# Patient Record
Sex: FEMALE | Race: WHITE | NOT HISPANIC OR LATINO | Employment: OTHER | URBAN - METROPOLITAN AREA
[De-identification: names, ages, dates, MRNs, and addresses within clinical notes are randomized per-mention and may not be internally consistent; named-entity substitution may affect disease eponyms.]

---

## 2017-01-31 ENCOUNTER — TELEPHONE (OUTPATIENT)
Dept: FAMILY MEDICINE CLINIC | Facility: CLINIC | Age: 72
End: 2017-01-31

## 2017-01-31 NOTE — TELEPHONE ENCOUNTER
Called raine [phone] re physical therapy/nursing for new hospital visit knee okd but pt not seen since 10/16 and would need appt.

## 2017-02-10 ENCOUNTER — OFFICE VISIT (OUTPATIENT)
Dept: FAMILY MEDICINE CLINIC | Facility: CLINIC | Age: 72
End: 2017-02-10

## 2017-02-10 VITALS
SYSTOLIC BLOOD PRESSURE: 130 MMHG | BODY MASS INDEX: 41.32 KG/M2 | RESPIRATION RATE: 16 BRPM | HEART RATE: 80 BPM | TEMPERATURE: 98.3 F | DIASTOLIC BLOOD PRESSURE: 82 MMHG | WEIGHT: 248 LBS | OXYGEN SATURATION: 96 % | HEIGHT: 65 IN

## 2017-02-10 DIAGNOSIS — M15.9 PRIMARY OSTEOARTHRITIS INVOLVING MULTIPLE JOINTS: Primary | ICD-10-CM

## 2017-02-10 DIAGNOSIS — E78.5 HYPERLIPIDEMIA, UNSPECIFIED HYPERLIPIDEMIA TYPE: ICD-10-CM

## 2017-02-10 DIAGNOSIS — E03.9 HYPOTHYROIDISM, UNSPECIFIED TYPE: ICD-10-CM

## 2017-02-10 PROCEDURE — 99214 OFFICE O/P EST MOD 30 MIN: CPT | Performed by: INTERNAL MEDICINE

## 2017-02-10 RX ORDER — PANTOPRAZOLE SODIUM 40 MG/1
40 TABLET, DELAYED RELEASE ORAL DAILY
COMMUNITY
End: 2017-10-16

## 2017-02-10 RX ORDER — LEVOTHYROXINE SODIUM 0.12 MG/1
125 TABLET ORAL DAILY
Qty: 90 TABLET | Refills: 3 | Status: SHIPPED | OUTPATIENT
Start: 2017-02-10 | End: 2017-04-06 | Stop reason: DRUGHIGH

## 2017-02-10 RX ORDER — OXYCODONE HYDROCHLORIDE AND ACETAMINOPHEN 5; 325 MG/1; MG/1
TABLET ORAL
COMMUNITY
Start: 2017-02-06 | End: 2017-09-19

## 2017-02-10 RX ORDER — RIZATRIPTAN BENZOATE 10 MG/1
10 TABLET ORAL ONCE AS NEEDED
COMMUNITY
End: 2017-02-10 | Stop reason: SDUPTHER

## 2017-02-10 RX ORDER — RIZATRIPTAN BENZOATE 10 MG/1
10 TABLET ORAL ONCE AS NEEDED
Qty: 5 TABLET | Refills: 3 | Status: SHIPPED | OUTPATIENT
Start: 2017-02-10 | End: 2018-05-03

## 2017-02-10 RX ORDER — ASPIRIN 325 MG
TABLET, DELAYED RELEASE (ENTERIC COATED) ORAL
COMMUNITY
Start: 2017-01-27 | End: 2017-11-01 | Stop reason: DRUGHIGH

## 2017-02-10 NOTE — PROGRESS NOTES
Mesha Lane is a 71 y.o. female.     History of Present Illness   Patient was really simply discharged hard in [facility] for a knee replacement.  Patient is doing extremely well she refill her medications and return to our clinic in 3 months.    Her hypothyroidism been well-controlled on her medication.  She is also controlling her lipid status with diet.    Much of this encounter note is an electronic transcription/translation of spoken language to printed text.  The electronic translation of spoken language may permit erroneous, or at times, nonsensical words or phrases to be inadvertently transcribed.  Although I have reviewed the note for such errors, some may still exist.  The following portions of the patient's history were reviewed and updated as appropriate: allergies, current medications, past family history, past medical history, past social history, past surgical history and problem list.    Review of Systems   Constitutional: Negative for fatigue and fever.   HENT: Positive for congestion. Negative for trouble swallowing.    Eyes: Negative for discharge and visual disturbance.   Respiratory: Negative for choking and shortness of breath.    Cardiovascular: Negative for chest pain and palpitations.   Gastrointestinal: Negative for abdominal pain and blood in stool.   Endocrine: Negative.    Genitourinary: Negative for genital sores and hematuria.   Musculoskeletal: Negative for gait problem and joint swelling.        Knee pain   Skin: Negative for color change, pallor, rash and wound.   Allergic/Immunologic: Positive for environmental allergies. Negative for immunocompromised state.   Neurological: Negative for facial asymmetry and speech difficulty.   Psychiatric/Behavioral: Negative for hallucinations and suicidal ideas.       Objective   Physical Exam   Constitutional: She is oriented to person, place, and time. She appears well-developed and well-nourished.   HENT:   Head:  Normocephalic.   Eyes: Conjunctivae are normal. Pupils are equal, round, and reactive to light.   Neck: Normal range of motion. Neck supple.   Cardiovascular: Normal rate, regular rhythm and normal heart sounds.    Pulmonary/Chest: Effort normal and breath sounds normal.   Abdominal: Soft. Bowel sounds are normal.   Musculoskeletal: She exhibits edema, tenderness and deformity.   Pain to flexion extension left knee   Neurological: She is alert and oriented to person, place, and time.   Skin: Skin is warm and dry.   Psychiatric: She has a normal mood and affect. Her behavior is normal. Judgment and thought content normal.   Nursing note and vitals reviewed.      Assessment/Plan   Problems Addressed this Visit        Cardiovascular and Mediastinum    Hyperlipemia       Endocrine    Hypothyroidism    Relevant Medications    levothyroxine (SYNTHROID) 125 MCG tablet       Musculoskeletal and Integument    Primary osteoarthritis involving multiple joints - Primary

## 2017-03-03 ENCOUNTER — LAB (OUTPATIENT)
Dept: FAMILY MEDICINE CLINIC | Facility: CLINIC | Age: 72
End: 2017-03-03

## 2017-03-03 DIAGNOSIS — M15.9 PRIMARY OSTEOARTHRITIS INVOLVING MULTIPLE JOINTS: ICD-10-CM

## 2017-03-03 DIAGNOSIS — E78.5 HYPERLIPIDEMIA, UNSPECIFIED HYPERLIPIDEMIA TYPE: ICD-10-CM

## 2017-03-03 DIAGNOSIS — R79.89 LOW VITAMIN D LEVEL: ICD-10-CM

## 2017-03-03 DIAGNOSIS — E03.9 HYPOTHYROIDISM, UNSPECIFIED TYPE: ICD-10-CM

## 2017-03-03 LAB
25(OH)D3 SERPL-MCNC: 29.6 NG/ML (ref 30–100)
ALBUMIN SERPL-MCNC: 4.2 G/DL (ref 3.5–5.2)
ALBUMIN/GLOB SERPL: 1.3 G/DL
ALP SERPL-CCNC: 69 U/L (ref 39–117)
ALT SERPL W P-5'-P-CCNC: 17 U/L (ref 1–33)
ANION GAP SERPL CALCULATED.3IONS-SCNC: 12.1 MMOL/L
AST SERPL-CCNC: 19 U/L (ref 1–32)
BILIRUB SERPL-MCNC: 0.7 MG/DL (ref 0.1–1.2)
BUN BLD-MCNC: 16 MG/DL (ref 8–23)
BUN/CREAT SERPL: 16.8 (ref 7–25)
CALCIUM SPEC-SCNC: 9.9 MG/DL (ref 8.6–10.5)
CHLORIDE SERPL-SCNC: 101 MMOL/L (ref 98–107)
CHOLEST SERPL-MCNC: 185 MG/DL (ref 0–200)
CO2 SERPL-SCNC: 26.9 MMOL/L (ref 22–29)
CREAT BLD-MCNC: 0.95 MG/DL (ref 0.57–1)
ERYTHROCYTE [DISTWIDTH] IN BLOOD BY AUTOMATED COUNT: 12.8 % (ref 4.5–15)
GFR SERPL CREATININE-BSD FRML MDRD: 58 ML/MIN/1.73
GLOBULIN UR ELPH-MCNC: 3.3 GM/DL
GLUCOSE BLD-MCNC: 97 MG/DL (ref 65–99)
HCT VFR BLD AUTO: 43.2 % (ref 31–42)
HDLC SERPL-MCNC: 39 MG/DL (ref 40–60)
HGB BLD-MCNC: 14.3 G/DL (ref 12–18)
LDLC SERPL CALC-MCNC: 114 MG/DL (ref 0–100)
LDLC/HDLC SERPL: 2.93 {RATIO}
LYMPHOCYTES # BLD AUTO: 2.2 10*3/MM3 (ref 1.2–3.4)
LYMPHOCYTES NFR BLD AUTO: 26.2 % (ref 21–51)
MCH RBC QN AUTO: 30.3 PG (ref 26.1–33.1)
MCHC RBC AUTO-ENTMCNC: 33.1 G/DL (ref 33–37)
MCV RBC AUTO: 91.3 FL (ref 80–99)
MONOCYTES # BLD AUTO: 0.5 10*3/MM3 (ref 0.1–0.6)
MONOCYTES NFR BLD AUTO: 6 % (ref 2–9)
NEUTROPHILS # BLD AUTO: 5.7 10*3/MM3 (ref 1.4–6.5)
NEUTROPHILS NFR BLD AUTO: 67.8 % (ref 42–75)
PLATELET # BLD AUTO: 298 10*3/MM3 (ref 150–450)
PMV BLD AUTO: 8.6 FL (ref 7.1–10.5)
POTASSIUM BLD-SCNC: 4.8 MMOL/L (ref 3.5–5.2)
PROT SERPL-MCNC: 7.5 G/DL (ref 6–8.5)
RBC # BLD AUTO: 4.73 10*6/MM3 (ref 4–6)
SODIUM BLD-SCNC: 140 MMOL/L (ref 136–145)
T-UPTAKE NFR SERPL: 0.97 TBI (ref 0.8–1.3)
T4 SERPL-MCNC: 9.64 MCG/DL (ref 4.5–11.7)
TRIGL SERPL-MCNC: 158 MG/DL (ref 0–150)
TSH SERPL DL<=0.05 MIU/L-ACNC: 4.39 MIU/ML (ref 0.27–4.2)
VLDLC SERPL-MCNC: 31.6 MG/DL (ref 5–40)
WBC NRBC COR # BLD: 8.4 10*3/MM3 (ref 4.5–10)

## 2017-03-03 PROCEDURE — 82306 VITAMIN D 25 HYDROXY: CPT | Performed by: INTERNAL MEDICINE

## 2017-03-03 PROCEDURE — 84436 ASSAY OF TOTAL THYROXINE: CPT | Performed by: INTERNAL MEDICINE

## 2017-03-03 PROCEDURE — 80061 LIPID PANEL: CPT | Performed by: INTERNAL MEDICINE

## 2017-03-03 PROCEDURE — 85025 COMPLETE CBC W/AUTO DIFF WBC: CPT | Performed by: INTERNAL MEDICINE

## 2017-03-03 PROCEDURE — 80053 COMPREHEN METABOLIC PANEL: CPT | Performed by: INTERNAL MEDICINE

## 2017-03-03 PROCEDURE — 84479 ASSAY OF THYROID (T3 OR T4): CPT | Performed by: INTERNAL MEDICINE

## 2017-03-03 PROCEDURE — 84443 ASSAY THYROID STIM HORMONE: CPT | Performed by: INTERNAL MEDICINE

## 2017-03-04 ENCOUNTER — TELEPHONE (OUTPATIENT)
Dept: FAMILY MEDICINE CLINIC | Facility: CLINIC | Age: 72
End: 2017-03-04

## 2017-03-04 DIAGNOSIS — E06.3 HYPOTHYROIDISM DUE TO HASHIMOTO'S THYROIDITIS: Primary | ICD-10-CM

## 2017-03-04 DIAGNOSIS — E03.8 HYPOTHYROIDISM DUE TO HASHIMOTO'S THYROIDITIS: Primary | ICD-10-CM

## 2017-03-04 NOTE — TELEPHONE ENCOUNTER
----- Message from Edis Bhakta MD sent at 3/4/2017  9:43 AM EST -----  Slightly elevated TSH would recheck in 1 month      L/m to recheck 1 month no change now

## 2017-04-01 ENCOUNTER — LAB (OUTPATIENT)
Dept: FAMILY MEDICINE CLINIC | Facility: CLINIC | Age: 72
End: 2017-04-01

## 2017-04-01 DIAGNOSIS — E03.8 HYPOTHYROIDISM DUE TO HASHIMOTO'S THYROIDITIS: ICD-10-CM

## 2017-04-01 DIAGNOSIS — E06.3 HYPOTHYROIDISM DUE TO HASHIMOTO'S THYROIDITIS: ICD-10-CM

## 2017-04-01 LAB — TSH SERPL DL<=0.05 MIU/L-ACNC: 4.31 MIU/ML (ref 0.27–4.2)

## 2017-04-01 PROCEDURE — 84443 ASSAY THYROID STIM HORMONE: CPT | Performed by: INTERNAL MEDICINE

## 2017-04-06 ENCOUNTER — TELEPHONE (OUTPATIENT)
Dept: FAMILY MEDICINE CLINIC | Facility: CLINIC | Age: 72
End: 2017-04-06

## 2017-04-06 RX ORDER — LEVOTHYROXINE SODIUM 137 UG/1
137 TABLET ORAL DAILY
Qty: 30 TABLET | Refills: 1 | Status: SHIPPED | OUTPATIENT
Start: 2017-04-06 | End: 2017-05-18 | Stop reason: SDUPTHER

## 2017-04-06 NOTE — TELEPHONE ENCOUNTER
----- Message from Coleman Lopez Jr., MD sent at 4/6/2017  7:39 AM EDT -----  Contact: PATIENT [phone]  Increase Synthroid to 137 mg daily with recheck TSH in 6 weeks  ----- Message -----     From: Sameera Stephen MA     Sent: 4/5/2017   2:37 PM       To: Coleman Lopez Jr., MD    Pt states has not missed a dose,takes 125 faithfully  ----- Message -----     From: Sandra Bradley     Sent: 4/5/2017   2:11 PM       To: Sameera Stephen MA    LABS RESULTS DONE HERE      Pt informed re: dose change and recheck in 6 weeks,couldnt get to go over so faxed

## 2017-05-16 ENCOUNTER — LAB (OUTPATIENT)
Dept: FAMILY MEDICINE CLINIC | Facility: CLINIC | Age: 72
End: 2017-05-16

## 2017-05-16 DIAGNOSIS — E03.9 HYPOTHYROIDISM, UNSPECIFIED TYPE: Primary | ICD-10-CM

## 2017-05-16 LAB — TSH SERPL DL<=0.05 MIU/L-ACNC: 3.06 MIU/ML (ref 0.27–4.2)

## 2017-05-16 PROCEDURE — 84443 ASSAY THYROID STIM HORMONE: CPT | Performed by: INTERNAL MEDICINE

## 2017-05-18 ENCOUNTER — TELEPHONE (OUTPATIENT)
Dept: FAMILY MEDICINE CLINIC | Facility: CLINIC | Age: 72
End: 2017-05-18

## 2017-05-18 RX ORDER — LEVOTHYROXINE SODIUM 137 UG/1
137 TABLET ORAL DAILY
Qty: 90 TABLET | Refills: 1 | Status: SHIPPED | OUTPATIENT
Start: 2017-05-18 | End: 2017-11-15 | Stop reason: SDUPTHER

## 2017-08-23 ENCOUNTER — OFFICE VISIT (OUTPATIENT)
Dept: FAMILY MEDICINE CLINIC | Facility: CLINIC | Age: 72
End: 2017-08-23

## 2017-08-23 VITALS
WEIGHT: 251 LBS | OXYGEN SATURATION: 93 % | HEIGHT: 65 IN | DIASTOLIC BLOOD PRESSURE: 72 MMHG | BODY MASS INDEX: 41.82 KG/M2 | HEART RATE: 106 BPM | TEMPERATURE: 98.3 F | SYSTOLIC BLOOD PRESSURE: 122 MMHG

## 2017-08-23 DIAGNOSIS — H61.21 RIGHT EAR IMPACTED CERUMEN: ICD-10-CM

## 2017-08-23 DIAGNOSIS — Z00.00 MEDICARE ANNUAL WELLNESS VISIT, INITIAL: Primary | ICD-10-CM

## 2017-08-23 DIAGNOSIS — J01.00 ACUTE MAXILLARY SINUSITIS, RECURRENCE NOT SPECIFIED: ICD-10-CM

## 2017-08-23 PROCEDURE — 69209 REMOVE IMPACTED EAR WAX UNI: CPT | Performed by: NURSE PRACTITIONER

## 2017-08-23 PROCEDURE — G0438 PPPS, INITIAL VISIT: HCPCS | Performed by: NURSE PRACTITIONER

## 2017-08-23 PROCEDURE — 99213 OFFICE O/P EST LOW 20 MIN: CPT | Performed by: NURSE PRACTITIONER

## 2017-08-23 RX ORDER — AMOXICILLIN 875 MG/1
875 TABLET, COATED ORAL 2 TIMES DAILY
Qty: 14 TABLET | Refills: 0 | Status: SHIPPED | OUTPATIENT
Start: 2017-08-23 | End: 2017-08-30

## 2017-08-23 NOTE — PROGRESS NOTES
QUICK REFERENCE INFORMATION:  The ABCs of the Annual Wellness Visit    Initial Medicare Wellness Visit    HEALTH RISK ASSESSMENT    1945    Recent Hospitalizations:  Recently treated at the following:  [facility].        Current Medical Providers:  Patient Care Team:  Edis Bhakta MD as PCP - General  Denys Valladares MD as PCP - Claims Attributed        Smoking Status:  History   Smoking Status   • Former Smoker   Smokeless Tobacco   • Not on file       Alcohol Consumption:  History   Alcohol Use No       Depression Screen:   PHQ-9 Depression Screening 8/23/2017   Little interest or pleasure in doing things 0   Feeling down, depressed, or hopeless 0   Trouble falling or staying asleep, or sleeping too much 0   Feeling tired or having little energy 0   Poor appetite or overeating 0   Feeling bad about yourself - or that you are a failure or have let yourself or your family down 0   Trouble concentrating on things, such as reading the newspaper or watching television 0   Moving or speaking so slowly that other people could have noticed. Or the opposite - being so fidgety or restless that you have been moving around a lot more than usual 0   Thoughts that you would be better off dead, or of hurting yourself in some way 0   PHQ-9 Total Score 0   If you checked off any problems, how difficult have these problems made it for you to do your work, take care of things at home, or get along with other people? Not difficult at all       Health Habits and Functional and Cognitive Screening:  Functional & Cognitive Status 8/23/2017   Do you have difficulty preparing food and eating? No   Do you have difficulty bathing yourself? No   Do you have difficulty getting dressed? No   Do you have difficulty using the toilet? No   Do you have difficulty moving around from place to place? No   In the past year have you fallen or experienced a near fall? No   Do you need help using the phone?  No   Are you deaf or  do you have serious difficulty hearing?  No   Do you need help with transportation? No   Do you need help shopping? No   Do you need help preparing meals?  No   Do you need help with housework?  No   Do you need help with laundry? No   Do you need help taking your medications? No   Do you need help managing money? No   Do you have difficulty concentrating, remembering or making decisions? No       Health Habits  Current Diet: Well Balanced Diet  Dental Exam: Up to date  Eye Exam: Up to date  Exercise (times per week): 4 times per week  Current Exercise Activities Include: Swimming          Does the patient have evidence of cognitive impairment? No    Asiprin use counseling: Start ASA 81 mg daily       Recent Lab Results:    Visual Acuity:  No exam data present    Age-appropriate Screening Schedule:  Refer to the list below for future screening recommendations based on patient's age, sex and/or medical conditions. Orders for these recommended tests are listed in the plan section. The patient has been provided with a written plan.    Health Maintenance   Topic Date Due   • TDAP/TD VACCINES (1 - Tdap) 07/22/1964   • ZOSTER VACCINE  04/11/2016   • DIABETIC FOOT EXAM  08/23/2017   • HEMOGLOBIN A1C  08/23/2017   • URINE MICROALBUMIN  08/23/2017   • INFLUENZA VACCINE  09/01/2017   • LIPID PANEL  03/03/2018   • DIABETIC EYE EXAM  06/01/2018   • PNEUMOCOCCAL VACCINES (65+ LOW/MEDIUM RISK) (2 of 2 - PPSV23) 08/23/2018   • MAMMOGRAM  12/30/2018   • COLONOSCOPY  04/21/2025        Subjective   History of Present Illness    Shwetha Lane is a 72 y.o. female who presents for an Annual Wellness Visit.    The following portions of the patient's history were reviewed and updated as appropriate: allergies, current medications, past family history, past medical history, past social history, past surgical history and problem list.    Outpatient Medications Prior to Visit   Medication Sig Dispense Refill   • levothyroxine  "(SYNTHROID) 137 MCG tablet Take 1 tablet by mouth Daily. 90 tablet 1   • aspirin  MG tablet      • oxyCODONE-acetaminophen (PERCOCET) 5-325 MG per tablet      • pantoprazole (PROTONIX) 40 MG EC tablet Take 40 mg by mouth Daily.     • rizatriptan (MAXALT) 10 MG tablet Take 1 tablet by mouth 1 (One) Time As Needed for migraine. May repeat in 2 hours if needed 5 tablet 3   • vitamin D (ERGOCALCIFEROL) 03930 UNITS capsule capsule Take 1 capsule by mouth Every 7 (Seven) Days. 32 capsule 1     No facility-administered medications prior to visit.        Patient Active Problem List   Diagnosis   • Pneumonia   • Hypothyroidism   • Primary osteoarthritis involving multiple joints   • Hyperlipemia   • Migraine       Advance Care Planning:  has an advance directive - a copy HAS NOT been provided. Have asked the patient to send this to us to add to record.    Identification of Risk Factors:  Risk factors include: weight  and inactivity.    Review of Systems    Compared to one year ago, the patient feels her physical health is worse.  Compared to one year ago, the patient feels her mental health is the same.    Objective     Physical Exam    Vitals:    08/23/17 1417   BP: 122/72   BP Location: Left arm   Patient Position: Sitting   Cuff Size: Small Adult   Pulse: 106   Temp: 98.3 °F (36.8 °C)   TempSrc: Oral   SpO2: 93%   Weight: 251 lb (114 kg)   Height: 65\" (165.1 cm)   PainSc:   6   PainLoc: Head       Body mass index is 41.77 kg/(m^2).  Discussed the patient's BMI with her. The BMI is above average; BMI management plan is completed.    Assessment/Plan   Patient Self-Management and Personalized Health Advice  The patient has been provided with information about: diet, exercise and weight management and preventive services including:   · Exercise counseling provided, Nutrition counseling provided.    Visit Diagnoses:    ICD-10-CM ICD-9-CM   1. Right ear impacted cerumen H61.21 380.4   2. Acute maxillary sinusitis, " recurrence not specified J01.00 461.0       No orders of the defined types were placed in this encounter.      Outpatient Encounter Prescriptions as of 8/23/2017   Medication Sig Dispense Refill   • levothyroxine (SYNTHROID) 137 MCG tablet Take 1 tablet by mouth Daily. 90 tablet 1   • Multiple Vitamins-Minerals (MULTIVITAMIN & MINERAL PO) Take  by mouth.     • amoxicillin (AMOXIL) 875 MG tablet Take 1 tablet by mouth 2 (Two) Times a Day for 7 days. 14 tablet 0   • aspirin  MG tablet      • oxyCODONE-acetaminophen (PERCOCET) 5-325 MG per tablet      • pantoprazole (PROTONIX) 40 MG EC tablet Take 40 mg by mouth Daily.     • rizatriptan (MAXALT) 10 MG tablet Take 1 tablet by mouth 1 (One) Time As Needed for migraine. May repeat in 2 hours if needed 5 tablet 3   • vitamin D (ERGOCALCIFEROL) 86040 UNITS capsule capsule Take 1 capsule by mouth Every 7 (Seven) Days. 32 capsule 1     No facility-administered encounter medications on file as of 8/23/2017.        Reviewed use of high risk medication in the elderly: yes  Reviewed for potential of harmful drug interactions in the elderly: yes    Follow Up:  No Follow-up on file.     An After Visit Summary and PPPS with all of these plans were given to the patient.

## 2017-08-23 NOTE — PATIENT INSTRUCTIONS
Right ear lavage today.  Start amoxicillin 875mg 2x day for 7 days, take with food.  Medicare wellness visit today.  May try OTC antihistamine, like claritin as needed for drainage.   Increase fluid intake, get plenty of rest.   Patient agrees with plan of care and understands instructions. Call if worsening symptoms or any pr    Medicare Wellness  Personal Prevention Plan of Service     Date of Office Visit:  2017  Encounter Provider:  NELLY Streeter  Place of Service:  [facility]  Patient Name: Shwetha Lane  :  1945    As part of the Medicare Wellness portion of your visit today, we are providing you with this personalized preventive plan of services (PPPS). This plan is based upon recommendations of the [place] Preventive Services Task Force (USPSTF) and the Advisory Committee on Immunization Practices (ACIP).    This lists the preventive care services that should be considered, and provides dates of when you are due. Items listed as completed are up-to-date and do not require any further intervention.    Health Maintenance   Topic Date Due   • TDAP/TD VACCINES (1 - Tdap) 1964   • HEPATITIS C SCREENING  2016   • MEDICARE ANNUAL WELLNESS  2016   • ZOSTER VACCINE  2016   • DIABETIC FOOT EXAM  2017   • HEMOGLOBIN A1C  2017   • URINE MICROALBUMIN  2017   • INFLUENZA VACCINE  2017   • LIPID PANEL  2018   • DIABETIC EYE EXAM  2018   • PNEUMOCOCCAL VACCINES (65+ LOW/MEDIUM RISK) (2 of 2 - PPSV23) 2018   • MAMMOGRAM  2018   • COLONOSCOPY  2025       No orders of the defined types were placed in this encounter.      No Follow-up on file.      oblems or concerns.

## 2017-08-23 NOTE — PROGRESS NOTES
Mesha Lane is a 72 y.o. female.     History of Present Illness   C/o er pain, fever, sore throat, stared Monday night, she has sore lymph nodes on neck, she had nose bleeds on Monday, she has HA, sinus pressure, she has body aches, she took tylenol at home which helped some. She has not been around anyone sick, she recently got home from traveling. She   The following portions of the patient's history were reviewed and updated as appropriate: allergies, current medications, past family history, past medical history, past social history, past surgical history and problem list.    Review of Systems   Constitutional: Positive for fatigue. Negative for chills, diaphoresis and fever.   HENT: Positive for ear pain, nosebleeds, postnasal drip, rhinorrhea, sinus pressure and sore throat. Negative for congestion.    Eyes: Negative for pain.   Respiratory: Negative for cough and shortness of breath.    Cardiovascular: Negative for chest pain.   Musculoskeletal: Positive for arthralgias and myalgias.   Skin: Negative for pallor.   Allergic/Immunologic: Positive for environmental allergies.   Neurological: Negative for dizziness, light-headedness and headaches.   All other systems reviewed and are negative.      Objective   Physical Exam   Constitutional: She is oriented to person, place, and time. She appears well-developed and well-nourished.   HENT:   Head: Normocephalic.   Left Ear: Tympanic membrane and ear canal normal.   Nose: Mucosal edema present. Right sinus exhibits maxillary sinus tenderness and frontal sinus tenderness. Left sinus exhibits maxillary sinus tenderness and frontal sinus tenderness.   Mouth/Throat: Uvula is midline, oropharynx is clear and moist and mucous membranes are normal.   Right cerumen impaction   Eyes: Pupils are equal, round, and reactive to light.   Neck: Neck supple.   Cardiovascular: Normal rate, regular rhythm and normal heart sounds.    Pulmonary/Chest: Effort normal  and breath sounds normal.   Musculoskeletal: Normal range of motion.   Lymphadenopathy:     She has no cervical adenopathy.   Neurological: She is alert and oriented to person, place, and time.   Skin: Skin is warm and dry.   Psychiatric: She has a normal mood and affect. Her behavior is normal. Judgment and thought content normal.   Nursing note and vitals reviewed.  right TM WNL after lavage.     Assessment/Plan   Shwetha was seen today for sinusitis.    Diagnoses and all orders for this visit:    Medicare annual wellness visit, initial    Right ear impacted cerumen    Acute maxillary sinusitis, recurrence not specified    Other orders  -     amoxicillin (AMOXIL) 875 MG tablet; Take 1 tablet by mouth 2 (Two) Times a Day for 7 days.      Right ear lavage today. Tolerated well.   Start amoxicillin 875mg 2x day for 7 days, take with food.  Medicare wellness visit today.  May try OTC antihistamine, like claritin as needed for drainage.   Increase fluid intake, get plenty of rest.   Patient agrees with plan of care and understands instructions. Call if worsening symptoms or any problems or concerns.

## 2017-09-19 ENCOUNTER — OFFICE VISIT (OUTPATIENT)
Dept: FAMILY MEDICINE CLINIC | Facility: CLINIC | Age: 72
End: 2017-09-19

## 2017-09-19 VITALS
BODY MASS INDEX: 42 KG/M2 | TEMPERATURE: 98.4 F | HEIGHT: 64 IN | DIASTOLIC BLOOD PRESSURE: 70 MMHG | HEART RATE: 93 BPM | OXYGEN SATURATION: 92 % | WEIGHT: 246 LBS | SYSTOLIC BLOOD PRESSURE: 120 MMHG

## 2017-09-19 DIAGNOSIS — J20.9 ACUTE BRONCHITIS, UNSPECIFIED ORGANISM: Primary | ICD-10-CM

## 2017-09-19 DIAGNOSIS — R06.2 WHEEZING: ICD-10-CM

## 2017-09-19 DIAGNOSIS — R06.02 SHORTNESS OF BREATH: ICD-10-CM

## 2017-09-19 DIAGNOSIS — J01.00 ACUTE MAXILLARY SINUSITIS, RECURRENCE NOT SPECIFIED: ICD-10-CM

## 2017-09-19 LAB
ERYTHROCYTE [DISTWIDTH] IN BLOOD BY AUTOMATED COUNT: 12.8 % (ref 4.5–15)
HCT VFR BLD AUTO: 45.4 % (ref 31–42)
HGB BLD-MCNC: 14.8 G/DL (ref 12–18)
LYMPHOCYTES # BLD AUTO: 3.2 10*3/MM3 (ref 1.2–3.4)
LYMPHOCYTES NFR BLD AUTO: 19.6 % (ref 21–51)
MCH RBC QN AUTO: 30.2 PG (ref 26.1–33.1)
MCHC RBC AUTO-ENTMCNC: 32.7 G/DL (ref 33–37)
MCV RBC AUTO: 92.5 FL (ref 80–99)
MONOCYTES # BLD AUTO: 0.4 10*3/MM3 (ref 0.1–0.6)
MONOCYTES NFR BLD AUTO: 2.6 % (ref 2–9)
NEUTROPHILS # BLD AUTO: 12.8 10*3/MM3 (ref 1.4–6.5)
NEUTROPHILS NFR BLD AUTO: 77.8 % (ref 42–75)
PLATELET # BLD AUTO: 333 10*3/MM3 (ref 150–450)
PMV BLD AUTO: 7.3 FL (ref 7.1–10.5)
RBC # BLD AUTO: 4.91 10*6/MM3 (ref 4–6)
WBC NRBC COR # BLD: 16.4 10*3/MM3 (ref 4.5–10)

## 2017-09-19 PROCEDURE — 36415 COLL VENOUS BLD VENIPUNCTURE: CPT | Performed by: NURSE PRACTITIONER

## 2017-09-19 PROCEDURE — 71020 XR CHEST PA AND LATERAL: CPT | Performed by: NURSE PRACTITIONER

## 2017-09-19 PROCEDURE — 99213 OFFICE O/P EST LOW 20 MIN: CPT | Performed by: NURSE PRACTITIONER

## 2017-09-19 PROCEDURE — 85025 COMPLETE CBC W/AUTO DIFF WBC: CPT | Performed by: NURSE PRACTITIONER

## 2017-09-19 RX ORDER — LEVOFLOXACIN 500 MG/1
500 TABLET, FILM COATED ORAL DAILY
Qty: 7 TABLET | Refills: 0 | Status: SHIPPED | OUTPATIENT
Start: 2017-09-19 | End: 2017-10-16

## 2017-09-19 RX ORDER — FLUCONAZOLE 150 MG/1
150 TABLET ORAL ONCE
Qty: 1 TABLET | Refills: 1 | Status: SHIPPED | OUTPATIENT
Start: 2017-09-19 | End: 2017-09-19

## 2017-09-19 RX ORDER — OXYBUTYNIN CHLORIDE 5 MG/1
TABLET ORAL
COMMUNITY
Start: 2017-08-24 | End: 2017-11-01

## 2017-09-19 RX ORDER — ALBUTEROL SULFATE 90 UG/1
2 AEROSOL, METERED RESPIRATORY (INHALATION) EVERY 4 HOURS PRN
Qty: 1 INHALER | Refills: 0 | Status: SHIPPED | OUTPATIENT
Start: 2017-09-19 | End: 2017-11-01

## 2017-09-19 RX ORDER — GUAIFENESIN AND CODEINE PHOSPHATE 100; 10 MG/5ML; MG/5ML
5 SOLUTION ORAL 4 TIMES DAILY PRN
Qty: 120 ML | Refills: 0 | Status: SHIPPED | OUTPATIENT
Start: 2017-09-19 | End: 2017-10-16

## 2017-09-19 RX ORDER — IPRATROPIUM BROMIDE AND ALBUTEROL SULFATE 2.5; .5 MG/3ML; MG/3ML
3 SOLUTION RESPIRATORY (INHALATION) ONCE
Status: DISCONTINUED | OUTPATIENT
Start: 2017-09-19 | End: 2018-05-25

## 2017-09-19 NOTE — PROGRESS NOTES
Mesha Lane is a 72 y.o. female.     History of Present Illness   C/o chest tenderness and fever beginning 13 days ago, with chest tightness and using mucinex OTC without much help, with wheezing and SOA and prod cough yellow mucus, no ear pain or sore throat, some sinus tenderness or congestion and was last seen 08/17 tx amox helped but thinks has returned, OTC tylenol helps somewhat, with hx of bronchitis and pneumonia, has used albuterol inhaler in past but none at home, doesn't like steroids as makes her anxious and jittery    The following portions of the patient's history were reviewed and updated as appropriate: allergies, current medications, past family history, past medical history, past social history, past surgical history and problem list.    Review of Systems   Constitutional: Positive for fatigue and fever.   HENT: Positive for congestion, facial swelling, postnasal drip and sinus pressure. Negative for dental problem, drooling, ear discharge, ear pain, hearing loss, mouth sores, nosebleeds, rhinorrhea, sneezing, sore throat, trouble swallowing and voice change.    Respiratory: Positive for cough, chest tightness, shortness of breath and wheezing.    Cardiovascular: Negative for chest pain, palpitations and leg swelling.   Neurological: Negative for dizziness and headaches.   Psychiatric/Behavioral: Positive for sleep disturbance. Negative for suicidal ideas. The patient is nervous/anxious.    All other systems reviewed and are negative.      Objective   Physical Exam   Constitutional: She is oriented to person, place, and time. She appears well-developed and well-nourished.   HENT:   Head: Normocephalic and atraumatic.   Right Ear: Hearing and tympanic membrane normal.   Left Ear: Hearing and tympanic membrane normal.   Nose: Mucosal edema present. Right sinus exhibits maxillary sinus tenderness. Right sinus exhibits no frontal sinus tenderness. Left sinus exhibits maxillary sinus  tenderness. Left sinus exhibits no frontal sinus tenderness.   Mouth/Throat: Posterior oropharyngeal erythema present. No oropharyngeal exudate or posterior oropharyngeal edema.   Eyes: Conjunctivae and EOM are normal. Pupils are equal, round, and reactive to light.   Neck: Normal range of motion. Neck supple. No thyromegaly present.   Cardiovascular: Normal rate, regular rhythm and normal heart sounds.    Pulmonary/Chest: Effort normal. She has wheezes (inspiratory and expiratory B upper and middle lobes).   Musculoskeletal: Normal range of motion.   Lymphadenopathy:     She has no cervical adenopathy.   Neurological: She is alert and oriented to person, place, and time.   Skin: Skin is warm and dry.   Psychiatric: Her behavior is normal. Judgment and thought content normal.   tearful   Vitals reviewed.  chest xray 2v without comparison for SOA wheezing and prod cough shows NAD awaiting radiology review    Assessment/Plan   Shwetha was seen today for cough.    Diagnoses and all orders for this visit:    Acute bronchitis, unspecified organism  -     ipratropium-albuterol (DUO-NEB) nebulizer solution 3 mL; Take 3 mL by nebulization 1 (One) Time.    Shortness of breath  -     CBC & Differential  -     XR Chest PA & Lateral  -     CBC Auto Differential  -     ipratropium-albuterol (DUO-NEB) nebulizer solution 3 mL; Take 3 mL by nebulization 1 (One) Time.    Wheezing  -     CBC & Differential  -     XR Chest PA & Lateral  -     CBC Auto Differential  -     ipratropium-albuterol (DUO-NEB) nebulizer solution 3 mL; Take 3 mL by nebulization 1 (One) Time.    Acute maxillary sinusitis, recurrence not specified    Other orders  -     levoFLOXacin (LEVAQUIN) 500 MG tablet; Take 1 tablet by mouth Daily. 1 tab qd x 10d  -     albuterol (PROVENTIL HFA;VENTOLIN HFA) 108 (90 Base) MCG/ACT inhaler; Inhale 2 puffs Every 4 (Four) Hours As Needed for Wheezing or Shortness of Air.  -     fluconazole (DIFLUCAN) 150 MG tablet; Take 1  tablet by mouth 1 (One) Time for 1 dose.  -     guaifenesin-codeine (CHERATUSSIN AC) 100-10 MG/5ML liquid; Take 5 mL by mouth 4 (Four) Times a Day As Needed for Cough.    WBC 16, CXR NAD, duoneb today, rx levaquin 500 mg 1 PO daily x 1 wk, rx albuterol inhaler 2 puffs every 4-6 hrs prn, defer steroids d/t past SE, erx diflucan 150 mg 1 PO today repeat prn, call or RTC if sx persist or worsen in 3 days, increase H20 and rest, rx cheratussin cough syrup may cause sedation

## 2017-09-19 NOTE — PATIENT INSTRUCTIONS
WBC 16, CXR NAD, duoneb today, rx levaquin 500 mg 1 PO daily x 1 wk, rx albuterol inhaler 2 puffs every 4-6 hrs prn, defer steroids d/t past SE, erx diflucan 150 mg 1 PO today repeat prn, call or RTC if sx persist or worsen in 3 days, increase H20 and rest

## 2017-09-20 ENCOUNTER — TELEPHONE (OUTPATIENT)
Dept: FAMILY MEDICINE CLINIC | Facility: CLINIC | Age: 72
End: 2017-09-20

## 2017-10-16 ENCOUNTER — OFFICE VISIT (OUTPATIENT)
Dept: FAMILY MEDICINE CLINIC | Facility: CLINIC | Age: 72
End: 2017-10-16

## 2017-10-16 VITALS
HEART RATE: 85 BPM | DIASTOLIC BLOOD PRESSURE: 72 MMHG | TEMPERATURE: 98.4 F | WEIGHT: 248.8 LBS | HEIGHT: 64 IN | SYSTOLIC BLOOD PRESSURE: 118 MMHG | OXYGEN SATURATION: 94 % | RESPIRATION RATE: 16 BRPM | BODY MASS INDEX: 42.47 KG/M2

## 2017-10-16 DIAGNOSIS — J30.2 SEASONAL ALLERGIC RHINITIS, UNSPECIFIED CHRONICITY, UNSPECIFIED TRIGGER: ICD-10-CM

## 2017-10-16 DIAGNOSIS — J01.01 ACUTE RECURRENT MAXILLARY SINUSITIS: Primary | ICD-10-CM

## 2017-10-16 DIAGNOSIS — R05.9 COUGH: ICD-10-CM

## 2017-10-16 PROCEDURE — 99213 OFFICE O/P EST LOW 20 MIN: CPT | Performed by: NURSE PRACTITIONER

## 2017-10-16 RX ORDER — FEXOFENADINE HCL AND PSEUDOEPHEDRINE HCI 60; 120 MG/1; MG/1
1 TABLET, EXTENDED RELEASE ORAL 2 TIMES DAILY
Qty: 30 TABLET | Refills: 0 | Status: SHIPPED | OUTPATIENT
Start: 2017-10-16 | End: 2017-11-01

## 2017-10-16 NOTE — PROGRESS NOTES
"Mesha Lane is a 72 y.o. female.     History of Present Illness   C/o sinus pressure and congestion, she has nasal drainage, she was seen in 8/17, given amoxicillin for sinusitis, seen again in 9/17 by trina tyler, given levaquin, cxr normal, she states she feels weak, she states chest congestion is better, she still has a productive cough, nasal drainage yellow in color,she denies fever, she denies sore throat. she states she tried sin-x which helped, she has used inhaler but did not need it this weekend. She has seen ENT for ear infection before. She saw Dr. Wilkinson. She states she had sinus infections frequently d/t a \"tooth leak.\"  The following portions of the patient's history were reviewed and updated as appropriate: allergies, current medications, past family history, past medical history, past social history, past surgical history and problem list.    Review of Systems   Constitutional: Negative for chills, diaphoresis and fever.   HENT: Positive for congestion, postnasal drip, rhinorrhea and sinus pressure. Negative for ear pain and sore throat.    Eyes: Negative for pain.   Respiratory: Positive for cough. Negative for chest tightness, shortness of breath and wheezing.    Cardiovascular: Negative for chest pain.   Musculoskeletal: Negative for arthralgias and myalgias.   Allergic/Immunologic: Positive for environmental allergies.   Neurological: Negative for dizziness, light-headedness and headaches.   All other systems reviewed and are negative.      Objective   Physical Exam   Constitutional: She is oriented to person, place, and time. She appears well-developed and well-nourished.   HENT:   Head: Normocephalic.   Right Ear: Tympanic membrane and ear canal normal.   Left Ear: Tympanic membrane and ear canal normal.   Nose: Mucosal edema and rhinorrhea present. Right sinus exhibits maxillary sinus tenderness. Left sinus exhibits maxillary sinus tenderness.   Mouth/Throat: Uvula is " midline, oropharynx is clear and moist and mucous membranes are normal.   Eyes: Pupils are equal, round, and reactive to light.   Neck: Normal range of motion. Neck supple.   Cardiovascular: Normal rate, regular rhythm and normal heart sounds.    Pulmonary/Chest: Effort normal and breath sounds normal. She has no decreased breath sounds. She has no wheezes. She has no rhonchi. She has no rales.   Musculoskeletal: Normal range of motion.   Lymphadenopathy:     She has no cervical adenopathy.   Neurological: She is alert and oriented to person, place, and time.   Skin: Skin is warm and dry.   Psychiatric: She has a normal mood and affect. Her behavior is normal. Judgment and thought content normal.   Nursing note and vitals reviewed.      Assessment/Plan   Shwetha was seen today for sinusitis.    Diagnoses and all orders for this visit:    Acute recurrent maxillary sinusitis  -     Ambulatory Referral to ENT (Otolaryngology)    Seasonal allergic rhinitis, unspecified chronicity, unspecified trigger  -     Ambulatory Referral to ENT (Otolaryngology)    Cough    Other orders  -     fexofenadine-pseudoephedrine (ALLEGRA-D)  MG per 12 hr tablet; Take 1 tablet by mouth 2 (Two) Times a Day.      Refer to ent, appt made for patient with Dr. Renner on 10/19/17 at 3:20pm.  Trial allegra-d as needed for congestion.   Deferred abx and CT sinus today as she will see ENT this week.  May try flonase OTC as needed for congestion and drainage.   Increase fluid intake, get plenty of rest.   Patient agrees with plan of care and understands instructions. Call if worsening symptoms or any problems or concerns.

## 2017-10-16 NOTE — PATIENT INSTRUCTIONS
Refer to ent, appt made for patient with Dr. Renner on 10/19/17 at 3:20pm.  Trial allegra-d as needed for congestion.   Deferred abx and CT sinus today as she will see ENT this week.  May try flonase OTC as needed for congestion and drainage.   Increase fluid intake, get plenty of rest.   Patient agrees with plan of care and understands instructions. Call if worsening symptoms or any problems or concerns.

## 2017-10-23 ENCOUNTER — TELEPHONE (OUTPATIENT)
Dept: FAMILY MEDICINE CLINIC | Facility: CLINIC | Age: 72
End: 2017-10-23

## 2017-10-23 NOTE — TELEPHONE ENCOUNTER
PT WENT TO HOSPITAL FOR PNEUMONIA AND NEEDS A F/U APPT WTH RLS.  SOONEST AVAIL ISN'T UNTIL 11/21 PLS WORK PT IN SOONER IF POSSIBLE.

## 2017-11-01 ENCOUNTER — OFFICE VISIT (OUTPATIENT)
Dept: FAMILY MEDICINE CLINIC | Facility: CLINIC | Age: 72
End: 2017-11-01

## 2017-11-01 VITALS
DIASTOLIC BLOOD PRESSURE: 88 MMHG | TEMPERATURE: 98.1 F | OXYGEN SATURATION: 93 % | HEIGHT: 64 IN | BODY MASS INDEX: 42 KG/M2 | WEIGHT: 246 LBS | RESPIRATION RATE: 16 BRPM | SYSTOLIC BLOOD PRESSURE: 120 MMHG | HEART RATE: 103 BPM

## 2017-11-01 DIAGNOSIS — B37.0 THRUSH: ICD-10-CM

## 2017-11-01 DIAGNOSIS — J18.9 PNEUMONIA OF LEFT LOWER LOBE DUE TO INFECTIOUS ORGANISM: Primary | ICD-10-CM

## 2017-11-01 DIAGNOSIS — E03.9 HYPOTHYROIDISM, UNSPECIFIED TYPE: ICD-10-CM

## 2017-11-01 DIAGNOSIS — E78.5 HYPERLIPIDEMIA, UNSPECIFIED HYPERLIPIDEMIA TYPE: ICD-10-CM

## 2017-11-01 DIAGNOSIS — E55.9 VITAMIN D DEFICIENCY: ICD-10-CM

## 2017-11-01 LAB — KOH PREP NAIL: ABNORMAL

## 2017-11-01 PROCEDURE — 87220 TISSUE EXAM FOR FUNGI: CPT | Performed by: INTERNAL MEDICINE

## 2017-11-01 PROCEDURE — 99214 OFFICE O/P EST MOD 30 MIN: CPT | Performed by: INTERNAL MEDICINE

## 2017-11-01 RX ORDER — ASPIRIN 81 MG/1
81 TABLET ORAL DAILY
COMMUNITY
End: 2020-08-18

## 2017-11-01 RX ORDER — FLUCONAZOLE 100 MG/1
100 TABLET ORAL DAILY
Qty: 14 TABLET | Refills: 3 | Status: SHIPPED | OUTPATIENT
Start: 2017-11-01 | End: 2017-12-21

## 2017-11-01 NOTE — PROGRESS NOTES
Mesha Lane is a 72 y.o. female.     History of Present Illness   He was discharged from the hospital for pneumonia.  She is following up with a very stable.  She is on thyroid medicines but there has been stable over last several months.  Lipid status is been controlled with diet and medication.  She did have oral thrush and was put on nystatin swish and swallow without success.  He also became very hoarse and was set up with ENT for endoscopy.  Patient return to our clinic in 2 weeks and recheck KOH she was placed on Diflucan on milligrams daily.    Much of this encounter note is an electronic transcription/translation of spoken language to printed text.  The electronic translation of spoken language may permit erroneous, or at times, nonsensical words or phrases to be inadvertently transcribed.  Although I have reviewed the note for such errors, some may still exist.  The following portions of the patient's history were reviewed and updated as appropriate: allergies, current medications, past family history, past medical history, past social history, past surgical history and problem list.    Review of Systems   Constitutional: Negative for fatigue and fever.   HENT: Positive for congestion. Negative for trouble swallowing.    Eyes: Negative for discharge and visual disturbance.   Respiratory: Negative for choking and shortness of breath.    Cardiovascular: Negative for chest pain and palpitations.   Gastrointestinal: Negative for abdominal pain and blood in stool.   Endocrine: Negative.    Genitourinary: Negative for genital sores and hematuria.   Musculoskeletal: Negative for gait problem and joint swelling.   Skin: Negative for color change, pallor, rash and wound.   Allergic/Immunologic: Positive for environmental allergies. Negative for immunocompromised state.   Neurological: Negative for facial asymmetry and speech difficulty.   Psychiatric/Behavioral: Negative for hallucinations and  suicidal ideas.       Objective   Physical Exam   Constitutional: She is oriented to person, place, and time. She appears well-developed and well-nourished.   HENT:   Head: Normocephalic.   Eyes: Conjunctivae are normal. Pupils are equal, round, and reactive to light.   Neck: Normal range of motion. Neck supple.   Cardiovascular: Normal rate, regular rhythm and normal heart sounds.    Pulmonary/Chest: Effort normal and breath sounds normal.   Abdominal: Soft. Bowel sounds are normal.   Musculoskeletal: Normal range of motion.   Neurological: She is alert and oriented to person, place, and time.   Skin: Skin is warm and dry.   Psychiatric: She has a normal mood and affect. Her behavior is normal. Judgment and thought content normal.   Nursing note and vitals reviewed.      Assessment/Plan   Problems Addressed this Visit        Cardiovascular and Mediastinum    Hyperlipemia    Relevant Orders    CBC & Differential    Comprehensive Metabolic Panel    Lipid Panel    Vitamin D 25 Hydroxy    Thyroid Panel With TSH       Respiratory    Pneumonia - Primary    Relevant Orders    CBC & Differential    Comprehensive Metabolic Panel    Lipid Panel    Vitamin D 25 Hydroxy    Thyroid Panel With TSH       Endocrine    Hypothyroidism    Relevant Orders    CBC & Differential    Comprehensive Metabolic Panel    Lipid Panel    Vitamin D 25 Hydroxy    Thyroid Panel With TSH      Other Visit Diagnoses     Vitamin D deficiency         Relevant Orders    Vitamin D 25 Hydroxy    Thrush        Relevant Medications    fluconazole (DIFLUCAN) 100 MG tablet    Other Relevant Orders    KOH Prep - Swab, Throat (Completed)    ANDREY Prep - Swab, Throat

## 2017-11-10 ENCOUNTER — CLINICAL SUPPORT (OUTPATIENT)
Dept: FAMILY MEDICINE CLINIC | Facility: CLINIC | Age: 72
End: 2017-11-10

## 2017-11-10 DIAGNOSIS — Z23 FLU VACCINE NEED: Primary | ICD-10-CM

## 2017-11-10 PROCEDURE — 90662 IIV NO PRSV INCREASED AG IM: CPT | Performed by: INTERNAL MEDICINE

## 2017-11-10 PROCEDURE — G0008 ADMIN INFLUENZA VIRUS VAC: HCPCS | Performed by: INTERNAL MEDICINE

## 2017-11-15 ENCOUNTER — LAB (OUTPATIENT)
Dept: FAMILY MEDICINE CLINIC | Facility: CLINIC | Age: 72
End: 2017-11-15

## 2017-11-15 DIAGNOSIS — E55.9 VITAMIN D DEFICIENCY: ICD-10-CM

## 2017-11-15 DIAGNOSIS — E78.5 HYPERLIPIDEMIA, UNSPECIFIED HYPERLIPIDEMIA TYPE: ICD-10-CM

## 2017-11-15 DIAGNOSIS — B37.0 THRUSH: ICD-10-CM

## 2017-11-15 DIAGNOSIS — E03.9 HYPOTHYROIDISM, UNSPECIFIED TYPE: ICD-10-CM

## 2017-11-15 DIAGNOSIS — J18.9 PNEUMONIA OF LEFT LOWER LOBE DUE TO INFECTIOUS ORGANISM: ICD-10-CM

## 2017-11-15 LAB
25(OH)D3 SERPL-MCNC: 36.5 NG/ML (ref 30–100)
ALBUMIN SERPL-MCNC: 4 G/DL (ref 3.5–5.2)
ALBUMIN/GLOB SERPL: 1.2 G/DL
ALP SERPL-CCNC: 129 U/L (ref 39–117)
ALT SERPL W P-5'-P-CCNC: 99 U/L (ref 1–33)
ANION GAP SERPL CALCULATED.3IONS-SCNC: 12.5 MMOL/L
AST SERPL-CCNC: 53 U/L (ref 1–32)
BILIRUB SERPL-MCNC: 0.5 MG/DL (ref 0.1–1.2)
BUN BLD-MCNC: 18 MG/DL (ref 8–23)
BUN/CREAT SERPL: 20 (ref 7–25)
CALCIUM SPEC-SCNC: 9.8 MG/DL (ref 8.6–10.5)
CHLORIDE SERPL-SCNC: 102 MMOL/L (ref 98–107)
CHOLEST SERPL-MCNC: 200 MG/DL (ref 0–200)
CO2 SERPL-SCNC: 26.5 MMOL/L (ref 22–29)
CREAT BLD-MCNC: 0.9 MG/DL (ref 0.57–1)
ERYTHROCYTE [DISTWIDTH] IN BLOOD BY AUTOMATED COUNT: 13.1 % (ref 4.5–15)
GFR SERPL CREATININE-BSD FRML MDRD: 62 ML/MIN/1.73
GLOBULIN UR ELPH-MCNC: 3.3 GM/DL
GLUCOSE BLD-MCNC: 112 MG/DL (ref 65–99)
HCT VFR BLD AUTO: 46 % (ref 31–42)
HDLC SERPL-MCNC: 41 MG/DL (ref 40–60)
HGB BLD-MCNC: 14.8 G/DL (ref 12–18)
KOH PREP NAIL: NORMAL
LDLC SERPL CALC-MCNC: 126 MG/DL (ref 0–100)
LDLC/HDLC SERPL: 3.07 {RATIO}
LYMPHOCYTES # BLD AUTO: 2.6 10*3/MM3 (ref 1.2–3.4)
LYMPHOCYTES NFR BLD AUTO: 30 % (ref 21–51)
MCH RBC QN AUTO: 29.7 PG (ref 26.1–33.1)
MCHC RBC AUTO-ENTMCNC: 32.2 G/DL (ref 33–37)
MCV RBC AUTO: 92.3 FL (ref 80–99)
MONOCYTES # BLD AUTO: 0.7 10*3/MM3 (ref 0.1–0.6)
MONOCYTES NFR BLD AUTO: 8.2 % (ref 2–9)
NEUTROPHILS # BLD AUTO: 5.4 10*3/MM3 (ref 1.4–6.5)
NEUTROPHILS NFR BLD AUTO: 61.8 % (ref 42–75)
PLATELET # BLD AUTO: 297 10*3/MM3 (ref 150–450)
PMV BLD AUTO: 7.9 FL (ref 7.1–10.5)
POTASSIUM BLD-SCNC: 4.8 MMOL/L (ref 3.5–5.2)
PROT SERPL-MCNC: 7.3 G/DL (ref 6–8.5)
RBC # BLD AUTO: 4.98 10*6/MM3 (ref 4–6)
SODIUM BLD-SCNC: 141 MMOL/L (ref 136–145)
T-UPTAKE NFR SERPL: 1.05 TBI (ref 0.8–1.3)
T4 SERPL-MCNC: 10.39 MCG/DL (ref 4.5–11.7)
TRIGL SERPL-MCNC: 165 MG/DL (ref 0–150)
TSH SERPL DL<=0.05 MIU/L-ACNC: 2.29 MIU/ML (ref 0.27–4.2)
VLDLC SERPL-MCNC: 33 MG/DL (ref 5–40)
WBC NRBC COR # BLD: 8.8 10*3/MM3 (ref 4.5–10)

## 2017-11-15 PROCEDURE — 84479 ASSAY OF THYROID (T3 OR T4): CPT | Performed by: INTERNAL MEDICINE

## 2017-11-15 PROCEDURE — 82306 VITAMIN D 25 HYDROXY: CPT | Performed by: INTERNAL MEDICINE

## 2017-11-15 PROCEDURE — 80061 LIPID PANEL: CPT | Performed by: INTERNAL MEDICINE

## 2017-11-15 PROCEDURE — 80053 COMPREHEN METABOLIC PANEL: CPT | Performed by: INTERNAL MEDICINE

## 2017-11-15 PROCEDURE — 84443 ASSAY THYROID STIM HORMONE: CPT | Performed by: INTERNAL MEDICINE

## 2017-11-15 PROCEDURE — 84436 ASSAY OF TOTAL THYROXINE: CPT | Performed by: INTERNAL MEDICINE

## 2017-11-15 PROCEDURE — 85025 COMPLETE CBC W/AUTO DIFF WBC: CPT | Performed by: INTERNAL MEDICINE

## 2017-11-15 PROCEDURE — 87220 TISSUE EXAM FOR FUNGI: CPT | Performed by: INTERNAL MEDICINE

## 2017-11-15 PROCEDURE — 36415 COLL VENOUS BLD VENIPUNCTURE: CPT | Performed by: INTERNAL MEDICINE

## 2017-11-15 RX ORDER — LEVOTHYROXINE SODIUM 137 UG/1
137 TABLET ORAL DAILY
Qty: 90 TABLET | Refills: 1 | Status: SHIPPED | OUTPATIENT
Start: 2017-11-15 | End: 2017-11-16 | Stop reason: SDUPTHER

## 2017-11-16 ENCOUNTER — TELEPHONE (OUTPATIENT)
Dept: FAMILY MEDICINE CLINIC | Facility: CLINIC | Age: 72
End: 2017-11-16

## 2017-11-16 RX ORDER — LEVOTHYROXINE SODIUM 137 UG/1
137 TABLET ORAL DAILY
Qty: 90 TABLET | Refills: 3 | Status: SHIPPED | OUTPATIENT
Start: 2017-11-16 | End: 2018-11-01 | Stop reason: SDUPTHER

## 2017-11-16 NOTE — TELEPHONE ENCOUNTER
PATIENTS levothyroxine (SYNTHROID) 137 MCG tablet WAS SENT TO [facility] YESTERDAY AND PHARMACY DID NOT RECEIVE IT. PATIENT WANTS TO  A RX  TOMORROW AND TAKE IT TO [facility] HERSELF.

## 2017-12-04 ENCOUNTER — TELEPHONE (OUTPATIENT)
Dept: FAMILY MEDICINE CLINIC | Facility: CLINIC | Age: 72
End: 2017-12-04

## 2017-12-04 DIAGNOSIS — R94.5 ABNORMAL RESULTS OF LIVER FUNCTION STUDIES: ICD-10-CM

## 2017-12-04 DIAGNOSIS — R79.89 ELEVATED LFTS: Primary | ICD-10-CM

## 2017-12-04 NOTE — TELEPHONE ENCOUNTER
Vitamin D low normal recommend 5000 units daily and elevated liver function tests recommend additional labs and ultrasound

## 2017-12-05 DIAGNOSIS — R79.89 ELEVATED LFTS: Primary | ICD-10-CM

## 2017-12-05 NOTE — TELEPHONE ENCOUNTER
Patient informed re: mychal d 5000 daily, further hepatits/lft check  Wants tomorrow  Says wants to recheck lfts before u/s due to something done at hospital. Did you want to order alk.phos?

## 2017-12-07 ENCOUNTER — LAB (OUTPATIENT)
Dept: FAMILY MEDICINE CLINIC | Facility: CLINIC | Age: 72
End: 2017-12-07

## 2017-12-07 DIAGNOSIS — R94.5 ABNORMAL RESULTS OF LIVER FUNCTION STUDIES: ICD-10-CM

## 2017-12-07 DIAGNOSIS — R79.89 ELEVATED LFTS: ICD-10-CM

## 2017-12-07 LAB
ALBUMIN SERPL-MCNC: 4.2 G/DL (ref 3.5–5.2)
ALP SERPL-CCNC: 82 U/L (ref 39–117)
ALT SERPL W P-5'-P-CCNC: 19 U/L (ref 1–33)
AST SERPL-CCNC: 13 U/L (ref 1–32)
BILIRUB CONJ SERPL-MCNC: <0.2 MG/DL (ref 0–0.3)
BILIRUB INDIRECT SERPL-MCNC: NORMAL MG/DL
BILIRUB SERPL-MCNC: 0.6 MG/DL (ref 0.1–1.2)
HAV IGM SERPL QL IA: NORMAL
HBV CORE IGM SERPL QL IA: NORMAL
HBV SURFACE AG SERPL QL IA: NORMAL
HCV AB SER DONR QL: NORMAL
PROT SERPL-MCNC: 7.9 G/DL (ref 6–8.5)

## 2017-12-07 PROCEDURE — 80074 ACUTE HEPATITIS PANEL: CPT | Performed by: INTERNAL MEDICINE

## 2017-12-07 PROCEDURE — 36415 COLL VENOUS BLD VENIPUNCTURE: CPT | Performed by: INTERNAL MEDICINE

## 2017-12-07 PROCEDURE — 80076 HEPATIC FUNCTION PANEL: CPT | Performed by: INTERNAL MEDICINE

## 2017-12-08 LAB
ALP BONE CFR SERPL: 48 % (ref 14–68)
ALP INTEST CFR SERPL: 3 % (ref 0–18)
ALP LIVER CFR SERPL: 49 % (ref 18–85)
ALP SERPL-CCNC: 84 IU/L (ref 39–117)

## 2017-12-13 ENCOUNTER — HOSPITAL ENCOUNTER (OUTPATIENT)
Dept: ULTRASOUND IMAGING | Facility: HOSPITAL | Age: 72
Discharge: HOME | End: 2017-12-13
Admitting: INTERNAL MEDICINE

## 2017-12-13 DIAGNOSIS — R79.89 ELEVATED LFTS: ICD-10-CM

## 2017-12-13 PROCEDURE — 76700 US EXAM ABDOM COMPLETE: CPT

## 2017-12-14 PROBLEM — K76.0 FATTY LIVER: Status: ACTIVE | Noted: 2017-12-14

## 2017-12-21 ENCOUNTER — OFFICE VISIT (OUTPATIENT)
Dept: FAMILY MEDICINE CLINIC | Facility: CLINIC | Age: 72
End: 2017-12-21

## 2017-12-21 VITALS
TEMPERATURE: 98.5 F | OXYGEN SATURATION: 93 % | SYSTOLIC BLOOD PRESSURE: 120 MMHG | BODY MASS INDEX: 42 KG/M2 | WEIGHT: 246 LBS | DIASTOLIC BLOOD PRESSURE: 72 MMHG | HEART RATE: 84 BPM | HEIGHT: 64 IN | RESPIRATION RATE: 18 BRPM

## 2017-12-21 DIAGNOSIS — E78.2 MIXED HYPERLIPIDEMIA: ICD-10-CM

## 2017-12-21 DIAGNOSIS — M15.9 PRIMARY OSTEOARTHRITIS INVOLVING MULTIPLE JOINTS: Primary | ICD-10-CM

## 2017-12-21 DIAGNOSIS — E03.9 HYPOTHYROIDISM, UNSPECIFIED TYPE: ICD-10-CM

## 2017-12-21 DIAGNOSIS — E55.9 VITAMIN D DEFICIENCY: ICD-10-CM

## 2017-12-21 PROCEDURE — 99214 OFFICE O/P EST MOD 30 MIN: CPT | Performed by: INTERNAL MEDICINE

## 2017-12-21 RX ORDER — RIZATRIPTAN BENZOATE 10 MG/1
10 TABLET, ORALLY DISINTEGRATING ORAL ONCE AS NEEDED
COMMUNITY
End: 2017-12-21 | Stop reason: SDUPTHER

## 2017-12-21 RX ORDER — CYCLOBENZAPRINE HCL 10 MG
10 TABLET ORAL DAILY PRN
COMMUNITY
End: 2017-12-21 | Stop reason: SDUPTHER

## 2017-12-21 RX ORDER — CYCLOBENZAPRINE HCL 10 MG
10 TABLET ORAL DAILY PRN
Qty: 30 TABLET | Refills: 3 | Status: SHIPPED | OUTPATIENT
Start: 2017-12-21 | End: 2019-12-11 | Stop reason: SDUPTHER

## 2017-12-21 RX ORDER — RIZATRIPTAN BENZOATE 10 MG/1
10 TABLET, ORALLY DISINTEGRATING ORAL ONCE AS NEEDED
Qty: 30 TABLET | Refills: 0 | Status: SHIPPED | OUTPATIENT
Start: 2017-12-21 | End: 2021-06-17

## 2017-12-21 NOTE — PROGRESS NOTES
Mesha Lane is a 72 y.o. female.     History of Present Illness   Patient was seen for a steel arthritis.  Her joint pains been controlled with exercise and over-the-counter medication.  She was also going to join a club with a heated pool to do water aerobics.  Her thyroid is also been controlled with medication.  Her lipid status also controlled with diet and exercise.    Much of this encounter note is an electronic transcription/translation of spoken language to printed text.  The electronic translation of spoken language may permit erroneous, or at times, nonsensical words or phrases to be inadvertently transcribed.  Although I have reviewed the note for such errors, some may still exist.  The following portions of the patient's history were reviewed and updated as appropriate: allergies, current medications, past family history, past medical history, past social history, past surgical history and problem list.    Review of Systems   Constitutional: Negative for fatigue and fever.   HENT: Positive for congestion. Negative for trouble swallowing.    Eyes: Negative for discharge and visual disturbance.   Respiratory: Negative for choking and shortness of breath.    Cardiovascular: Negative for chest pain and palpitations.   Gastrointestinal: Negative for abdominal pain and blood in stool.   Endocrine: Negative.    Genitourinary: Negative for genital sores and hematuria.   Musculoskeletal: Negative for gait problem and joint swelling.   Skin: Negative for color change, pallor, rash and wound.   Allergic/Immunologic: Positive for environmental allergies. Negative for immunocompromised state.   Neurological: Negative for facial asymmetry and speech difficulty.   Psychiatric/Behavioral: Negative for hallucinations and suicidal ideas.       Objective   Physical Exam   Constitutional: She is oriented to person, place, and time. She appears well-developed and well-nourished.   HENT:   Head:  Normocephalic.   Eyes: Conjunctivae are normal. Pupils are equal, round, and reactive to light.   Neck: Normal range of motion. Neck supple.   Cardiovascular: Normal rate, regular rhythm and normal heart sounds.    Pulmonary/Chest: Effort normal and breath sounds normal.   Abdominal: Soft. Bowel sounds are normal.   Musculoskeletal: Normal range of motion.   Neurological: She is alert and oriented to person, place, and time.   Skin: Skin is warm and dry.   Psychiatric: She has a normal mood and affect. Her behavior is normal. Judgment and thought content normal.   Nursing note and vitals reviewed.      Assessment/Plan   Problems Addressed this Visit        Cardiovascular and Mediastinum    Hyperlipemia    Relevant Orders    CBC & Differential    Comprehensive Metabolic Panel    Lipid Panel    Thyroid Panel With TSH    Vitamin D 25 Hydroxy       Endocrine    Hypothyroidism    Relevant Orders    CBC & Differential    Comprehensive Metabolic Panel    Lipid Panel    Thyroid Panel With TSH    Vitamin D 25 Hydroxy       Musculoskeletal and Integument    Primary osteoarthritis involving multiple joints - Primary    Relevant Orders    CBC & Differential    Comprehensive Metabolic Panel    Lipid Panel    Thyroid Panel With TSH    Vitamin D 25 Hydroxy      Other Visit Diagnoses     Vitamin D deficiency         Relevant Orders    Vitamin D 25 Hydroxy

## 2018-02-07 ENCOUNTER — OFFICE VISIT CONVERTED (OUTPATIENT)
Dept: ORTHOPEDIC SURGERY | Facility: CLINIC | Age: 73
End: 2018-02-07
Attending: ORTHOPAEDIC SURGERY

## 2018-04-27 ENCOUNTER — TELEPHONE (OUTPATIENT)
Dept: FAMILY MEDICINE CLINIC | Facility: CLINIC | Age: 73
End: 2018-04-27

## 2018-04-27 NOTE — TELEPHONE ENCOUNTER
PT WENT TO THE EMERGENCY ROOM AND CALLED TO MAKE A FOLLOW UP APPOINTMENT TO SEE Dr. COOPER. PT STATES THAT SHE HAS SEVERE VERTIGO AND A SEVERE HEADACHE, AND STATES THAT SHE NEEDS TO BE PUT BACK ON A MEDICATION SHE TOOK HERSELF OFF Of.  PT ALSO STATED THAT Dr. COOPER TOLD HER IF SHE IS HAVING AN ISSUE, SHE NEEDS TO TALK TO [facility] AND HAVE HER WORK HER INTO Dr. COOPER'S SCHEDULE, SO PT DOES NOT WANT TO MAKE AN APPOINTMENT WITH ANYONE ELSE, AFTER I TOLD HER THAT Dr. COOPER WAS SICK AND HIS SCHEDULE IS FULL THROUGH THE MIDDLE OF MAY

## 2018-05-03 ENCOUNTER — OFFICE VISIT (OUTPATIENT)
Dept: FAMILY MEDICINE CLINIC | Facility: CLINIC | Age: 73
End: 2018-05-03

## 2018-05-03 VITALS
HEART RATE: 78 BPM | BODY MASS INDEX: 40.98 KG/M2 | TEMPERATURE: 98.4 F | HEIGHT: 65 IN | DIASTOLIC BLOOD PRESSURE: 72 MMHG | WEIGHT: 246 LBS | OXYGEN SATURATION: 94 % | SYSTOLIC BLOOD PRESSURE: 124 MMHG

## 2018-05-03 DIAGNOSIS — E78.2 MIXED HYPERLIPIDEMIA: ICD-10-CM

## 2018-05-03 DIAGNOSIS — K76.0 FATTY LIVER: ICD-10-CM

## 2018-05-03 DIAGNOSIS — E55.9 VITAMIN D DEFICIENCY: ICD-10-CM

## 2018-05-03 DIAGNOSIS — E03.9 HYPOTHYROIDISM, UNSPECIFIED TYPE: ICD-10-CM

## 2018-05-03 DIAGNOSIS — R42 VERTIGO: Primary | ICD-10-CM

## 2018-05-03 PROCEDURE — 99214 OFFICE O/P EST MOD 30 MIN: CPT | Performed by: INTERNAL MEDICINE

## 2018-05-03 RX ORDER — MECLIZINE HYDROCHLORIDE 25 MG/1
25 TABLET ORAL
COMMUNITY
Start: 2018-04-25 | End: 2018-05-03 | Stop reason: ALTCHOICE

## 2018-05-03 RX ORDER — FLUTICASONE PROPIONATE 50 MCG
SPRAY, SUSPENSION (ML) NASAL
COMMUNITY
Start: 2018-05-01 | End: 2019-08-08 | Stop reason: SDUPTHER

## 2018-05-03 RX ORDER — CLONAZEPAM 1 MG/1
1 TABLET ORAL 2 TIMES DAILY PRN
Qty: 30 TABLET | Refills: 1 | Status: SHIPPED | OUTPATIENT
Start: 2018-05-03 | End: 2021-06-17

## 2018-05-03 RX ORDER — TOPIRAMATE 25 MG/1
25 TABLET ORAL DAILY
Qty: 30 TABLET | Refills: 3 | Status: SHIPPED | OUTPATIENT
Start: 2018-05-03 | End: 2018-08-23 | Stop reason: SDUPTHER

## 2018-05-03 NOTE — PROGRESS NOTES
Mesha Lane is a 72 y.o. female.     History of Present Illness   A she was seen today for vertigo.  She gets extreme dizziness with nausea when laying on her left side.  Patient did go the emergency room and the MRI was normal.  She has no palpitations no irregular heartbeats.  She was trying meclizine with partial relief that was DC'd and switched to clonidine.  Patient was also sent to ENT for the Epley maneuver.  Her thyroid is main stable on levothyroxin her fatty liver is also being monitored on a routine basis and her lipids also controlled with diet and medication.  She has having more trouble with her migraines and was placed back on her Topamax daily.    Dictated utilizing Dragon dictation. If there are questions or for further clarification, please contact me.   The following portions of the patient's history were reviewed and updated as appropriate: allergies, current medications, past family history, past medical history, past social history, past surgical history and problem list.    Review of Systems   Constitutional: Negative for fatigue and fever.   HENT: Positive for congestion. Negative for trouble swallowing.    Eyes: Negative for discharge and visual disturbance.   Respiratory: Negative for choking and shortness of breath.    Cardiovascular: Negative for chest pain and palpitations.   Gastrointestinal: Negative for abdominal pain and blood in stool.   Endocrine: Negative.    Genitourinary: Negative for genital sores and hematuria.   Musculoskeletal: Negative for gait problem and joint swelling.   Skin: Negative for color change, pallor, rash and wound.   Allergic/Immunologic: Positive for environmental allergies. Negative for immunocompromised state.   Neurological: Positive for dizziness and headaches. Negative for facial asymmetry and speech difficulty.   Psychiatric/Behavioral: Negative for hallucinations and suicidal ideas.       Objective   Physical Exam    Constitutional: She is oriented to person, place, and time. She appears well-developed and well-nourished.   HENT:   Head: Normocephalic and atraumatic.   Right Ear: External ear normal.   Left Ear: External ear normal.   Nose: Nose normal.   Mouth/Throat: Oropharynx is clear and moist. No oropharyngeal exudate.   Eyes: Conjunctivae are normal. Pupils are equal, round, and reactive to light.   Neck: Normal range of motion. Neck supple.   Cardiovascular: Normal rate, regular rhythm and normal heart sounds.  Exam reveals no gallop and no friction rub.    No murmur heard.  Pulmonary/Chest: Effort normal and breath sounds normal. No respiratory distress. She has no wheezes. She has no rales. She exhibits no tenderness.   Abdominal: Soft. Bowel sounds are normal.   Musculoskeletal: Normal range of motion.   Neurological: She is alert and oriented to person, place, and time.   Skin: Skin is warm and dry.   Psychiatric: She has a normal mood and affect. Her behavior is normal. Judgment and thought content normal.   Nursing note and vitals reviewed.      Assessment/Plan   Problems Addressed this Visit        Cardiovascular and Mediastinum    Hyperlipemia    Relevant Orders    Comprehensive Metabolic Panel    CBC & Differential    Lipid Panel    Vitamin D 25 Hydroxy    Thyroid Panel With TSH       Digestive    Fatty liver    Relevant Orders    Comprehensive Metabolic Panel    CBC & Differential    Lipid Panel    Vitamin D 25 Hydroxy    Thyroid Panel With TSH       Endocrine    Hypothyroidism    Relevant Orders    Comprehensive Metabolic Panel    CBC & Differential    Lipid Panel    Vitamin D 25 Hydroxy    Thyroid Panel With TSH       Other    Vertigo - Primary    Relevant Orders    Ambulatory Referral to ENT (Otolaryngology) (Completed)      Other Visit Diagnoses     Vitamin D deficiency         Relevant Orders    Vitamin D 25 Hydroxy

## 2018-05-22 ENCOUNTER — LAB (OUTPATIENT)
Dept: FAMILY MEDICINE CLINIC | Facility: CLINIC | Age: 73
End: 2018-05-22

## 2018-05-22 DIAGNOSIS — M15.9 PRIMARY OSTEOARTHRITIS INVOLVING MULTIPLE JOINTS: ICD-10-CM

## 2018-05-22 DIAGNOSIS — K76.0 FATTY LIVER: ICD-10-CM

## 2018-05-22 DIAGNOSIS — E03.9 HYPOTHYROIDISM, UNSPECIFIED TYPE: ICD-10-CM

## 2018-05-22 DIAGNOSIS — E78.2 MIXED HYPERLIPIDEMIA: ICD-10-CM

## 2018-05-22 DIAGNOSIS — E55.9 VITAMIN D DEFICIENCY: ICD-10-CM

## 2018-05-22 LAB
25(OH)D3 SERPL-MCNC: 48.7 NG/ML (ref 30–100)
ALBUMIN SERPL-MCNC: 4.2 G/DL (ref 3.5–5.2)
ALBUMIN/GLOB SERPL: 1.4 G/DL
ALP SERPL-CCNC: 67 U/L (ref 39–117)
ALT SERPL W P-5'-P-CCNC: 19 U/L (ref 1–33)
ANION GAP SERPL CALCULATED.3IONS-SCNC: 11.6 MMOL/L
AST SERPL-CCNC: 14 U/L (ref 1–32)
BILIRUB SERPL-MCNC: 0.7 MG/DL (ref 0.1–1.2)
BUN BLD-MCNC: 15 MG/DL (ref 8–23)
BUN/CREAT SERPL: 16.7 (ref 7–25)
CALCIUM SPEC-SCNC: 9.5 MG/DL (ref 8.6–10.5)
CHLORIDE SERPL-SCNC: 102 MMOL/L (ref 98–107)
CHOLEST SERPL-MCNC: 196 MG/DL (ref 0–200)
CO2 SERPL-SCNC: 26.4 MMOL/L (ref 22–29)
CREAT BLD-MCNC: 0.9 MG/DL (ref 0.57–1)
ERYTHROCYTE [DISTWIDTH] IN BLOOD BY AUTOMATED COUNT: 13 % (ref 4.5–15)
GFR SERPL CREATININE-BSD FRML MDRD: 62 ML/MIN/1.73
GLOBULIN UR ELPH-MCNC: 2.9 GM/DL
GLUCOSE BLD-MCNC: 108 MG/DL (ref 65–99)
HCT VFR BLD AUTO: 46.8 % (ref 31–42)
HDLC SERPL-MCNC: 41 MG/DL (ref 40–60)
HGB BLD-MCNC: 15.8 G/DL (ref 12–18)
LDLC SERPL CALC-MCNC: 128 MG/DL (ref 0–100)
LDLC/HDLC SERPL: 3.13 {RATIO}
LYMPHOCYTES # BLD AUTO: 2.2 10*3/MM3 (ref 1.2–3.4)
LYMPHOCYTES NFR BLD AUTO: 27.4 % (ref 21–51)
MCH RBC QN AUTO: 31.3 PG (ref 26.1–33.1)
MCHC RBC AUTO-ENTMCNC: 33.8 G/DL (ref 33–37)
MCV RBC AUTO: 92.5 FL (ref 80–99)
MONOCYTES # BLD AUTO: 0.5 10*3/MM3 (ref 0.1–0.6)
MONOCYTES NFR BLD AUTO: 5.6 % (ref 2–9)
NEUTROPHILS # BLD AUTO: 5.5 10*3/MM3 (ref 1.4–6.5)
NEUTROPHILS NFR BLD AUTO: 67 % (ref 42–75)
PLATELET # BLD AUTO: 271 10*3/MM3 (ref 150–450)
PMV BLD AUTO: 8.2 FL (ref 7.1–10.5)
POTASSIUM BLD-SCNC: 4.6 MMOL/L (ref 3.5–5.2)
PROT SERPL-MCNC: 7.1 G/DL (ref 6–8.5)
RBC # BLD AUTO: 5.06 10*6/MM3 (ref 4–6)
SODIUM BLD-SCNC: 140 MMOL/L (ref 136–145)
T-UPTAKE NFR SERPL: 0.88 TBI (ref 0.8–1.3)
T4 SERPL-MCNC: 10.83 MCG/DL (ref 4.5–11.7)
TRIGL SERPL-MCNC: 133 MG/DL (ref 0–150)
TSH SERPL DL<=0.05 MIU/L-ACNC: 2.53 MIU/ML (ref 0.27–4.2)
VLDLC SERPL-MCNC: 26.6 MG/DL (ref 5–40)
WBC NRBC COR # BLD: 8.2 10*3/MM3 (ref 4.5–10)

## 2018-05-22 PROCEDURE — 36415 COLL VENOUS BLD VENIPUNCTURE: CPT | Performed by: INTERNAL MEDICINE

## 2018-05-22 PROCEDURE — 84479 ASSAY OF THYROID (T3 OR T4): CPT | Performed by: INTERNAL MEDICINE

## 2018-05-22 PROCEDURE — 84436 ASSAY OF TOTAL THYROXINE: CPT | Performed by: INTERNAL MEDICINE

## 2018-05-22 PROCEDURE — 85025 COMPLETE CBC W/AUTO DIFF WBC: CPT | Performed by: INTERNAL MEDICINE

## 2018-05-22 PROCEDURE — 84443 ASSAY THYROID STIM HORMONE: CPT | Performed by: INTERNAL MEDICINE

## 2018-05-22 PROCEDURE — 80061 LIPID PANEL: CPT | Performed by: INTERNAL MEDICINE

## 2018-05-22 PROCEDURE — 80053 COMPREHEN METABOLIC PANEL: CPT | Performed by: INTERNAL MEDICINE

## 2018-05-22 PROCEDURE — 82306 VITAMIN D 25 HYDROXY: CPT | Performed by: INTERNAL MEDICINE

## 2018-06-21 ENCOUNTER — OFFICE VISIT (OUTPATIENT)
Dept: FAMILY MEDICINE CLINIC | Facility: CLINIC | Age: 73
End: 2018-06-21

## 2018-06-21 VITALS
WEIGHT: 242 LBS | OXYGEN SATURATION: 94 % | BODY MASS INDEX: 40.32 KG/M2 | TEMPERATURE: 98.3 F | HEIGHT: 65 IN | SYSTOLIC BLOOD PRESSURE: 110 MMHG | RESPIRATION RATE: 18 BRPM | HEART RATE: 85 BPM | DIASTOLIC BLOOD PRESSURE: 70 MMHG

## 2018-06-21 DIAGNOSIS — M25.511 ACUTE PAIN OF RIGHT SHOULDER: Primary | ICD-10-CM

## 2018-06-21 DIAGNOSIS — E78.2 MIXED HYPERLIPIDEMIA: ICD-10-CM

## 2018-06-21 DIAGNOSIS — E03.9 HYPOTHYROIDISM, UNSPECIFIED TYPE: ICD-10-CM

## 2018-06-21 DIAGNOSIS — R42 VERTIGO: ICD-10-CM

## 2018-06-21 PROCEDURE — 73030 X-RAY EXAM OF SHOULDER: CPT | Performed by: INTERNAL MEDICINE

## 2018-06-21 PROCEDURE — 99213 OFFICE O/P EST LOW 20 MIN: CPT | Performed by: INTERNAL MEDICINE

## 2018-06-21 NOTE — PROGRESS NOTES
Subjective   Shwetha Lane is a 72 y.o. female.     History of Present Illness   Patient was seen for trauma to the right shoulder.  A she was in [place] and fell and hit her right shoulder.  Her x-ray did not show any acute fractures.  She was given physical therapy orders and will follow-up in 2 weeks.  He also prefers to use over-the-counter NSAIDs.    X-Ray  Interpretation report in house X-rays that I personally viewed    Relevant Clinical Issues/Diagnoses/Indications:  Shoulder pain shoulder x-ray        Clinical Findings:  No acute disease          Comparative Data:  No previous x-ray          Date of Previous X-ray:  Change on current X-ray      Dictated utilizing Dragon dictation. If there are questions or for further clarification, please contact me.   The following portions of the patient's history were reviewed and updated as appropriate: allergies, current medications, past family history, past medical history, past social history, past surgical history and problem list.    Review of Systems   Constitutional: Negative for fatigue and fever.   HENT: Positive for congestion. Negative for trouble swallowing.    Eyes: Negative for discharge and visual disturbance.   Respiratory: Negative for choking and shortness of breath.    Cardiovascular: Negative for chest pain and palpitations.   Gastrointestinal: Negative for abdominal pain and blood in stool.   Endocrine: Negative.    Genitourinary: Negative for genital sores and hematuria.   Musculoskeletal: Negative for gait problem and joint swelling.        Right shoulder pain   Skin: Negative for color change, pallor, rash and wound.   Allergic/Immunologic: Positive for environmental allergies. Negative for immunocompromised state.   Neurological: Negative for facial asymmetry and speech difficulty.   Psychiatric/Behavioral: Negative for hallucinations and suicidal ideas.       Objective   Physical Exam   Constitutional: She is oriented to person, place,  and time. She appears well-developed and well-nourished.   HENT:   Head: Normocephalic.   Eyes: Conjunctivae are normal. Pupils are equal, round, and reactive to light.   Neck: Normal range of motion. Neck supple.   Cardiovascular: Normal rate, regular rhythm and normal heart sounds.    Pulmonary/Chest: Effort normal and breath sounds normal.   Abdominal: Soft. Bowel sounds are normal.   Musculoskeletal: Normal range of motion. She exhibits edema and tenderness.   Neurological: She is alert and oriented to person, place, and time.   Skin: Skin is warm and dry.   Psychiatric: She has a normal mood and affect. Her behavior is normal. Judgment and thought content normal.   Nursing note and vitals reviewed.      Assessment/Plan   Problems Addressed this Visit        Cardiovascular and Mediastinum    Hyperlipemia       Endocrine    Hypothyroidism       Other    Vertigo      Other Visit Diagnoses     Acute pain of right shoulder    -  Primary    Relevant Orders    XR Shoulder 2+ View Right (Completed)    Ambulatory Referral to Physical Therapy Evaluate and treat, Ortho (Completed)

## 2018-07-23 ENCOUNTER — OFFICE VISIT CONVERTED (OUTPATIENT)
Dept: ORTHOPEDIC SURGERY | Facility: CLINIC | Age: 73
End: 2018-07-23
Attending: ORTHOPAEDIC SURGERY

## 2018-08-23 ENCOUNTER — TELEPHONE (OUTPATIENT)
Dept: FAMILY MEDICINE CLINIC | Facility: CLINIC | Age: 73
End: 2018-08-23

## 2018-08-23 RX ORDER — TOPIRAMATE 25 MG/1
25 TABLET ORAL DAILY
Qty: 90 TABLET | Refills: 3 | Status: SHIPPED | OUTPATIENT
Start: 2018-08-23 | End: 2018-08-28 | Stop reason: SDUPTHER

## 2018-08-28 ENCOUNTER — TELEPHONE (OUTPATIENT)
Dept: FAMILY MEDICINE CLINIC | Facility: CLINIC | Age: 73
End: 2018-08-28

## 2018-08-28 RX ORDER — TOPIRAMATE 25 MG/1
25 TABLET ORAL DAILY
Qty: 90 TABLET | Refills: 3 | Status: SHIPPED | OUTPATIENT
Start: 2018-08-28 | End: 2019-08-08

## 2018-08-28 NOTE — TELEPHONE ENCOUNTER
PT CALLED FROM PHARMACY STATING THAT THEY NEVER RECEIVED THE RX FOR TOPIRAMATE.  PT STATES THAT SHE IS COMING BY TOMORROW TO  A PAPER PRESCRIPTION INSTEAD

## 2018-09-10 ENCOUNTER — OFFICE VISIT CONVERTED (OUTPATIENT)
Dept: NEUROSURGERY | Facility: CLINIC | Age: 73
End: 2018-09-10
Attending: PHYSICIAN ASSISTANT

## 2018-10-10 ENCOUNTER — CONVERSION ENCOUNTER (OUTPATIENT)
Dept: NEUROLOGY | Facility: CLINIC | Age: 73
End: 2018-10-10

## 2018-10-10 ENCOUNTER — OFFICE VISIT CONVERTED (OUTPATIENT)
Dept: NEUROSURGERY | Facility: CLINIC | Age: 73
End: 2018-10-10
Attending: PHYSICIAN ASSISTANT

## 2018-11-01 ENCOUNTER — OFFICE VISIT (OUTPATIENT)
Dept: FAMILY MEDICINE CLINIC | Facility: CLINIC | Age: 73
End: 2018-11-01

## 2018-11-01 VITALS
RESPIRATION RATE: 16 BRPM | SYSTOLIC BLOOD PRESSURE: 110 MMHG | WEIGHT: 247 LBS | TEMPERATURE: 98.2 F | OXYGEN SATURATION: 96 % | HEIGHT: 65 IN | BODY MASS INDEX: 41.15 KG/M2 | DIASTOLIC BLOOD PRESSURE: 64 MMHG | HEART RATE: 66 BPM

## 2018-11-01 DIAGNOSIS — E03.9 HYPOTHYROIDISM, UNSPECIFIED TYPE: ICD-10-CM

## 2018-11-01 DIAGNOSIS — E78.2 MIXED HYPERLIPIDEMIA: ICD-10-CM

## 2018-11-01 DIAGNOSIS — Z00.00 MEDICARE ANNUAL WELLNESS VISIT, SUBSEQUENT: Primary | ICD-10-CM

## 2018-11-01 DIAGNOSIS — M15.9 PRIMARY OSTEOARTHRITIS INVOLVING MULTIPLE JOINTS: ICD-10-CM

## 2018-11-01 DIAGNOSIS — E55.9 VITAMIN D DEFICIENCY: ICD-10-CM

## 2018-11-01 LAB
25(OH)D3 SERPL-MCNC: 56.3 NG/ML (ref 30–100)
ALBUMIN SERPL-MCNC: 4.4 G/DL (ref 3.5–5.2)
ALBUMIN/GLOB SERPL: 1.3 G/DL
ALP SERPL-CCNC: 73 U/L (ref 39–117)
ALT SERPL W P-5'-P-CCNC: 20 U/L (ref 1–33)
ANION GAP SERPL CALCULATED.3IONS-SCNC: 10.8 MMOL/L
AST SERPL-CCNC: 19 U/L (ref 1–32)
BILIRUB SERPL-MCNC: 0.6 MG/DL (ref 0.1–1.2)
BUN BLD-MCNC: 15 MG/DL (ref 8–23)
BUN/CREAT SERPL: 18.1 (ref 7–25)
CALCIUM SPEC-SCNC: 9.7 MG/DL (ref 8.6–10.5)
CHLORIDE SERPL-SCNC: 102 MMOL/L (ref 98–107)
CHOLEST SERPL-MCNC: 182 MG/DL (ref 0–200)
CO2 SERPL-SCNC: 24.2 MMOL/L (ref 22–29)
CREAT BLD-MCNC: 0.83 MG/DL (ref 0.57–1)
ERYTHROCYTE [DISTWIDTH] IN BLOOD BY AUTOMATED COUNT: 12.6 % (ref 4.5–15)
GFR SERPL CREATININE-BSD FRML MDRD: 67 ML/MIN/1.73
GLOBULIN UR ELPH-MCNC: 3.4 GM/DL
GLUCOSE BLD-MCNC: 100 MG/DL (ref 65–99)
HCT VFR BLD AUTO: 47.6 % (ref 31–42)
HDLC SERPL-MCNC: 38 MG/DL (ref 40–60)
HGB BLD-MCNC: 15.4 G/DL (ref 12–18)
LDLC SERPL CALC-MCNC: 106 MG/DL (ref 0–100)
LDLC/HDLC SERPL: 2.78 {RATIO}
LYMPHOCYTES # BLD AUTO: 3.1 10*3/MM3 (ref 1.2–3.4)
LYMPHOCYTES NFR BLD AUTO: 32 % (ref 21–51)
MCH RBC QN AUTO: 30.8 PG (ref 26.1–33.1)
MCHC RBC AUTO-ENTMCNC: 32.4 G/DL (ref 33–37)
MCV RBC AUTO: 95.1 FL (ref 80–99)
MONOCYTES # BLD AUTO: 0.3 10*3/MM3 (ref 0.1–0.6)
MONOCYTES NFR BLD AUTO: 3.4 % (ref 2–9)
NEUTROPHILS # BLD AUTO: 6.2 10*3/MM3 (ref 1.4–6.5)
NEUTROPHILS NFR BLD AUTO: 64.6 % (ref 42–75)
PLATELET # BLD AUTO: 294 10*3/MM3 (ref 150–450)
PMV BLD AUTO: 7.8 FL (ref 7.1–10.5)
POTASSIUM BLD-SCNC: 4.2 MMOL/L (ref 3.5–5.2)
PROT SERPL-MCNC: 7.8 G/DL (ref 6–8.5)
RBC # BLD AUTO: 5.01 10*6/MM3 (ref 4–6)
SODIUM BLD-SCNC: 137 MMOL/L (ref 136–145)
T-UPTAKE NFR SERPL: 0.97 TBI (ref 0.8–1.3)
T4 SERPL-MCNC: 10.66 MCG/DL (ref 4.5–11.7)
TRIGL SERPL-MCNC: 192 MG/DL (ref 0–150)
TSH SERPL DL<=0.05 MIU/L-ACNC: 1.3 MIU/ML (ref 0.27–4.2)
VLDLC SERPL-MCNC: 38.4 MG/DL (ref 5–40)
WBC NRBC COR # BLD: 9.6 10*3/MM3 (ref 4.5–10)

## 2018-11-01 PROCEDURE — 99214 OFFICE O/P EST MOD 30 MIN: CPT | Performed by: INTERNAL MEDICINE

## 2018-11-01 PROCEDURE — G0008 ADMIN INFLUENZA VIRUS VAC: HCPCS | Performed by: INTERNAL MEDICINE

## 2018-11-01 PROCEDURE — 82306 VITAMIN D 25 HYDROXY: CPT | Performed by: INTERNAL MEDICINE

## 2018-11-01 PROCEDURE — 36415 COLL VENOUS BLD VENIPUNCTURE: CPT | Performed by: INTERNAL MEDICINE

## 2018-11-01 PROCEDURE — 84443 ASSAY THYROID STIM HORMONE: CPT | Performed by: INTERNAL MEDICINE

## 2018-11-01 PROCEDURE — 84436 ASSAY OF TOTAL THYROXINE: CPT | Performed by: INTERNAL MEDICINE

## 2018-11-01 PROCEDURE — 90662 IIV NO PRSV INCREASED AG IM: CPT | Performed by: INTERNAL MEDICINE

## 2018-11-01 PROCEDURE — 80061 LIPID PANEL: CPT | Performed by: INTERNAL MEDICINE

## 2018-11-01 PROCEDURE — 80053 COMPREHEN METABOLIC PANEL: CPT | Performed by: INTERNAL MEDICINE

## 2018-11-01 PROCEDURE — 84479 ASSAY OF THYROID (T3 OR T4): CPT | Performed by: INTERNAL MEDICINE

## 2018-11-01 PROCEDURE — G0439 PPPS, SUBSEQ VISIT: HCPCS | Performed by: INTERNAL MEDICINE

## 2018-11-01 PROCEDURE — 85025 COMPLETE CBC W/AUTO DIFF WBC: CPT | Performed by: INTERNAL MEDICINE

## 2018-11-01 RX ORDER — LEVOTHYROXINE SODIUM 137 UG/1
137 TABLET ORAL DAILY
Qty: 90 TABLET | Refills: 3 | Status: SHIPPED | OUTPATIENT
Start: 2018-11-01 | End: 2019-11-06 | Stop reason: SDUPTHER

## 2018-11-01 NOTE — PROGRESS NOTES
Mesha Lane is a 73 y.o. female.     History of Present Illness   She was seen for Medicare wellness exam.  Patient's thyroid is been well-controlled with her levothyroxin her osteoarthritis controlled with over-the-counter pain medication.  Lipid status will be assessed today with lab work and she will follow-up in 4 days.  She will return to our clinic in 6 months.    Dictated utilizing Dragon dictation. If there are questions or for further clarification, please contact me.   The following portions of the patient's history were reviewed and updated as appropriate: allergies, current medications, past family history, past medical history, past social history, past surgical history and problem list.    Review of Systems   Constitutional: Negative for fatigue and fever.   HENT: Positive for congestion. Negative for trouble swallowing.    Eyes: Negative for discharge and visual disturbance.   Respiratory: Negative for choking and shortness of breath.    Cardiovascular: Negative for chest pain and palpitations.   Gastrointestinal: Negative for abdominal pain and blood in stool.   Endocrine: Negative.    Genitourinary: Negative for genital sores and hematuria.   Musculoskeletal: Negative for gait problem and joint swelling.   Skin: Negative for color change, pallor, rash and wound.   Allergic/Immunologic: Positive for environmental allergies. Negative for immunocompromised state.   Neurological: Negative for facial asymmetry and speech difficulty.   Psychiatric/Behavioral: Negative for hallucinations and suicidal ideas.       Objective   Physical Exam   Constitutional: She is oriented to person, place, and time. She appears well-developed and well-nourished.   HENT:   Head: Normocephalic.   Eyes: Pupils are equal, round, and reactive to light. Conjunctivae are normal.   Neck: Normal range of motion. Neck supple.   Cardiovascular: Normal rate, regular rhythm and normal heart sounds.     Pulmonary/Chest: Effort normal and breath sounds normal.   Abdominal: Soft. Bowel sounds are normal.   Musculoskeletal: Normal range of motion.   Neurological: She is alert and oriented to person, place, and time.   Skin: Skin is warm and dry.   Psychiatric: She has a normal mood and affect. Her behavior is normal. Judgment and thought content normal.   Nursing note and vitals reviewed.      Assessment/Plan   Problems Addressed this Visit        Cardiovascular and Mediastinum    Hyperlipemia    Relevant Orders    Comprehensive Metabolic Panel    CBC & Differential (Completed)    Lipid Panel    Vitamin D 25 Hydroxy    Thyroid Panel With TSH    CBC Auto Differential (Completed)       Endocrine    Hypothyroidism    Relevant Medications    metoprolol tartrate (LOPRESSOR) 25 MG tablet    levothyroxine (SYNTHROID) 137 MCG tablet    Other Relevant Orders    Comprehensive Metabolic Panel    CBC & Differential (Completed)    Lipid Panel    Vitamin D 25 Hydroxy    Thyroid Panel With TSH    CBC Auto Differential (Completed)       Musculoskeletal and Integument    Primary osteoarthritis involving multiple joints    Relevant Orders    Comprehensive Metabolic Panel    CBC & Differential (Completed)    Lipid Panel    Vitamin D 25 Hydroxy    Thyroid Panel With TSH    CBC Auto Differential (Completed)      Other Visit Diagnoses     Medicare annual wellness visit, subsequent    -  Primary    Vitamin D deficiency         Relevant Orders    Vitamin D 25 Hydroxy

## 2018-11-01 NOTE — PATIENT INSTRUCTIONS
Medicare Wellness  Personal Prevention Plan of Service     Date of Office Visit:  2018  Encounter Provider:  Edis Bhakta MD  Place of Service:  [facility]  Patient Name: Shwetha Lane  :  1945    As part of the Medicare Wellness portion of your visit today, we are providing you with this personalized preventive plan of services (PPPS). This plan is based upon recommendations of the [place] Preventive Services Task Force (USPSTF) and the Advisory Committee on Immunization Practices (ACIP).    This lists the preventive care services that should be considered, and provides dates of when you are due. Items listed as completed are up-to-date and do not require any further intervention.    Health Maintenance   Topic Date Due   • URINE MICROALBUMIN  1945   • TDAP/TD VACCINES (1 - Tdap) 1964   • ZOSTER VACCINE (1 of 2) 1995   • DIABETIC FOOT EXAM  2016   • HEMOGLOBIN A1C  2016   • INFLUENZA VACCINE  2018   • MEDICARE ANNUAL WELLNESS  2018   • MAMMOGRAM  2018   • LIPID PANEL  2019   • DIABETIC EYE EXAM  2019   • COLONOSCOPY  2028   • HEPATITIS C SCREENING  Completed   • PNEUMOCOCCAL VACCINES (65+ LOW/MEDIUM RISK)  Excluded       Orders Placed This Encounter   Procedures   • Flu Vaccine High Dose PF 65YR+ ([id number])   • Comprehensive Metabolic Panel   • Lipid Panel   • Vitamin D 25 Hydroxy   • Thyroid Panel With TSH   • CBC & Differential     Order Specific Question:   Manual Differential     Answer:   No       No Follow-up on file.

## 2018-11-01 NOTE — PROGRESS NOTES
QUICK REFERENCE INFORMATION:  The ABCs of the Annual Wellness Visit    Subsequent Medicare Wellness Visit    HEALTH RISK ASSESSMENT    1945    Recent Hospitalizations:  No hospitalization(s) within the last year..        Current Medical Providers:  Patient Care Team:  Edis Bhakta MD as PCP - General  BlaineDenys MD as PCP - Claims Attributed        Smoking Status:  History   Smoking Status   • Former Smoker   Smokeless Tobacco   • Never Used       Alcohol Consumption:  History   Alcohol Use No       Depression Screen:   PHQ-2/PHQ-9 Depression Screening 11/1/2018   Little interest or pleasure in doing things 0   Feeling down, depressed, or hopeless 0   Trouble falling or staying asleep, or sleeping too much 0   Feeling tired or having little energy 0   Poor appetite or overeating 0   Feeling bad about yourself - or that you are a failure or have let yourself or your family down 0   Trouble concentrating on things, such as reading the newspaper or watching television 0   Moving or speaking so slowly that other people could have noticed. Or the opposite - being so fidgety or restless that you have been moving around a lot more than usual 0   Thoughts that you would be better off dead, or of hurting yourself in some way 0   Total Score 0   If you checked off any problems, how difficult have these problems made it for you to do your work, take care of things at home, or get along with other people? Not difficult at all       Health Habits and Functional and Cognitive Screening:  Functional & Cognitive Status 8/23/2017   Do you have difficulty preparing food and eating? No   Do you have difficulty bathing yourself, getting dressed or grooming yourself? No   Do you have difficulty using the toilet? No   Do you have difficulty moving around from place to place? No   In the past year have you fallen or experienced a near fall? No   Current Diet Well Balanced Diet   Dental Exam Up to date   Eye Exam Up to  date   Exercise (times per week) 4 times per week   Current Exercise Activities Include Swimming   Do you need help using the phone?  No   Are you deaf or do you have serious difficulty hearing?  No   Do you need help with transportation? No   Do you need help shopping? No   Do you need help preparing meals?  No   Do you need help with housework?  No   Do you need help with laundry? No   Do you need help taking your medications? No   Do you need help managing money? No   Do you have difficulty concentrating, remembering or making decisions? No           Does the patient have evidence of cognitive impairment? No    Aspirin use counseling: Does not need ASA (and currently is not on it)      Recent Lab Results:  CMP:  Lab Results   Component Value Date    BUN 15 05/22/2018    CREATININE 0.90 05/22/2018    EGFRIFNONA 62 05/22/2018    BCR 16.7 05/22/2018     05/22/2018    K 4.6 05/22/2018    CO2 26.4 05/22/2018    CALCIUM 9.5 05/22/2018    ALBUMIN 4.20 05/22/2018    BILITOT 0.7 05/22/2018    ALKPHOS 67 05/22/2018    AST 14 05/22/2018    ALT 19 05/22/2018     Lipid Panel:  Lab Results   Component Value Date    CHOL 196 05/22/2018    TRIG 133 05/22/2018    HDL 41 05/22/2018    VLDL 26.6 05/22/2018    LDLHDL 3.13 05/22/2018     HbA1c:       Visual Acuity:  No exam data present    Age-appropriate Screening Schedule:  Refer to the list below for future screening recommendations based on patient's age, sex and/or medical conditions. Orders for these recommended tests are listed in the plan section. The patient has been provided with a written plan.    Health Maintenance   Topic Date Due   • URINE MICROALBUMIN  1945   • TDAP/TD VACCINES (1 - Tdap) 07/22/1964   • ZOSTER VACCINE (1 of 2) 07/22/1995   • DIABETIC FOOT EXAM  04/11/2016   • HEMOGLOBIN A1C  04/11/2016   • INFLUENZA VACCINE  08/01/2018   • MAMMOGRAM  12/30/2018   • LIPID PANEL  05/22/2019   • DIABETIC EYE EXAM  11/01/2019   • COLONOSCOPY  06/21/2028   •  PNEUMOCOCCAL VACCINES (65+ LOW/MEDIUM RISK)  Excluded        Subjective   History of Present Illness    Shwetha Lane is a 73 y.o. female who presents for an Subsequent Wellness Visit.    The following portions of the patient's history were reviewed and updated as appropriate: allergies, current medications, past family history, past medical history, past social history, past surgical history and problem list.    Outpatient Medications Prior to Visit   Medication Sig Dispense Refill   • Cholecalciferol (VITAMIN D-3) 5000 units tablet Take  by mouth Daily.     • fluticasone (FLONASE) 50 MCG/ACT nasal spray      • Multiple Vitamins-Minerals (MULTIVITAMIN & MINERAL PO) Take  by mouth.     • rizatriptan MLT (MAXALT-MLT) 10 MG disintegrating tablet Take 1 tablet by mouth 1 (One) Time As Needed for Migraine. May repeat in 2 hours if needed 30 tablet 0   • topiramate (TOPAMAX) 25 MG tablet Take 1 tablet by mouth Daily. 90 tablet 3   • levothyroxine (SYNTHROID) 137 MCG tablet Take 1 tablet by mouth Daily. 90 tablet 3   • aspirin 81 MG EC tablet Take 81 mg by mouth Daily.     • clonazePAM (KLONOPIN) 1 MG tablet Take 1 tablet by mouth 2 (Two) Times a Day As Needed (vertigo do not drive). 30 tablet 1   • cyclobenzaprine (FLEXERIL) 10 MG tablet Take 1 tablet by mouth Daily As Needed for Muscle Spasms. 30 tablet 3     No facility-administered medications prior to visit.        Patient Active Problem List   Diagnosis   • Pneumonia   • Hypothyroidism   • Primary osteoarthritis involving multiple joints   • Hyperlipemia   • Migraine   • Obesity, morbid (more than 100 lbs over ideal weight or BMI > 40) (CMS/HCC)   • Status following gastric banding surgery for weight loss   • Fatty liver   • Vertigo       Advance Care Planning:  has an advance directive - a copy HAS NOT been provided. Have asked the patient to send this to us to add to record.    Identification of Risk Factors:  Risk factors include: NA.    Review of  "Systems    Compared to one year ago, the patient feels her physical health is worse.  Compared to one year ago, the patient feels her mental health is the same.    Objective     Physical Exam    Vitals:    11/01/18 1307   BP: 110/64   BP Location: Left arm   Patient Position: Sitting   Cuff Size: Large Adult   Pulse: 66   Resp: 16   Temp: 98.2 °F (36.8 °C)   TempSrc: Oral   SpO2: 96%   Weight: 112 kg (247 lb)   Height: 165.1 cm (65\")   PainSc: 0-No pain       Patient's Body mass index is 41.1 kg/m². BMI is above normal parameters. Recommendations include: educational material.      Assessment/Plan   Patient Self-Management and Personalized Health Advice  The patient has been provided with information about: diet and exercise and preventive services including:   · NA.    Visit Diagnoses:    ICD-10-CM ICD-9-CM   1. Mixed hyperlipidemia E78.2 272.2   2. Hypothyroidism, unspecified type E03.9 244.9   3. Primary osteoarthritis involving multiple joints M15.0 715.09   4. Vitamin D deficiency  E55.9 268.9       Orders Placed This Encounter   Procedures   • Flu Vaccine High Dose PF 65YR+ ([id number])   • Comprehensive Metabolic Panel   • Lipid Panel   • Vitamin D 25 Hydroxy   • Thyroid Panel With TSH   • CBC & Differential     Order Specific Question:   Manual Differential     Answer:   No       Outpatient Encounter Prescriptions as of 11/1/2018   Medication Sig Dispense Refill   • Cholecalciferol (VITAMIN D-3) 5000 units tablet Take  by mouth Daily.     • fluticasone (FLONASE) 50 MCG/ACT nasal spray      • metoprolol tartrate (LOPRESSOR) 25 MG tablet Take 25 mg by mouth Daily.     • Multiple Vitamins-Minerals (MULTIVITAMIN & MINERAL PO) Take  by mouth.     • rizatriptan MLT (MAXALT-MLT) 10 MG disintegrating tablet Take 1 tablet by mouth 1 (One) Time As Needed for Migraine. May repeat in 2 hours if needed 30 tablet 0   • topiramate (TOPAMAX) 25 MG tablet Take 1 tablet by mouth Daily. 90 tablet 3   • [DISCONTINUED] " levothyroxine (SYNTHROID) 137 MCG tablet Take 1 tablet by mouth Daily. 90 tablet 3   • aspirin 81 MG EC tablet Take 81 mg by mouth Daily.     • clonazePAM (KLONOPIN) 1 MG tablet Take 1 tablet by mouth 2 (Two) Times a Day As Needed (vert[place] do not drive). 30 tablet 1   • cyclobenzaprine (FLEXERIL) 10 MG tablet Take 1 tablet by mouth Daily As Needed for Muscle Spasms. 30 tablet 3   • levothyroxine (SYNTHROID) 137 MCG tablet Take 1 tablet by mouth Daily. 90 tablet 3     No facility-administered encounter medications on file as of 11/1/2018.        Reviewed use of high risk medication in the elderly: not applicable  Reviewed for potential of harmful drug interactions in the elderly: not applicable    Follow Up:  No Follow-up on file.     An After Visit Summary and PPPS with all of these plans were given to the patient.

## 2018-12-10 ENCOUNTER — OFFICE VISIT CONVERTED (OUTPATIENT)
Dept: ORTHOPEDIC SURGERY | Facility: CLINIC | Age: 73
End: 2018-12-10
Attending: ORTHOPAEDIC SURGERY

## 2019-01-16 ENCOUNTER — OFFICE VISIT CONVERTED (OUTPATIENT)
Dept: NEUROLOGY | Facility: CLINIC | Age: 74
End: 2019-01-16
Attending: PSYCHIATRY & NEUROLOGY

## 2019-01-18 ENCOUNTER — OFFICE VISIT CONVERTED (OUTPATIENT)
Dept: NEUROLOGY | Facility: CLINIC | Age: 74
End: 2019-01-18
Attending: PSYCHIATRY & NEUROLOGY

## 2019-03-01 ENCOUNTER — OFFICE VISIT CONVERTED (OUTPATIENT)
Dept: NEUROLOGY | Facility: CLINIC | Age: 74
End: 2019-03-01
Attending: PHYSICIAN ASSISTANT

## 2019-03-04 ENCOUNTER — CONVERSION ENCOUNTER (OUTPATIENT)
Dept: NEUROLOGY | Facility: CLINIC | Age: 74
End: 2019-03-04

## 2019-03-04 ENCOUNTER — OFFICE VISIT CONVERTED (OUTPATIENT)
Dept: NEUROSURGERY | Facility: CLINIC | Age: 74
End: 2019-03-04
Attending: PHYSICIAN ASSISTANT

## 2019-04-15 ENCOUNTER — OFFICE VISIT CONVERTED (OUTPATIENT)
Dept: NEUROSURGERY | Facility: CLINIC | Age: 74
End: 2019-04-15
Attending: PHYSICIAN ASSISTANT

## 2019-04-17 ENCOUNTER — OFFICE VISIT (OUTPATIENT)
Dept: FAMILY MEDICINE CLINIC | Facility: CLINIC | Age: 74
End: 2019-04-17

## 2019-04-17 VITALS
DIASTOLIC BLOOD PRESSURE: 80 MMHG | OXYGEN SATURATION: 96 % | SYSTOLIC BLOOD PRESSURE: 136 MMHG | HEART RATE: 91 BPM | WEIGHT: 247.8 LBS | TEMPERATURE: 97.4 F | HEIGHT: 65 IN | BODY MASS INDEX: 41.29 KG/M2

## 2019-04-17 DIAGNOSIS — J01.00 ACUTE MAXILLARY SINUSITIS, RECURRENCE NOT SPECIFIED: Primary | ICD-10-CM

## 2019-04-17 PROCEDURE — 99213 OFFICE O/P EST LOW 20 MIN: CPT | Performed by: NURSE PRACTITIONER

## 2019-04-17 RX ORDER — BENZONATATE 100 MG/1
100 CAPSULE ORAL 3 TIMES DAILY PRN
Qty: 30 CAPSULE | Refills: 0 | Status: SHIPPED | OUTPATIENT
Start: 2019-04-17 | End: 2020-08-18

## 2019-04-17 RX ORDER — AMOXICILLIN 875 MG/1
875 TABLET, COATED ORAL 2 TIMES DAILY
Qty: 14 TABLET | Refills: 0 | Status: SHIPPED | OUTPATIENT
Start: 2019-04-17 | End: 2019-04-24

## 2019-04-17 NOTE — PROGRESS NOTES
Mesha Lane is a 73 y.o. female.     History of Present Illness   C/o cough, sinus pressure, started about 2 weeks ago, productive cough, green in color, hx of seasonal allergies, has not used flonase, denies fever, did have sore throat, she has drainage. Does have ear pain, comes and goes. Tried robitussin at home.     The following portions of the patient's history were reviewed and updated as appropriate: allergies, current medications, past family history, past medical history, past social history, past surgical history and problem list.    Review of Systems   Constitutional: Negative for chills, diaphoresis and fever.   HENT: Positive for ear pain, postnasal drip, rhinorrhea, sinus pressure and sore throat.    Respiratory: Positive for cough. Negative for shortness of breath and wheezing.    Cardiovascular: Negative for chest pain.   Musculoskeletal: Negative for arthralgias and myalgias.   Allergic/Immunologic: Negative for environmental allergies.   Neurological: Negative for dizziness, light-headedness and headache.   All other systems reviewed and are negative.      Objective   Physical Exam   Constitutional: She is oriented to person, place, and time. She appears well-developed and well-nourished.   HENT:   Head: Normocephalic.   Right Ear: Tympanic membrane and ear canal normal.   Left Ear: Tympanic membrane and ear canal normal.   Nose: Mucosal edema present. Right sinus exhibits maxillary sinus tenderness and frontal sinus tenderness. Left sinus exhibits maxillary sinus tenderness and frontal sinus tenderness.   Mouth/Throat: Uvula is midline and mucous membranes are normal. Posterior oropharyngeal erythema present.   Eyes: Pupils are equal, round, and reactive to light.   Neck: Normal range of motion.   Cardiovascular: Normal rate, regular rhythm and normal heart sounds.   Pulmonary/Chest: Effort normal and breath sounds normal. She has no decreased breath sounds. She has no  wheezes. She has no rhonchi. She has no rales.   Musculoskeletal: Normal range of motion.   Lymphadenopathy:     She has no cervical adenopathy.   Neurological: She is alert and oriented to person, place, and time.   Skin: Skin is warm and dry.   Psychiatric: She has a normal mood and affect. Her behavior is normal.   Nursing note and vitals reviewed.        Assessment/Plan   Shwetha was seen today for cough and sinusitis.    Diagnoses and all orders for this visit:    Acute maxillary sinusitis, recurrence not specified    Other orders  -     amoxicillin (AMOXIL) 875 MG tablet; Take 1 tablet by mouth 2 (Two) Times a Day for 7 days.  -     benzonatate (TESSALON PERLES) 100 MG capsule; Take 1 capsule by mouth 3 (Three) Times a Day As Needed for Cough.      Start amoxicillin 875mg 2x day for 7 days.   Tessalon perles as needed for cough,   May try OTC zyrtec as needed for drainage, cont Flonase as needed.   If symptoms persist 5-7 days call office   Increase fluid intake, get plenty of rest.   Patient agrees with plan of care and understands instructions. Call if worsening symptoms or any problems or concerns.

## 2019-04-17 NOTE — PATIENT INSTRUCTIONS
Start amoxicillin 875mg 2x day for 7 days.   Tessalon perles as needed for cough,   May try OTC zyrtec as needed for drainage, cont Flonase as needed.   If symptoms persist 5-7 days call office   Increase fluid intake, get plenty of rest.   Patient agrees with plan of care and understands instructions. Call if worsening symptoms or any problems or concerns.

## 2019-05-01 ENCOUNTER — LAB (OUTPATIENT)
Dept: FAMILY MEDICINE CLINIC | Facility: CLINIC | Age: 74
End: 2019-05-01

## 2019-05-01 DIAGNOSIS — E03.9 HYPOTHYROIDISM, UNSPECIFIED TYPE: ICD-10-CM

## 2019-05-01 DIAGNOSIS — E55.9 VITAMIN D DEFICIENCY: ICD-10-CM

## 2019-05-01 DIAGNOSIS — E78.2 MIXED HYPERLIPIDEMIA: Primary | ICD-10-CM

## 2019-05-01 LAB
25(OH)D3 SERPL-MCNC: 46.4 NG/ML (ref 30–100)
ALBUMIN SERPL-MCNC: 4.1 G/DL (ref 3.5–5.2)
ALBUMIN/GLOB SERPL: 1.6 G/DL
ALP SERPL-CCNC: 69 U/L (ref 39–117)
ALT SERPL W P-5'-P-CCNC: 17 U/L (ref 1–33)
ANION GAP SERPL CALCULATED.3IONS-SCNC: 9.1 MMOL/L
AST SERPL-CCNC: 17 U/L (ref 1–32)
BILIRUB SERPL-MCNC: 0.5 MG/DL (ref 0.2–1.2)
BUN BLD-MCNC: 14 MG/DL (ref 8–23)
BUN/CREAT SERPL: 14.4 (ref 7–25)
CALCIUM SPEC-SCNC: 9.6 MG/DL (ref 8.6–10.5)
CHLORIDE SERPL-SCNC: 103 MMOL/L (ref 98–107)
CHOLEST SERPL-MCNC: 178 MG/DL (ref 0–200)
CO2 SERPL-SCNC: 26.9 MMOL/L (ref 22–29)
CREAT BLD-MCNC: 0.97 MG/DL (ref 0.57–1)
ERYTHROCYTE [DISTWIDTH] IN BLOOD BY AUTOMATED COUNT: 13.2 % (ref 12.3–15.4)
GFR SERPL CREATININE-BSD FRML MDRD: 56 ML/MIN/1.73
GLOBULIN UR ELPH-MCNC: 2.6 GM/DL
GLUCOSE BLD-MCNC: 110 MG/DL (ref 65–99)
HCT VFR BLD AUTO: 43.5 % (ref 34–46.6)
HDLC SERPL-MCNC: 41 MG/DL (ref 40–60)
HGB BLD-MCNC: 14.8 G/DL (ref 12–15.9)
LDLC SERPL CALC-MCNC: 112 MG/DL (ref 0–100)
LDLC/HDLC SERPL: 2.73 {RATIO}
LYMPHOCYTES # BLD AUTO: 2.2 10*3/MM3 (ref 0.7–3.1)
LYMPHOCYTES NFR BLD AUTO: 26.2 % (ref 19.6–45.3)
MCH RBC QN AUTO: 31.2 PG (ref 26.6–33)
MCHC RBC AUTO-ENTMCNC: 34.1 G/DL (ref 31.5–35.7)
MCV RBC AUTO: 91.6 FL (ref 79–97)
MONOCYTES # BLD AUTO: 0.5 10*3/MM3 (ref 0.1–0.9)
MONOCYTES NFR BLD AUTO: 6 % (ref 5–12)
NEUTROPHILS # BLD AUTO: 5.7 10*3/MM3 (ref 1.7–7)
NEUTROPHILS NFR BLD AUTO: 67.8 % (ref 42.7–76)
PLATELET # BLD AUTO: 292 10*3/MM3 (ref 140–450)
PMV BLD AUTO: 8 FL (ref 6–12)
POTASSIUM BLD-SCNC: 4.4 MMOL/L (ref 3.5–5.2)
PROT SERPL-MCNC: 6.7 G/DL (ref 6–8.5)
RBC # BLD AUTO: 4.75 10*6/MM3 (ref 3.77–5.28)
SODIUM BLD-SCNC: 139 MMOL/L (ref 136–145)
T-UPTAKE NFR SERPL: 0.91 TBI (ref 0.8–1.3)
T4 SERPL-MCNC: 8.93 MCG/DL (ref 4.5–11.7)
TRIGL SERPL-MCNC: 125 MG/DL (ref 0–150)
TSH SERPL DL<=0.05 MIU/L-ACNC: 1.98 MIU/ML (ref 0.27–4.2)
VLDLC SERPL-MCNC: 25 MG/DL (ref 5–40)
WBC NRBC COR # BLD: 8.4 10*3/MM3 (ref 3.4–10.8)

## 2019-05-01 PROCEDURE — 80053 COMPREHEN METABOLIC PANEL: CPT | Performed by: INTERNAL MEDICINE

## 2019-05-01 PROCEDURE — 84436 ASSAY OF TOTAL THYROXINE: CPT | Performed by: INTERNAL MEDICINE

## 2019-05-01 PROCEDURE — 36415 COLL VENOUS BLD VENIPUNCTURE: CPT | Performed by: INTERNAL MEDICINE

## 2019-05-01 PROCEDURE — 84443 ASSAY THYROID STIM HORMONE: CPT | Performed by: INTERNAL MEDICINE

## 2019-05-01 PROCEDURE — 82306 VITAMIN D 25 HYDROXY: CPT | Performed by: INTERNAL MEDICINE

## 2019-05-01 PROCEDURE — 80061 LIPID PANEL: CPT | Performed by: INTERNAL MEDICINE

## 2019-05-01 PROCEDURE — 84479 ASSAY OF THYROID (T3 OR T4): CPT | Performed by: INTERNAL MEDICINE

## 2019-05-01 PROCEDURE — 85025 COMPLETE CBC W/AUTO DIFF WBC: CPT | Performed by: INTERNAL MEDICINE

## 2019-05-08 ENCOUNTER — OFFICE VISIT (OUTPATIENT)
Dept: FAMILY MEDICINE CLINIC | Facility: CLINIC | Age: 74
End: 2019-05-08

## 2019-05-08 VITALS
TEMPERATURE: 99.1 F | HEART RATE: 82 BPM | BODY MASS INDEX: 41.09 KG/M2 | DIASTOLIC BLOOD PRESSURE: 78 MMHG | WEIGHT: 246.6 LBS | HEIGHT: 65 IN | SYSTOLIC BLOOD PRESSURE: 134 MMHG | OXYGEN SATURATION: 95 %

## 2019-05-08 DIAGNOSIS — E03.9 HYPOTHYROIDISM, UNSPECIFIED TYPE: Primary | ICD-10-CM

## 2019-05-08 DIAGNOSIS — E78.2 MIXED HYPERLIPIDEMIA: ICD-10-CM

## 2019-05-08 DIAGNOSIS — M15.9 PRIMARY OSTEOARTHRITIS INVOLVING MULTIPLE JOINTS: ICD-10-CM

## 2019-05-08 PROCEDURE — 99214 OFFICE O/P EST MOD 30 MIN: CPT | Performed by: INTERNAL MEDICINE

## 2019-05-08 RX ORDER — OXYBUTYNIN CHLORIDE 5 MG/1
2.5 TABLET ORAL DAILY
COMMUNITY

## 2019-05-08 NOTE — PROGRESS NOTES
Mesha Lane is a 73 y.o. female.     History of Present Illness   Patient was seen for hypothyroidism.  Pain controlled with her levothyroxine.  Her lipids controlled with diet and exercise.  Her primary osteoarthritis also controlled with over-the-counter medication.    Dictated utilizing Dragon dictation. If there are questions or for further clarification, please contact me.  The following portions of the patient's history were reviewed and updated as appropriate: allergies, current medications, past family history, past medical history, past social history, past surgical history and problem list.    Review of Systems   Constitutional: Negative for fatigue and fever.   HENT: Positive for congestion. Negative for trouble swallowing.    Eyes: Negative for discharge and visual disturbance.   Respiratory: Negative for choking and shortness of breath.    Cardiovascular: Negative for chest pain and palpitations.   Gastrointestinal: Negative for abdominal pain and blood in stool.   Endocrine: Negative.    Genitourinary: Negative for genital sores and hematuria.   Musculoskeletal: Negative for gait problem and joint swelling.   Skin: Negative for color change, pallor, rash and wound.   Allergic/Immunologic: Positive for environmental allergies. Negative for immunocompromised state.   Neurological: Negative for facial asymmetry and speech difficulty.   Psychiatric/Behavioral: Negative for hallucinations and suicidal ideas.       Objective   Physical Exam   Constitutional: She is oriented to person, place, and time. She appears well-developed and well-nourished.   HENT:   Head: Normocephalic.   Eyes: Conjunctivae are normal. Pupils are equal, round, and reactive to light.   Neck: Normal range of motion. Neck supple.   Cardiovascular: Normal rate, regular rhythm and normal heart sounds.   Pulmonary/Chest: Effort normal and breath sounds normal.   Abdominal: Soft. Bowel sounds are normal.    Musculoskeletal: Normal range of motion.   Neurological: She is alert and oriented to person, place, and time.   Skin: Skin is warm and dry.   Psychiatric: She has a normal mood and affect. Her behavior is normal. Judgment and thought content normal.   Nursing note and vitals reviewed.      Assessment/Plan   Problems Addressed this Visit        Cardiovascular and Mediastinum    Hyperlipemia       Endocrine    Hypothyroidism - Primary       Musculoskeletal and Integument    Primary osteoarthritis involving multiple joints

## 2019-06-17 ENCOUNTER — OFFICE VISIT CONVERTED (OUTPATIENT)
Dept: NEUROSURGERY | Facility: CLINIC | Age: 74
End: 2019-06-17
Attending: PHYSICIAN ASSISTANT

## 2019-06-17 ENCOUNTER — CONVERSION ENCOUNTER (OUTPATIENT)
Dept: NEUROLOGY | Facility: CLINIC | Age: 74
End: 2019-06-17

## 2019-08-08 ENCOUNTER — OFFICE VISIT (OUTPATIENT)
Dept: FAMILY MEDICINE CLINIC | Facility: CLINIC | Age: 74
End: 2019-08-08

## 2019-08-08 VITALS
HEIGHT: 65 IN | TEMPERATURE: 98.3 F | WEIGHT: 247 LBS | DIASTOLIC BLOOD PRESSURE: 72 MMHG | BODY MASS INDEX: 41.15 KG/M2 | OXYGEN SATURATION: 94 % | RESPIRATION RATE: 16 BRPM | HEART RATE: 64 BPM | SYSTOLIC BLOOD PRESSURE: 118 MMHG

## 2019-08-08 DIAGNOSIS — E03.9 HYPOTHYROIDISM, UNSPECIFIED TYPE: ICD-10-CM

## 2019-08-08 DIAGNOSIS — J01.01 ACUTE RECURRENT MAXILLARY SINUSITIS: Primary | ICD-10-CM

## 2019-08-08 DIAGNOSIS — E55.9 VITAMIN D DEFICIENCY: ICD-10-CM

## 2019-08-08 DIAGNOSIS — M15.9 PRIMARY OSTEOARTHRITIS INVOLVING MULTIPLE JOINTS: ICD-10-CM

## 2019-08-08 DIAGNOSIS — E78.2 MIXED HYPERLIPIDEMIA: ICD-10-CM

## 2019-08-08 PROCEDURE — 99213 OFFICE O/P EST LOW 20 MIN: CPT | Performed by: INTERNAL MEDICINE

## 2019-08-08 RX ORDER — CHOLECALCIFEROL (VITAMIN D3) 50 MCG
2000 TABLET ORAL DAILY
Qty: 90 TABLET | Refills: 3 | Status: SHIPPED | OUTPATIENT
Start: 2019-08-08 | End: 2020-08-07

## 2019-08-08 RX ORDER — FLUTICASONE PROPIONATE 50 MCG
1 SPRAY, SUSPENSION (ML) NASAL DAILY
Qty: 3 BOTTLE | Refills: 3 | Status: SHIPPED | OUTPATIENT
Start: 2019-08-08 | End: 2021-06-17

## 2019-08-08 RX ORDER — TOPIRAMATE 25 MG/1
25 TABLET ORAL DAILY
Qty: 90 TABLET | Refills: 3 | Status: SHIPPED | OUTPATIENT
Start: 2019-08-08 | End: 2021-06-17

## 2019-08-08 NOTE — PROGRESS NOTES
Mesha Lane is a 74 y.o. female.     History of Present Illness   Patient was seen for chronic maxillary sinusitis.  Patient has frequent sinusitis and was diagnosed with a maxillary cyst.  Patient was put on Flonase and will follow-up in 1 month.    Dictation by Katlyn  The following portions of the patient's history were reviewed and updated as appropriate: allergies, current medications, past family history, past medical history, past social history, past surgical history and problem list.    Review of Systems   Constitutional: Negative for fatigue and fever.   HENT: Positive for congestion and sinus pain. Negative for trouble swallowing.    Eyes: Negative for discharge and visual disturbance.   Respiratory: Negative for choking and shortness of breath.    Cardiovascular: Negative for chest pain and palpitations.   Gastrointestinal: Negative for abdominal pain and blood in stool.   Endocrine: Negative.    Genitourinary: Negative for genital sores and hematuria.   Musculoskeletal: Negative for gait problem and joint swelling.   Skin: Negative for color change, pallor, rash and wound.   Allergic/Immunologic: Positive for environmental allergies. Negative for immunocompromised state.   Neurological: Negative for facial asymmetry and speech difficulty.   Psychiatric/Behavioral: Negative for hallucinations and suicidal ideas.       Objective   Physical Exam   Constitutional: She is oriented to person, place, and time. She appears well-developed and well-nourished.   HENT:   Head: Normocephalic.   Clear tenderness   Eyes: Conjunctivae are normal. Pupils are equal, round, and reactive to light.   Neck: Normal range of motion. Neck supple.   Cardiovascular: Normal rate, regular rhythm and normal heart sounds.   Pulmonary/Chest: Effort normal and breath sounds normal.   Abdominal: Soft. Bowel sounds are normal.   Musculoskeletal: Normal range of motion.   Neurological: She is alert and oriented to  person, place, and time.   Skin: Skin is warm and dry.   Psychiatric: She has a normal mood and affect. Her behavior is normal. Judgment and thought content normal.   Nursing note and vitals reviewed.      Assessment/Plan   Problems Addressed this Visit        Cardiovascular and Mediastinum    Hyperlipemia    Relevant Orders    CBC (No Diff)    Comprehensive Metabolic Panel    Lipid Panel    Vitamin D 25 Hydroxy    Thyroid Panel With TSH       Endocrine    Hypothyroidism    Relevant Orders    CBC (No Diff)    Comprehensive Metabolic Panel    Lipid Panel    Vitamin D 25 Hydroxy    Thyroid Panel With TSH       Musculoskeletal and Integument    Primary osteoarthritis involving multiple joints    Relevant Orders    CBC (No Diff)    Comprehensive Metabolic Panel    Lipid Panel    Vitamin D 25 Hydroxy    Thyroid Panel With TSH      Other Visit Diagnoses     Acute recurrent maxillary sinusitis    -  Primary    Vitamin D deficiency         Relevant Orders    Vitamin D 25 Hydroxy

## 2019-09-11 ENCOUNTER — OFFICE VISIT CONVERTED (OUTPATIENT)
Dept: NEUROSURGERY | Facility: CLINIC | Age: 74
End: 2019-09-11
Attending: PHYSICIAN ASSISTANT

## 2019-10-03 ENCOUNTER — TELEPHONE (OUTPATIENT)
Dept: FAMILY MEDICINE CLINIC | Facility: CLINIC | Age: 74
End: 2019-10-03

## 2019-10-03 ENCOUNTER — OFFICE VISIT (OUTPATIENT)
Dept: FAMILY MEDICINE CLINIC | Facility: CLINIC | Age: 74
End: 2019-10-03

## 2019-10-03 VITALS
TEMPERATURE: 98.3 F | DIASTOLIC BLOOD PRESSURE: 74 MMHG | BODY MASS INDEX: 40.42 KG/M2 | HEIGHT: 65 IN | OXYGEN SATURATION: 94 % | SYSTOLIC BLOOD PRESSURE: 122 MMHG | HEART RATE: 80 BPM | WEIGHT: 242.6 LBS

## 2019-10-03 DIAGNOSIS — R05.9 COUGH: ICD-10-CM

## 2019-10-03 DIAGNOSIS — J01.00 ACUTE MAXILLARY SINUSITIS, RECURRENCE NOT SPECIFIED: Primary | ICD-10-CM

## 2019-10-03 DIAGNOSIS — J30.9 ALLERGIC RHINITIS, UNSPECIFIED SEASONALITY, UNSPECIFIED TRIGGER: ICD-10-CM

## 2019-10-03 PROCEDURE — 99213 OFFICE O/P EST LOW 20 MIN: CPT | Performed by: NURSE PRACTITIONER

## 2019-10-03 RX ORDER — IPRATROPIUM BROMIDE AND ALBUTEROL SULFATE 2.5; .5 MG/3ML; MG/3ML
3 SOLUTION RESPIRATORY (INHALATION) EVERY 4 HOURS PRN
Qty: 360 ML | Refills: 0 | Status: SHIPPED | OUTPATIENT
Start: 2019-10-03 | End: 2021-06-17

## 2019-10-03 RX ORDER — FLUCONAZOLE 150 MG/1
TABLET ORAL
Qty: 2 TABLET | Refills: 0 | Status: SHIPPED | OUTPATIENT
Start: 2019-10-03 | End: 2020-08-18

## 2019-10-03 RX ORDER — IPRATROPIUM BROMIDE AND ALBUTEROL SULFATE 2.5; .5 MG/3ML; MG/3ML
3 SOLUTION RESPIRATORY (INHALATION) EVERY 4 HOURS PRN
COMMUNITY
End: 2019-10-03 | Stop reason: SDUPTHER

## 2019-10-03 RX ORDER — AMOXICILLIN 875 MG/1
875 TABLET, COATED ORAL 2 TIMES DAILY
Qty: 14 TABLET | Refills: 0 | Status: SHIPPED | OUTPATIENT
Start: 2019-10-03 | End: 2019-10-10

## 2019-10-03 NOTE — PATIENT INSTRUCTIONS
Start amoxicillin 875mg bid for 7 days.   Refilled duoneb cont as needed for wheezing,   Cont flonase, tessalon she has these at home,   If symptoms persist 5-7 days call office   Increase fluid intake, get plenty of rest.   Patient agrees with plan of care and understands instructions. Call if worsening symptoms or any problems or concerns.

## 2019-10-03 NOTE — TELEPHONE ENCOUNTER
PT STS THAT SHE WILL NEED SOMETHING FOR A YEAST INFECTION ..BEFORE SHE CAN START THE MEDS YOU GAVE HER TODAY.....PLEASE CALL HER [phone]

## 2019-10-03 NOTE — PROGRESS NOTES
Mesha Lane is a 74 y.o. female.     History of Present Illness   C/o sinus pressure, cough, started about 5 days ago, she denies fever, did have chills, has productive cough, green in color, she denies hx of asthma, hx of bronchitis, former smoker quit about 12 years ago. She denies sore throat, does have right ear pain, she tried aleve OTC, needs refills of medications for nebulizer. She did notice wheezing. She does use flonase.     The following portions of the patient's history were reviewed and updated as appropriate: allergies, current medications, past family history, past medical history, past social history, past surgical history and problem list.    Review of Systems   Constitutional: Negative for chills, diaphoresis and fever.   HENT: Positive for congestion, ear pain, postnasal drip, rhinorrhea, sinus pressure and sore throat.    Respiratory: Positive for cough and wheezing. Negative for shortness of breath.    Cardiovascular: Negative for chest pain.   Musculoskeletal: Negative for arthralgias and myalgias.   Allergic/Immunologic: Positive for environmental allergies.   Neurological: Negative for dizziness, light-headedness and headache.   All other systems reviewed and are negative.      Objective   Physical Exam   Constitutional: She is oriented to person, place, and time. She appears well-developed and well-nourished.   HENT:   Head: Normocephalic.   Right Ear: Tympanic membrane and ear canal normal.   Left Ear: Tympanic membrane and ear canal normal.   Nose: Mucosal edema present. Right sinus exhibits maxillary sinus tenderness. Left sinus exhibits maxillary sinus tenderness.   Mouth/Throat: Uvula is midline and mucous membranes are normal. Posterior oropharyngeal erythema present.   Eyes: Pupils are equal, round, and reactive to light.   Neck: Normal range of motion.   Cardiovascular: Normal rate, regular rhythm and normal heart sounds.   Pulmonary/Chest: Effort normal. She has  no decreased breath sounds. She has wheezes in the right upper field and the left upper field. She has no rhonchi. She has no rales.   Musculoskeletal: Normal range of motion.   Lymphadenopathy:     She has no cervical adenopathy.   Neurological: She is alert and oriented to person, place, and time.   Skin: Skin is warm and dry.   Psychiatric: She has a normal mood and affect. Her behavior is normal.   Nursing note and vitals reviewed.        Assessment/Plan   Shwetha was seen today for sinusitis, cough and med refill.    Diagnoses and all orders for this visit:    Acute maxillary sinusitis, recurrence not specified    Cough    Allergic rhinitis, unspecified seasonality, unspecified trigger    Other orders  -     ipratropium-albuterol (DUO-NEB) 0.5-2.5 mg/3 ml nebulizer; Take 3 mL by nebulization Every 4 (Four) Hours As Needed for Wheezing.  -     amoxicillin (AMOXIL) 875 MG tablet; Take 1 tablet by mouth 2 (Two) Times a Day for 7 days.          Start amoxicillin 875mg bid for 7 days.   Refilled duoneb cont as needed for wheezing,   Cont flonase, tessalon she has these at home,   If symptoms persist 5-7 days call office   Increase fluid intake, get plenty of rest.   Patient agrees with plan of care and understands instructions. Call if worsening symptoms or any problems or concerns.

## 2019-10-07 ENCOUNTER — OFFICE VISIT CONVERTED (OUTPATIENT)
Dept: ORTHOPEDIC SURGERY | Facility: CLINIC | Age: 74
End: 2019-10-07
Attending: ORTHOPAEDIC SURGERY

## 2019-10-14 ENCOUNTER — OFFICE VISIT CONVERTED (OUTPATIENT)
Dept: ORTHOPEDIC SURGERY | Facility: CLINIC | Age: 74
End: 2019-10-14
Attending: ORTHOPAEDIC SURGERY

## 2019-10-17 ENCOUNTER — CLINICAL SUPPORT (OUTPATIENT)
Dept: FAMILY MEDICINE CLINIC | Facility: CLINIC | Age: 74
End: 2019-10-17

## 2019-10-17 PROCEDURE — 90653 IIV ADJUVANT VACCINE IM: CPT | Performed by: INTERNAL MEDICINE

## 2019-10-17 PROCEDURE — G0008 ADMIN INFLUENZA VIRUS VAC: HCPCS | Performed by: INTERNAL MEDICINE

## 2019-11-06 ENCOUNTER — TELEPHONE (OUTPATIENT)
Dept: FAMILY MEDICINE CLINIC | Facility: CLINIC | Age: 74
End: 2019-11-06

## 2019-11-06 RX ORDER — LEVOTHYROXINE SODIUM 137 UG/1
137 TABLET ORAL DAILY
Qty: 90 TABLET | Refills: 3 | Status: SHIPPED | OUTPATIENT
Start: 2019-11-06 | End: 2020-08-18 | Stop reason: SDUPTHER

## 2019-12-11 ENCOUNTER — OFFICE VISIT (OUTPATIENT)
Dept: FAMILY MEDICINE CLINIC | Facility: CLINIC | Age: 74
End: 2019-12-11

## 2019-12-11 VITALS
RESPIRATION RATE: 16 BRPM | HEIGHT: 65 IN | HEART RATE: 67 BPM | OXYGEN SATURATION: 95 % | BODY MASS INDEX: 40.32 KG/M2 | WEIGHT: 242 LBS | SYSTOLIC BLOOD PRESSURE: 120 MMHG | DIASTOLIC BLOOD PRESSURE: 64 MMHG | TEMPERATURE: 98.1 F

## 2019-12-11 DIAGNOSIS — M15.9 PRIMARY OSTEOARTHRITIS INVOLVING MULTIPLE JOINTS: ICD-10-CM

## 2019-12-11 DIAGNOSIS — R91.1 NODULE OF RIGHT LUNG: ICD-10-CM

## 2019-12-11 DIAGNOSIS — E55.9 VITAMIN D DEFICIENCY: ICD-10-CM

## 2019-12-11 DIAGNOSIS — E78.2 MIXED HYPERLIPIDEMIA: ICD-10-CM

## 2019-12-11 DIAGNOSIS — Z00.00 MEDICARE ANNUAL WELLNESS VISIT, SUBSEQUENT: Primary | ICD-10-CM

## 2019-12-11 DIAGNOSIS — E78.00 PURE HYPERCHOLESTEROLEMIA: ICD-10-CM

## 2019-12-11 DIAGNOSIS — E55.9 VITAMIN D DEFICIENCY, UNSPECIFIED: ICD-10-CM

## 2019-12-11 DIAGNOSIS — E03.9 HYPOTHYROIDISM, UNSPECIFIED TYPE: ICD-10-CM

## 2019-12-11 PROBLEM — I49.3 PVC (PREMATURE VENTRICULAR CONTRACTION): Status: ACTIVE | Noted: 2019-12-11

## 2019-12-11 PROBLEM — I49.1 PAC (PREMATURE ATRIAL CONTRACTION): Status: ACTIVE | Noted: 2019-12-11

## 2019-12-11 LAB
25(OH)D3 SERPL-MCNC: 46.8 NG/ML (ref 30–100)
ALBUMIN SERPL-MCNC: 4.2 G/DL (ref 3.5–5.2)
ALBUMIN/GLOB SERPL: 1.2 G/DL
ALP SERPL-CCNC: 71 U/L (ref 39–117)
ALT SERPL W P-5'-P-CCNC: 21 U/L (ref 1–33)
ANION GAP SERPL CALCULATED.3IONS-SCNC: 10.2 MMOL/L (ref 5–15)
AST SERPL-CCNC: 17 U/L (ref 1–32)
BILIRUB SERPL-MCNC: 0.5 MG/DL (ref 0.2–1.2)
BUN BLD-MCNC: 14 MG/DL (ref 8–23)
BUN/CREAT SERPL: 16.5 (ref 7–25)
CALCIUM SPEC-SCNC: 9.5 MG/DL (ref 8.6–10.5)
CHLORIDE SERPL-SCNC: 102 MMOL/L (ref 98–107)
CHOLEST SERPL-MCNC: 201 MG/DL (ref 0–200)
CO2 SERPL-SCNC: 25.8 MMOL/L (ref 22–29)
CREAT BLD-MCNC: 0.85 MG/DL (ref 0.57–1)
DEPRECATED RDW RBC AUTO: 40.5 FL (ref 37–54)
ERYTHROCYTE [DISTWIDTH] IN BLOOD BY AUTOMATED COUNT: 12.4 % (ref 12.3–15.4)
GFR SERPL CREATININE-BSD FRML MDRD: 65 ML/MIN/1.73
GLOBULIN UR ELPH-MCNC: 3.4 GM/DL
GLUCOSE BLD-MCNC: 104 MG/DL (ref 65–99)
HCT VFR BLD AUTO: 44.2 % (ref 34–46.6)
HDLC SERPL-MCNC: 40 MG/DL (ref 40–60)
HGB BLD-MCNC: 15.2 G/DL (ref 12–15.9)
LDLC SERPL CALC-MCNC: 129 MG/DL (ref 0–100)
LDLC/HDLC SERPL: 3.23 {RATIO}
MCH RBC QN AUTO: 31.1 PG (ref 26.6–33)
MCHC RBC AUTO-ENTMCNC: 34.4 G/DL (ref 31.5–35.7)
MCV RBC AUTO: 90.4 FL (ref 79–97)
PLATELET # BLD AUTO: 282 10*3/MM3 (ref 140–450)
PMV BLD AUTO: 10 FL (ref 6–12)
POTASSIUM BLD-SCNC: 4.3 MMOL/L (ref 3.5–5.2)
PROT SERPL-MCNC: 7.6 G/DL (ref 6–8.5)
RBC # BLD AUTO: 4.89 10*6/MM3 (ref 3.77–5.28)
SODIUM BLD-SCNC: 138 MMOL/L (ref 136–145)
T-UPTAKE NFR SERPL: 0.9 TBI (ref 0.8–1.3)
T4 SERPL-MCNC: 9.71 MCG/DL (ref 4.5–11.7)
TRIGL SERPL-MCNC: 159 MG/DL (ref 0–150)
TSH SERPL DL<=0.05 MIU/L-ACNC: 1.53 UIU/ML (ref 0.27–4.2)
VLDLC SERPL-MCNC: 31.8 MG/DL (ref 5–40)
WBC NRBC COR # BLD: 7.35 10*3/MM3 (ref 3.4–10.8)

## 2019-12-11 PROCEDURE — 82306 VITAMIN D 25 HYDROXY: CPT | Performed by: INTERNAL MEDICINE

## 2019-12-11 PROCEDURE — 99214 OFFICE O/P EST MOD 30 MIN: CPT | Performed by: INTERNAL MEDICINE

## 2019-12-11 PROCEDURE — 80053 COMPREHEN METABOLIC PANEL: CPT | Performed by: INTERNAL MEDICINE

## 2019-12-11 PROCEDURE — 36415 COLL VENOUS BLD VENIPUNCTURE: CPT | Performed by: INTERNAL MEDICINE

## 2019-12-11 PROCEDURE — 84479 ASSAY OF THYROID (T3 OR T4): CPT | Performed by: INTERNAL MEDICINE

## 2019-12-11 PROCEDURE — G0439 PPPS, SUBSEQ VISIT: HCPCS | Performed by: INTERNAL MEDICINE

## 2019-12-11 PROCEDURE — 85027 COMPLETE CBC AUTOMATED: CPT | Performed by: INTERNAL MEDICINE

## 2019-12-11 PROCEDURE — 84436 ASSAY OF TOTAL THYROXINE: CPT | Performed by: INTERNAL MEDICINE

## 2019-12-11 PROCEDURE — 84443 ASSAY THYROID STIM HORMONE: CPT | Performed by: INTERNAL MEDICINE

## 2019-12-11 PROCEDURE — 80061 LIPID PANEL: CPT | Performed by: INTERNAL MEDICINE

## 2019-12-11 RX ORDER — LIDOCAINE 50 MG/G
1 PATCH TOPICAL EVERY 12 HOURS
Qty: 30 PATCH | Refills: 1 | Status: SHIPPED | OUTPATIENT
Start: 2019-12-11 | End: 2021-10-22 | Stop reason: SDUPTHER

## 2019-12-11 RX ORDER — LIDOCAINE 50 MG/G
1 PATCH TOPICAL EVERY 24 HOURS
COMMUNITY
End: 2019-12-11 | Stop reason: SDUPTHER

## 2019-12-11 RX ORDER — CYCLOBENZAPRINE HCL 10 MG
10 TABLET ORAL DAILY PRN
Qty: 30 TABLET | Refills: 0 | Status: SHIPPED | OUTPATIENT
Start: 2019-12-11 | End: 2021-06-17

## 2019-12-11 NOTE — PROGRESS NOTES
Subsequent Medicare Wellness Visit   The ABC's of the Annual Wellness Visit    Chief Complaint   Patient presents with   • Hypothyroidism   • Follow-up     [facility] went in for irregular heart rate states everything came back normal       HPI:  Shwetha Lane, -1945, is a 74 y.o. female who presents for a Subsequent Medicare Wellness Visit.  Patient was seen for Medicare wellness exam.  Patient had a prior x-ray which indicated possible pneumonia versus pulmonary nodule.  Radiologist did recommend CT scan of the chest and it was ordered.  Patient's lipids are being treated with diet exercise.  Thyroid treated with levothyroxine and labs were drawn.  Patient does have osteoarthritis and is treated with over-the-counter pain medication.    Dictated utilizing Dragon dictation. If there are questions or for further clarification, please contact me.  Recent Hospitalizations:  Recently treated at the following:  Other: NA.    Current Medical Providers:  Patient Care Team:  Edis Bhakta MD as PCP - General  Denys Valladares MD as PCP - Claims Attributed    Health Habits and Functional and Cognitive Screening and Depression Screening:  Functional & Cognitive Status 2019   Do you have difficulty preparing food and eating? No   Do you have difficulty bathing yourself, getting dressed or grooming yourself? No   Do you have difficulty using the toilet? No   Do you have difficulty moving around from place to place? No   Do you have trouble with steps or getting out of a bed or a chair? No   Current Diet Well Balanced Diet   Dental Exam Up to date   Eye Exam Up to date   Exercise (times per week) 3 times per week   Current Exercise Activities Include Aerobics   Do you need help using the phone?  No   Are you deaf or do you have serious difficulty hearing?  No   Do you need help with transportation? No   Do you need help shopping? No   Do you need help preparing meals?  No   Do you need help with  housework?  No   Do you need help with laundry? No   Do you need help taking your medications? No   Do you need help managing money? No   Do you ever drive or ride in a car without wearing a seat belt? No   Have you felt unusual stress, anger or loneliness in the last month? No   Who do you live with? Other   If you need help, do you have trouble finding someone available to you? No   Have you been bothered in the last four weeks by sexual problems? No   Do you have difficulty concentrating, remembering or making decisions? No       Compared to one year ago, the patient feels her physical health is worse and her mental health is the same.    Depression Screen:  PHQ-2/PHQ-9 Depression Screening 12/11/2019   Little interest or pleasure in doing things 0   Feeling down, depressed, or hopeless 0   Trouble falling or staying asleep, or sleeping too much 0   Feeling tired or having little energy 0   Poor appetite or overeating 0   Feeling bad about yourself - or that you are a failure or have let yourself or your family down 0   Trouble concentrating on things, such as reading the newspaper or watching television 0   Moving or speaking so slowly that other people could have noticed. Or the opposite - being so fidgety or restless that you have been moving around a lot more than usual 0   Thoughts that you would be better off dead, or of hurting yourself in some way 0   Total Score 0   If you checked off any problems, how difficult have these problems made it for you to do your work, take care of things at home, or get along with other people? Not difficult at all         Past Medical/Family/Social History:  The following portions of the patient's history were reviewed and updated as appropriate: allergies, current medications, past family history, past medical history, past social history, past surgical history and problem list.    Allergies   Allergen Reactions   • Nickel Rash         Current Outpatient Medications:   •   Cholecalciferol (VITAMIN D) 2000 units tablet, Take 2,000 Units by mouth Daily., Disp: 90 tablet, Rfl: 3  •  cyclobenzaprine (FLEXERIL) 10 MG tablet, Take 1 tablet by mouth Daily As Needed for Muscle Spasms., Disp: 30 tablet, Rfl: 0  •  fluticasone (FLONASE) 50 MCG/ACT nasal spray, 1 spray into the nostril(s) as directed by provider Daily., Disp: 3 bottle, Rfl: 3  •  ipratropium-albuterol (DUO-NEB) 0.5-2.5 mg/3 ml nebulizer, Take 3 mL by nebulization Every 4 (Four) Hours As Needed for Wheezing., Disp: 360 mL, Rfl: 0  •  levothyroxine (SYNTHROID) 137 MCG tablet, Take 1 tablet by mouth Daily., Disp: 90 tablet, Rfl: 3  •  lidocaine (LIDODERM) 5 %, Place 1 patch on the skin as directed by provider Every 12 (Twelve) Hours. Remove & Discard patch within 12 hours or as directed by MD, Disp: 30 patch, Rfl: 1  •  metoprolol tartrate (LOPRESSOR) 25 MG tablet, Take 25 mg by mouth Daily., Disp: , Rfl:   •  Multiple Vitamins-Minerals (MULTIVITAMIN & MINERAL PO), Take  by mouth., Disp: , Rfl:   •  oxybutynin (DITROPAN) 5 MG tablet, Take 2.5 mg by mouth Daily., Disp: , Rfl:   •  rizatriptan MLT (MAXALT-MLT) 10 MG disintegrating tablet, Take 1 tablet by mouth 1 (One) Time As Needed for Migraine. May repeat in 2 hours if needed, Disp: 30 tablet, Rfl: 0  •  topiramate (TOPAMAX) 25 MG tablet, Take 1 tablet by mouth Daily., Disp: 90 tablet, Rfl: 3  •  aspirin 81 MG EC tablet, Take 81 mg by mouth Daily., Disp: , Rfl:   •  benzonatate (TESSALON PERLES) 100 MG capsule, Take 1 capsule by mouth 3 (Three) Times a Day As Needed for Cough., Disp: 30 capsule, Rfl: 0  •  clonazePAM (KLONOPIN) 1 MG tablet, Take 1 tablet by mouth 2 (Two) Times a Day As Needed (vertigo do not drive)., Disp: 30 tablet, Rfl: 1  •  fluconazole (DIFLUCAN) 150 MG tablet, Take 1 tablet today then repeat again in 3 days., Disp: 2 tablet, Rfl: 0    Aspirin use counseling: Does not need ASA (and currently is not on it)    Current medication list contains no high risk  medications.  No harmful drug interactions have been identified.     Family History   Problem Relation Age of Onset   • Diabetes Mother    • Heart disease Father        Social History     Tobacco Use   • Smoking status: Former Smoker   • Smokeless tobacco: Never Used   Substance Use Topics   • Alcohol use: No       Past Surgical History:   Procedure Laterality Date   • COLONOSCOPY     • HYSTERECTOMY     • LAPAROSCOPIC GASTRIC BANDING     • REPLACEMENT TOTAL KNEE BILATERAL Bilateral     2017 january   • TONSILLECTOMY         Patient Active Problem List   Diagnosis   • Pneumonia   • Hypothyroidism   • Primary osteoarthritis involving multiple joints   • Hyperlipemia   • Migraine   • Obesity, morbid (more than 100 lbs over ideal weight or BMI > 40) (CMS/HCC)   • Status following gastric banding surgery for weight loss   • Fatty liver   • Vertigo   • PVC (premature ventricular contraction)   • PAC (premature atrial contraction)       Review of Systems   Constitutional: Negative for fatigue and fever.   HENT: Positive for congestion. Negative for trouble swallowing.    Eyes: Negative for discharge and visual disturbance.   Respiratory: Negative for choking and shortness of breath.    Cardiovascular: Negative for chest pain and palpitations.   Gastrointestinal: Negative for abdominal pain and blood in stool.   Endocrine: Negative.    Genitourinary: Negative for genital sores and hematuria.   Musculoskeletal: Negative for gait problem and joint swelling.   Skin: Negative for color change, pallor, rash and wound.   Allergic/Immunologic: Positive for environmental allergies. Negative for immunocompromised state.   Neurological: Negative for facial asymmetry and speech difficulty.   Psychiatric/Behavioral: Negative for hallucinations and suicidal ideas.       Objective     Vitals:    12/11/19 1022   BP: 120/64   BP Location: Left arm   Patient Position: Sitting   Cuff Size: Large Adult   Pulse: 67   Resp: 16   Temp: 98.1 °F  "(36.7 °C)   TempSrc: Oral   SpO2: 95%   Weight: 110 kg (242 lb)   Height: 165.1 cm (65\")   PainSc: 0-No pain       Patient's Body mass index is 40.27 kg/m². BMI is within normal parameters. No follow-up required..      No exam data present    The patient has no evidence of cognitve impairment.     Physical Exam   Constitutional: She is oriented to person, place, and time. She appears well-developed and well-nourished.   HENT:   Head: Normocephalic and atraumatic.   Right Ear: External ear normal.   Left Ear: External ear normal.   Nose: Nose normal.   Mouth/Throat: Oropharynx is clear and moist.   Eyes: Pupils are equal, round, and reactive to light. Conjunctivae and EOM are normal.   Neck: Normal range of motion. Neck supple.   Cardiovascular: Exam reveals no gallop and no friction rub.   No murmur heard.  Pulmonary/Chest: No stridor. No respiratory distress. She has no wheezes. She has no rales. She exhibits no tenderness.   Abdominal: Soft. Bowel sounds are normal.   Musculoskeletal: Normal range of motion.   Neurological: She is alert and oriented to person, place, and time.   Skin: Skin is warm and dry.   Psychiatric: She has a normal mood and affect. Her behavior is normal. Judgment and thought content normal.       Recent Lab Results:  Lab Results   Component Value Date     (H) 12/02/2019     Lab Results   Component Value Date    CHOL 201 (H) 12/11/2019    TRIG 159 (H) 12/11/2019    HDL 40 12/11/2019    VLDL 31.8 12/11/2019    LDLHDL 3.23 12/11/2019       Assessment/Plan #1 CT scan of the chest #2 labs #3 continue present diet and activity level  Age-appropriate Screening Schedule:  Refer to the list below for future screening recommendations based on patient's age, sex and/or medical conditions.      Health Maintenance   Topic Date Due   • TDAP/TD VACCINES (1 - Tdap) 07/22/1956   • ZOSTER VACCINE (1 of 2) 07/22/1995   • LIPID PANEL  05/01/2020   • MAMMOGRAM  12/03/2020   • COLONOSCOPY  06/21/2028   • " INFLUENZA VACCINE  Completed   • PNEUMOCOCCAL VACCINES (65+ LOW/MEDIUM RISK)  Discontinued       Medicare Risks and Personalized Health Plan:  NA      CMS-Preventive Services Quick Reference  Medicare Preventive Services Addressed:  DONA    Advance Care Planning:  Patient has an advance directive - a copy has not been provided. Have asked the patient to send this to us to add to record    Diagnoses and all orders for this visit:    1. Medicare annual wellness visit, subsequent (Primary)    2. Nodule of right lung  -     CT Chest Without Contrast; Future  -     Cancel: CBC (No Diff)  -     Cancel: Comprehensive Metabolic Panel  -     Cancel: Lipid Panel  -     Cancel: Vitamin D 25 Hydroxy  -     Cancel: Thyroid Panel With TSH    3. Pure hypercholesterolemia  -     Cancel: CBC (No Diff)  -     Cancel: Comprehensive Metabolic Panel  -     Cancel: Lipid Panel  -     Cancel: Vitamin D 25 Hydroxy  -     Cancel: Thyroid Panel With TSH    4. Hypothyroidism, unspecified type  -     Cancel: CBC (No Diff)  -     Cancel: Comprehensive Metabolic Panel  -     Cancel: Lipid Panel  -     Cancel: Vitamin D 25 Hydroxy  -     Cancel: Thyroid Panel With TSH  -     CBC (No Diff)  -     Comprehensive Metabolic Panel  -     Lipid Panel  -     Vitamin D 25 Hydroxy  -     Thyroid Panel With TSH    5. Vitamin D deficiency, unspecified   -     Cancel: Vitamin D 25 Hydroxy    6. Mixed hyperlipidemia  -     CBC (No Diff)  -     Comprehensive Metabolic Panel  -     Lipid Panel  -     Vitamin D 25 Hydroxy  -     Thyroid Panel With TSH    7. Primary osteoarthritis involving multiple joints  -     CBC (No Diff)  -     Comprehensive Metabolic Panel  -     Lipid Panel  -     Vitamin D 25 Hydroxy  -     Thyroid Panel With TSH    8. Vitamin D deficiency   -     Vitamin D 25 Hydroxy    Other orders  -     cyclobenzaprine (FLEXERIL) 10 MG tablet; Take 1 tablet by mouth Daily As Needed for Muscle Spasms.  Dispense: 30 tablet; Refill: 0  -     lidocaine  (LIDODERM) 5 %; Place 1 patch on the skin as directed by provider Every 12 (Twelve) Hours. Remove & Discard patch within 12 hours or as directed by MD  Dispense: 30 patch; Refill: 1        An After Visit Summary and PPPS with all of these plans were given to the patient.      Follow Up:  Return in about 4 weeks (around 1/8/2020), or if symptoms worsen or fail to improve, for Recheck.

## 2019-12-11 NOTE — PATIENT INSTRUCTIONS
Medicare Wellness  Personal Prevention Plan of Service     Date of Office Visit:  2019  Encounter Provider:  Edis Bhakta MD  Place of Service:  [facility]  Patient Name: Shwetha Lane  :  1945    As part of the Medicare Wellness portion of your visit today, we are providing you with this personalized preventive plan of services (PPPS). This plan is based upon recommendations of the [place] Preventive Services Task Force (USPSTF) and the Advisory Committee on Immunization Practices (ACIP).    This lists the preventive care services that should be considered, and provides dates of when you are due. Items listed as completed are up-to-date and do not require any further intervention.    Health Maintenance   Topic Date Due   • TDAP/TD VACCINES (1 - Tdap) 1956   • ZOSTER VACCINE (1 of 2) 1995   • LIPID PANEL  2020   • MAMMOGRAM  2020   • MEDICARE ANNUAL WELLNESS  2020   • COLONOSCOPY  2028   • HEPATITIS C SCREENING  Completed   • INFLUENZA VACCINE  Completed   • PNEUMOCOCCAL VACCINES (65+ LOW/MEDIUM RISK)  Discontinued       Orders Placed This Encounter   Procedures   • CT Chest Without Contrast     CxR at [facility] indicates a possible pneumonia verses nodule . Radiologist recommending CT scan     Standing Status:   Future     Standing Expiration Date:   12/10/2020   • CBC (No Diff)   • Comprehensive Metabolic Panel   • Lipid Panel   • Vitamin D 25 Hydroxy   • Thyroid Panel With TSH       No follow-ups on file.

## 2019-12-20 ENCOUNTER — TELEPHONE (OUTPATIENT)
Dept: FAMILY MEDICINE CLINIC | Facility: CLINIC | Age: 74
End: 2019-12-20

## 2019-12-20 NOTE — TELEPHONE ENCOUNTER
PATIENT IS GETTING A CT SCAN DONE ON Monday AND DR. COOPER TOLD PATIENT TO COME IN 4 DAYS LATER. CAN PATIENT BE WORKED IN

## 2019-12-23 ENCOUNTER — HOSPITAL ENCOUNTER (OUTPATIENT)
Dept: CT IMAGING | Facility: HOSPITAL | Age: 74
Discharge: HOME | End: 2019-12-23
Admitting: INTERNAL MEDICINE

## 2019-12-23 DIAGNOSIS — R91.1 NODULE OF RIGHT LUNG: ICD-10-CM

## 2019-12-23 PROCEDURE — 71250 CT THORAX DX C-: CPT

## 2020-01-13 ENCOUNTER — OFFICE VISIT CONVERTED (OUTPATIENT)
Dept: NEUROSURGERY | Facility: CLINIC | Age: 75
End: 2020-01-13
Attending: PHYSICIAN ASSISTANT

## 2020-06-02 ENCOUNTER — OFFICE VISIT CONVERTED (OUTPATIENT)
Dept: NEUROSURGERY | Facility: CLINIC | Age: 75
End: 2020-06-02
Attending: PHYSICIAN ASSISTANT

## 2020-07-31 ENCOUNTER — TELEPHONE (OUTPATIENT)
Dept: FAMILY MEDICINE CLINIC | Facility: CLINIC | Age: 75
End: 2020-07-31

## 2020-07-31 DIAGNOSIS — E78.00 PURE HYPERCHOLESTEROLEMIA: Primary | ICD-10-CM

## 2020-07-31 DIAGNOSIS — E55.9 VITAMIN D DEFICIENCY, UNSPECIFIED: ICD-10-CM

## 2020-07-31 DIAGNOSIS — E03.9 HYPOTHYROIDISM, UNSPECIFIED TYPE: ICD-10-CM

## 2020-08-05 ENCOUNTER — LAB (OUTPATIENT)
Dept: FAMILY MEDICINE CLINIC | Facility: CLINIC | Age: 75
End: 2020-08-05

## 2020-08-05 DIAGNOSIS — E03.9 HYPOTHYROIDISM, UNSPECIFIED TYPE: ICD-10-CM

## 2020-08-05 DIAGNOSIS — E78.00 PURE HYPERCHOLESTEROLEMIA: ICD-10-CM

## 2020-08-05 DIAGNOSIS — E55.9 VITAMIN D DEFICIENCY, UNSPECIFIED: ICD-10-CM

## 2020-08-05 LAB
25(OH)D3 SERPL-MCNC: 40.6 NG/ML (ref 30–100)
ALBUMIN SERPL-MCNC: 4 G/DL (ref 3.5–5.2)
ALBUMIN/GLOB SERPL: 1.4 G/DL
ALP SERPL-CCNC: 64 U/L (ref 39–117)
ALT SERPL W P-5'-P-CCNC: 23 U/L (ref 1–33)
ANION GAP SERPL CALCULATED.3IONS-SCNC: 9.7 MMOL/L (ref 5–15)
AST SERPL-CCNC: 16 U/L (ref 1–32)
BILIRUB SERPL-MCNC: 0.7 MG/DL (ref 0–1.2)
BUN SERPL-MCNC: 14 MG/DL (ref 8–23)
BUN/CREAT SERPL: 15.4 (ref 7–25)
CALCIUM SPEC-SCNC: 9.4 MG/DL (ref 8.6–10.5)
CHLORIDE SERPL-SCNC: 104 MMOL/L (ref 98–107)
CHOLEST SERPL-MCNC: 189 MG/DL (ref 0–200)
CO2 SERPL-SCNC: 24.3 MMOL/L (ref 22–29)
CREAT SERPL-MCNC: 0.91 MG/DL (ref 0.57–1)
DEPRECATED RDW RBC AUTO: 40.9 FL (ref 37–54)
ERYTHROCYTE [DISTWIDTH] IN BLOOD BY AUTOMATED COUNT: 12.1 % (ref 12.3–15.4)
GFR SERPL CREATININE-BSD FRML MDRD: 60 ML/MIN/1.73
GLOBULIN UR ELPH-MCNC: 2.8 GM/DL
GLUCOSE SERPL-MCNC: 113 MG/DL (ref 65–99)
HCT VFR BLD AUTO: 45.1 % (ref 34–46.6)
HDLC SERPL-MCNC: 34 MG/DL (ref 40–60)
HGB BLD-MCNC: 15.1 G/DL (ref 12–15.9)
LDLC SERPL CALC-MCNC: 116 MG/DL (ref 0–100)
LDLC/HDLC SERPL: 3.42 {RATIO}
MCH RBC QN AUTO: 30.9 PG (ref 26.6–33)
MCHC RBC AUTO-ENTMCNC: 33.5 G/DL (ref 31.5–35.7)
MCV RBC AUTO: 92.2 FL (ref 79–97)
PLATELET # BLD AUTO: 268 10*3/MM3 (ref 140–450)
PMV BLD AUTO: 10.4 FL (ref 6–12)
POTASSIUM SERPL-SCNC: 4.4 MMOL/L (ref 3.5–5.2)
PROT SERPL-MCNC: 6.8 G/DL (ref 6–8.5)
RBC # BLD AUTO: 4.89 10*6/MM3 (ref 3.77–5.28)
SODIUM SERPL-SCNC: 138 MMOL/L (ref 136–145)
T-UPTAKE NFR SERPL: 0.97 TBI (ref 0.8–1.3)
T4 SERPL-MCNC: 11 MCG/DL (ref 4.5–11.7)
TRIGL SERPL-MCNC: 193 MG/DL (ref 0–150)
TSH SERPL DL<=0.05 MIU/L-ACNC: 1.92 UIU/ML (ref 0.27–4.2)
VLDLC SERPL-MCNC: 38.6 MG/DL (ref 5–40)
WBC # BLD AUTO: 6.93 10*3/MM3 (ref 3.4–10.8)

## 2020-08-05 PROCEDURE — 85027 COMPLETE CBC AUTOMATED: CPT | Performed by: INTERNAL MEDICINE

## 2020-08-05 PROCEDURE — 84436 ASSAY OF TOTAL THYROXINE: CPT | Performed by: INTERNAL MEDICINE

## 2020-08-05 PROCEDURE — 84479 ASSAY OF THYROID (T3 OR T4): CPT | Performed by: INTERNAL MEDICINE

## 2020-08-05 PROCEDURE — 80061 LIPID PANEL: CPT | Performed by: INTERNAL MEDICINE

## 2020-08-05 PROCEDURE — 84443 ASSAY THYROID STIM HORMONE: CPT | Performed by: INTERNAL MEDICINE

## 2020-08-05 PROCEDURE — 80053 COMPREHEN METABOLIC PANEL: CPT | Performed by: INTERNAL MEDICINE

## 2020-08-05 PROCEDURE — 82306 VITAMIN D 25 HYDROXY: CPT | Performed by: INTERNAL MEDICINE

## 2020-08-05 PROCEDURE — 36415 COLL VENOUS BLD VENIPUNCTURE: CPT | Performed by: INTERNAL MEDICINE

## 2020-08-07 ENCOUNTER — TELEPHONE (OUTPATIENT)
Dept: FAMILY MEDICINE CLINIC | Facility: CLINIC | Age: 75
End: 2020-08-07

## 2020-08-07 NOTE — TELEPHONE ENCOUNTER
Patient is calling for the  Results of labs that were drawn on 08/05/20.    Please advise.    Patient call back [phone]

## 2020-08-07 NOTE — TELEPHONE ENCOUNTER
FYI,  Had mammogram stage 1 left breast lumpectomy just finished radiation cancer free now on meds SEEING ONCLOGIST

## 2020-08-18 ENCOUNTER — OFFICE VISIT (OUTPATIENT)
Dept: FAMILY MEDICINE CLINIC | Facility: CLINIC | Age: 75
End: 2020-08-18

## 2020-08-18 VITALS
SYSTOLIC BLOOD PRESSURE: 118 MMHG | HEIGHT: 65 IN | HEART RATE: 82 BPM | WEIGHT: 253 LBS | BODY MASS INDEX: 42.15 KG/M2 | OXYGEN SATURATION: 96 % | DIASTOLIC BLOOD PRESSURE: 66 MMHG | TEMPERATURE: 97.8 F

## 2020-08-18 DIAGNOSIS — C50.012 MALIGNANT NEOPLASM OF NIPPLE OF LEFT BREAST IN FEMALE, UNSPECIFIED ESTROGEN RECEPTOR STATUS (HCC): ICD-10-CM

## 2020-08-18 DIAGNOSIS — T66.XXXA ADVERSE EFFECT OF RADIATION, INITIAL ENCOUNTER: Primary | ICD-10-CM

## 2020-08-18 PROBLEM — C50.919 MALIGNANT NEOPLASM OF FEMALE BREAST (HCC): Status: ACTIVE | Noted: 2020-08-18

## 2020-08-18 PROCEDURE — 99213 OFFICE O/P EST LOW 20 MIN: CPT | Performed by: INTERNAL MEDICINE

## 2020-08-18 RX ORDER — FLUCONAZOLE 150 MG/1
TABLET ORAL
Qty: 3 TABLET | Refills: 0 | Status: SHIPPED | OUTPATIENT
Start: 2020-08-18 | End: 2021-06-17

## 2020-08-18 RX ORDER — SULFAMETHOXAZOLE AND TRIMETHOPRIM 800; 160 MG/1; MG/1
1 TABLET ORAL 2 TIMES DAILY
Qty: 28 TABLET | Refills: 0 | Status: SHIPPED | OUTPATIENT
Start: 2020-08-18 | End: 2021-02-04

## 2020-08-18 RX ORDER — MELATONIN
Qty: 180 TABLET | Refills: 3 | Status: SHIPPED | OUTPATIENT
Start: 2020-08-18 | End: 2021-11-17 | Stop reason: SDUPTHER

## 2020-08-18 RX ORDER — LEVOTHYROXINE SODIUM 137 UG/1
137 TABLET ORAL DAILY
Qty: 90 TABLET | Refills: 3 | Status: SHIPPED | OUTPATIENT
Start: 2020-08-18 | End: 2021-08-17 | Stop reason: SDUPTHER

## 2020-08-18 RX ORDER — MELATONIN
1000 DAILY
COMMUNITY
End: 2020-08-18 | Stop reason: SDUPTHER

## 2020-08-18 NOTE — PROGRESS NOTES
Subjective   Shwetha Lane is a 75 y.o. female.     History of Present Illness   Patient was seen for radiation burn over the left breast.  Patient has been diagnosed with breast cancer and received radiation therapy.  Her left breast showed a open wound in the chest area just posterior to her left breast.  It is approximately 2 cm in diameter and is erythematous.  The erythema includes her breasts.  It is very tender to palpation and she was put on Silvadene cream twice daily.  Patient was also placed on Bactrim DS 1 p.o. twice daily.  Patient will follow-up in 1 week.    Dictated utilizing Dragon dictation. If there are questions or for further clarification, please contact me.  The following portions of the patient's history were reviewed and updated as appropriate: allergies, current medications, past family history, past medical history, past social history, past surgical history and problem list.    Review of Systems   Constitutional: Negative for fatigue and fever.   HENT: Positive for congestion. Negative for trouble swallowing.    Eyes: Negative for discharge and visual disturbance.   Respiratory: Negative for choking and shortness of breath.    Cardiovascular: Negative for chest pain and palpitations.   Gastrointestinal: Negative for abdominal pain and blood in stool.   Endocrine: Negative.    Genitourinary: Negative for genital sores and hematuria.   Musculoskeletal: Negative for gait problem and joint swelling.   Skin: Negative for color change, pallor, rash and wound.        Radiation burns to anterior chest and left breast.  Abrasion anterior chest   Allergic/Immunologic: Positive for environmental allergies. Negative for immunocompromised state.   Neurological: Negative for facial asymmetry and speech difficulty.   Psychiatric/Behavioral: Negative for hallucinations and suicidal ideas.       Objective   Physical Exam   Constitutional: She is oriented to person, place, and time. She appears  well-developed and well-nourished.   HENT:   Head: Normocephalic and atraumatic.   Eyes: Pupils are equal, round, and reactive to light. Conjunctivae and EOM are normal.   Neck: Normal range of motion. Neck supple.   Cardiovascular: Normal rate, regular rhythm and normal heart sounds. Exam reveals no gallop and no friction rub.   No murmur heard.  Pulmonary/Chest: Effort normal and breath sounds normal. No stridor. No respiratory distress. She has no wheezes. She has no rales. She exhibits no tenderness.   Abdominal: Soft. Bowel sounds are normal.   Musculoskeletal: Normal range of motion.   Neurological: She is alert and oriented to person, place, and time.   Skin: Skin is warm and dry.   Abrasion with severe erythema anterior chest and left breast   Psychiatric: She has a normal mood and affect. Her behavior is normal. Judgment and thought content normal.   Nursing note and vitals reviewed.      Assessment/Plan #1 Silvadene dressing changes twice daily #2 Bactrim DS 1 p.o. twice daily  Shwetha was seen today for refill synthyroid, 6 month f/u and look at left breast skin issue.    Diagnoses and all orders for this visit:    Adverse effect of radiation, initial encounter    Malignant neoplasm of nipple of left breast in female, unspecified estrogen receptor status (CMS/HCC)    Other orders  -     levothyroxine (Synthroid) 137 MCG tablet; Take 1 tablet by mouth Daily.  -     cholecalciferol (VITAMIN D3) 25 MCG (1000 UT) tablet; 2 pills daily  -     silver sulfadiazine (Silvadene) 1 % cream; Apply  topically to the appropriate area as directed 2 (Two) Times a Day.  -     sulfamethoxazole-trimethoprim (BACTRIM DS,SEPTRA DS) 800-160 MG per tablet; Take 1 tablet by mouth 2 (Two) Times a Day.  -     fluconazole (Diflucan) 150 MG tablet; 1 po qday 1,4,7

## 2020-08-20 ENCOUNTER — TELEPHONE (OUTPATIENT)
Dept: FAMILY MEDICINE CLINIC | Facility: CLINIC | Age: 75
End: 2020-08-20

## 2020-08-20 RX ORDER — DOXYCYCLINE HYCLATE 100 MG
100 TABLET ORAL 2 TIMES DAILY
Qty: 20 TABLET | Refills: 0 | Status: SHIPPED | OUTPATIENT
Start: 2020-08-20 | End: 2021-02-04

## 2020-08-20 NOTE — TELEPHONE ENCOUNTER
PATIENT CALLED AND STATED SHE NOW HAS PUS COMING FROM UNDER BREAST AND THAT DR COOPER WANTED HER TO COME IN AND SEE HIM IF ANYTHING CHANGED FROM VISIT ON 8/18    DR COOPER HAS NO OPENINGS UNTIL THURSDAY THE 27TH AND PATIENT IS WANTING TO BE SEEN FRI 8/21      Phone:  [phone] (Mobile)

## 2020-10-13 ENCOUNTER — CLINICAL SUPPORT (OUTPATIENT)
Dept: FAMILY MEDICINE CLINIC | Facility: CLINIC | Age: 75
End: 2020-10-13

## 2020-10-13 PROCEDURE — 90694 VACC AIIV4 NO PRSRV 0.5ML IM: CPT | Performed by: INTERNAL MEDICINE

## 2020-10-13 PROCEDURE — G0008 ADMIN INFLUENZA VIRUS VAC: HCPCS | Performed by: INTERNAL MEDICINE

## 2020-11-06 ENCOUNTER — OFFICE VISIT CONVERTED (OUTPATIENT)
Dept: ORTHOPEDIC SURGERY | Facility: CLINIC | Age: 75
End: 2020-11-06
Attending: ORTHOPAEDIC SURGERY

## 2021-01-21 ENCOUNTER — OFFICE VISIT (OUTPATIENT)
Dept: FAMILY MEDICINE CLINIC | Facility: CLINIC | Age: 76
End: 2021-01-21

## 2021-01-21 VITALS
TEMPERATURE: 98 F | OXYGEN SATURATION: 96 % | WEIGHT: 250 LBS | DIASTOLIC BLOOD PRESSURE: 62 MMHG | HEIGHT: 65 IN | HEART RATE: 74 BPM | SYSTOLIC BLOOD PRESSURE: 120 MMHG | BODY MASS INDEX: 41.65 KG/M2 | RESPIRATION RATE: 16 BRPM

## 2021-01-21 DIAGNOSIS — E78.00 PURE HYPERCHOLESTEROLEMIA: ICD-10-CM

## 2021-01-21 DIAGNOSIS — I83.11 VARICOSE VEINS OF RIGHT LOWER EXTREMITY WITH INFLAMMATION: ICD-10-CM

## 2021-01-21 DIAGNOSIS — E03.9 HYPOTHYROIDISM, UNSPECIFIED TYPE: ICD-10-CM

## 2021-01-21 DIAGNOSIS — E55.9 VITAMIN D DEFICIENCY, UNSPECIFIED: ICD-10-CM

## 2021-01-21 DIAGNOSIS — I89.0 LYMPHEDEMA: Primary | ICD-10-CM

## 2021-01-21 LAB
25(OH)D3 SERPL-MCNC: 44.3 NG/ML (ref 30–100)
ALBUMIN SERPL-MCNC: 4.2 G/DL (ref 3.5–5.2)
ALBUMIN/GLOB SERPL: 1.8 G/DL
ALP SERPL-CCNC: 73 U/L (ref 39–117)
ALT SERPL W P-5'-P-CCNC: 15 U/L (ref 1–33)
ANION GAP SERPL CALCULATED.3IONS-SCNC: 10.9 MMOL/L (ref 5–15)
AST SERPL-CCNC: 17 U/L (ref 1–32)
BILIRUB SERPL-MCNC: 0.4 MG/DL (ref 0–1.2)
BUN SERPL-MCNC: 15 MG/DL (ref 8–23)
BUN/CREAT SERPL: 18.3 (ref 7–25)
CALCIUM SPEC-SCNC: 9.3 MG/DL (ref 8.6–10.5)
CHLORIDE SERPL-SCNC: 105 MMOL/L (ref 98–107)
CHOLEST SERPL-MCNC: 218 MG/DL (ref 0–200)
CO2 SERPL-SCNC: 25.1 MMOL/L (ref 22–29)
CREAT SERPL-MCNC: 0.82 MG/DL (ref 0.57–1)
DEPRECATED RDW RBC AUTO: 40.9 FL (ref 37–54)
ERYTHROCYTE [DISTWIDTH] IN BLOOD BY AUTOMATED COUNT: 12.2 % (ref 12.3–15.4)
GFR SERPL CREATININE-BSD FRML MDRD: 68 ML/MIN/1.73
GLOBULIN UR ELPH-MCNC: 2.4 GM/DL
GLUCOSE SERPL-MCNC: 89 MG/DL (ref 65–99)
HCT VFR BLD AUTO: 46 % (ref 34–46.6)
HDLC SERPL-MCNC: 37 MG/DL (ref 40–60)
HGB BLD-MCNC: 15.8 G/DL (ref 12–15.9)
LDLC SERPL CALC-MCNC: 135 MG/DL (ref 0–100)
LDLC/HDLC SERPL: 3.51 {RATIO}
MCH RBC QN AUTO: 31.6 PG (ref 26.6–33)
MCHC RBC AUTO-ENTMCNC: 34.3 G/DL (ref 31.5–35.7)
MCV RBC AUTO: 92 FL (ref 79–97)
PLATELET # BLD AUTO: 260 10*3/MM3 (ref 140–450)
PMV BLD AUTO: 10.6 FL (ref 6–12)
POTASSIUM SERPL-SCNC: 4.5 MMOL/L (ref 3.5–5.2)
PROT SERPL-MCNC: 6.6 G/DL (ref 6–8.5)
RBC # BLD AUTO: 5 10*6/MM3 (ref 3.77–5.28)
SODIUM SERPL-SCNC: 141 MMOL/L (ref 136–145)
T-UPTAKE NFR SERPL: 0.98 TBI (ref 0.8–1.3)
T4 SERPL-MCNC: 9.57 MCG/DL (ref 4.5–11.7)
TRIGL SERPL-MCNC: 256 MG/DL (ref 0–150)
TSH SERPL DL<=0.05 MIU/L-ACNC: 2.73 UIU/ML (ref 0.27–4.2)
VLDLC SERPL-MCNC: 46 MG/DL (ref 5–40)
WBC # BLD AUTO: 8.13 10*3/MM3 (ref 3.4–10.8)

## 2021-01-21 PROCEDURE — 80053 COMPREHEN METABOLIC PANEL: CPT | Performed by: INTERNAL MEDICINE

## 2021-01-21 PROCEDURE — 82306 VITAMIN D 25 HYDROXY: CPT | Performed by: INTERNAL MEDICINE

## 2021-01-21 PROCEDURE — 84436 ASSAY OF TOTAL THYROXINE: CPT | Performed by: INTERNAL MEDICINE

## 2021-01-21 PROCEDURE — 84443 ASSAY THYROID STIM HORMONE: CPT | Performed by: INTERNAL MEDICINE

## 2021-01-21 PROCEDURE — 80061 LIPID PANEL: CPT | Performed by: INTERNAL MEDICINE

## 2021-01-21 PROCEDURE — 85027 COMPLETE CBC AUTOMATED: CPT | Performed by: INTERNAL MEDICINE

## 2021-01-21 PROCEDURE — 84479 ASSAY OF THYROID (T3 OR T4): CPT | Performed by: INTERNAL MEDICINE

## 2021-01-21 PROCEDURE — 36415 COLL VENOUS BLD VENIPUNCTURE: CPT | Performed by: INTERNAL MEDICINE

## 2021-01-21 PROCEDURE — 99214 OFFICE O/P EST MOD 30 MIN: CPT | Performed by: INTERNAL MEDICINE

## 2021-01-21 RX ORDER — TAMOXIFEN CITRATE 20 MG/1
20 TABLET ORAL DAILY
COMMUNITY
End: 2021-06-17

## 2021-01-22 NOTE — PROGRESS NOTES
Mesha Lane is a 75 y.o. female.     Vitals:    01/21/21 1303   BP: 120/62   Pulse: 74   Resp: 16   Temp: 98 °F (36.7 °C)   SpO2: 96%      Body mass index is 41.6 kg/m².     History of Present Illness   Patient was seen for lymphedema and varicose veins of her right leg.  Patient is being set up with lymphedema clinic.  Patient was also given thigh-high JOCELYN stockings.  Patient did have knee-high stockings shoulder was changed to thigh-high.  Patient has had these feelings pain for several years.  Patient's thyroids been treated with levothyroxine 137 mcg daily.  TSH is 1.9.  Patient's lipids are being treated with diet and physical activity.  Glycerides 193, HDL 34, .  Patient wants to continue present treatment.  Patient's vitamin D3 is being treated with 2000 units daily.  Patient did have labs today and follow-up in 4 days.  Patient will follow up in the office in 1 month.    Dictated utilizing Dragon dictation. If there are questions or for further clarification, please contact me.  The following portions of the patient's history were reviewed and updated as appropriate: allergies, current medications, past family history, past medical history, past social history, past surgical history and problem list.    Review of Systems   Constitutional: Negative for fatigue and fever.   HENT: Positive for congestion. Negative for trouble swallowing.    Eyes: Negative for discharge and visual disturbance.   Respiratory: Negative for choking and shortness of breath.    Cardiovascular: Negative for chest pain and palpitations.   Gastrointestinal: Negative for abdominal pain and blood in stool.   Endocrine: Negative.    Genitourinary: Negative for genital sores and hematuria.   Musculoskeletal: Negative for gait problem and joint swelling.   Skin: Negative for color change, pallor, rash and wound.   Allergic/Immunologic: Positive for environmental allergies. Negative for immunocompromised state.    Neurological: Negative for facial asymmetry and speech difficulty.   Psychiatric/Behavioral: Negative for hallucinations and suicidal ideas.       Objective   Physical Exam  Vitals signs and nursing note reviewed.   Constitutional:       Appearance: Normal appearance. She is well-developed.   HENT:      Head: Normocephalic and atraumatic.      Nose: Nose normal.      Mouth/Throat:      Mouth: Mucous membranes are moist.      Pharynx: Oropharynx is clear.   Eyes:      Extraocular Movements: Extraocular movements intact.      Conjunctiva/sclera: Conjunctivae normal.      Pupils: Pupils are equal, round, and reactive to light.   Neck:      Musculoskeletal: Normal range of motion and neck supple.   Cardiovascular:      Rate and Rhythm: Normal rate and regular rhythm.      Heart sounds: Normal heart sounds. No murmur. No friction rub. No gallop.    Pulmonary:      Effort: Pulmonary effort is normal. No respiratory distress.      Breath sounds: Normal breath sounds. No stridor. No wheezing, rhonchi or rales.   Chest:      Chest wall: No tenderness.   Abdominal:      General: Bowel sounds are normal.      Palpations: Abdomen is soft.   Musculoskeletal: Normal range of motion.         General: Swelling present.      Right lower leg: Edema present.   Skin:     General: Skin is warm and dry.   Neurological:      General: No focal deficit present.      Mental Status: She is alert and oriented to person, place, and time. Mental status is at baseline.   Psychiatric:         Mood and Affect: Mood normal.         Behavior: Behavior normal.         Thought Content: Thought content normal.         Judgment: Judgment normal.         Assessment/Plan #1 labs #2 lipidemia clinic #3 thigh-high stocking   Problems Addressed this Visit     Cardiac and Vasculature              Hyperlipemia    Relevant Orders    CBC (No Diff)    Comprehensive Metabolic Panel    Lipid Panel    Vitamin D 25 Hydroxy    Thyroid Panel With TSH           Endocrine and Metabolic              Hypothyroidism    Relevant Orders    CBC (No Diff)    Comprehensive Metabolic Panel    Lipid Panel    Vitamin D 25 Hydroxy    Thyroid Panel With TSH            Other Visit Diagnoses     Lymphedema    -  Primary    Relevant Orders    Ambulatory Referral to Lymphedema Clinic    Compression Stockings    Vitamin D deficiency, unspecified         Relevant Orders    CBC (No Diff)    Comprehensive Metabolic Panel    Lipid Panel    Vitamin D 25 Hydroxy    Thyroid Panel With TSH    Varicose veins of right lower extremity with inflammation          Diagnoses     Diagnosis Codes Comments    Lymphedema    -  Primary ICD-10-CM: I89.0  ICD-9-CM: 457.1     Pure hypercholesterolemia     ICD-10-CM: E78.00  ICD-9-CM: 272.0     Hypothyroidism, unspecified type     ICD-10-CM: E03.9  ICD-9-CM: 244.9     Vitamin D deficiency, unspecified      ICD-10-CM: E55.9  ICD-9-CM: 268.9     Varicose veins of right lower extremity with inflammation     ICD-10-CM: I83.11  ICD-9-CM: 454.1

## 2021-02-04 ENCOUNTER — OFFICE VISIT (OUTPATIENT)
Dept: FAMILY MEDICINE CLINIC | Facility: CLINIC | Age: 76
End: 2021-02-04

## 2021-02-04 VITALS
HEIGHT: 65 IN | OXYGEN SATURATION: 95 % | TEMPERATURE: 97.5 F | SYSTOLIC BLOOD PRESSURE: 128 MMHG | RESPIRATION RATE: 16 BRPM | WEIGHT: 252 LBS | DIASTOLIC BLOOD PRESSURE: 80 MMHG | BODY MASS INDEX: 41.99 KG/M2 | HEART RATE: 72 BPM

## 2021-02-04 DIAGNOSIS — G43.001 MIGRAINE WITHOUT AURA AND WITH STATUS MIGRAINOSUS, NOT INTRACTABLE: ICD-10-CM

## 2021-02-04 DIAGNOSIS — E78.00 PURE HYPERCHOLESTEROLEMIA: ICD-10-CM

## 2021-02-04 DIAGNOSIS — E03.9 HYPOTHYROIDISM, UNSPECIFIED TYPE: Primary | ICD-10-CM

## 2021-02-04 PROBLEM — E11.43 TYPE 2 DIABETES MELLITUS WITH AUTONOMIC NEUROPATHY (HCC): Status: ACTIVE | Noted: 2021-02-04

## 2021-02-04 PROBLEM — E11.9 TYPE 2 DIABETES MELLITUS, WITHOUT LONG-TERM CURRENT USE OF INSULIN (HCC): Status: ACTIVE | Noted: 2021-02-04

## 2021-02-04 PROBLEM — E11.9 TYPE 2 DIABETES MELLITUS, WITHOUT LONG-TERM CURRENT USE OF INSULIN: Status: RESOLVED | Noted: 2021-02-04 | Resolved: 2021-02-04

## 2021-02-04 PROBLEM — E11.43 TYPE 2 DIABETES MELLITUS WITH AUTONOMIC NEUROPATHY: Status: RESOLVED | Noted: 2021-02-04 | Resolved: 2021-02-04

## 2021-02-04 PROCEDURE — 99214 OFFICE O/P EST MOD 30 MIN: CPT | Performed by: INTERNAL MEDICINE

## 2021-02-05 NOTE — PROGRESS NOTES
Mesha Lane is a 75 y.o. female.     Vitals:    02/04/21 1608   BP: 128/80   Pulse: 72   Resp: 16   Temp: 97.5 °F (36.4 °C)   SpO2: 95%      Body mass index is 41.93 kg/m².     History of Present Illness   Patient was seen for hypothyroidism.  Patient is using levothyroxine 137 mcg daily.  TSH was 2.7.  Patient will continue present dosage.  Patient's lipids being treated with diet and exercise.  Patient's triglycerides were 256, HDL 37, .  Patient 6 months ago had a triglycerides of 193, HDL 47, .  Patient states she has been off her diet and not exercising.  Patient prefers to restart and not begin medications at this time.  Patient will be rechecked and in a few months.  Patient's migraine is being treated with Maxalt.  This is well controlled her headaches.  Patient will continue treatment.    Dictated utilizing Dragon dictation. If there are questions or for further clarification, please contact me.  The following portions of the patient's history were reviewed and updated as appropriate: allergies, current medications, past family history, past medical history, past social history, past surgical history and problem list.    Review of Systems   Constitutional: Negative for fatigue and fever.   HENT: Positive for congestion. Negative for trouble swallowing.    Eyes: Negative for discharge and visual disturbance.   Respiratory: Negative for choking and shortness of breath.    Cardiovascular: Negative for chest pain and palpitations.   Gastrointestinal: Negative for abdominal pain and blood in stool.   Endocrine: Negative.    Genitourinary: Negative for genital sores and hematuria.   Musculoskeletal: Negative for gait problem and joint swelling.   Skin: Negative for color change, pallor, rash and wound.   Allergic/Immunologic: Positive for environmental allergies. Negative for immunocompromised state.   Neurological: Positive for headaches. Negative for facial asymmetry and speech  difficulty.   Psychiatric/Behavioral: Negative for hallucinations and suicidal ideas.       Objective   Physical Exam  Vitals signs and nursing note reviewed.   Constitutional:       Appearance: Normal appearance. She is well-developed.   HENT:      Head: Normocephalic and atraumatic.      Nose: Nose normal.      Mouth/Throat:      Mouth: Mucous membranes are moist.      Pharynx: Oropharynx is clear.   Eyes:      Extraocular Movements: Extraocular movements intact.      Conjunctiva/sclera: Conjunctivae normal.      Pupils: Pupils are equal, round, and reactive to light.   Neck:      Musculoskeletal: Normal range of motion and neck supple.   Cardiovascular:      Rate and Rhythm: Normal rate and regular rhythm.      Heart sounds: Normal heart sounds. No murmur. No friction rub. No gallop.    Pulmonary:      Effort: Pulmonary effort is normal. No respiratory distress.      Breath sounds: Normal breath sounds. No stridor. No wheezing, rhonchi or rales.   Chest:      Chest wall: No tenderness.   Abdominal:      General: Bowel sounds are normal.      Palpations: Abdomen is soft.   Musculoskeletal: Normal range of motion.   Skin:     General: Skin is warm and dry.   Neurological:      General: No focal deficit present.      Mental Status: She is alert and oriented to person, place, and time. Mental status is at baseline.   Psychiatric:         Mood and Affect: Mood normal.         Behavior: Behavior normal.         Thought Content: Thought content normal.         Judgment: Judgment normal.         Assessment/Plan #1 restart cholesterol diet and low impact activity #2 continue levothyroxine at present dose #3 continue triptan No. 4 recheck labs in 4 months  Problems Addressed this Visit     Cardiac and Vasculature              Hyperlipemia          Endocrine and Metabolic              Hypothyroidism - Primary          Neuro              Migraine            Diagnoses     Diagnosis Codes Comments    Hypothyroidism,  unspecified type    -  Primary ICD-10-CM: E03.9  ICD-9-CM: 244.9     Pure hypercholesterolemia     ICD-10-CM: E78.00  ICD-9-CM: 272.0     Migraine without aura and with status migrainosus, not intractable     ICD-10-CM: G43.001  ICD-9-CM: 346.12

## 2021-03-02 DIAGNOSIS — Z23 IMMUNIZATION DUE: ICD-10-CM

## 2021-04-02 ENCOUNTER — TELEPHONE (OUTPATIENT)
Dept: FAMILY MEDICINE CLINIC | Facility: CLINIC | Age: 76
End: 2021-04-02

## 2021-04-02 DIAGNOSIS — G89.29 CHRONIC BILATERAL LOW BACK PAIN WITH SCIATICA, SCIATICA LATERALITY UNSPECIFIED: Primary | ICD-10-CM

## 2021-04-02 DIAGNOSIS — M54.40 CHRONIC BILATERAL LOW BACK PAIN WITH SCIATICA, SCIATICA LATERALITY UNSPECIFIED: Primary | ICD-10-CM

## 2021-04-02 NOTE — TELEPHONE ENCOUNTER
PATIENT IS REQUESTING A REFERRAL TO DR VAUGHN ONEIL IN [place] ([phone])    SHE STATES THAT SHE IS EXPERIENCING PAIN IN HER SPINE AND NEEDS A REFERRAL TO BE SEEN.    PLEASE ADVISE    PATIENT CAN BE REACHED AT  [phone]

## 2021-04-07 DIAGNOSIS — E78.2 MIXED HYPERLIPIDEMIA: Primary | ICD-10-CM

## 2021-04-13 ENCOUNTER — TELEPHONE (OUTPATIENT)
Dept: FAMILY MEDICINE CLINIC | Facility: CLINIC | Age: 76
End: 2021-04-13

## 2021-04-13 NOTE — TELEPHONE ENCOUNTER
PATIENT IS REQUESTING A REFERRAL TO DR VAUGHN ONEIL IN [place] ([phone])     SHE STATES THAT SHE IS EXPERIENCING PAIN IN HER SPINE AND NEEDS A REFERRAL TO BE SEEN.     PLEASE ADVISE     PATIENT CAN BE REACHED AT  [phone]

## 2021-04-14 DIAGNOSIS — G89.29 CHRONIC BILATERAL LOW BACK PAIN WITH SCIATICA, SCIATICA LATERALITY UNSPECIFIED: Primary | ICD-10-CM

## 2021-04-14 DIAGNOSIS — M54.40 CHRONIC BILATERAL LOW BACK PAIN WITH SCIATICA, SCIATICA LATERALITY UNSPECIFIED: Primary | ICD-10-CM

## 2021-05-13 NOTE — PROGRESS NOTES
"   Progress Note      Patient Name: Shwetha Lane   Patient ID: [id number]   Sex: Female   YOB: 1945    Primary Care Provider: Denys Mohr MD   Referring Provider: Denys Mohr MD    Visit Date: November 6, 2020    Provider: Denys Mohr MD   Location: [facility]   Location Address: [address] [place] [address]  [id number]   Location Phone: [phone]          Chief Complaint  · bilateral hip pain      History Of Present Illness  Shwetha Lane is a 75 year old /White female who presents today to [facility].      L. She states pain is mostly lateral. She denies injury. She states pain in low back and right groin at times. She denies radicular symptoms. She also states severe right knee pain as well.     She is s/p left TKA 2017, and is very pleased with her outcome.\">She is here for bilateral hip pain, R>L. She states pain is mostly lateral. She denies injury. She states pain in low back and right groin at times. She denies radicular symptoms. She also states severe right knee pain as well.     She is s/p left TKA 2017, and is very pleased with her outcome.       Past Medical History  Arthritis; Bladder Disorder; Bladder problem; Degenerative Disc Disease ; History of knee replacement, total, left; Hypothyroidism, Acquired; Left Knee Osteoarthritis ; Migraines; Muscle cramps; Scoliosis; Thyroid disease; Thyroid disorder         Past Surgical History  Appendectomy; Artificial Joints/Limbs; Back; Back surgery; Colonoscopy; Hysterectomy; Joint Surgery; Lap Band Surgery; Tonsillectomy         Medication List  metoprolol tartrate 25 mg oral tablet; oxybutynin transdermal; Synthroid 137 mcg oral tablet; topiramate 25 mg oral capsule, sprinkle         Allergy List  NO KNOWN DRUG ALLERGIES       Allergies Reconciled  Family Medical History  Stroke; Heart Disease; Cancer, Unspecified; Heart Attack (MI); Family history of cancer; Family history of stroke; Family " "history of heart disease; Family history of diabetes mellitus         Social History  Alcohol (Never); lives with other; Recreational Drug Use (Never); Retired; Retired.; Tobacco (Former);          Review of Systems  · Constitutional  o Denies  o : fever, chills, weight loss  · Cardiovascular  o Denies  o : chest pain, shortness of breath  · Gastrointestinal  o Denies  o : liver disease, heartburn, nausea, blood in stools  · Genitourinary  o Denies  o : painful urination, blood in urine  · Integument  o Denies  o : rash, itching  · Neurologic  o Denies  o : headache, weakness, loss of consciousness  · Musculoskeletal  o Admits  o : painful, swollen joints  · Psychiatric  o Denies  o : drug/alcohol addiction, anxiety, depression      Vitals  Date Time BP Position Site L\R Cuff Size HR RR TEMP (F) WT  HT  BMI kg/m2 BSA m2 O2 Sat FR L/min FiO2 HC       11/06/2020 01:20 PM      78 - R   255lbs 8oz 5'  5\" 42.52 2.31 94 %            Physical Examination  · Constitutional  o Appearance  o : well developed, well-nourished, no obvious deformities present  · Head and Face  o Head  o :   § Inspection  § : normocephalic  o Face  o :   § Inspection  § : no facial lesions  · Eyes  o Conjunctivae  o : conjunctivae normal  o Sclerae  o : sclerae white  · Ears, Nose, Mouth and Throat  o Ears  o :   § External Ears  § : appearance within normal limits  § Hearing  § : intact  o Nose  o :   § External Nose  § : appearance normal  · Neck  o Inspection/Palpation  o : normal appearance  o Range of Motion  o : full range of motion  · Respiratory  o Respiratory Effort  o : breathing unlabored  o Inspection of Chest  o : normal appearance  o Auscultation of Lungs  o : no audible wheezing or rales  · Cardiovascular  o Heart  o : regular rate  · Gastrointestinal  o Abdominal Examination  o : soft and non-tender  · Skin and Subcutaneous Tissue  o General Inspection  o : intact, no rashes  · Psychiatric  o General  o : Alert and oriented " x3  o Judgement and Insight  o : judgment and insight intact  o Mood and Affect  o : mood normal, affect appropriate  · Right Knee  o Inspection  o : Arthritic appearing. Tender join lines. Full ROM. Stable to varus/valgus stress. Full weight bearing. Good tone of quads, hamstrings, dorsiflexors, plantarflexors. Neurovascularly intact.   · Left Knee  o Inspection  o : Well healed incision. Full ROM. Stable to varus/valgus stress. Full weight bearing. Good tone of quads, hamstrings, dorsiflexors, plantarflexors. Neurovascularly intact.   · Extremities  o Extremities  o : BILATERAL HIP: Appearance of the hips is normal compared to each other. No swelling, atrophy, or skin discoloration. Tenderness over bilateral trochanteric bursae. Mild left groin pain with internal/external rotation. Good strength of quadriceps, hamstrings, dorsiflexors, and plantar flexors. Sensation is grossly intact. Neurovascularly intact.   · Injection Note/Aspiration Note  o Site  o : right knee, bilateral hips   o Procedure  o : Procedure: After educating the patient, patient gave consent for procedure. After using Chloraprep, the joint space was injected. The patient tolerated the procedure well.   o Medication  o : 80 mg of DepoMedrol with 9cc of 1% Lidocaine for bilateral hips. Synvisc One 48 mg for right knee.  · In Office Procedures  o View  o : AP/LATERAL  o Site  o : right hip and left hip  o Indication  o : bilateral hip pain  o Study  o : X-rays ordered, taken in the office, and reviewed today.  o Xray  o : Mild degenerative hip changes L>R.  o Comparative Data  o : No comparative data found          Assessment  · Primary osteoarthritis of right knee     715.16/M17.11  · Trochanteric Bursitis     726.5/M70.60  · Bilateral Pain: Hip     719.45/M25.559  · History of knee joint replacement     V43.65/Z96.659      Plan  · Orders  o Hyaluronan or derivative, Synvisc or Synvisc-One, for intra-nikia () - 715.16/M17.11 - 11/06/2020   Lot  [id number] Exp 10 2022 [employer] Administered by OLIIVER Mohr MD  o Knee Intra-articular Injection without US [facility] (20610) - 715.16/M17.11 - 11/06/2020  o 2 - Depo-Medrol injection 80mg () - 726.5/M70.60 - 11/06/2020   Lot [id number] Exp 09 2021 [employer] Administered by OLIVIER abraham 2 - Hip Intra-articular Injection without US [facility] (20610) - 726.5/M70.60 - 11/06/2020   Lot [id number] Exp 08 01 2021 [employer] Administered by OLIVIER Mohr MD  o Hip (Left) 2 or more views (includes AP Pelvis) [facility] (68243) - 719.45/M25.559 - 11/06/2020  o Hip (Right) 2 or more views (includes AP Pelvis) [facility]. (90689) - 719.45/M25.559 - 11/06/2020  · Medications  o Medications have been Reconciled  o Transition of Care or Provider Policy  · Instructions  o Dr. Mohr saw and examined the patient and agrees with plan.   o X-rays reviewed by Dr. Mohr.  o Reviewed the patient's Past Medical, Social, and Family history as well as the ROS at today's visit, no changes.  o Call or return if worsening symptoms.  o Follow Up PRN.  o This note was transcribed by Romelia Quijano. frederick/nasra  o Right knee synvisc injection. Bilateral trochanteric bursa injections. Follow up PRN.  o Electronically Identified Patient Education Materials Provided Electronically            Electronically Signed by: Romelia Quijano-, Other -Author on December 3, 2020 04:09:39 PM  Electronically Co-signed by: Denys Mohr MD -Reviewer on December 3, 2020 09:34:17 PM

## 2021-05-13 NOTE — PROGRESS NOTES
Progress Note      Patient Name: Shwetha Lane   Patient ID: [id number]   Sex: Female   YOB: 1945    Primary Care Provider: Denys Mohr MD   Referring Provider: Denys Mohr MD    Visit Date: June 2, 2020    Provider: Rosibel Correia PA-C   Location: [facility]   Location Address: [address] [place] [address]  [id number]   Location Phone: [phone]          Chief Complaint     Patient is here for a 3 month follow up after RFA       History Of Present Illness     Pt notes that she did not do the RFA of neck. She notes that she had an occipital nerve block and it helped with her neck pain and migraines. She plans to have additional occipital nerve blocks prior to trying any other injections.       Past Medical History  Arthritis; Bladder Disorder; Bladder problem; Degenerative Disc Disease ; History of knee replacement, total, left; Hypothyroidism, Acquired; Left Knee Osteoarthritis ; Migraines; Muscle cramps; Scoliosis; Thyroid disease; Thyroid disorder         Past Surgical History  Appendectomy; Artificial Joints/Limbs; Back; Back surgery; Colonoscopy; Hysterectomy; Joint Surgery; Lap Band Surgery; Tonsillectomy         Medication List  metoprolol tartrate 25 mg oral tablet; oxybutynin transdermal; Synthroid 137 mcg oral tablet; topiramate 25 mg oral capsule, sprinkle; Voltaren 1 % topical gel         Allergy List  NO KNOWN DRUG ALLERGIES         Family Medical History  Stroke; Heart Disease; Cancer, Unspecified; Heart Attack (MI); Family history of cancer; Family history of stroke; Family history of heart disease; Family history of diabetes mellitus         Social History  Alcohol (Never); lives with other; Recreational Drug Use (Never); Retired; Retired.; Tobacco (Current every day);          Review of Systems  · Constitutional  o Denies  o : chills, excessive sweating, fatigue, fever, sycope/passing out, weight gain, weight loss  · Eyes  o Denies  o : changes  "in vision, blurry vision, double vision  · HENT  o Admits  o : ringing in the ears, sinus pain  o Denies  o : loss of hearing, ear aches, sore throat, nasal congestion, nose bleeds, seasonal allergies  · Cardiovascular  o Denies  o : blood clots, swollen legs, anemia, easy burising or bleeding, transfusions  · Respiratory  o Denies  o : shortness of breath, dry cough, productive cough, pneumonia, COPD  · Gastrointestinal  o Denies  o : difficulty swallowing, reflux  · Genitourinary  o Denies  o : incontinence  · Neurologic  o Admits  o : headache  o Denies  o : seizure, stroke, tremor, loss of balance, falls, dizziness/vertigo, difficulty with sleep, numbness/tingling/paresthesia , difficulty with coordination, difficulty with dexterity, weakness  · Musculoskeletal  o Admits  o : neck stiffness/pain, muscle aches, joint pain, low back pain  o Denies  o : swollen lymph nodes, weakness, spasms, sciatica, pain radiating in arm, pain radiating in leg  · Endocrine  o Admits  o : thyroid disorder  o Denies  o : diabetes  · Psychiatric  o Denies  o : anxiety, depression      Vitals  Date Time BP Position Site L\R Cuff Size HR RR TEMP (F) WT  HT  BMI kg/m2 BSA m2 O2 Sat        06/02/2020 02:21 PM        97.7 240lbs 0oz 5'  5\" 39.94 2.23           Physical Examination  · Constitutional  o Appearance  o : well-nourished, well developed, alert, in no acute distress  · Neck  o Inspection/Palpation  o : normal appearance, no masses, trachea midline. Tenderness of Cspine parsapinals on right.   o Range of Motion  o : Decreased ROM when turning to right.   · Respiratory  o Respiratory Effort  o : breathing unlabored  · Cardiovascular  o Peripheral Vascular System  o :   § Extremities  § : no edema or cyanosis  · Musculoskeletal  o Spine  o :   § Stability  § : no subluxations present  § Muscle Strength/Tone  § : paraspinal muscle strength within normal limits, paraspinal muscle tone within normal limits  o Right Upper " Extremity  o :   § Inspection/Palpation  § : no tenderness to palpation  § Joint Stability  § : shoulder, elbow and wrist joint stability normal  § Range of Motion  § : range of motion normal, no joint crepitus or pain with motion present,shoulder negative  o Left Upper Extremity  o :   § Inspection/Palpation  § : no tenderness to palpation  § Joint Stability  § : shoulder, elbow and wrist joint stability normal  § Range of Motion  § : range of motion normal, no joint crepitus present, no pain with joint motion, shoulder negative  · Skin and Subcutaneous Tissue  o Neck  o : no lesions or areas of discoloration  · Neurologic  o Mental Status Examination  o :   § Orientation  § : alert and oriented to person, place, time and events  o Motor Examination  o :   § RUE Strength  § : strength normal  § RUE Motor Function  § : tone normal, muscle bulk normal  § LUE Strength  § : strength normal  § LUE Motor Function  § : tone normal, muscle bulk normal  o Reflexes  o :   § RUE  § : 2/4 in biceps/triceps/brachioradialis, Franco sign negative  § LUE  § : 2/4 in biceps/triceps/brachioradialis, Franco sign negative  o Sensation  o :   § Light Touch  § : sensation intact to light touch in extremities  o Gait and Station  o :   § Gait Screening  § : normal gait, able to stand without difficulty  · Psychiatric  o Mood and Affect  o : mood normal, affect appropriate     Tenderness of upper back and neck, especially on left in neck in facet.               Assessment  · Cervicalgia     723.1/M54.2  · Cervical radiculopathy     723.4/M54.12  · Thoracic back pain     724.1/M54.6      Plan  · Medications  o Medications have been Reconciled  o Transition of Care or Provider Policy  · Instructions  o Pt plans to get occipital nerve block. She notes they have been helping with neck pain. She will call us with worsening symptoms. May consider RFA of neck.   o Electronically Identified Patient Education Materials Provided  Electronically  · Disposition  o Call or Return if symptoms worsen or persist.            Electronically Signed by: Rosibel Correia PA-C -Author on June 2, 2020 03:25:01 PM

## 2021-05-14 VITALS — BODY MASS INDEX: 42.57 KG/M2 | HEIGHT: 65 IN | OXYGEN SATURATION: 94 % | HEART RATE: 78 BPM | WEIGHT: 255.5 LBS

## 2021-05-15 VITALS — WEIGHT: 248 LBS | HEIGHT: 65 IN | OXYGEN SATURATION: 98 % | HEART RATE: 82 BPM | BODY MASS INDEX: 41.32 KG/M2

## 2021-05-15 VITALS — HEIGHT: 65 IN | HEART RATE: 77 BPM | WEIGHT: 242 LBS | BODY MASS INDEX: 40.32 KG/M2

## 2021-05-15 VITALS — HEIGHT: 65 IN | BODY MASS INDEX: 41.65 KG/M2 | HEART RATE: 77 BPM | WEIGHT: 250 LBS

## 2021-05-15 VITALS
DIASTOLIC BLOOD PRESSURE: 65 MMHG | BODY MASS INDEX: 40.55 KG/M2 | HEIGHT: 65 IN | WEIGHT: 243.37 LBS | SYSTOLIC BLOOD PRESSURE: 130 MMHG

## 2021-05-15 VITALS — BODY MASS INDEX: 41.32 KG/M2 | HEIGHT: 65 IN | WEIGHT: 248 LBS | HEART RATE: 78 BPM

## 2021-05-15 VITALS — WEIGHT: 240 LBS | HEIGHT: 65 IN | TEMPERATURE: 97.7 F | BODY MASS INDEX: 39.99 KG/M2

## 2021-05-16 VITALS
BODY MASS INDEX: 41.15 KG/M2 | SYSTOLIC BLOOD PRESSURE: 123 MMHG | DIASTOLIC BLOOD PRESSURE: 53 MMHG | HEIGHT: 65 IN | WEIGHT: 247 LBS | HEART RATE: 58 BPM

## 2021-05-16 VITALS — HEART RATE: 72 BPM | OXYGEN SATURATION: 96 % | WEIGHT: 245 LBS | BODY MASS INDEX: 40.82 KG/M2 | HEIGHT: 65 IN

## 2021-05-16 VITALS — HEART RATE: 65 BPM | WEIGHT: 246 LBS | OXYGEN SATURATION: 96 % | BODY MASS INDEX: 40.98 KG/M2 | HEIGHT: 65 IN

## 2021-05-16 VITALS — HEART RATE: 93 BPM | BODY MASS INDEX: 42.15 KG/M2 | WEIGHT: 253 LBS | HEIGHT: 65 IN

## 2021-05-16 VITALS
WEIGHT: 249 LBS | BODY MASS INDEX: 41.48 KG/M2 | DIASTOLIC BLOOD PRESSURE: 47 MMHG | SYSTOLIC BLOOD PRESSURE: 135 MMHG | HEIGHT: 65 IN | HEART RATE: 70 BPM

## 2021-05-16 VITALS — HEIGHT: 65 IN | BODY MASS INDEX: 41.65 KG/M2 | HEART RATE: 76 BPM | WEIGHT: 250 LBS

## 2021-05-16 VITALS — WEIGHT: 250 LBS | HEART RATE: 78 BPM | HEIGHT: 65 IN | BODY MASS INDEX: 41.65 KG/M2

## 2021-05-16 VITALS — OXYGEN SATURATION: 95 % | HEART RATE: 76 BPM | HEIGHT: 65 IN | BODY MASS INDEX: 41.32 KG/M2 | WEIGHT: 248 LBS

## 2021-05-26 ENCOUNTER — OFFICE VISIT CONVERTED (OUTPATIENT)
Dept: NEUROLOGY | Facility: CLINIC | Age: 76
End: 2021-05-26
Attending: NURSE PRACTITIONER

## 2021-05-26 ENCOUNTER — CONVERSION ENCOUNTER (OUTPATIENT)
Dept: NEUROLOGY | Facility: CLINIC | Age: 76
End: 2021-05-26

## 2021-06-04 ENCOUNTER — HOSPITAL ENCOUNTER (OUTPATIENT)
Dept: GENERAL RADIOLOGY | Facility: HOSPITAL | Age: 76
Discharge: HOME | End: 2021-06-04
Attending: NURSE PRACTITIONER

## 2021-06-05 NOTE — H&P
History and Physical      Patient Name: Shwetha Lane   Patient ID: [id number]   Sex: Female   YOB: 1945    Primary Care Provider: Denys Mohr MD   Referring Provider: Rosibel Correia PA-C    Visit Date: May 26, 2021    Provider: ENLLY Rico   Location: [facility]   Location Address: [address] [place] [address]  [id number]   Location Phone: [phone]          Chief Complaint  · Back pain     Established patient presenting with new mid back pain. No recent imaging       History Of Present Illness  The patient is a 75 year old /White female, who presents on referral from Rosibel Correia PA-C, for a neurosurgical evaluation mid back pain radiating to the right.   She denies weakness, changes in sensation in the legs, and bladder/bowel dysfunction. The patient has no prior history of neck or back surgery.   RECENT INTERVENTIONS:  She has not had any recent treatment for this problem, except as above.   INFORMATION REVIEWED:  The following information was reviewed: no studies available for review.      Pt with history of chronic neck and low back pain. She is followed by pain management for RFA and LESI.     Having new pain in the thoracic territory, radiating to the right in a T7/8 dermatome.       Past Medical History  Arthritis; Bladder disorder; Bladder problem; Cancer; Degenerative Disc Disease ; History of knee replacement, total, left; Hypothyroidism, Acquired; Left Knee Osteoarthritis ; Low back pain; Migraines; Muscle cramps; Scoliosis; Thyroid disease; Thyroid disorder         Past Surgical History  Appendectomy; Artificial Joints/Limbs; Back; Back surgery; Colonoscopy; Hysterectomy; Joint Surgery; Lap Band Surgery; Tonsillectomy         Medication List  letrozole 2.5 mg oral tablet; levothyroxine 137 mcg oral capsule; metoprolol tartrate 25 mg oral tablet; oxybutynin transdermal; Synthroid 137 mcg oral tablet; topiramate 25  "mg oral capsule, sprinkle; Vitamin D3 50 mcg (2,000 unit) oral capsule         Allergy List  NO KNOWN DRUG ALLERGIES       Allergies Reconciled  Family Medical History  Stroke; Heart Disease; Cancer, Unspecified; Heart Attack (MI); Family history of cancer; Family history of stroke; Family history of heart disease; Family history of diabetes mellitus         Social History  Alcohol (Never); Alcohol Use (Never); lives with other; Recreational Drug Use (Never); Retired; Retired.; Tobacco (Former);          Review of Systems  · Constitutional  o Admits  o : weight gain  · HENT  o Admits  o : ringing in ears  · Musculoskeletal  o Admits  o : joint pain, neck stiffness/pain, muscle aches, low back pain  · Endocrine  o Admits  o : thyroid disorder  · All Others Negative      Vitals  Date Time BP Position Site L\R Cuff Size HR RR TEMP (F) WT  HT  BMI kg/m2 BSA m2 O2 Sat FR L/min FiO2 HC       05/26/2021 02:20 /66 Sitting    63 - R 83 96.7 263lbs 5oz 5'  5\" 43.82 2.34 95 %            Physical Examination  · Constitutional  o Appearance  o : well-nourished, well developed, alert, in no acute distress  · Respiratory  o Respiratory Effort  o : breathing unlabored  · Cardiovascular  o Peripheral Vascular System  o :   § Extremities  § : no edema or cyanosis  · Musculoskeletal  o Spine  o :   § Inspection/Palpation  § : TTP in the T7/T8 territory on the right  o Right Lower Extremity  o :   § Inspection/Palpation  § : no joint or limb tenderness to palpation, no edema present, no ecchymosis  § Joint Stability  § : joint stability within normal limits  § Range of Motion  § : range of motion normal, no joint crepitations present, no pain on motion, Eduardo's test negative  o Left Lower Extremity  o :   § Inspection/Palpation  § : no joint or limb tenderness to palpation, no edema present, no ecchymosis  § Joint Stability  § : joint stability within normal limits  § Range of Motion  § : range of motion normal, no joint " crepitations present, no pain on motion, Eduardo's test negative  · Skin and Subcutaneous Tissue  o Extremities  o :   § Right Lower Extremity  § : no lesions or areas of discoloration  § Left Lower Extremity  § : no lesions or areas of discoloration  o Back  o : no lesions or areas of discoloration  · Neurologic  o Mental Status Examination  o :   § Orientation  § : alert and oriented to time, person, place and events  o Motor Examination  o :   § RLE Strength  § : strength normal  § RLE Motor Function  § : tone normal, no atrophy, no abnormal movements noted  § LLE Strength  § : strength normal  § LLE Motor Function  § : tone normal, no atrophy, no abnormal movements noted  o Reflexes  o :   § RLE  § : knee and ankle reflexes 2/4, SLR negative, no ankle jerk clonus  § LLE  § : knee and ankle reflexes 2/4, SLR negative, no ankle jerk clonus  o Sensation  o :   § Light Touch  § : sensation intact to light touch in extremities  o Gait and Station  o :   § Gait Screening  § : normal gait, able to stand without difficulty  · Psychiatric  o Mood and Affect  o : mood normal, affect appropriate          Assessment  · Thoracic radiculopathy due to degenerative joint disease of spine     722.51/M47.24       Pt with new thoracic spine pain with radiating symptoms. No rash noted in this area. Will order MRI Thoracic Spine to evaluate for radiculopathy. Follow up in 4 weeks to review.       Plan  · Orders  o MRI thoracic spine w/o contrst (90294) - 722.51/M47.24 - 05/26/2021  · Medications  o Medications have been Reconciled  o Transition of Care or Provider Policy  · Instructions  o Encouraged to follow-up with Primary Care Provider for preventative care.  o The ROS and the PFSH were reviewed at today's visit.  o I have discussed the risks and benefits of surgery versus physical therapy, epidural steroids, and other conservative forms of treatment.  o Handouts provided: Non-Surgical Treatments for Back Pain/Degenerative Disc  Disease.  o We have discussed the benefits of core strengthening. Patient will work on improving the core muscle strength with low impact activities such as walking, swimming, Pilates, etc.   o Call or return to office if symptoms worsen or persist.  o MRI Thoracic Spine.  o Lidocaine patch to affected area.  o Follow up in 4 weeks.            Electronically Signed by: NELLY Rico -Author on May 27, 2021 05:24:01 PM

## 2021-06-09 DIAGNOSIS — E55.9 VITAMIN D DEFICIENCY, UNSPECIFIED: ICD-10-CM

## 2021-06-09 DIAGNOSIS — E78.2 MIXED HYPERLIPIDEMIA: Primary | ICD-10-CM

## 2021-06-09 DIAGNOSIS — E03.9 HYPOTHYROIDISM, UNSPECIFIED TYPE: ICD-10-CM

## 2021-06-10 ENCOUNTER — LAB (OUTPATIENT)
Dept: FAMILY MEDICINE CLINIC | Facility: CLINIC | Age: 76
End: 2021-06-10

## 2021-06-10 DIAGNOSIS — E03.9 HYPOTHYROIDISM, UNSPECIFIED TYPE: ICD-10-CM

## 2021-06-10 DIAGNOSIS — E55.9 VITAMIN D DEFICIENCY, UNSPECIFIED: ICD-10-CM

## 2021-06-10 DIAGNOSIS — E78.2 MIXED HYPERLIPIDEMIA: ICD-10-CM

## 2021-06-10 LAB
25(OH)D3 SERPL-MCNC: 37.2 NG/ML (ref 30–100)
ALBUMIN SERPL-MCNC: 4 G/DL (ref 3.5–5.2)
ALBUMIN/GLOB SERPL: 1.4 G/DL
ALP SERPL-CCNC: 76 U/L (ref 39–117)
ALT SERPL W P-5'-P-CCNC: 24 U/L (ref 1–33)
ANION GAP SERPL CALCULATED.3IONS-SCNC: 9.5 MMOL/L (ref 5–15)
AST SERPL-CCNC: 19 U/L (ref 1–32)
BILIRUB SERPL-MCNC: 0.6 MG/DL (ref 0–1.2)
BUN SERPL-MCNC: 17 MG/DL (ref 8–23)
BUN/CREAT SERPL: 22.4 (ref 7–25)
CALCIUM SPEC-SCNC: 9.6 MG/DL (ref 8.6–10.5)
CHLORIDE SERPL-SCNC: 104 MMOL/L (ref 98–107)
CHOLEST SERPL-MCNC: 209 MG/DL (ref 0–200)
CO2 SERPL-SCNC: 26.5 MMOL/L (ref 22–29)
CREAT SERPL-MCNC: 0.76 MG/DL (ref 0.57–1)
DEPRECATED RDW RBC AUTO: 44.4 FL (ref 37–54)
ERYTHROCYTE [DISTWIDTH] IN BLOOD BY AUTOMATED COUNT: 12.5 % (ref 12.3–15.4)
GFR SERPL CREATININE-BSD FRML MDRD: 74 ML/MIN/1.73
GLOBULIN UR ELPH-MCNC: 2.8 GM/DL
GLUCOSE SERPL-MCNC: 109 MG/DL (ref 65–99)
HCT VFR BLD AUTO: 46.2 % (ref 34–46.6)
HDLC SERPL-MCNC: 43 MG/DL (ref 40–60)
HGB BLD-MCNC: 15.6 G/DL (ref 12–15.9)
LDLC SERPL CALC-MCNC: 134 MG/DL (ref 0–100)
LDLC/HDLC SERPL: 3.04 {RATIO}
MCH RBC QN AUTO: 32.3 PG (ref 26.6–33)
MCHC RBC AUTO-ENTMCNC: 33.8 G/DL (ref 31.5–35.7)
MCV RBC AUTO: 95.7 FL (ref 79–97)
PLATELET # BLD AUTO: 252 10*3/MM3 (ref 140–450)
PMV BLD AUTO: 10.2 FL (ref 6–12)
POTASSIUM SERPL-SCNC: 4.4 MMOL/L (ref 3.5–5.2)
PROT SERPL-MCNC: 6.8 G/DL (ref 6–8.5)
RBC # BLD AUTO: 4.83 10*6/MM3 (ref 3.77–5.28)
SODIUM SERPL-SCNC: 140 MMOL/L (ref 136–145)
T-UPTAKE NFR SERPL: 0.97 TBI (ref 0.8–1.3)
T4 SERPL-MCNC: 8.05 MCG/DL (ref 4.5–11.7)
TRIGL SERPL-MCNC: 177 MG/DL (ref 0–150)
TSH SERPL DL<=0.05 MIU/L-ACNC: 4.42 UIU/ML (ref 0.27–4.2)
VLDLC SERPL-MCNC: 32 MG/DL (ref 5–40)
WBC # BLD AUTO: 8.27 10*3/MM3 (ref 3.4–10.8)

## 2021-06-10 PROCEDURE — 84479 ASSAY OF THYROID (T3 OR T4): CPT | Performed by: INTERNAL MEDICINE

## 2021-06-10 PROCEDURE — 84443 ASSAY THYROID STIM HORMONE: CPT | Performed by: INTERNAL MEDICINE

## 2021-06-10 PROCEDURE — 80061 LIPID PANEL: CPT | Performed by: INTERNAL MEDICINE

## 2021-06-10 PROCEDURE — 85027 COMPLETE CBC AUTOMATED: CPT | Performed by: INTERNAL MEDICINE

## 2021-06-10 PROCEDURE — 82306 VITAMIN D 25 HYDROXY: CPT | Performed by: INTERNAL MEDICINE

## 2021-06-10 PROCEDURE — 80053 COMPREHEN METABOLIC PANEL: CPT | Performed by: INTERNAL MEDICINE

## 2021-06-10 PROCEDURE — 36415 COLL VENOUS BLD VENIPUNCTURE: CPT | Performed by: INTERNAL MEDICINE

## 2021-06-10 PROCEDURE — 84436 ASSAY OF TOTAL THYROXINE: CPT | Performed by: INTERNAL MEDICINE

## 2021-06-17 ENCOUNTER — OFFICE VISIT (OUTPATIENT)
Dept: FAMILY MEDICINE CLINIC | Facility: CLINIC | Age: 76
End: 2021-06-17

## 2021-06-17 VITALS
OXYGEN SATURATION: 95 % | BODY MASS INDEX: 42.85 KG/M2 | HEIGHT: 65 IN | WEIGHT: 257.2 LBS | HEART RATE: 83 BPM | TEMPERATURE: 97.1 F

## 2021-06-17 DIAGNOSIS — E03.9 HYPOTHYROIDISM, UNSPECIFIED TYPE: ICD-10-CM

## 2021-06-17 DIAGNOSIS — M15.9 PRIMARY OSTEOARTHRITIS INVOLVING MULTIPLE JOINTS: ICD-10-CM

## 2021-06-17 DIAGNOSIS — Z00.00 MEDICARE ANNUAL WELLNESS VISIT, SUBSEQUENT: Primary | ICD-10-CM

## 2021-06-17 DIAGNOSIS — C50.011 BILATERAL MALIGNANT NEOPLASM INVOLVING BOTH NIPPLE AND AREOLA IN FEMALE, UNSPECIFIED ESTROGEN RECEPTOR STATUS (HCC): ICD-10-CM

## 2021-06-17 DIAGNOSIS — G43.001 MIGRAINE WITHOUT AURA AND WITH STATUS MIGRAINOSUS, NOT INTRACTABLE: ICD-10-CM

## 2021-06-17 DIAGNOSIS — C50.012 BILATERAL MALIGNANT NEOPLASM INVOLVING BOTH NIPPLE AND AREOLA IN FEMALE, UNSPECIFIED ESTROGEN RECEPTOR STATUS (HCC): ICD-10-CM

## 2021-06-17 PROCEDURE — G0439 PPPS, SUBSEQ VISIT: HCPCS | Performed by: INTERNAL MEDICINE

## 2021-06-17 PROCEDURE — 99214 OFFICE O/P EST MOD 30 MIN: CPT | Performed by: INTERNAL MEDICINE

## 2021-06-17 RX ORDER — ESTRADIOL 0.1 MG/G
CREAM VAGINAL
COMMUNITY
Start: 2021-04-19

## 2021-06-17 RX ORDER — NYSTATIN 100000 [USP'U]/G
POWDER TOPICAL
COMMUNITY
Start: 2021-04-13 | End: 2021-06-23

## 2021-06-17 NOTE — PROGRESS NOTES
QUICK REFERENCE INFORMATION:  The ABCs of the Annual Wellness Visit    Subsequent Medicare Wellness Visit patient was seen for Medicare wellness exam.  Patient was seen for hypothyroidism.  Patient is using levothyroxine 137 mcg daily.  TSH was 4.4.  Patient was advised to wait 1 month and recheck.  Patient does have a history of breast cancer and was using an an aromatase inhibitor.  Patient states her oncologist stopped it due to the side effects.  Patient's osteoarthritis has been treated with Tylenol.  Patient states this relieves her symptoms and is able to ambulate.  Patient does not want any further pain medication.  Patient does have a history of migraines.  Maxalt did cause palpitations and elevated blood pressures.  Patient was given Nurtec 75 mg every other day.    Dictated utilizing Dragon dictation. If there are questions or for further clarification, please contact me.    HEALTH RISK ASSESSMENT    1945    Recent Hospitalizations:  No hospitalization(s) within the last year..        Current Medical Providers:  Patient Care Team:  Edis Bhakta MD as PCP - General        Smoking Status:  Social History     Tobacco Use   Smoking Status Former Smoker   Smokeless Tobacco Never Used       Alcohol Consumption:  Social History     Substance and Sexual Activity   Alcohol Use No       Depression Screen:   PHQ-2/PHQ-9 Depression Screening 6/17/2021   Little interest or pleasure in doing things 0   Feeling down, depressed, or hopeless 0   Trouble falling or staying asleep, or sleeping too much 0   Feeling tired or having little energy 0   Poor appetite or overeating 0   Feeling bad about yourself - or that you are a failure or have let yourself or your family down 0   Trouble concentrating on things, such as reading the newspaper or watching television 0   Moving or speaking so slowly that other people could have noticed. Or the opposite - being so fidgety or restless that you have been moving around a  lot more than usual 0   Thoughts that you would be better off dead, or of hurting yourself in some way 0   Total Score 0   If you checked off any problems, how difficult have these problems made it for you to do your work, take care of things at home, or get along with other people? Not difficult at all       Health Habits and Functional and Cognitive Screening:  Functional & Cognitive Status 6/17/2021   Do you have difficulty preparing food and eating? No   Do you have difficulty bathing yourself, getting dressed or grooming yourself? No   Do you have difficulty using the toilet? No   Do you have difficulty moving around from place to place? No   Do you have trouble with steps or getting out of a bed or a chair? No   Current Diet Well Balanced Diet   Dental Exam Up to date   Eye Exam Up to date   Exercise (times per week) 7 times per week   Current Exercises Include Aerobics   Current Exercise Activities Include -   Do you need help using the phone?  No   Are you deaf or do you have serious difficulty hearing?  No   Do you need help with transportation? No   Do you need help shopping? No   Do you need help preparing meals?  No   Do you need help with housework?  No   Do you need help with laundry? No   Do you need help taking your medications? No   Do you need help managing money? No   Do you ever drive or ride in a car without wearing a seat belt? No   Have you felt unusual stress, anger or loneliness in the last month? No   Who do you live with? Spouse   If you need help, do you have trouble finding someone available to you? No   Have you been bothered in the last four weeks by sexual problems? No   Do you have difficulty concentrating, remembering or making decisions? No           Does the patient have evidence of cognitive impairment? No    Aspirin use counseling: Does not need ASA (and currently is not on it)      Recent Lab Results:  CMP:  Lab Results   Component Value Date     (H) 05/14/2020    BUN 17  06/10/2021    CREATININE 0.76 06/10/2021    EGFRIFNONA 74 06/10/2021    BCR 22.4 06/10/2021     06/10/2021    K 4.4 06/10/2021    CO2 26.5 06/10/2021    CALCIUM 9.6 06/10/2021    ALBUMIN 4.00 06/10/2021    LABIL2 1.2 12/02/2019    BILITOT 0.6 06/10/2021    ALKPHOS 76 06/10/2021    AST 19 06/10/2021    ALT 24 06/10/2021     Lipid Panel:  Lab Results   Component Value Date    CHOL 209 (H) 06/10/2021    TRIG 177 (H) 06/10/2021    HDL 43 06/10/2021    VLDL 32 06/10/2021    LDLHDL 3.04 06/10/2021     HbA1c:       Visual Acuity:  No exam data present    Age-appropriate Screening Schedule:  Refer to the list below for future screening recommendations based on patient's age, sex and/or medical conditions. Orders for these recommended tests are listed in the plan section. The patient has been provided with a written plan.    Health Maintenance   Topic Date Due   • URINE MICROALBUMIN  Never done   • TDAP/TD VACCINES (1 - Tdap) Never done   • ZOSTER VACCINE (1 of 2) Never done   • DIABETIC FOOT EXAM  Never done   • HEMOGLOBIN A1C  Never done   • DIABETIC EYE EXAM  11/01/2019   • MAMMOGRAM  12/03/2019   • DXA SCAN  11/14/2020   • INFLUENZA VACCINE  08/01/2021   • LIPID PANEL  06/10/2022        Subjective   History of Present Illness    Shwetha Lane is a 75 y.o. female who presents for an Subsequent Wellness Visit.    The following portions of the patient's history were reviewed and updated as appropriate: allergies, current medications, past family history, past medical history, past social history, past surgical history and problem list.    Outpatient Medications Prior to Visit   Medication Sig Dispense Refill   • cholecalciferol (VITAMIN D3) 25 MCG (1000 UT) tablet 2 pills daily 180 tablet 3   • levothyroxine (Synthroid) 137 MCG tablet Take 1 tablet by mouth Daily. 90 tablet 3   • lidocaine (LIDODERM) 5 % Place 1 patch on the skin as directed by provider Every 12 (Twelve) Hours. Remove & Discard patch within 12  hours or as directed by MD 30 patch 1   • metoprolol tartrate (LOPRESSOR) 25 MG tablet Take 25 mg by mouth Daily.     • Multiple Vitamins-Minerals (MULTIVITAMIN & MINERAL PO) Take  by mouth.     • oxybutynin (DITROPAN) 5 MG tablet Take 2.5 mg by mouth Daily.     • topiramate (TOPAMAX) 25 MG tablet Take 1 tablet by mouth Daily. 90 tablet 3   • estradiol (ESTRACE) 0.1 MG/GM vaginal cream      • nystatin 141992 UNIT/GM topical powder      • clonazePAM (KLONOPIN) 1 MG tablet Take 1 tablet by mouth 2 (Two) Times a Day As Needed (vertigo do not drive). 30 tablet 1   • cyclobenzaprine (FLEXERIL) 10 MG tablet Take 1 tablet by mouth Daily As Needed for Muscle Spasms. 30 tablet 0   • fluconazole (Diflucan) 150 MG tablet 1 po qday 1,4,7 3 tablet 0   • fluticasone (FLONASE) 50 MCG/ACT nasal spray 1 spray into the nostril(s) as directed by provider Daily. 3 bottle 3   • ipratropium-albuterol (DUO-NEB) 0.5-2.5 mg/3 ml nebulizer Take 3 mL by nebulization Every 4 (Four) Hours As Needed for Wheezing. 360 mL 0   • rizatriptan MLT (MAXALT-MLT) 10 MG disintegrating tablet Take 1 tablet by mouth 1 (One) Time As Needed for Migraine. May repeat in 2 hours if needed 30 tablet 0   • silver sulfadiazine (Silvadene) 1 % cream Apply  topically to the appropriate area as directed 2 (Two) Times a Day. 50 g 3   • tamoxifen (NOLVADEX) 20 MG chemo tablet Take 20 mg by mouth Daily.       No facility-administered medications prior to visit.       Patient Active Problem List   Diagnosis   • Pneumonia   • Hypothyroidism   • Primary osteoarthritis involving multiple joints   • Hyperlipemia   • Migraine   • Obesity, morbid (more than 100 lbs over ideal weight or BMI > 40) (CMS/HCC)   • Status following gastric banding surgery for weight loss   • Fatty liver   • Vertigo   • PVC (premature ventricular contraction)   • PAC (premature atrial contraction)   • Malignant neoplasm of female breast (CMS/HCC)       Advance Care Planning:  ACP discussion was held  with the patient during this visit. Patient has an advance directive in EMR which is still valid.     Identification of Risk Factors:  Risk factors include: NA.    Review of Systems   Constitutional: Negative for fatigue and fever.   HENT: Positive for congestion. Negative for trouble swallowing.    Eyes: Negative for discharge and visual disturbance.   Respiratory: Negative for choking and shortness of breath.    Cardiovascular: Negative for chest pain and palpitations.   Gastrointestinal: Negative for abdominal pain and blood in stool.   Endocrine: Negative.    Genitourinary: Negative for genital sores and hematuria.   Musculoskeletal: Positive for gait problem and joint swelling.   Skin: Negative for color change, pallor, rash and wound.   Allergic/Immunologic: Positive for environmental allergies. Negative for immunocompromised state.   Neurological: Positive for headaches. Negative for facial asymmetry and speech difficulty.   Psychiatric/Behavioral: Negative for hallucinations and suicidal ideas.       Compared to one year ago, the patient feels her physical health is worse.  Compared to one year ago, the patient feels her mental health is the same.    Objective     Physical Exam  Vitals and nursing note reviewed.   Constitutional:       Appearance: Normal appearance. She is well-developed.   HENT:      Head: Normocephalic and atraumatic.      Nose: Nose normal.      Mouth/Throat:      Mouth: Mucous membranes are moist.      Pharynx: Oropharynx is clear.   Eyes:      Extraocular Movements: Extraocular movements intact.      Conjunctiva/sclera: Conjunctivae normal.      Pupils: Pupils are equal, round, and reactive to light.   Cardiovascular:      Rate and Rhythm: Normal rate and regular rhythm.      Heart sounds: Normal heart sounds. No murmur heard.   No friction rub. No gallop.    Pulmonary:      Effort: Pulmonary effort is normal. No respiratory distress.      Breath sounds: Normal breath sounds. No  "stridor. No wheezing, rhonchi or rales.   Chest:      Chest wall: No tenderness.   Abdominal:      General: Bowel sounds are normal.      Palpations: Abdomen is soft.   Musculoskeletal:         General: Swelling and tenderness present. Normal range of motion.      Cervical back: Normal range of motion and neck supple.   Skin:     General: Skin is warm and dry.   Neurological:      General: No focal deficit present.      Mental Status: She is alert and oriented to person, place, and time. Mental status is at baseline.   Psychiatric:         Mood and Affect: Mood normal.         Behavior: Behavior normal.         Thought Content: Thought content normal.         Judgment: Judgment normal.         Vitals:    06/17/21 1421   Pulse: 83   Temp: 97.1 °F (36.2 °C)   SpO2: 95%   Weight: 117 kg (257 lb 3.2 oz)   Height: 165.1 cm (65\")       Patient's Body mass index is 42.8 kg/m². indicating that she is overweight (BMI 25-29.9). Obesity-related health conditions include the following: hypertension and osteoarthritis. Obesity is improving with treatment. BMI is is above average; BMI management plan is completed. We discussed portion control and increasing exercise..      Assessment/Plan #1 nurtec 75 mg twice daily #2 continue Tylenol for arthritis treatment #3 continue levothyroxine dose  Patient Self-Management and Personalized Health Advice  The patient has been provided with information about: diet and exercise and preventive services including:   · NA.    Visit Diagnoses:    ICD-10-CM ICD-9-CM   1. Medicare annual wellness visit, subsequent  Z00.00 V70.0   2. Hypothyroidism, unspecified type  E03.9 244.9   3. Primary osteoarthritis involving multiple joints  M89.49 715.98   4. Bilateral malignant neoplasm involving both nipple and areola in female, unspecified estrogen receptor status (CMS/[facility])  C50.011 174.0    C50.012    5. Migraine without aura and with status migrainosus, not intractable  G43.001 346.12       No orders " of the defined types were placed in this encounter.      Outpatient Encounter Medications as of 6/17/2021   Medication Sig Dispense Refill   • cholecalciferol (VITAMIN D3) 25 MCG (1000 UT) tablet 2 pills daily 180 tablet 3   • levothyroxine (Synthroid) 137 MCG tablet Take 1 tablet by mouth Daily. 90 tablet 3   • lidocaine (LIDODERM) 5 % Place 1 patch on the skin as directed by provider Every 12 (Twelve) Hours. Remove & Discard patch within 12 hours or as directed by MD 30 patch 1   • metoprolol tartrate (LOPRESSOR) 25 MG tablet Take 25 mg by mouth Daily.     • Multiple Vitamins-Minerals (MULTIVITAMIN & MINERAL PO) Take  by mouth.     • oxybutynin (DITROPAN) 5 MG tablet Take 2.5 mg by mouth Daily.     • [DISCONTINUED] topiramate (TOPAMAX) 25 MG tablet Take 1 tablet by mouth Daily. 90 tablet 3   • estradiol (ESTRACE) 0.1 MG/GM vaginal cream      • nystatin 755272 UNIT/GM topical powder      • Rimegepant Sulfate (NURTEC) 75 MG tablet dispersible tablet Take 1 tablet by mouth Every Other Day. 15 tablet 5   • [DISCONTINUED] clonazePAM (KLONOPIN) 1 MG tablet Take 1 tablet by mouth 2 (Two) Times a Day As Needed (vertigo do not drive). 30 tablet 1   • [DISCONTINUED] cyclobenzaprine (FLEXERIL) 10 MG tablet Take 1 tablet by mouth Daily As Needed for Muscle Spasms. 30 tablet 0   • [DISCONTINUED] fluconazole (Diflucan) 150 MG tablet 1 po qday 1,4,7 3 tablet 0   • [DISCONTINUED] fluticasone (FLONASE) 50 MCG/ACT nasal spray 1 spray into the nostril(s) as directed by provider Daily. 3 bottle 3   • [DISCONTINUED] ipratropium-albuterol (DUO-NEB) 0.5-2.5 mg/3 ml nebulizer Take 3 mL by nebulization Every 4 (Four) Hours As Needed for Wheezing. 360 mL 0   • [DISCONTINUED] rizatriptan MLT (MAXALT-MLT) 10 MG disintegrating tablet Take 1 tablet by mouth 1 (One) Time As Needed for Migraine. May repeat in 2 hours if needed 30 tablet 0   • [DISCONTINUED] silver sulfadiazine (Silvadene) 1 % cream Apply  topically to the appropriate area as  directed 2 (Two) Times a Day. 50 g 3   • [DISCONTINUED] tamoxifen (NOLVADEX) 20 MG chemo tablet Take 20 mg by mouth Daily.       No facility-administered encounter medications on file as of 6/17/2021.       Reviewed use of high risk medication in the elderly: not applicable  Reviewed for potential of harmful drug interactions in the elderly: not applicable    Follow Up:  Return in about 2 weeks (around 7/1/2021), or if symptoms worsen or fail to improve, for Recheck.     An After Visit Summary and PPPS with all of these plans were given to the patient.

## 2021-06-17 NOTE — PATIENT INSTRUCTIONS
"https://www.nhlbi.nih.gov/files/docs/public/heart/dash_brief.pdf\">   DASH Eating Plan  DASH stands for Dietary Approaches to Stop Hypertension. The DASH eating plan is a healthy eating plan that has been shown to:  · Reduce high blood pressure (hypertension).  · Reduce your risk for type 2 diabetes, heart disease, and stroke.  · Help with weight loss.  What are tips for following this plan?  Reading food labels  · Check food labels for the amount of salt (sodium) per serving. Choose foods with less than 5 percent of the Daily Value of sodium. Generally, foods with less than 300 milligrams (mg) of sodium per serving fit into this eating plan.  · To find whole grains, look for the word \"whole\" as the first word in the ingredient list.  Shopping  · Buy products labeled as \"low-sodium\" or \"no salt added.\"  · Buy fresh foods. Avoid canned foods and pre-made or frozen meals.  Cooking  · Avoid adding salt when cooking. Use salt-free seasonings or herbs instead of table salt or sea salt. Check with your health care provider or pharmacist before using salt substitutes.  · Do not vides foods. Cook foods using healthy methods such as baking, boiling, grilling, roasting, and broiling instead.  · Cook with heart-healthy oils, such as olive, canola, avocado, soybean, or sunflower oil.  Meal planning    · Eat a balanced diet that includes:  ? 4 or more servings of fruits and 4 or more servings of vegetables each day. Try to fill one-half of your plate with fruits and vegetables.  ? 6-8 servings of whole grains each day.  ? Less than 6 oz (170 g) of lean meat, poultry, or fish each day. A 3-oz (85-g) serving of meat is about the same size as a deck of cards. One egg equals 1 oz (28 g).  ? 2-3 servings of low-fat dairy each day. One serving is 1 cup (237 mL).  ? 1 serving of nuts, seeds, or beans 5 times each week.  ? 2-3 servings of heart-healthy fats. Healthy fats called omega-3 fatty acids are found in foods such as walnuts, " flaxseeds, fortified milks, and eggs. These fats are also found in cold-water fish, such as sardines, salmon, and mackerel.  · Limit how much you eat of:  ? Canned or prepackaged foods.  ? Food that is high in trans fat, such as some fried foods.  ? Food that is high in saturated fat, such as fatty meat.  ? Desserts and other sweets, sugary drinks, and other foods with added sugar.  ? Full-fat dairy products.  · Do not salt foods before eating.  · Do not eat more than 4 egg yolks a week.  · Try to eat at least 2 vegetarian meals a week.  · Eat more home-cooked food and less restaurant, buffet, and fast food.  Lifestyle  · When eating at a restaurant, ask that your food be prepared with less salt or no salt, if possible.  · If you drink alcohol:  ? Limit how much you use to:  § 0-1 drink a day for women who are not pregnant.  § 0-2 drinks a day for men.  ? Be aware of how much alcohol is in your drink. In the [place] one drink equals one 12 oz bottle of beer (355 mL), one 5 oz glass of wine (148 mL), or one 1½ oz glass of hard liquor (44 mL).  General information  · Avoid eating more than 2,300 mg of salt a day. If you have hypertension, you may need to reduce your sodium intake to 1,500 mg a day.  · Work with your health care provider to maintain a healthy body weight or to lose weight. Ask what an ideal weight is for you.  · Get at least 30 minutes of exercise that causes your heart to beat faster (aerobic exercise) most days of the week. Activities may include walking, swimming, or biking.  · Work with your health care provider or dietitian to adjust your eating plan to your individual calorie needs.  What foods should I eat?  Fruits  All fresh, dried, or frozen fruit. Canned fruit in natural juice (without added sugar).  Vegetables  Fresh or frozen vegetables (raw, steamed, roasted, or grilled). Low-sodium or reduced-sodium tomato and vegetable juice. Low-sodium or reduced-sodium tomato sauce and tomato paste.  Low-sodium or reduced-sodium canned vegetables.  Grains  Whole-grain or whole-wheat bread. Whole-grain or whole-wheat pasta. Brown rice. Oatmeal. Quinoa. Bulgur. Whole-grain and low-sodium cereals. Laurie bread. Low-fat, low-sodium crackers. Whole-wheat flour tortillas.  Meats and other proteins  Skinless chicken or turkey. Ground chicken or turkey. Pork with fat trimmed off. Fish and seafood. Egg whites. Dried beans, peas, or lentils. Unsalted nuts, nut butters, and seeds. Unsalted canned beans. Lean cuts of beef with fat trimmed off. Low-sodium, lean precooked or cured meat, such as sausages or meat loaves.  Dairy  Low-fat (1%) or fat-free (skim) milk. Reduced-fat, low-fat, or fat-free cheeses. Nonfat, low-sodium ricotta or cottage cheese. Low-fat or nonfat yogurt. Low-fat, low-sodium cheese.  Fats and oils  Soft margarine without trans fats. Vegetable oil. Reduced-fat, low-fat, or light mayonnaise and salad dressings (reduced-sodium). Canola, safflower, olive, avocado, soybean, and sunflower oils. Avocado.  Seasonings and condiments  Herbs. Spices. Seasoning mixes without salt.  Other foods  Unsalted popcorn and pretzels. Fat-free sweets.  The items listed above may not be a complete list of foods and beverages you can eat. Contact a dietitian for more information.  What foods should I avoid?  Fruits  Canned fruit in a light or heavy syrup. Fried fruit. Fruit in cream or butter sauce.  Vegetables  Creamed or fried vegetables. Vegetables in a cheese sauce. Regular canned vegetables (not low-sodium or reduced-sodium). Regular canned tomato sauce and paste (not low-sodium or reduced-sodium). Regular tomato and vegetable juice (not low-sodium or reduced-sodium). Pickles. Olives.  Grains  Baked goods made with fat, such as croissants, muffins, or some breads. Dry pasta or rice meal packs.  Meats and other proteins  Fatty cuts of meat. Ribs. Fried meat. Pitt. Bologna, salami, and other precooked or cured meats, such as  sausages or meat loaves. Fat from the back of a pig (fatback). Bratwurst. Salted nuts and seeds. Canned beans with added salt. Canned or smoked fish. Whole eggs or egg yolks. Chicken or turkey with skin.  Dairy  Whole or 2% milk, cream, and half-and-half. Whole or full-fat cream cheese. Whole-fat or sweetened yogurt. Full-fat cheese. Nondairy creamers. Whipped toppings. Processed cheese and cheese spreads.  Fats and oils  Butter. Stick margarine. Lard. Shortening. Ghee. Pitt fat. Tropical oils, such as coconut, palm kernel, or palm oil.  Seasonings and condiments  Onion salt, garlic salt, seasoned salt, table salt, and sea salt. Worcestershire sauce. Tartar sauce. Barbecue sauce. Teriyaki sauce. Soy sauce, including reduced-sodium. Steak sauce. Canned and packaged gravies. Fish sauce. Oyster sauce. Cocktail sauce. Store-bought horseradish. Ketchup. Mustard. Meat flavorings and tenderizers. Bouillon cubes. Hot sauces. Pre-made or packaged marinades. Pre-made or packaged taco seasonings. Relishes. Regular salad dressings.  Other foods  Salted popcorn and pretzels.  The items listed above may not be a complete list of foods and beverages you should avoid. Contact a dietitian for more information.  Where to find more information  · National Heart, Lung, and Blood [place]: www.nhlbi.nih.gov  · American Heart Association: www.heart.org  · Academy of Nutrition and Dietetics: www.eatright.org  · National Kidney Foundation: www.kidney.org  Summary  · The DASH eating plan is a healthy eating plan that has been shown to reduce high blood pressure (hypertension). It may also reduce your risk for type 2 diabetes, heart disease, and stroke.  · When on the DASH eating plan, aim to eat more fresh fruits and vegetables, whole grains, lean proteins, low-fat dairy, and heart-healthy fats.  · With the DASH eating plan, you should limit salt (sodium) intake to 2,300 mg a day. If you have hypertension, you may need to reduce your  sodium intake to 1,500 mg a day.  · Work with your health care provider or dietitian to adjust your eating plan to your individual calorie needs.  This information is not intended to replace advice given to you by your health care provider. Make sure you discuss any questions you have with your health care provider.  Document Revised: 11/20/2020 Document Reviewed: 11/20/2020  [employer] Patient Education © 2021 [employer]

## 2021-06-23 ENCOUNTER — OFFICE VISIT (OUTPATIENT)
Dept: NEUROSURGERY | Facility: CLINIC | Age: 76
End: 2021-06-23

## 2021-06-23 VITALS
BODY MASS INDEX: 42.75 KG/M2 | HEIGHT: 65 IN | HEART RATE: 93 BPM | DIASTOLIC BLOOD PRESSURE: 68 MMHG | SYSTOLIC BLOOD PRESSURE: 127 MMHG | OXYGEN SATURATION: 96 % | TEMPERATURE: 97.6 F | WEIGHT: 256.6 LBS

## 2021-06-23 DIAGNOSIS — M51.34 DEGENERATIVE DISC DISEASE, THORACIC: Primary | ICD-10-CM

## 2021-06-23 DIAGNOSIS — M62.838 MUSCLE SPASM: ICD-10-CM

## 2021-06-23 DIAGNOSIS — M47.814 FACET ARTHROPATHY, THORACIC: ICD-10-CM

## 2021-06-23 PROCEDURE — 99212 OFFICE O/P EST SF 10 MIN: CPT | Performed by: NURSE PRACTITIONER

## 2021-06-23 RX ORDER — METOPROLOL SUCCINATE 25 MG/1
25 TABLET, EXTENDED RELEASE ORAL DAILY
COMMUNITY
Start: 2021-06-21 | End: 2021-06-23

## 2021-06-23 RX ORDER — TIZANIDINE 2 MG/1
2-4 TABLET ORAL EVERY 8 HOURS PRN
Qty: 60 TABLET | Refills: 1 | Status: SHIPPED | OUTPATIENT
Start: 2021-06-23 | End: 2021-08-17

## 2021-06-23 NOTE — PROGRESS NOTES
"Chief Complaint  Back Pain and Follow-up (MRI Thoracic at [facility])    Subjective          Shwetha Lane who is a 75 y.o. year old female who presents to [facility] to review her MRI Thoracic Spine.     We reviewed her MRI Thoracic Spine. Shows multilevel degenerative changes with DDD. There is a small disc protrusion at T11-12 causing mild foraminal and canal narrowing. Most of her pain is in the intrascapular territory on the right. She will have spasms in the mid back. Not taking any medication for muscle spasms. She is followed by pain management for trigger point injections and RFA in the cervical spine.       Interval History     Recent Interventions: Pain management RFAs      Review of Systems   Constitutional: Positive for fatigue and unexpected weight gain.   Musculoskeletal: Positive for arthralgias, back pain, gait problem, joint swelling, myalgias, neck pain and neck stiffness.   Neurological: Positive for weakness, numbness and headache.   Psychiatric/Behavioral: The patient is nervous/anxious.    All other systems reviewed and are negative.       Objective   Vital Signs:   /68   Pulse 93   Temp 97.6 °F (36.4 °C)   Ht 165.1 cm (65\")   Wt 116 kg (256 lb 9.6 oz)   SpO2 96%   BMI 42.70 kg/m²       Physical Exam  Vitals reviewed.   Constitutional:       Appearance: Normal appearance.   Musculoskeletal:      Cervical back: Tenderness present.      Thoracic back: Tenderness present.      Lumbar back: Tenderness present.   Neurological:      Mental Status: She is alert and oriented to person, place, and time.      Gait: Gait is intact.      Deep Tendon Reflexes: Strength normal.        Neurologic Exam     Mental Status   Oriented to person, place, and time.   Level of consciousness: alert    Motor Exam   Muscle bulk: normal  Overall muscle tone: normal    Strength   Strength 5/5 throughout.     Sensory Exam   Light touch normal.     Gait, Coordination, and " Reflexes     Gait  Gait: normal       Result Review :                 Assessment and Plan    Diagnoses and all orders for this visit:    1. Degenerative disc disease, thoracic (Primary)    2. Facet arthropathy, thoracic    3. Muscle spasm  -     tiZANidine (ZANAFLEX) 2 MG tablet; Take 1-2 tablets by mouth Every 8 (Eight) Hours As Needed for Muscle Spasms.  Dispense: 60 tablet; Refill: 1    No surgical recommendations at this time. Will order tizanidine as needed for muscle spasms. Follow up as needed.       Follow Up   Return if symptoms worsen or fail to improve.  Patient was given instructions and counseling regarding her condition or for health maintenance advice.

## 2021-07-15 VITALS
TEMPERATURE: 96.7 F | SYSTOLIC BLOOD PRESSURE: 140 MMHG | WEIGHT: 263.31 LBS | HEIGHT: 65 IN | DIASTOLIC BLOOD PRESSURE: 66 MMHG | OXYGEN SATURATION: 95 % | BODY MASS INDEX: 43.87 KG/M2 | HEART RATE: 63 BPM

## 2021-08-17 ENCOUNTER — OFFICE VISIT (OUTPATIENT)
Dept: FAMILY MEDICINE CLINIC | Facility: CLINIC | Age: 76
End: 2021-08-17

## 2021-08-17 VITALS
BODY MASS INDEX: 43.12 KG/M2 | SYSTOLIC BLOOD PRESSURE: 140 MMHG | HEART RATE: 74 BPM | OXYGEN SATURATION: 96 % | TEMPERATURE: 98 F | DIASTOLIC BLOOD PRESSURE: 78 MMHG | HEIGHT: 65 IN | WEIGHT: 258.8 LBS

## 2021-08-17 DIAGNOSIS — M15.9 PRIMARY OSTEOARTHRITIS INVOLVING MULTIPLE JOINTS: Primary | ICD-10-CM

## 2021-08-17 DIAGNOSIS — E78.00 PURE HYPERCHOLESTEROLEMIA: ICD-10-CM

## 2021-08-17 DIAGNOSIS — E03.9 HYPOTHYROIDISM, UNSPECIFIED TYPE: ICD-10-CM

## 2021-08-17 LAB
ALBUMIN SERPL-MCNC: 4.4 G/DL (ref 3.5–5.2)
ALBUMIN/GLOB SERPL: 1.5 G/DL
ALP SERPL-CCNC: 74 U/L (ref 39–117)
ALT SERPL W P-5'-P-CCNC: 27 U/L (ref 1–33)
ANION GAP SERPL CALCULATED.3IONS-SCNC: 10.5 MMOL/L (ref 5–15)
AST SERPL-CCNC: 25 U/L (ref 1–32)
BILIRUB SERPL-MCNC: 0.7 MG/DL (ref 0–1.2)
BUN SERPL-MCNC: 13 MG/DL (ref 8–23)
BUN/CREAT SERPL: 17.6 (ref 7–25)
CALCIUM SPEC-SCNC: 9.7 MG/DL (ref 8.6–10.5)
CHLORIDE SERPL-SCNC: 104 MMOL/L (ref 98–107)
CO2 SERPL-SCNC: 25.5 MMOL/L (ref 22–29)
CREAT SERPL-MCNC: 0.74 MG/DL (ref 0.57–1)
GFR SERPL CREATININE-BSD FRML MDRD: 76 ML/MIN/1.73
GLOBULIN UR ELPH-MCNC: 2.9 GM/DL
GLUCOSE SERPL-MCNC: 98 MG/DL (ref 65–99)
POTASSIUM SERPL-SCNC: 4.3 MMOL/L (ref 3.5–5.2)
PROT SERPL-MCNC: 7.3 G/DL (ref 6–8.5)
SODIUM SERPL-SCNC: 140 MMOL/L (ref 136–145)
TSH SERPL DL<=0.05 MIU/L-ACNC: 1.77 UIU/ML (ref 0.27–4.2)

## 2021-08-17 PROCEDURE — 99214 OFFICE O/P EST MOD 30 MIN: CPT | Performed by: INTERNAL MEDICINE

## 2021-08-17 PROCEDURE — 80053 COMPREHEN METABOLIC PANEL: CPT | Performed by: INTERNAL MEDICINE

## 2021-08-17 PROCEDURE — 84443 ASSAY THYROID STIM HORMONE: CPT | Performed by: INTERNAL MEDICINE

## 2021-08-17 PROCEDURE — 36415 COLL VENOUS BLD VENIPUNCTURE: CPT | Performed by: INTERNAL MEDICINE

## 2021-08-17 RX ORDER — EXEMESTANE 25 MG/1
TABLET ORAL
COMMUNITY
Start: 2021-08-02 | End: 2022-08-11

## 2021-08-17 RX ORDER — LEVOTHYROXINE SODIUM 137 UG/1
137 TABLET ORAL DAILY
Qty: 90 TABLET | Refills: 3 | Status: SHIPPED | OUTPATIENT
Start: 2021-08-17 | End: 2022-10-10 | Stop reason: SDUPTHER

## 2021-08-17 RX ORDER — VALACYCLOVIR HYDROCHLORIDE 1 G/1
1000 TABLET, FILM COATED ORAL 3 TIMES DAILY
Qty: 21 TABLET | Refills: 3 | Status: SHIPPED | OUTPATIENT
Start: 2021-08-17 | End: 2021-10-22

## 2021-08-17 RX ORDER — DIAPER,BRIEF,INFANT-TODD,DISP
EACH MISCELLANEOUS 2 TIMES DAILY
Qty: 15 G | Refills: 1 | Status: SHIPPED | OUTPATIENT
Start: 2021-08-17 | End: 2022-01-19

## 2021-08-17 RX ORDER — DIAPER,BRIEF,INFANT-TODD,DISP
EACH MISCELLANEOUS 2 TIMES DAILY
Qty: 15 G | Refills: 1 | Status: SHIPPED | OUTPATIENT
Start: 2021-08-17 | End: 2021-08-17 | Stop reason: SDUPTHER

## 2021-08-17 RX ORDER — ATORVASTATIN CALCIUM 20 MG/1
20 TABLET, FILM COATED ORAL DAILY
COMMUNITY
Start: 2021-07-27 | End: 2022-08-11 | Stop reason: DRUGHIGH

## 2021-08-17 RX ORDER — VALACYCLOVIR HYDROCHLORIDE 1 G/1
1000 TABLET, FILM COATED ORAL 3 TIMES DAILY
Qty: 21 TABLET | Refills: 3 | Status: SHIPPED | OUTPATIENT
Start: 2021-08-17 | End: 2021-08-17 | Stop reason: SDUPTHER

## 2021-08-17 NOTE — PROGRESS NOTES
Mesha Lane is a 76 y.o. female.     Vitals:    08/17/21 1235   BP: 140/78   Pulse: 74   Temp: 98 °F (36.7 °C)   SpO2: 96%      Body mass index is 43.07 kg/m².     History of Present Illness   Patient was seen for osteoarthritis.  Patient is using Aleve to relieve her pain.  Patient was a little hesitant to use it daily.  Patient was advised to take it only as needed.  Patient agreed.  Patient's thyroid is being treated with levothyroxine 137 mcg daily.  TSH was 4.4.  Patient TSH was slightly elevated and she will recheck in 1 month.  Patient's lipids being treated with diet exercise and atorvastatin 20 mg daily.  Triglycerides were 177, HDL 43, .  Patient wanted to try diet and exercise before increasing medication.    Dictated utilizing Dragon dictation. If there are questions or for further clarification, please contact me.  The following portions of the patient's history were reviewed and updated as appropriate: allergies, current medications, past family history, past medical history, past social history, past surgical history and problem list.    Review of Systems   Constitutional: Negative for fatigue and fever.   HENT: Positive for congestion. Negative for trouble swallowing.    Eyes: Negative for discharge and visual disturbance.   Respiratory: Negative for choking and shortness of breath.    Cardiovascular: Negative for chest pain and palpitations.   Gastrointestinal: Negative for abdominal pain and blood in stool.   Endocrine: Negative.    Genitourinary: Negative for genital sores and hematuria.   Musculoskeletal: Positive for arthralgias and joint swelling. Negative for gait problem.   Skin: Negative for color change, pallor, rash and wound.   Allergic/Immunologic: Positive for environmental allergies. Negative for immunocompromised state.   Neurological: Negative for facial asymmetry and speech difficulty.   Psychiatric/Behavioral: Negative for hallucinations and suicidal  ideas.       Objective   Physical Exam  Vitals and nursing note reviewed.   Constitutional:       Appearance: Normal appearance. She is well-developed.   HENT:      Head: Normocephalic and atraumatic.      Nose: Nose normal.      Mouth/Throat:      Mouth: Mucous membranes are moist.      Pharynx: Oropharynx is clear.   Eyes:      Extraocular Movements: Extraocular movements intact.      Conjunctiva/sclera: Conjunctivae normal.      Pupils: Pupils are equal, round, and reactive to light.   Cardiovascular:      Rate and Rhythm: Normal rate and regular rhythm.      Heart sounds: Normal heart sounds. No murmur heard.   No friction rub. No gallop.    Pulmonary:      Effort: Pulmonary effort is normal. No respiratory distress.      Breath sounds: Normal breath sounds. No stridor. No wheezing, rhonchi or rales.   Chest:      Chest wall: No tenderness.   Abdominal:      General: Bowel sounds are normal.      Palpations: Abdomen is soft.   Musculoskeletal:         General: Tenderness present. Normal range of motion.      Cervical back: Normal range of motion and neck supple.   Skin:     General: Skin is warm and dry.   Neurological:      General: No focal deficit present.      Mental Status: She is alert and oriented to person, place, and time. Mental status is at baseline.   Psychiatric:         Mood and Affect: Mood normal.         Behavior: Behavior normal.         Thought Content: Thought content normal.         Judgment: Judgment normal.         Assessment/Plan #1 continue Aleve as needed #2 recheck TSH in a month #3 better adherence to cholesterol diet recheck in 4 months.  Problems Addressed this Visit     Cardiac and Vasculature            Hyperlipemia    Relevant Medications    atorvastatin (LIPITOR) 20 MG tablet    Other Relevant Orders    Comprehensive Metabolic Panel (Completed)    TSH          Endocrine and Metabolic            Hypothyroidism    Relevant Medications    levothyroxine (Synthroid) 137 MCG tablet     Other Relevant Orders    Comprehensive Metabolic Panel (Completed)    TSH          Musculoskeletal and Injuries            Primary osteoarthritis involving multiple joints - Primary            Diagnoses     Diagnosis Codes Comments    Primary osteoarthritis involving multiple joints    -  Primary ICD-10-CM: M89.49  ICD-9-CM: 715.98     Hypothyroidism, unspecified type     ICD-10-CM: E03.9  ICD-9-CM: 244.9     Pure hypercholesterolemia     ICD-10-CM: E78.00  ICD-9-CM: 272.0

## 2021-09-07 ENCOUNTER — TELEPHONE (OUTPATIENT)
Dept: FAMILY MEDICINE CLINIC | Facility: CLINIC | Age: 76
End: 2021-09-07

## 2021-09-07 NOTE — TELEPHONE ENCOUNTER
Caller: Shwetha Lane    Relationship to patient: Self    Best call back number: [phone]     Date of exposure: 09/06/21    Date of positive COVID19 test: 09/06/21        COVID19 symptoms: BODY ACHES, SINUS ISSUES, AND CHILLS         Additional information or concerns: PATIENT STATES HER BOYFRIEND JUST TESTED POSITIVE YESTERDAY FOR COVID HE HAD SYMPTOMS SINCE SAT BUT WAS FEELING BAD ENOUGH TO GO TO ER YESTERDAY AND TESTED POSITIVE. BOTH HAVE BEEN VACCINATED AND SHE IS NOT SURE NOW WHAT SHE SHOULD DO.

## 2021-09-08 ENCOUNTER — LAB (OUTPATIENT)
Dept: FAMILY MEDICINE CLINIC | Facility: CLINIC | Age: 76
End: 2021-09-08

## 2021-09-08 DIAGNOSIS — R06.2 WHEEZING: ICD-10-CM

## 2021-09-08 DIAGNOSIS — R06.2 WHEEZING: Primary | ICD-10-CM

## 2021-09-10 PROBLEM — U07.1 COVID-19 VIRUS DETECTED: Status: ACTIVE | Noted: 2021-09-10

## 2021-09-10 LAB
LABCORP SARS-COV-2, NAA 2 DAY TAT: NORMAL
SARS-COV-2 RNA RESP QL NAA+PROBE: DETECTED

## 2021-09-16 ENCOUNTER — TELEPHONE (OUTPATIENT)
Dept: FAMILY MEDICINE CLINIC | Facility: CLINIC | Age: 76
End: 2021-09-16

## 2021-09-16 NOTE — TELEPHONE ENCOUNTER
THE PATIENT WAS RECENTLY DISCHARGED FROM [facility] YESTERDAY. MARY IS CALLING FOR AN EVALUATION FOR BOTH PHYSICAL THERAPY AND OCCUPATIONAL THERAPY. PLEASE ADVISE.

## 2021-09-20 ENCOUNTER — TELEPHONE (OUTPATIENT)
Dept: FAMILY MEDICINE CLINIC | Facility: CLINIC | Age: 76
End: 2021-09-20

## 2021-09-20 NOTE — TELEPHONE ENCOUNTER
Caller: MYRNA    Relationship: Home Health    Best call back number: [phone]    Who are you requesting to speak with (clinical staff, provider,  specific staff member): NURSE    What was the call regarding: PATIENT IS NOT NEEDING HOME HEALTH. SHE IS DOING WELL. SHE WILL CALL IN FOR APPT AS NEEDED.     Do you require a callback: YES

## 2021-10-22 ENCOUNTER — OFFICE VISIT (OUTPATIENT)
Dept: FAMILY MEDICINE CLINIC | Facility: CLINIC | Age: 76
End: 2021-10-22

## 2021-10-22 VITALS
OXYGEN SATURATION: 91 % | WEIGHT: 252 LBS | BODY MASS INDEX: 41.99 KG/M2 | TEMPERATURE: 98 F | SYSTOLIC BLOOD PRESSURE: 122 MMHG | DIASTOLIC BLOOD PRESSURE: 70 MMHG | HEART RATE: 95 BPM | HEIGHT: 65 IN

## 2021-10-22 DIAGNOSIS — E78.00 PURE HYPERCHOLESTEROLEMIA: ICD-10-CM

## 2021-10-22 DIAGNOSIS — U07.1 COVID-19 VIRUS DETECTED: Primary | ICD-10-CM

## 2021-10-22 DIAGNOSIS — E03.9 HYPOTHYROIDISM, UNSPECIFIED TYPE: ICD-10-CM

## 2021-10-22 DIAGNOSIS — M79.671 FOOT PAIN, RIGHT: ICD-10-CM

## 2021-10-22 PROBLEM — B02.9 SHINGLES: Status: ACTIVE | Noted: 2021-10-22

## 2021-10-22 PROCEDURE — G0008 ADMIN INFLUENZA VIRUS VAC: HCPCS | Performed by: INTERNAL MEDICINE

## 2021-10-22 PROCEDURE — 73620 X-RAY EXAM OF FOOT: CPT | Performed by: INTERNAL MEDICINE

## 2021-10-22 PROCEDURE — 90662 IIV NO PRSV INCREASED AG IM: CPT | Performed by: INTERNAL MEDICINE

## 2021-10-22 PROCEDURE — 99213 OFFICE O/P EST LOW 20 MIN: CPT | Performed by: INTERNAL MEDICINE

## 2021-10-22 RX ORDER — LIDOCAINE 50 MG/G
1 PATCH TOPICAL EVERY 12 HOURS
Qty: 30 PATCH | Refills: 4 | Status: SHIPPED | OUTPATIENT
Start: 2021-10-22

## 2021-10-22 NOTE — PROGRESS NOTES
Mesha Lane is a 76 y.o. female.     Vitals:    10/22/21 1050   BP: 122/70   Pulse: 95   Temp: 98 °F (36.7 °C)   SpO2: 91%      Body mass index is 41.93 kg/m².     History of Present Illness   Patient is following up after COVID-19.  Patient has been stable but still has a  moderately low pulse ox.  She is at 91%.  Patient is following up with her pulmonary doctor.  Patient did not want a chest x-ray.  Patient's thyroid has been treated with levothyroxine 137 mcg daily.  Patient's lipids treated with diet exercise and atorvastatin 20 mg daily.  Patient had developed right foot pain.  It was on the dorsum of the foot.  Patient's x-ray indicated degenerative changes and she was referred to podiatry.    X-Ray  Interpretation report in house X-rays that I personally viewed    Relevant Clinical Issues/Diagnoses/Indications: Foot pain right foot x-ray        Clinical Findings: Degenerative changes          Comparative Data: No previous x-ray          Date of Previous X-ray:    Change on current X-ray:      Dictated utilizing Dragon dictation. If there are questions or for further clarification, please contact me.  The following portions of the patient's history were reviewed and updated as appropriate: allergies, current medications, past family history, past medical history, past social history, past surgical history and problem list.    Review of Systems   Constitutional: Negative for fatigue and fever.   HENT: Positive for congestion. Negative for trouble swallowing.    Eyes: Negative for discharge and visual disturbance.   Respiratory: Negative for choking and shortness of breath.    Cardiovascular: Negative for chest pain and palpitations.   Gastrointestinal: Negative for abdominal pain and blood in stool.   Endocrine: Negative.    Genitourinary: Negative for genital sores and hematuria.   Musculoskeletal: Positive for gait problem and joint swelling.   Skin: Negative for color change, pallor,  rash and wound.   Allergic/Immunologic: Positive for environmental allergies. Negative for immunocompromised state.   Neurological: Negative for facial asymmetry and speech difficulty.   Psychiatric/Behavioral: Negative for hallucinations and suicidal ideas.       Objective   Physical Exam  Vitals and nursing note reviewed.   Constitutional:       Appearance: Normal appearance. She is well-developed.   HENT:      Head: Normocephalic and atraumatic.      Nose: Nose normal.      Mouth/Throat:      Mouth: Mucous membranes are moist.      Pharynx: Oropharynx is clear.   Eyes:      Extraocular Movements: Extraocular movements intact.      Conjunctiva/sclera: Conjunctivae normal.      Pupils: Pupils are equal, round, and reactive to light.   Cardiovascular:      Rate and Rhythm: Normal rate and regular rhythm.      Heart sounds: Normal heart sounds. No murmur heard.  No friction rub. No gallop.    Pulmonary:      Effort: Pulmonary effort is normal. No respiratory distress.      Breath sounds: Normal breath sounds. No stridor. No wheezing, rhonchi or rales.   Chest:      Chest wall: No tenderness.   Abdominal:      General: Bowel sounds are normal.      Palpations: Abdomen is soft.   Musculoskeletal:         General: Swelling, tenderness and deformity present. Normal range of motion.      Cervical back: Normal range of motion and neck supple.   Skin:     General: Skin is warm and dry.   Neurological:      General: No focal deficit present.      Mental Status: She is alert and oriented to person, place, and time. Mental status is at baseline.   Psychiatric:         Mood and Affect: Mood normal.         Behavior: Behavior normal.         Thought Content: Thought content normal.         Judgment: Judgment normal.         Assessment/Plan #1 follow-up with pulmonologist number 2 foot x-ray #3 podiatry referral  Problems Addressed this Visit        Cardiac and Vasculature    Hyperlipemia       Endocrine and Metabolic     Hypothyroidism       Other    COVID-19 virus detected - Primary      Other Visit Diagnoses     Foot pain, right        Relevant Orders    XR Foot 2 View Right (Completed)    Ambulatory Referral to Podiatry (Completed)      Diagnoses     Diagnosis Codes Comments    COVID-19 virus detected    -  Primary ICD-10-CM: U07.1  ICD-9-CM: 079.89     Pure hypercholesterolemia     ICD-10-CM: E78.00  ICD-9-CM: 272.0     Hypothyroidism, unspecified type     ICD-10-CM: E03.9  ICD-9-CM: 244.9     Foot pain, right     ICD-10-CM: M79.671  ICD-9-CM: 729.5

## 2021-11-09 DIAGNOSIS — E03.9 HYPOTHYROIDISM, UNSPECIFIED TYPE: ICD-10-CM

## 2021-11-09 DIAGNOSIS — E78.00 PURE HYPERCHOLESTEROLEMIA: Primary | ICD-10-CM

## 2021-11-09 DIAGNOSIS — E55.9 VITAMIN D DEFICIENCY, UNSPECIFIED: ICD-10-CM

## 2021-11-10 ENCOUNTER — LAB (OUTPATIENT)
Dept: FAMILY MEDICINE CLINIC | Facility: CLINIC | Age: 76
End: 2021-11-10

## 2021-11-10 DIAGNOSIS — E55.9 VITAMIN D DEFICIENCY, UNSPECIFIED: ICD-10-CM

## 2021-11-10 DIAGNOSIS — E78.00 PURE HYPERCHOLESTEROLEMIA: ICD-10-CM

## 2021-11-10 DIAGNOSIS — E03.9 HYPOTHYROIDISM, UNSPECIFIED TYPE: ICD-10-CM

## 2021-11-10 LAB
25(OH)D3 SERPL-MCNC: 43.2 NG/ML
ALBUMIN SERPL-MCNC: 4.3 G/DL (ref 3.5–5.2)
ALBUMIN/GLOB SERPL: 1.5 G/DL
ALP SERPL-CCNC: 89 U/L (ref 39–117)
ALT SERPL W P-5'-P-CCNC: 37 U/L (ref 1–33)
ANION GAP SERPL CALCULATED.3IONS-SCNC: 7.2 MMOL/L (ref 5–15)
AST SERPL-CCNC: 19 U/L (ref 1–32)
BILIRUB SERPL-MCNC: 0.5 MG/DL (ref 0–1.2)
BUN SERPL-MCNC: 11 MG/DL (ref 8–23)
BUN/CREAT SERPL: 12.1 (ref 7–25)
CALCIUM SPEC-SCNC: 9.6 MG/DL (ref 8.6–10.5)
CHLORIDE SERPL-SCNC: 104 MMOL/L (ref 98–107)
CHOLEST SERPL-MCNC: 135 MG/DL (ref 0–200)
CO2 SERPL-SCNC: 27.8 MMOL/L (ref 22–29)
CREAT SERPL-MCNC: 0.91 MG/DL (ref 0.57–1)
DEPRECATED RDW RBC AUTO: 43.2 FL (ref 37–54)
ERYTHROCYTE [DISTWIDTH] IN BLOOD BY AUTOMATED COUNT: 12.4 % (ref 12.3–15.4)
GFR SERPL CREATININE-BSD FRML MDRD: 60 ML/MIN/1.73
GLOBULIN UR ELPH-MCNC: 2.8 GM/DL
GLUCOSE SERPL-MCNC: 103 MG/DL (ref 65–99)
HCT VFR BLD AUTO: 44.9 % (ref 34–46.6)
HDLC SERPL-MCNC: 45 MG/DL (ref 40–60)
HGB BLD-MCNC: 15 G/DL (ref 12–15.9)
LDLC SERPL CALC-MCNC: 69 MG/DL (ref 0–100)
LDLC/HDLC SERPL: 1.5 {RATIO}
MCH RBC QN AUTO: 31.4 PG (ref 26.6–33)
MCHC RBC AUTO-ENTMCNC: 33.4 G/DL (ref 31.5–35.7)
MCV RBC AUTO: 94.1 FL (ref 79–97)
PLATELET # BLD AUTO: 325 10*3/MM3 (ref 140–450)
PMV BLD AUTO: 10.2 FL (ref 6–12)
POTASSIUM SERPL-SCNC: 4.3 MMOL/L (ref 3.5–5.2)
PROT SERPL-MCNC: 7.1 G/DL (ref 6–8.5)
RBC # BLD AUTO: 4.77 10*6/MM3 (ref 3.77–5.28)
SODIUM SERPL-SCNC: 139 MMOL/L (ref 136–145)
T-UPTAKE NFR SERPL: 0.91 TBI (ref 0.8–1.3)
T4 SERPL-MCNC: 9.56 MCG/DL (ref 4.5–11.7)
TRIGL SERPL-MCNC: 113 MG/DL (ref 0–150)
TSH SERPL DL<=0.05 MIU/L-ACNC: 1.08 UIU/ML (ref 0.27–4.2)
VLDLC SERPL-MCNC: 21 MG/DL (ref 5–40)
WBC # BLD AUTO: 10.52 10*3/MM3 (ref 3.4–10.8)

## 2021-11-10 PROCEDURE — 80053 COMPREHEN METABOLIC PANEL: CPT | Performed by: INTERNAL MEDICINE

## 2021-11-10 PROCEDURE — 84436 ASSAY OF TOTAL THYROXINE: CPT | Performed by: INTERNAL MEDICINE

## 2021-11-10 PROCEDURE — 80061 LIPID PANEL: CPT | Performed by: INTERNAL MEDICINE

## 2021-11-10 PROCEDURE — 82306 VITAMIN D 25 HYDROXY: CPT | Performed by: INTERNAL MEDICINE

## 2021-11-10 PROCEDURE — 36415 COLL VENOUS BLD VENIPUNCTURE: CPT | Performed by: INTERNAL MEDICINE

## 2021-11-10 PROCEDURE — 84479 ASSAY OF THYROID (T3 OR T4): CPT | Performed by: INTERNAL MEDICINE

## 2021-11-10 PROCEDURE — 84443 ASSAY THYROID STIM HORMONE: CPT | Performed by: INTERNAL MEDICINE

## 2021-11-10 PROCEDURE — 85027 COMPLETE CBC AUTOMATED: CPT | Performed by: INTERNAL MEDICINE

## 2021-11-17 ENCOUNTER — OFFICE VISIT (OUTPATIENT)
Dept: FAMILY MEDICINE CLINIC | Facility: CLINIC | Age: 76
End: 2021-11-17

## 2021-11-17 VITALS
TEMPERATURE: 98.4 F | DIASTOLIC BLOOD PRESSURE: 70 MMHG | SYSTOLIC BLOOD PRESSURE: 146 MMHG | BODY MASS INDEX: 42.32 KG/M2 | HEIGHT: 65 IN | WEIGHT: 254 LBS | OXYGEN SATURATION: 94 % | HEART RATE: 87 BPM

## 2021-11-17 DIAGNOSIS — E78.00 PURE HYPERCHOLESTEROLEMIA: ICD-10-CM

## 2021-11-17 DIAGNOSIS — I49.3 PVC (PREMATURE VENTRICULAR CONTRACTION): Primary | ICD-10-CM

## 2021-11-17 DIAGNOSIS — E03.9 HYPOTHYROIDISM, UNSPECIFIED TYPE: ICD-10-CM

## 2021-11-17 PROCEDURE — 99214 OFFICE O/P EST MOD 30 MIN: CPT | Performed by: INTERNAL MEDICINE

## 2021-11-17 RX ORDER — LANOLIN ALCOHOL/MO/W.PET/CERES
1000 CREAM (GRAM) TOPICAL DAILY
COMMUNITY

## 2021-11-17 RX ORDER — MELATONIN
Qty: 180 TABLET | Refills: 3 | Status: SHIPPED | OUTPATIENT
Start: 2021-11-17 | End: 2022-10-31 | Stop reason: SDUPTHER

## 2021-11-17 NOTE — PROGRESS NOTES
Mesha Lane is a 76 y.o. female.     Vitals:    11/17/21 1305   BP: 146/70   Pulse: 87   Temp: 98.4 °F (36.9 °C)   SpO2: 94%      Body mass index is 42.27 kg/m².     History of Present Illness   Patient is being seen for PVCs.  Patient had several episodes on her blood pressure machine when she was like that irregular beats.  Patient did not want an EKG but did agree to 2-week Holter.  Patient was also advised to keep a diary.  Patient will call 4 days after polyps are being removed.  Patient's thyroids been treated with levothyroxine 137 mcg daily.  TSH was 1.0.  Patient will continue present dose.  Patient's lipids treated with diet exercise and atorvastatin 20 mg daily.  Triglycerides 117, HDL 45, LDL 69.  Patient will continue present treatment.    Dictated utilizing Dragon dictation. If there are questions or for further clarification, please contact me.  The following portions of the patient's history were reviewed and updated as appropriate: allergies, current medications, past family history, past medical history, past social history, past surgical history and problem list.    Review of Systems   Constitutional: Negative for fatigue and fever.   HENT: Positive for congestion. Negative for trouble swallowing.    Eyes: Negative for discharge and visual disturbance.   Respiratory: Negative for choking and shortness of breath.    Cardiovascular: Positive for palpitations. Negative for chest pain.   Gastrointestinal: Negative for abdominal pain and blood in stool.   Endocrine: Negative.    Genitourinary: Negative for genital sores and hematuria.   Musculoskeletal: Negative for gait problem and joint swelling.   Skin: Negative for color change, pallor, rash and wound.   Allergic/Immunologic: Positive for environmental allergies. Negative for immunocompromised state.   Neurological: Negative for facial asymmetry and speech difficulty.   Psychiatric/Behavioral: Negative for hallucinations and  suicidal ideas.       Objective   Physical Exam  Vitals and nursing note reviewed.   Constitutional:       Appearance: Normal appearance. She is well-developed.   HENT:      Head: Normocephalic and atraumatic.      Nose: Nose normal.      Mouth/Throat:      Mouth: Mucous membranes are moist.      Pharynx: Oropharynx is clear.   Eyes:      Extraocular Movements: Extraocular movements intact.      Conjunctiva/sclera: Conjunctivae normal.      Pupils: Pupils are equal, round, and reactive to light.   Cardiovascular:      Rate and Rhythm: Normal rate and regular rhythm.      Heart sounds: Normal heart sounds.   Pulmonary:      Effort: Pulmonary effort is normal.      Breath sounds: Normal breath sounds.   Abdominal:      General: Bowel sounds are normal.      Palpations: Abdomen is soft.   Musculoskeletal:         General: Normal range of motion.      Cervical back: Normal range of motion and neck supple.   Skin:     General: Skin is warm and dry.   Neurological:      General: No focal deficit present.      Mental Status: She is alert and oriented to person, place, and time. Mental status is at baseline.   Psychiatric:         Mood and Affect: Mood normal.         Behavior: Behavior normal.         Thought Content: Thought content normal.         Judgment: Judgment normal.         Assessment/Plan #1 Holter monitor #2 continue present diet and activity level  Problems Addressed this Visit        Cardiac and Vasculature    Hyperlipemia    PVC (premature ventricular contraction) - Primary    Relevant Orders    Holter Monitor - 72 Hour Up To 15 Days       Endocrine and Metabolic    Hypothyroidism      Diagnoses     Diagnosis Codes Comments    PVC (premature ventricular contraction)    -  Primary ICD-10-CM: I49.3  ICD-9-CM: 427.69     Pure hypercholesterolemia     ICD-10-CM: E78.00  ICD-9-CM: 272.0     Hypothyroidism, unspecified type     ICD-10-CM: E03.9  ICD-9-CM: 244.9

## 2021-11-30 ENCOUNTER — HOSPITAL ENCOUNTER (OUTPATIENT)
Dept: CARDIOLOGY | Facility: HOSPITAL | Age: 76
Discharge: HOME | End: 2021-11-30
Admitting: INTERNAL MEDICINE

## 2021-11-30 DIAGNOSIS — I49.3 PVC (PREMATURE VENTRICULAR CONTRACTION): ICD-10-CM

## 2021-11-30 PROCEDURE — 93246 EXT ECG>7D<15D RECORDING: CPT

## 2021-12-17 LAB
MAXIMAL PREDICTED HEART RATE: 144 BPM
STRESS TARGET HR: 122 BPM

## 2021-12-17 PROCEDURE — 93248 EXT ECG>7D<15D REV&INTERPJ: CPT | Performed by: INTERNAL MEDICINE

## 2021-12-27 ENCOUNTER — TELEPHONE (OUTPATIENT)
Dept: ORTHOPEDIC SURGERY | Facility: CLINIC | Age: 76
End: 2021-12-27

## 2021-12-27 NOTE — TELEPHONE ENCOUNTER
Provider: DR YOUSIF     Caller: FANY LANGE     Relationship to Patient: SELF    Phone Number: [phone]    Reason for Call: PATIENT CALLED AND WOULD LIKE TO SCHEDULE FOR AN INJECTION PLEASE ADVISE

## 2022-01-03 ENCOUNTER — OFFICE VISIT (OUTPATIENT)
Dept: ORTHOPEDIC SURGERY | Facility: CLINIC | Age: 77
End: 2022-01-03

## 2022-01-03 VITALS — WEIGHT: 258.4 LBS | HEART RATE: 80 BPM | HEIGHT: 65 IN | OXYGEN SATURATION: 97 % | BODY MASS INDEX: 43.05 KG/M2

## 2022-01-03 DIAGNOSIS — M17.11 PRIMARY OSTEOARTHRITIS OF RIGHT KNEE: Primary | ICD-10-CM

## 2022-01-03 PROCEDURE — 20610 DRAIN/INJ JOINT/BURSA W/O US: CPT | Performed by: ORTHOPAEDIC SURGERY

## 2022-01-03 PROCEDURE — 99213 OFFICE O/P EST LOW 20 MIN: CPT | Performed by: ORTHOPAEDIC SURGERY

## 2022-01-03 NOTE — PROGRESS NOTES
"Chief Complaint  Follow-up of the Right Knee     Subjective      Shwetha Lane presents to [facility] for a follow-up of right knee. Patient has a history of bone on bone right knee osteoarthritis that has been treated conservatively. Patient last seen on 10/14/19 for her right knee, she had Synvisc injection back then that has given her some relief. She states pain in the right knee has worsened since her last visit. She is considering a knee replacement but still isn’t ready at this time.     Allergies   Allergen Reactions   • Bee Venom Swelling   • Letrozole Swelling   • Nickel Rash        Social History     Socioeconomic History   • Marital status:    Tobacco Use   • Smoking status: Former Smoker   • Smokeless tobacco: Never Used   Substance and Sexual Activity   • Alcohol use: No   • Drug use: No        Review of Systems     Objective   Vital Signs:   Pulse 80   Ht 165.1 cm (65\")   Wt 117 kg (258 lb 6.4 oz)   SpO2 97%   BMI 43.00 kg/m²       Physical Exam  Constitutional:       Appearance: Normal appearance. Patient is well-developed and normal weight.   HENT:      Head: Normocephalic.      Right Ear: Hearing and external ear normal.      Left Ear: Hearing and external ear normal.      Nose: Nose normal.   Eyes:      Conjunctiva/sclera: Conjunctivae normal.   Cardiovascular:      Rate and Rhythm: Normal rate.   Pulmonary:      Effort: Pulmonary effort is normal.      Breath sounds: No wheezing or rales.   Abdominal:      Palpations: Abdomen is soft.      Tenderness: There is no abdominal tenderness.   Musculoskeletal:      Cervical back: Normal range of motion.   Skin:     Findings: No rash.   Neurological:      Mental Status: Patient  is alert and oriented to person, place, and time.   Psychiatric:         Mood and Affect: Mood and affect normal.         Judgment: Judgment normal.       Ortho Exam      RIGHT KNEE: Good strength to hamstrings, quadriceps, " dorsiflexors and plantar flexors. -2 degrees of extension. Flexion to 120 degrees. Calf supple, non-tender, no signs of DVT. Dorsal Pedal Pulse 2+, posterior tibialis pulse 2+. Tender joint lines. Crepitus with motion. Skin intact.       Large Joint Arthrocentesis: R knee  Date/Time: 1/3/2022 1:15 PM  Consent given by: patient  Site marked: site marked  Timeout: Immediately prior to procedure a time out was called to verify the correct patient, procedure, equipment, support staff and site/side marked as required   Supporting Documentation  Indications: pain   Procedure Details  Location: knee - R knee  Needle size: 22 G  Medications administered: 48 mg hylan 48 MG/6ML  Patient tolerance: patient tolerated the procedure well with no immediate complications              Imaging Results (Most Recent)     None           Result Review :     X-Ray Report:  Right knee(s) X-Ray  Indication: Evaluation of right knee pain   AP, Lateral and Standing view(s)  Findings: Demonstrates bone on bone osteoarthritis.   Prior studies available for comparison: no     Assessment and Plan     DX: Right knee osteoarthritis     A right knee Synvisc one injection given, she tolerated this well.     Call or return if worsening symptoms.    Follow Up     Prn.       Patient was given instructions and counseling regarding her condition or for health maintenance advice. Please see specific information pulled into the AVS if appropriate.     Scribed for Denys Mohr MD by Romelia Quijano.  01/03/22   12:55 EST    I have personally performed the services described in this document as scribed by the above individual and it is both accurate and complete. Denys Mohr MD 01/03/22

## 2022-01-19 ENCOUNTER — OFFICE VISIT (OUTPATIENT)
Dept: FAMILY MEDICINE CLINIC | Facility: CLINIC | Age: 77
End: 2022-01-19

## 2022-01-19 VITALS
HEART RATE: 83 BPM | HEIGHT: 65 IN | DIASTOLIC BLOOD PRESSURE: 76 MMHG | SYSTOLIC BLOOD PRESSURE: 124 MMHG | BODY MASS INDEX: 42.69 KG/M2 | OXYGEN SATURATION: 94 % | WEIGHT: 256.2 LBS | TEMPERATURE: 97.7 F

## 2022-01-19 DIAGNOSIS — I49.3 PVC (PREMATURE VENTRICULAR CONTRACTION): Primary | ICD-10-CM

## 2022-01-19 DIAGNOSIS — E03.9 HYPOTHYROIDISM, UNSPECIFIED TYPE: ICD-10-CM

## 2022-01-19 DIAGNOSIS — E78.00 PURE HYPERCHOLESTEROLEMIA: ICD-10-CM

## 2022-01-19 DIAGNOSIS — I49.1 PAC (PREMATURE ATRIAL CONTRACTION): ICD-10-CM

## 2022-01-19 PROBLEM — I47.20 VENTRICULAR TACHYCARDIA (HCC): Status: ACTIVE | Noted: 2022-01-19

## 2022-01-19 PROCEDURE — 99214 OFFICE O/P EST MOD 30 MIN: CPT | Performed by: INTERNAL MEDICINE

## 2022-01-19 RX ORDER — METOPROLOL SUCCINATE 50 MG/1
50 TABLET, EXTENDED RELEASE ORAL DAILY
COMMUNITY
Start: 2022-01-12 | End: 2023-01-20 | Stop reason: SDUPTHER

## 2022-01-19 NOTE — PROGRESS NOTES
Mesha Lane is a 76 y.o. female.     Vitals:    01/19/22 1304   BP: 124/76   Pulse: 83   Temp: 97.7 °F (36.5 °C)   SpO2: 94%      Body mass index is 42.63 kg/m².     History of Present Illness   Patient was seen for PVCs.  Patient just had her Holter monitor read and had numerous PACs and PVCs.  Patient did have a 3-second run of ventricular trigeminy.  Patient saw her cardiologist and had her metoprolol increased to metoprolol succinate XL 50 mg daily.  Patient states her palpitations have decreased and she feels much better.  Patient's thyroids been treated with levothyroxine 137 mcg daily.  TSH was 1.08.  Patient will continue present dose of levothyroxine.  Patient's lipids are being treated with diet and exercise.  Triglycerides 113, HDL 45, LDL 69.  Patient will continue present treatment regimen.  Patient will monitor blood pressure and follow-up in 6 months.    Dictated utilizing Dragon dictation. If there are questions or for further clarification, please contact me.  The following portions of the patient's history were reviewed and updated as appropriate: allergies, current medications, past family history, past medical history, past social history, past surgical history and problem list.    Review of Systems   Constitutional: Negative for fatigue and fever.   HENT: Positive for congestion. Negative for trouble swallowing.    Eyes: Negative for discharge and visual disturbance.   Respiratory: Negative for choking and shortness of breath.    Cardiovascular: Negative for chest pain and palpitations.   Gastrointestinal: Negative for abdominal pain and blood in stool.   Endocrine: Negative.    Genitourinary: Negative for genital sores and hematuria.   Musculoskeletal: Negative for gait problem and joint swelling.   Skin: Negative for color change, pallor, rash and wound.   Allergic/Immunologic: Positive for environmental allergies. Negative for immunocompromised state.   Neurological:  Negative for facial asymmetry and speech difficulty.   Psychiatric/Behavioral: Negative for hallucinations and suicidal ideas.       Objective   Physical Exam  Vitals and nursing note reviewed.   Constitutional:       Appearance: Normal appearance. She is well-developed.   HENT:      Head: Normocephalic and atraumatic.      Nose: Nose normal.      Mouth/Throat:      Mouth: Mucous membranes are moist.      Pharynx: Oropharynx is clear.   Eyes:      Extraocular Movements: Extraocular movements intact.      Conjunctiva/sclera: Conjunctivae normal.      Pupils: Pupils are equal, round, and reactive to light.   Cardiovascular:      Rate and Rhythm: Normal rate and regular rhythm.      Heart sounds: Normal heart sounds. No murmur heard.  No friction rub. No gallop.    Pulmonary:      Effort: Pulmonary effort is normal. No respiratory distress.      Breath sounds: Normal breath sounds. No stridor. No wheezing, rhonchi or rales.   Chest:      Chest wall: No tenderness.   Abdominal:      General: Bowel sounds are normal.      Palpations: Abdomen is soft.   Musculoskeletal:         General: Normal range of motion.      Cervical back: Normal range of motion and neck supple.   Skin:     General: Skin is warm and dry.   Neurological:      General: No focal deficit present.      Mental Status: She is alert and oriented to person, place, and time. Mental status is at baseline.   Psychiatric:         Mood and Affect: Mood normal.         Behavior: Behavior normal.         Thought Content: Thought content normal.         Judgment: Judgment normal.         Assessment/Plan #1 increase metoprolol to metoprolol succinate 50 mg daily #2 continue present diet and activity level #3 continue levothyroxine dose  Problems Addressed this Visit        Cardiac and Vasculature    Hyperlipemia    PVC (premature ventricular contraction) - Primary    Relevant Medications    metoprolol succinate XL (TOPROL-XL) 50 MG 24 hr tablet    PAC (premature  atrial contraction)    Relevant Medications    metoprolol succinate XL (TOPROL-XL) 50 MG 24 hr tablet       Endocrine and Metabolic    Hypothyroidism    Relevant Medications    metoprolol succinate XL (TOPROL-XL) 50 MG 24 hr tablet      Diagnoses     Diagnosis Codes Comments    PVC (premature ventricular contraction)    -  Primary ICD-10-CM: I49.3  ICD-9-CM: 427.69     PAC (premature atrial contraction)     ICD-10-CM: I49.1  ICD-9-CM: 427.61     Hypothyroidism, unspecified type     ICD-10-CM: E03.9  ICD-9-CM: 244.9     Pure hypercholesterolemia     ICD-10-CM: E78.00  ICD-9-CM: 272.0

## 2022-02-11 ENCOUNTER — OFFICE VISIT (OUTPATIENT)
Dept: ORTHOPEDIC SURGERY | Facility: CLINIC | Age: 77
End: 2022-02-11

## 2022-02-11 VITALS — BODY MASS INDEX: 42.65 KG/M2 | HEIGHT: 65 IN | WEIGHT: 256 LBS

## 2022-02-11 DIAGNOSIS — M25.552 BILATERAL HIP PAIN: Primary | ICD-10-CM

## 2022-02-11 DIAGNOSIS — M70.62 TROCHANTERIC BURSITIS OF BOTH HIPS: ICD-10-CM

## 2022-02-11 DIAGNOSIS — M70.61 TROCHANTERIC BURSITIS OF BOTH HIPS: ICD-10-CM

## 2022-02-11 DIAGNOSIS — M25.551 BILATERAL HIP PAIN: Primary | ICD-10-CM

## 2022-02-11 PROCEDURE — 99213 OFFICE O/P EST LOW 20 MIN: CPT | Performed by: ORTHOPAEDIC SURGERY

## 2022-02-11 PROCEDURE — 20610 DRAIN/INJ JOINT/BURSA W/O US: CPT | Performed by: ORTHOPAEDIC SURGERY

## 2022-02-11 RX ORDER — BUPIVACAINE HYDROCHLORIDE 2.5 MG/ML
5 INJECTION, SOLUTION INFILTRATION; PERINEURAL
Status: COMPLETED | OUTPATIENT
Start: 2022-02-11 | End: 2022-02-11

## 2022-02-11 RX ORDER — TRIAMCINOLONE ACETONIDE 40 MG/ML
40 INJECTION, SUSPENSION INTRA-ARTICULAR; INTRAMUSCULAR
Status: COMPLETED | OUTPATIENT
Start: 2022-02-11 | End: 2022-02-11

## 2022-02-11 RX ADMIN — TRIAMCINOLONE ACETONIDE 40 MG: 40 INJECTION, SUSPENSION INTRA-ARTICULAR; INTRAMUSCULAR at 15:16

## 2022-02-11 RX ADMIN — BUPIVACAINE HYDROCHLORIDE 5 ML: 2.5 INJECTION, SOLUTION INFILTRATION; PERINEURAL at 15:16

## 2022-02-11 NOTE — PROGRESS NOTES
"Chief Complaint  Initial Evaluation of the Right Hip and Initial Evaluation of the Left Hip     Subjective      Shwetha Lane presents to [facility] for an evaluation of bilateral hips. Patient states she has been waking up multiple times in the middle of night because of bilateral hip pain. She states she has to switch sides throughout the night. She has no trauma or injury.     Allergies   Allergen Reactions   • Bee Venom Swelling   • Letrozole Swelling   • Nickel Rash        Social History     Socioeconomic History   • Marital status:    Tobacco Use   • Smoking status: Former Smoker   • Smokeless tobacco: Never Used   Vaping Use   • Vaping Use: Never used   Substance and Sexual Activity   • Alcohol use: No   • Drug use: No        Review of Systems     Objective   Vital Signs:   Ht 165.1 cm (65\")   Wt 116 kg (256 lb)   BMI 42.60 kg/m²       Physical Exam  Constitutional:       Appearance: Normal appearance. Patient is well-developed and normal weight.   HENT:      Head: Normocephalic.      Right Ear: Hearing and external ear normal.      Left Ear: Hearing and external ear normal.      Nose: Nose normal.   Eyes:      Conjunctiva/sclera: Conjunctivae normal.   Cardiovascular:      Rate and Rhythm: Normal rate.   Pulmonary:      Effort: Pulmonary effort is normal.      Breath sounds: No wheezing or rales.   Abdominal:      Palpations: Abdomen is soft.      Tenderness: There is no abdominal tenderness.   Musculoskeletal:      Cervical back: Normal range of motion.   Skin:     Findings: No rash.   Neurological:      Mental Status: Patient  is alert and oriented to person, place, and time.   Psychiatric:         Mood and Affect: Mood and affect normal.         Judgment: Judgment normal.       Ortho Exam      RIGHT HIP: Good tone of hip flexors, hip extensors, hip adductor, hip abductors. Tender greater trochanter. No groin pain. Full passive hip range of motion. Good strength " to hamstrings, quadriceps, dorsiflexors and plantar flexors. Stable to varus/valgus stress. Negative Lachman. Sensation grossly intact. Neurovascular intact.      LEFT HIP: Sensation grossly intact. Neurovascular intact.  Good strength to hamstrings, quadriceps, dorsiflexors and plantar flexors. Good tone of hip flexors, hip extensors, hip adductor, hip abductors. No groin pain. Non-antalgic gait. Sensation grossly intact. Neurovascular intact.        Large Joint Arthrocentesis: R greater trochanteric bursa  Date/Time: 2/11/2022 3:16 PM  Consent given by: patient  Site marked: site marked  Timeout: Immediately prior to procedure a time out was called to verify the correct patient, procedure, equipment, support staff and site/side marked as required   Supporting Documentation  Indications: pain   Procedure Details  Location: hip - R greater trochanteric bursa  Preparation: Patient was prepped and draped in the usual sterile fashion  Needle size: 22 G  Medications administered: 5 mL bupivacaine 0.25 %; 40 mg triamcinolone acetonide 40 MG/ML  Patient tolerance: patient tolerated the procedure well with no immediate complications    Large Joint Arthrocentesis: L greater trochanteric bursa  Date/Time: 2/11/2022 3:16 PM  Consent given by: patient  Site marked: site marked  Timeout: Immediately prior to procedure a time out was called to verify the correct patient, procedure, equipment, support staff and site/side marked as required   Supporting Documentation  Indications: pain   Procedure Details  Location: hip - L greater trochanteric bursa  Preparation: Patient was prepped and draped in the usual sterile fashion  Needle size: 22 G  Medications administered: 5 mL bupivacaine 0.25 %; 40 mg triamcinolone acetonide 40 MG/ML  Patient tolerance: patient tolerated the procedure well with no immediate complications              Imaging Results (Most Recent)     None           Result Review :     X-Ray Report:  Bilateral hip(s)  X-Ray  Indication: Evaluation of bilateral hips   AP and Lateral view(s)  Findings: There is well maintained joint space. No acute fracture or dislocation.   Prior studies available for comparison: no     Assessment and Plan     DX: Bilateral trochanteric bursitis     Discussed treatment plans and diagnosis with the patient. She was given bilateral bursa injections, she tolerated this well. A prescription for PT given.     Call or return if worsening symptoms.    Follow Up     PRN.       Patient was given instructions and counseling regarding her condition or for health maintenance advice. Please see specific information pulled into the AVS if appropriate.     Scribed for Denys Mohr MD by Romelia Quijano.  02/11/22   14:26 EST      I have personally performed the services described in this document as scribed by the above individual and it is both accurate and complete. Denys Mohr MD 02/11/22

## 2022-05-02 ENCOUNTER — OFFICE VISIT (OUTPATIENT)
Dept: ORTHOPEDIC SURGERY | Facility: CLINIC | Age: 77
End: 2022-05-02

## 2022-05-02 VITALS — WEIGHT: 256 LBS | BODY MASS INDEX: 42.65 KG/M2 | HEIGHT: 65 IN

## 2022-05-02 DIAGNOSIS — M79.672 LEFT FOOT PAIN: ICD-10-CM

## 2022-05-02 DIAGNOSIS — M54.50 LOW BACK PAIN, UNSPECIFIED BACK PAIN LATERALITY, UNSPECIFIED CHRONICITY, UNSPECIFIED WHETHER SCIATICA PRESENT: ICD-10-CM

## 2022-05-02 DIAGNOSIS — M70.62 TROCHANTERIC BURSITIS OF BOTH HIPS: Primary | ICD-10-CM

## 2022-05-02 DIAGNOSIS — M70.61 TROCHANTERIC BURSITIS OF BOTH HIPS: Primary | ICD-10-CM

## 2022-05-02 PROCEDURE — 20610 DRAIN/INJ JOINT/BURSA W/O US: CPT | Performed by: ORTHOPAEDIC SURGERY

## 2022-05-02 PROCEDURE — 99213 OFFICE O/P EST LOW 20 MIN: CPT | Performed by: ORTHOPAEDIC SURGERY

## 2022-05-02 RX ORDER — LIDOCAINE HYDROCHLORIDE 10 MG/ML
9 INJECTION, SOLUTION INFILTRATION; PERINEURAL
Status: COMPLETED | OUTPATIENT
Start: 2022-05-02 | End: 2022-05-02

## 2022-05-02 RX ORDER — TRIAMCINOLONE ACETONIDE 40 MG/ML
40 INJECTION, SUSPENSION INTRA-ARTICULAR; INTRAMUSCULAR
Status: COMPLETED | OUTPATIENT
Start: 2022-05-02 | End: 2022-05-02

## 2022-05-02 RX ADMIN — TRIAMCINOLONE ACETONIDE 40 MG: 40 INJECTION, SUSPENSION INTRA-ARTICULAR; INTRAMUSCULAR at 14:17

## 2022-05-02 RX ADMIN — LIDOCAINE HYDROCHLORIDE 9 ML: 10 INJECTION, SOLUTION INFILTRATION; PERINEURAL at 14:17

## 2022-05-02 NOTE — PROGRESS NOTES
"Chief Complaint  Follow-up of the Left Hip     Subjective      Shwetha Lane presents to [facility] for a follow-up of left hip. She reports throbbing sensation about the buttock region and laterally. She states that she only has this when she is sitting. Throbbing isn't present when standing and walking. She denies groin pain. She states she has been limping and has callus formation of the feet. This is causing her pain in the feet. She also feels like she has a leg length discrepancy. She denies any shooting pains down the leg. Pain remains isolated in the hip.     Allergies   Allergen Reactions   • Bee Venom Swelling   • Letrozole Swelling   • Nickel Rash        Social History     Socioeconomic History   • Marital status:    Tobacco Use   • Smoking status: Former Smoker   • Smokeless tobacco: Never Used   Vaping Use   • Vaping Use: Never used   Substance and Sexual Activity   • Alcohol use: No   • Drug use: No        Review of Systems     Objective   Vital Signs:   Ht 165.1 cm (65\")   Wt 116 kg (256 lb)   BMI 42.60 kg/m²       Physical Exam  Constitutional:       Appearance: Normal appearance. Patient is well-developed and normal weight.   HENT:      Head: Normocephalic.      Right Ear: Hearing and external ear normal.      Left Ear: Hearing and external ear normal.      Nose: Nose normal.   Eyes:      Conjunctiva/sclera: Conjunctivae normal.   Cardiovascular:      Rate and Rhythm: Normal rate.   Pulmonary:      Effort: Pulmonary effort is normal.      Breath sounds: No wheezing or rales.   Abdominal:      Palpations: Abdomen is soft.      Tenderness: There is no abdominal tenderness.   Musculoskeletal:      Cervical back: Normal range of motion.   Skin:     Findings: No rash.   Neurological:      Mental Status: Patient  is alert and oriented to person, place, and time.   Psychiatric:         Mood and Affect: Mood and affect normal.         Judgment: Judgment normal. "       Ortho Exam      LEFT HIP: Tender greater trochanter. Good tone of hip flexors, hip extensors, hip adductor, hip abductors. Good dorsiflexion and plantar flexion. Limping gait. No groin pain with hip range of motion. Good leg raise with mild pain.     Large Joint Arthrocentesis: L greater trochanteric bursa  Date/Time: 5/2/2022 2:17 PM  Consent given by: patient  Site marked: site marked  Timeout: Immediately prior to procedure a time out was called to verify the correct patient, procedure, equipment, support staff and site/side marked as required   Supporting Documentation  Indications: pain   Procedure Details  Location: hip - L greater trochanteric bursa  Needle size: 22 G  Medications administered: 9 mL lidocaine 1 %; 40 mg triamcinolone acetonide 40 MG/ML  Patient tolerance: patient tolerated the procedure well with no immediate complications              Imaging Results (Most Recent)     None           Result Review :       No results found.           Assessment and Plan     DX: trochanteric bursitis of left hip  Low back pain  Left foot pain    Patient referred to a podiatrist for the further evaluation of the foot. A prescription for PT to evaluate the leg length discrepancy, hip and back placed.     Left hip bursa injection given, she tolerated this well.     Call or return if worsening symptoms.    Follow Up     4-6 weeks.       Patient was given instructions and counseling regarding her condition or for health maintenance advice. Please see specific information pulled into the AVS if appropriate.     Scribed for Denys Mohr MD by Romelia Quijano.  05/02/22   14:10 EDT    I have personally performed the services described in this document as scribed by the above individual and it is both accurate and complete. Denys Mohr MD 05/02/22

## 2022-05-20 DIAGNOSIS — M79.671 BILATERAL FOOT PAIN: Primary | ICD-10-CM

## 2022-05-20 DIAGNOSIS — M79.672 BILATERAL FOOT PAIN: Primary | ICD-10-CM

## 2022-06-07 ENCOUNTER — OFFICE VISIT (OUTPATIENT)
Dept: FAMILY MEDICINE CLINIC | Facility: CLINIC | Age: 77
End: 2022-06-07

## 2022-06-07 VITALS
TEMPERATURE: 97.8 F | HEIGHT: 65 IN | SYSTOLIC BLOOD PRESSURE: 126 MMHG | OXYGEN SATURATION: 96 % | BODY MASS INDEX: 43.49 KG/M2 | DIASTOLIC BLOOD PRESSURE: 68 MMHG | HEART RATE: 98 BPM | WEIGHT: 261 LBS

## 2022-06-07 DIAGNOSIS — M54.17 LUMBOSACRAL RADICULOPATHY: Primary | ICD-10-CM

## 2022-06-07 PROBLEM — M43.02 CERVICAL SPONDYLOLYSIS: Status: ACTIVE | Noted: 2022-06-07

## 2022-06-07 PROBLEM — M50.30 DDD (DEGENERATIVE DISC DISEASE), CERVICAL: Status: ACTIVE | Noted: 2022-06-07

## 2022-06-07 PROCEDURE — 72220 X-RAY EXAM SACRUM TAILBONE: CPT

## 2022-06-07 PROCEDURE — 96372 THER/PROPH/DIAG INJ SC/IM: CPT

## 2022-06-07 PROCEDURE — 99214 OFFICE O/P EST MOD 30 MIN: CPT

## 2022-06-07 PROCEDURE — 72100 X-RAY EXAM L-S SPINE 2/3 VWS: CPT

## 2022-06-07 RX ORDER — ALBUTEROL SULFATE 2.5 MG/3ML
SOLUTION RESPIRATORY (INHALATION)
COMMUNITY
Start: 2022-04-22

## 2022-06-07 RX ORDER — MELOXICAM 7.5 MG/1
7.5 TABLET ORAL DAILY
Qty: 30 TABLET | Refills: 0 | Status: SHIPPED | OUTPATIENT
Start: 2022-06-07 | End: 2022-07-07

## 2022-06-07 RX ORDER — METHYLPREDNISOLONE SODIUM SUCCINATE 40 MG/ML
40 INJECTION, POWDER, LYOPHILIZED, FOR SOLUTION INTRAMUSCULAR; INTRAVENOUS ONCE
Status: COMPLETED | OUTPATIENT
Start: 2022-06-07 | End: 2022-06-07

## 2022-06-07 RX ORDER — CYCLOBENZAPRINE HCL 10 MG
10 TABLET ORAL 3 TIMES DAILY PRN
Qty: 30 TABLET | Refills: 0 | Status: SHIPPED | OUTPATIENT
Start: 2022-06-07

## 2022-06-07 RX ADMIN — METHYLPREDNISOLONE SODIUM SUCCINATE 40 MG: 40 INJECTION, POWDER, LYOPHILIZED, FOR SOLUTION INTRAMUSCULAR; INTRAVENOUS at 14:16

## 2022-06-07 NOTE — PATIENT INSTRUCTIONS
Xray reviewed: Awaiting final read by radiologist. Our office will update you with final radiology report.  May take Flexeril 3 times a day as needed for muscle spasms-do not take this medication while driving or drinking alcohol.  Take meloxicam 1 to 2 tablets every 12 hours as needed for sciatica.  Take with food.  Explained to patient to call 911 or go to the nearest emergency room if she experiences any loss of bowel or bladder control or cannot feel lower extremities.  Patient agrees with plan of care and understands instructions. Call if worsening symptoms or any problems or concerns.

## 2022-06-07 NOTE — PROGRESS NOTES
"Chief Complaint  Back Pain (Lower Back pain into buttocks Down into leg)    Subjective        Shwetha Lane presents to [facility]  History of Present Illness  Patient is a 76-year-old female, new patient to me.  Patient of Dr. Bhakta last seen in office 1/19/2022.    History of osteoarthritis of multiple joints. Last MRI on thoracic spine completed 6/4/21: shows DDD in thoracic spine and cervical spine C6-7.  Finished PT for hip pain at end of March. Since April, pt c/o lower back pain. Patient here today with same pain, but it now has throbbing pain in left buttock that radiates into 3/4th left thigh. Pt reprots lower back feels weak. Pt has tried ice, heat, flexeril, lidocaine patches, minimal relief. No pain when laying down. States pain worse when sitting, bending, get up from chair pain. Pt hasn't received any xrays recently on lower back. Pt pointing to lumbosacral area where pain originates in back.    Objective   Vital Signs:  /68   Pulse 98   Temp 97.8 °F (36.6 °C)   Ht 165.1 cm (65\")   Wt 118 kg (261 lb)   SpO2 96%   BMI 43.43 kg/m²   Estimated body mass index is 43.43 kg/m² as calculated from the following:    Height as of this encounter: 165.1 cm (65\").    Weight as of this encounter: 118 kg (261 lb).           Physical Exam  Constitutional:       General: She is awake.      Appearance: Normal appearance.   HENT:      Head: Normocephalic and atraumatic.      Nose: Nose normal.   Eyes:      Extraocular Movements: Extraocular movements intact.      Conjunctiva/sclera: Conjunctivae normal.      Pupils: Pupils are equal, round, and reactive to light.   Cardiovascular:      Rate and Rhythm: Normal rate and regular rhythm.      Pulses: Normal pulses.      Heart sounds: Normal heart sounds.   Pulmonary:      Effort: Pulmonary effort is normal.      Breath sounds: Normal breath sounds and air entry.   Musculoskeletal:      Cervical back: Full passive range of " motion without pain, normal range of motion and neck supple.      Lumbar back: Spasms, tenderness and bony tenderness present. No swelling or deformity. Negative right straight leg raise test and negative left straight leg raise test.   Skin:     General: Skin is warm and dry.   Neurological:      General: No focal deficit present.      Mental Status: She is alert and oriented to person, place, and time. Mental status is at baseline.   Psychiatric:         Attention and Perception: Attention normal.         Behavior: Behavior normal. Behavior is cooperative.        Result Review :                Assessment and Plan   Diagnoses and all orders for this visit:    1. Lumbosacral radiculopathy (Primary)  -     XR Spine Lumbar 2 or 3 View (In Office)  -     XR Sacrum & Coccyx (In Office)  -     methylPREDNISolone sodium succinate (SOLU-Medrol) injection 40 mg  -     cyclobenzaprine (FLEXERIL) 10 MG tablet; Take 1 tablet by mouth 3 (Three) Times a Day As Needed for Muscle Spasms.  Dispense: 30 tablet; Refill: 0  -     meloxicam (Mobic) 7.5 MG tablet; Take 1 tablet by mouth Daily for 30 days. Make take 1-2 pills every 12 hours as needed for sciatica  Dispense: 30 tablet; Refill: 0      Xray reviewed: Awaiting final read by radiologist. Our office will update you with final radiology report.  • May take Flexeril 3 times a day as needed for muscle spasms-do not take this medication while driving or drinking alcohol.  • Take meloxicam 1 to 2 tablets every 12 hours as needed for sciatica.  Take with food.  • Explained to patient to call 911 or go to the nearest emergency room if she experiences any loss of bowel or bladder control or cannot feel lower extremities.  • Patient agrees with plan of care and understands instructions. Call if worsening symptoms or any problems or concerns.          Follow Up   Return if symptoms worsen or fail to improve.  Patient was given instructions and counseling regarding her condition or for  health maintenance advice. Please see specific information pulled into the AVS if appropriate.

## 2022-06-10 DIAGNOSIS — M54.17 LUMBOSACRAL RADICULOPATHY: Primary | ICD-10-CM

## 2022-06-17 DIAGNOSIS — M54.17 LUMBOSACRAL RADICULOPATHY: Primary | ICD-10-CM

## 2022-06-22 ENCOUNTER — TELEPHONE (OUTPATIENT)
Dept: FAMILY MEDICINE CLINIC | Facility: CLINIC | Age: 77
End: 2022-06-22

## 2022-06-22 DIAGNOSIS — M54.17 LUMBOSACRAL RADICULOPATHY: Primary | ICD-10-CM

## 2022-07-09 ENCOUNTER — HOSPITAL ENCOUNTER (OUTPATIENT)
Dept: MRI IMAGING | Facility: HOSPITAL | Age: 77
Discharge: HOME | End: 2022-07-09
Admitting: INTERNAL MEDICINE

## 2022-07-09 DIAGNOSIS — M54.17 LUMBOSACRAL RADICULOPATHY: ICD-10-CM

## 2022-07-09 PROCEDURE — 72148 MRI LUMBAR SPINE W/O DYE: CPT

## 2022-07-13 NOTE — PROGRESS NOTES
Subjective   History of Present Illness: Shwetha Lane is a 76 y.o. female is here today for follow-up. Ms. Lane previously seen NELLY Rico at the [facility] neurosurgery location but does not want to travel to [facility]. Patient is here today with a new lumbar MRI.     Today, Ms. Lane reports she is having low back pain. She reports she is having severe throbbing pain under her left buttock that radiates to her left thigh. She denies any numbness or tingling. She reports an intermittent burning sensation under her left buttock. She denies any leg weakness. She reports she has weakness in her lower back. She denies any bowel or bladder incontinence. She reports meloxicam or aleve for pain.     While in the room and during my examination of the patient I wore a mask and eye protection.  I washed my hands before and after this patient encounter.  The patient was also wearing a mask.    Back Pain  This is a new problem. The current episode started more than 1 month ago. The pain is present in the lumbar spine. The pain radiates to the left thigh. The pain is at a severity of 3/10. The pain is mild. The symptoms are aggravated by position, bending, sitting and standing. Associated symptoms include leg pain and weakness. Pertinent negatives include no bladder incontinence, bowel incontinence, chest pain or numbness. She has tried muscle relaxant and analgesics for the symptoms. The treatment provided no relief.       The following portions of the patient's history were reviewed and updated as appropriate: allergies, current medications, past family history, past medical history, past social history, past surgical history and problem list.    Review of Systems   Constitutional: Positive for activity change.   HENT: Negative for congestion.    Eyes: Negative for visual disturbance.   Respiratory: Negative for chest tightness and shortness of breath.    Cardiovascular: Negative for chest pain.  "  Gastrointestinal: Negative for bowel incontinence, nausea and vomiting.   Endocrine: Negative for cold intolerance and heat intolerance.   Genitourinary: Negative for bladder incontinence and difficulty urinating.   Musculoskeletal: Positive for back pain.   Skin: Negative for rash and wound.   Allergic/Immunologic: Negative for environmental allergies.   Neurological: Positive for weakness. Negative for numbness.   Hematological: Does not bruise/bleed easily.   Psychiatric/Behavioral: Negative for sleep disturbance.       Objective     Vitals:    07/15/22 1239   BP: 120/72   Pulse: 84   Temp: 98.6 °F (37 °C)   SpO2: 95%   Weight: 118 kg (261 lb)   Height: 165.1 cm (65\")     Body mass index is 43.43 kg/m².      Physical Exam  Neurologic Exam    Physical Exam:    CONSTITUTIONAL: This 76 year old  female appears well developed, well-nourished and in no acute distress.    HEAD & FACE: the head and face are symmetric, normocephalic and atraumatic.    EYES: Inspection of the conjunctivae and lids reveals no swelling, erythema or discharge.  Pupils are round, equal and reactive to light and there is no scleral icterus.    EARS, NOSE, MOUTH & THROAT: On inspection, the ears and nose are within normal limits.    NECK: the neck is supple and symmetric. The trachea is midline with no masses.    PULMONARY: Respiratory effort is normal with no increased work of breathing or signs of respiratory distress.    CARDIOVASCULAR: Pedal pulses are +2/4 bilaterally. Examination of the extremities shows no edema or varicosities.    MUSCULOSKELETAL: Gait and station are within normal limits. The spine has no tenderness to midline palpation although she has marked tenderness to the left SI joint palpation.    SKIN: The skin is warm, dry and intact    NEUROLOGIC:   Cranial Nerves 2-12 intact  Normal motor strength noted. Muscle bulk and tone are normal.  Sensory exam is normal to fine touch to confrontational testing " bilaterally  Reflexes on the right side demonstrates 2/4 Knee Jerk Reflex, 1/4 Ankle Jerk Reflex and no ankle clonus on the right.   Reflexes on the left side demonstrates 2/4 Knee Jerk Reflex, 1/4 Ankle Jerk Reflex and no ankle clonus on the left.  Superficial/Primitive Reflexes: primitive reflexes were absent.  Franco's, Babinski, and Clonus signs all negative.  No coordination deficit observed.  Radicular testing showed a negative Eduardo (SUNDAY) test and negative straight leg raise.  He does get a little bit of SI joint pain with SUNDAY testing.  Cortical function is intact and without deficits. Speech is normal.    PSYCHIATRIC: oriented to person, place and time. Patient's mood and affect are normal.    Assessment & Plan   Independent Review of Radiographic Studies:      I personally reviewed the images from the following studies.    MRI of the thoracic spine done on June 4, 2021 reveals underlying degenerative changes thoracic spine but only mild neuroforaminal narrowing is seen at any level.  There is no threat to the spinal cord.    MRI of the lumbar spine done on July 9, 2022 at [facility] reveals levoconvex scoliosis with the apex at L1-L2.  There is some degenerative disc disease at L3-4 with a mild amount of neuroforaminal narrowing superimposed upon facet hypertrophic change at that level.  No significant central spinal stenosis is seen.    Medical Decision Making:      From the neurosurgical perspective there is no surgical option for isolated back pain. There is no persistent radiculopathy or loss of nerve function on exam to justify surgery.  Her symptomatology fits with a clinical presentation of left SI joint arthropathy and pain although she may have some issue ongoing with her back as well.  The disc protrusion to the left at L3-4 causes only mild neuroforaminal narrowing and I do not detect an L4 radiculopathy on my exam with symmetrical knee reflexes and sensation in the legs.  Generally  most people respond favorably to physical therapy which she felt was aggravating it.  I encouraged her to continue the meloxicam prescribed by her primary physician.  She would like to follow-up with Dr. Jay her pain management physician who treats her neck pain to consider left SI joint injections versus spinal injections or both.    Since surgical pathology was not identified radiographically or clinically, we will initiate conservative treatment and only plan on follow up as needed if surgical questions arise.    No follow-ups on file.    Diagnoses and all orders for this visit:    1. DDD (degenerative disc disease), cervical (Primary)  -     Ambulatory Referral to Pain Management    2. Chronic midline low back pain with left-sided sciatica  -     Ambulatory Referral to Pain Management    3. Sacroiliac joint pain  -     Ambulatory Referral to Pain Management             Koko Ochoa MD FACS [facility]  Neurological Surgery

## 2022-07-15 ENCOUNTER — OFFICE VISIT (OUTPATIENT)
Dept: NEUROSURGERY | Facility: CLINIC | Age: 77
End: 2022-07-15

## 2022-07-15 VITALS
DIASTOLIC BLOOD PRESSURE: 72 MMHG | WEIGHT: 261 LBS | BODY MASS INDEX: 43.49 KG/M2 | OXYGEN SATURATION: 95 % | HEART RATE: 84 BPM | TEMPERATURE: 98.6 F | SYSTOLIC BLOOD PRESSURE: 120 MMHG | HEIGHT: 65 IN

## 2022-07-15 DIAGNOSIS — M53.3 SACROILIAC JOINT PAIN: ICD-10-CM

## 2022-07-15 DIAGNOSIS — G89.29 CHRONIC MIDLINE LOW BACK PAIN WITH LEFT-SIDED SCIATICA: ICD-10-CM

## 2022-07-15 DIAGNOSIS — M50.30 DDD (DEGENERATIVE DISC DISEASE), CERVICAL: Primary | ICD-10-CM

## 2022-07-15 DIAGNOSIS — M54.42 CHRONIC MIDLINE LOW BACK PAIN WITH LEFT-SIDED SCIATICA: ICD-10-CM

## 2022-07-15 PROCEDURE — 99214 OFFICE O/P EST MOD 30 MIN: CPT | Performed by: NEUROLOGICAL SURGERY

## 2022-07-15 RX ORDER — MELOXICAM 7.5 MG/1
7.5 TABLET ORAL DAILY
COMMUNITY
End: 2022-11-15

## 2022-08-04 ENCOUNTER — LAB (OUTPATIENT)
Dept: FAMILY MEDICINE CLINIC | Facility: CLINIC | Age: 77
End: 2022-08-04

## 2022-08-04 DIAGNOSIS — E03.9 HYPOTHYROIDISM, UNSPECIFIED TYPE: Primary | ICD-10-CM

## 2022-08-04 DIAGNOSIS — E78.00 PURE HYPERCHOLESTEROLEMIA: ICD-10-CM

## 2022-08-04 DIAGNOSIS — E55.9 VITAMIN D DEFICIENCY, UNSPECIFIED: ICD-10-CM

## 2022-08-05 LAB
25(OH)D3 SERPL-MCNC: 39.4 NG/ML (ref 30–100)
ALBUMIN SERPL-MCNC: 3.9 G/DL (ref 3.5–5.2)
ALBUMIN/GLOB SERPL: 1.3 G/DL
ALP SERPL-CCNC: 83 U/L (ref 39–117)
ALT SERPL W P-5'-P-CCNC: 20 U/L (ref 1–33)
ANION GAP SERPL CALCULATED.3IONS-SCNC: 9 MMOL/L (ref 5–15)
AST SERPL-CCNC: 21 U/L (ref 1–32)
BILIRUB SERPL-MCNC: 0.6 MG/DL (ref 0–1.2)
BUN SERPL-MCNC: 12 MG/DL (ref 8–23)
BUN/CREAT SERPL: 14.6 (ref 7–25)
CALCIUM SPEC-SCNC: 9.3 MG/DL (ref 8.6–10.5)
CHLORIDE SERPL-SCNC: 104 MMOL/L (ref 98–107)
CHOLEST SERPL-MCNC: 131 MG/DL (ref 0–200)
CO2 SERPL-SCNC: 27 MMOL/L (ref 22–29)
CREAT SERPL-MCNC: 0.82 MG/DL (ref 0.57–1)
DEPRECATED RDW RBC AUTO: 42.6 FL (ref 37–54)
EGFRCR SERPLBLD CKD-EPI 2021: 73.8 ML/MIN/1.73
ERYTHROCYTE [DISTWIDTH] IN BLOOD BY AUTOMATED COUNT: 12.3 % (ref 12.3–15.4)
GLOBULIN UR ELPH-MCNC: 2.9 GM/DL
GLUCOSE SERPL-MCNC: 95 MG/DL (ref 65–99)
HCT VFR BLD AUTO: 44.3 % (ref 34–46.6)
HDLC SERPL-MCNC: 38 MG/DL (ref 40–60)
HGB BLD-MCNC: 14.6 G/DL (ref 12–15.9)
LDLC SERPL CALC-MCNC: 69 MG/DL (ref 0–100)
LDLC/HDLC SERPL: 1.72 {RATIO}
MCH RBC QN AUTO: 31.1 PG (ref 26.6–33)
MCHC RBC AUTO-ENTMCNC: 33 G/DL (ref 31.5–35.7)
MCV RBC AUTO: 94.3 FL (ref 79–97)
PLATELET # BLD AUTO: 282 10*3/MM3 (ref 140–450)
PMV BLD AUTO: 10.6 FL (ref 6–12)
POTASSIUM SERPL-SCNC: 4.7 MMOL/L (ref 3.5–5.2)
PROT SERPL-MCNC: 6.8 G/DL (ref 6–8.5)
RBC # BLD AUTO: 4.7 10*6/MM3 (ref 3.77–5.28)
SODIUM SERPL-SCNC: 140 MMOL/L (ref 136–145)
T-UPTAKE NFR SERPL: 0.96 TBI (ref 0.8–1.3)
T4 SERPL-MCNC: 8.17 MCG/DL (ref 4.5–11.7)
TRIGL SERPL-MCNC: 138 MG/DL (ref 0–150)
TSH SERPL DL<=0.05 MIU/L-ACNC: 4.24 UIU/ML (ref 0.27–4.2)
VLDLC SERPL-MCNC: 24 MG/DL (ref 5–40)
WBC NRBC COR # BLD: 7.89 10*3/MM3 (ref 3.4–10.8)

## 2022-08-05 PROCEDURE — 84479 ASSAY OF THYROID (T3 OR T4): CPT | Performed by: INTERNAL MEDICINE

## 2022-08-05 PROCEDURE — 80061 LIPID PANEL: CPT | Performed by: INTERNAL MEDICINE

## 2022-08-05 PROCEDURE — 82306 VITAMIN D 25 HYDROXY: CPT | Performed by: INTERNAL MEDICINE

## 2022-08-05 PROCEDURE — 80053 COMPREHEN METABOLIC PANEL: CPT | Performed by: INTERNAL MEDICINE

## 2022-08-05 PROCEDURE — 84436 ASSAY OF TOTAL THYROXINE: CPT | Performed by: INTERNAL MEDICINE

## 2022-08-05 PROCEDURE — 84443 ASSAY THYROID STIM HORMONE: CPT | Performed by: INTERNAL MEDICINE

## 2022-08-05 PROCEDURE — 36415 COLL VENOUS BLD VENIPUNCTURE: CPT | Performed by: INTERNAL MEDICINE

## 2022-08-05 PROCEDURE — 85027 COMPLETE CBC AUTOMATED: CPT | Performed by: INTERNAL MEDICINE

## 2022-08-11 ENCOUNTER — OFFICE VISIT (OUTPATIENT)
Dept: FAMILY MEDICINE CLINIC | Facility: CLINIC | Age: 77
End: 2022-08-11

## 2022-08-11 VITALS
BODY MASS INDEX: 43.32 KG/M2 | SYSTOLIC BLOOD PRESSURE: 138 MMHG | DIASTOLIC BLOOD PRESSURE: 64 MMHG | WEIGHT: 260 LBS | HEIGHT: 65 IN | HEART RATE: 68 BPM | OXYGEN SATURATION: 96 % | TEMPERATURE: 98.2 F

## 2022-08-11 DIAGNOSIS — E78.00 PURE HYPERCHOLESTEROLEMIA: ICD-10-CM

## 2022-08-11 DIAGNOSIS — Z00.00 MEDICARE ANNUAL WELLNESS VISIT, SUBSEQUENT: Primary | ICD-10-CM

## 2022-08-11 PROCEDURE — 1170F FXNL STATUS ASSESSED: CPT | Performed by: INTERNAL MEDICINE

## 2022-08-11 PROCEDURE — 1159F MED LIST DOCD IN RCRD: CPT | Performed by: INTERNAL MEDICINE

## 2022-08-11 PROCEDURE — G0439 PPPS, SUBSEQ VISIT: HCPCS | Performed by: INTERNAL MEDICINE

## 2022-08-11 RX ORDER — ATORVASTATIN CALCIUM 40 MG/1
40 TABLET, FILM COATED ORAL NIGHTLY
Qty: 90 TABLET | Refills: 1 | Status: SHIPPED | OUTPATIENT
Start: 2022-08-11 | End: 2023-01-20 | Stop reason: SDUPTHER

## 2022-08-11 NOTE — PROGRESS NOTES
QUICK REFERENCE INFORMATION:  The ABCs of the Annual Wellness Visit    Subsequent Medicare Wellness Visit patient was seen for a Medicare wellness exam.  Patient was seen for hyperlipidemia.  Patient's HDL was 38 and she increased her Lipitor to 40 mg daily from 20 mg daily.  Patient will have labs in 1 in 3 months.    Dictated utilizing Dragon dictation. If there are questions or for further clarification, please contact me.    HEALTH RISK ASSESSMENT    1945    Recent Hospitalizations:  No hospitalization(s) within the last year..        Current Medical Providers:  Patient Care Team:  Edis Bhakta MD as PCP - General        Smoking Status:  Social History     Tobacco Use   Smoking Status Former Smoker   Smokeless Tobacco Never Used       Alcohol Consumption:  Social History     Substance and Sexual Activity   Alcohol Use No       Depression Screen:   PHQ-2/PHQ-9 Depression Screening 8/11/2022   Retired PHQ-9 Total Score -   Retired Total Score -   Little Interest or Pleasure in Doing Things 0-->not at all   Feeling Down, Depressed or Hopeless 0-->not at all   PHQ-9: Brief Depression Severity Measure Score 0       Health Habits and Functional and Cognitive Screening:  Functional & Cognitive Status 8/11/2022   Do you have difficulty preparing food and eating? No   Do you have difficulty bathing yourself, getting dressed or grooming yourself? No   Do you have difficulty using the toilet? No   Do you have difficulty moving around from place to place? Yes   Do you have trouble with steps or getting out of a bed or a chair? Yes   Current Diet Well Balanced Diet   Dental Exam Up to date   Eye Exam Up to date   Exercise (times per week) 7 times per week   Current Exercises Include Aerobics   Current Exercise Activities Include -   Do you need help using the phone?  No   Are you deaf or do you have serious difficulty hearing?  No   Do you need help with transportation? No   Do you need help shopping? No   Do you  need help preparing meals?  No   Do you need help with housework?  No   Do you need help with laundry? No   Do you need help taking your medications? No   Do you need help managing money? No   Do you ever drive or ride in a car without wearing a seat belt? No   Have you felt unusual stress, anger or loneliness in the last month? No   Who do you live with? Spouse   If you need help, do you have trouble finding someone available to you? No   Have you been bothered in the last four weeks by sexual problems? No   Do you have difficulty concentrating, remembering or making decisions? No           Does the patient have evidence of cognitive impairment? No    Aspirin use counseling: Does not need ASA (and currently is not on it)      Recent Lab Results:  CMP:  Lab Results   Component Value Date     (H) 05/14/2020    BUN 12 08/05/2022    CREATININE 0.82 08/05/2022    EGFRIFNONA 60 (L) 11/10/2021    BCR 14.6 08/05/2022     08/05/2022    K 4.7 08/05/2022    CO2 27.0 08/05/2022    CALCIUM 9.3 08/05/2022    ALBUMIN 3.90 08/05/2022    LABIL2 1.2 12/02/2019    BILITOT 0.6 08/05/2022    ALKPHOS 83 08/05/2022    AST 21 08/05/2022    ALT 20 08/05/2022     Lipid Panel:  Lab Results   Component Value Date    CHOL 131 08/05/2022    TRIG 138 08/05/2022    HDL 38 (L) 08/05/2022    VLDL 24 08/05/2022    LDLHDL 1.72 08/05/2022     HbA1c:       Visual Acuity:  No exam data present    Age-appropriate Screening Schedule:  Refer to the list below for future screening recommendations based on patient's age, sex and/or medical conditions. Orders for these recommended tests are listed in the plan section. The patient has been provided with a written plan.    Health Maintenance   Topic Date Due   • URINE MICROALBUMIN  Never done   • TDAP/TD VACCINES (1 - Tdap) Never done   • ZOSTER VACCINE (1 of 2) Never done   • HEMOGLOBIN A1C  Never done   • DIABETIC EYE EXAM  11/01/2019   • INFLUENZA VACCINE  10/01/2022   • DXA SCAN  11/23/2022   •  MAMMOGRAM  03/10/2023   • LIPID PANEL  08/05/2023        Subjective   History of Present Illness    Shwetha Lane is a 77 y.o. female who presents for an Subsequent Wellness Visit.    The following portions of the patient's history were reviewed and updated as appropriate: allergies, current medications, past family history, past medical history, past social history, past surgical history and problem list.    Outpatient Medications Prior to Visit   Medication Sig Dispense Refill   • albuterol (PROVENTIL) (2.5 MG/3ML) 0.083% nebulizer solution      • cholecalciferol (VITAMIN D3) 25 MCG (1000 UT) tablet 2 pills daily 180 tablet 3   • cyclobenzaprine (FLEXERIL) 10 MG tablet Take 1 tablet by mouth 3 (Three) Times a Day As Needed for Muscle Spasms. 30 tablet 0   • estradiol (ESTRACE) 0.1 MG/GM vaginal cream      • Galcanezumab-gnlm 120 MG/ML solution prefilled syringe Inject 1 mL under the skin into the appropriate area as directed Every 30 (Thirty) Days. 1.12 mL 3   • levothyroxine (Synthroid) 137 MCG tablet Take 1 tablet by mouth Daily. 90 tablet 3   • lidocaine (LIDODERM) 5 % Place 1 patch on the skin as directed by provider Every 12 (Twelve) Hours. Remove & Discard patch within 12 hours or as directed by MD 30 patch 4   • metoprolol succinate XL (TOPROL-XL) 50 MG 24 hr tablet Take 50 mg by mouth Daily.     • Multiple Vitamins-Minerals (MULTIVITAMIN & MINERAL PO) Take  by mouth.     • oxybutynin (DITROPAN) 5 MG tablet Take 2.5 mg by mouth Daily.     • vitamin B-12 (CYANOCOBALAMIN) 1000 MCG tablet Take 1,000 mcg by mouth Daily.     • atorvastatin (LIPITOR) 20 MG tablet Take 20 mg by mouth Daily.     • meloxicam (MOBIC) 7.5 MG tablet Take 7.5 mg by mouth Daily.     • exemestane (AROMASIN) 25 MG chemo tablet        No facility-administered medications prior to visit.       Patient Active Problem List   Diagnosis   • Pneumonia   • Hypothyroidism   • Primary osteoarthritis involving multiple joints   • Hyperlipemia    • Migraine   • Obesity, morbid (more than 100 lbs over ideal weight or BMI > 40) (HCC)   • Status following gastric banding surgery for weight loss   • Fatty liver   • Vertigo   • PVC (premature ventricular contraction)   • PAC (premature atrial contraction)   • Malignant neoplasm of female breast (HCC)   • COVID-19 virus detected   • Shingles   • Ventricular tachycardia (HCC)   • DDD (degenerative disc disease), cervical   • Cervical spondylolysis   • Low back pain   • Sacroiliac joint pain       Advance Care Planning:  ACP discussion was held with the patient during this visit. Patient has an advance directive in EMR which is still valid.     Identification of Risk Factors:  Risk factors include: na.    Review of Systems   Constitutional: Negative for fatigue and fever.   HENT: Positive for congestion. Negative for trouble swallowing.    Eyes: Negative for discharge and visual disturbance.   Respiratory: Negative for choking and shortness of breath.    Cardiovascular: Negative for chest pain and palpitations.   Gastrointestinal: Negative for abdominal pain and blood in stool.   Endocrine: Negative.    Genitourinary: Negative for genital sores and hematuria.   Musculoskeletal: Negative for gait problem and joint swelling.   Skin: Negative for color change, pallor, rash and wound.   Allergic/Immunologic: Positive for environmental allergies. Negative for immunocompromised state.   Neurological: Negative for facial asymmetry and speech difficulty.   Psychiatric/Behavioral: Negative for hallucinations and suicidal ideas.       Compared to one year ago, the patient feels her physical health is worse.  Compared to one year ago, the patient feels her mental health is better.    Objective     Physical Exam  Vitals and nursing note reviewed.   Constitutional:       Appearance: Normal appearance. She is well-developed.   HENT:      Head: Normocephalic and atraumatic.      Nose: Nose normal.      Mouth/Throat:      Mouth:  "Mucous membranes are moist.      Pharynx: Oropharynx is clear.   Eyes:      Extraocular Movements: Extraocular movements intact.      Conjunctiva/sclera: Conjunctivae normal.      Pupils: Pupils are equal, round, and reactive to light.   Cardiovascular:      Rate and Rhythm: Normal rate and regular rhythm.      Heart sounds: Normal heart sounds. No murmur heard.    No friction rub. No gallop.   Pulmonary:      Effort: Pulmonary effort is normal. No respiratory distress.      Breath sounds: Normal breath sounds. No stridor. No wheezing, rhonchi or rales.   Chest:      Chest wall: No tenderness.   Abdominal:      General: Bowel sounds are normal.      Palpations: Abdomen is soft.   Musculoskeletal:         General: Normal range of motion.      Cervical back: Normal range of motion and neck supple.   Skin:     General: Skin is warm and dry.   Neurological:      General: No focal deficit present.      Mental Status: She is alert and oriented to person, place, and time. Mental status is at baseline.   Psychiatric:         Mood and Affect: Mood normal.         Behavior: Behavior normal.         Thought Content: Thought content normal.         Judgment: Judgment normal.         Vitals:    08/11/22 1349   BP: 138/64   BP Location: Right arm   Patient Position: Sitting   Cuff Size: Large Adult   Pulse: 68   Temp: 98.2 °F (36.8 °C)   SpO2: 96%   Weight: 118 kg (260 lb)   Height: 165.1 cm (65\")       Class 3 Severe Obesity (BMI >=40). Obesity-related health conditions include the following: NA. Obesity is NA. BMI is is above average; no BMI management plan is appropriate. We discussed portion control and increasing exercise.      Assessment & Plan #1 wellness #2 labs  Patient Self-Management and Personalized Health Advice  The patient has been provided with information about: NA and preventive services including:   · NA.    Visit Diagnoses:    ICD-10-CM ICD-9-CM   1. Medicare annual wellness visit, subsequent  Z00.00 V70.0 "   2. Pure hypercholesterolemia  E78.00 272.0       Orders Placed This Encounter   Procedures   • Hepatic Function Panel     Standing Status:   Future     Standing Expiration Date:   8/11/2023     Order Specific Question:   Release to patient     Answer:   Routine Release   • Comprehensive Metabolic Panel     Standing Status:   Future     Standing Expiration Date:   8/11/2023     Order Specific Question:   Release to patient     Answer:   Routine Release   • Lipid Panel     Standing Status:   Future     Standing Expiration Date:   8/11/2023       Outpatient Encounter Medications as of 8/11/2022   Medication Sig Dispense Refill   • albuterol (PROVENTIL) (2.5 MG/3ML) 0.083% nebulizer solution      • cholecalciferol (VITAMIN D3) 25 MCG (1000 UT) tablet 2 pills daily 180 tablet 3   • cyclobenzaprine (FLEXERIL) 10 MG tablet Take 1 tablet by mouth 3 (Three) Times a Day As Needed for Muscle Spasms. 30 tablet 0   • estradiol (ESTRACE) 0.1 MG/GM vaginal cream      • Galcanezumab-gnlm 120 MG/ML solution prefilled syringe Inject 1 mL under the skin into the appropriate area as directed Every 30 (Thirty) Days. 1.12 mL 3   • levothyroxine (Synthroid) 137 MCG tablet Take 1 tablet by mouth Daily. 90 tablet 3   • lidocaine (LIDODERM) 5 % Place 1 patch on the skin as directed by provider Every 12 (Twelve) Hours. Remove & Discard patch within 12 hours or as directed by MD 30 patch 4   • metoprolol succinate XL (TOPROL-XL) 50 MG 24 hr tablet Take 50 mg by mouth Daily.     • Multiple Vitamins-Minerals (MULTIVITAMIN & MINERAL PO) Take  by mouth.     • oxybutynin (DITROPAN) 5 MG tablet Take 2.5 mg by mouth Daily.     • vitamin B-12 (CYANOCOBALAMIN) 1000 MCG tablet Take 1,000 mcg by mouth Daily.     • [DISCONTINUED] atorvastatin (LIPITOR) 20 MG tablet Take 20 mg by mouth Daily.     • atorvastatin (Lipitor) 40 MG tablet Take 1 tablet by mouth Every Night. 90 tablet 1   • meloxicam (MOBIC) 7.5 MG tablet Take 7.5 mg by mouth Daily.     •  [DISCONTINUED] exemestane (AROMASIN) 25 MG chemo tablet        No facility-administered encounter medications on file as of 8/11/2022.       Reviewed use of high risk medication in the elderly: not applicable  Reviewed for potential of harmful drug interactions in the elderly: not applicable    Follow Up:  Return in about 6 months (around 2/11/2023), or if symptoms worsen or fail to improve, for Recheck.     An After Visit Summary and PPPS with all of these plans were given to the patient.

## 2022-08-11 NOTE — PATIENT INSTRUCTIONS
Medicare Wellness  Personal Prevention Plan of Service     Date of Office Visit:    Encounter Provider:  Edis Bhakta MD  Place of Service:  [facility] PRIMARY [facility]ARE  Patient Name: Shwetha Lane  :  1945    As part of the Medicare Wellness portion of your visit today, we are providing you with this personalized preventive plan of services (PPPS). This plan is based upon recommendations of the [place] Preventive Services Task Force (USPSTF) and the Advisory Committee on Immunization Practices (ACIP).    This lists the preventive care services that should be considered, and provides dates of when you are due. Items listed as completed are up-to-date and do not require any further intervention.    Health Maintenance   Topic Date Due    URINE MICROALBUMIN  Never done    Pneumococcal Vaccine 65+ (1 - PCV) Never done    TDAP/TD VACCINES (1 - Tdap) Never done    ZOSTER VACCINE (1 of 2) Never done    HEMOGLOBIN A1C  Never done    DIABETIC EYE EXAM  2019    COVID-19 Vaccine (4 - Booster for Pfizer series) 2022    INFLUENZA VACCINE  10/01/2022    DXA SCAN  2022    MAMMOGRAM  03/10/2023    LIPID PANEL  2023    ANNUAL WELLNESS VISIT  2023    COLORECTAL CANCER SCREENING  2032    HEPATITIS C SCREENING  Completed       Orders Placed This Encounter   Procedures    Hepatic Function Panel     Standing Status:   Future     Standing Expiration Date:   2023     Order Specific Question:   Release to patient     Answer:   Routine Release    Comprehensive Metabolic Panel     Standing Status:   Future     Standing Expiration Date:   2023     Order Specific Question:   Release to patient     Answer:   Routine Release    Lipid Panel     Standing Status:   Future     Standing Expiration Date:   2023       Return in about 6 months (around 2023), or if symptoms worsen or fail to improve, for Recheck.

## 2022-09-08 ENCOUNTER — LAB (OUTPATIENT)
Dept: FAMILY MEDICINE CLINIC | Facility: CLINIC | Age: 77
End: 2022-09-08

## 2022-09-08 DIAGNOSIS — E78.00 PURE HYPERCHOLESTEROLEMIA: ICD-10-CM

## 2022-09-08 LAB
ALBUMIN SERPL-MCNC: 3.9 G/DL (ref 3.5–5.2)
ALP SERPL-CCNC: 83 U/L (ref 39–117)
ALT SERPL W P-5'-P-CCNC: 20 U/L (ref 1–33)
AST SERPL-CCNC: 14 U/L (ref 1–32)
BILIRUB CONJ SERPL-MCNC: <0.2 MG/DL (ref 0–0.3)
BILIRUB INDIRECT SERPL-MCNC: NORMAL MG/DL
BILIRUB SERPL-MCNC: 0.8 MG/DL (ref 0–1.2)
PROT SERPL-MCNC: 6.8 G/DL (ref 6–8.5)

## 2022-09-08 PROCEDURE — 80076 HEPATIC FUNCTION PANEL: CPT | Performed by: INTERNAL MEDICINE

## 2022-09-08 PROCEDURE — 36415 COLL VENOUS BLD VENIPUNCTURE: CPT | Performed by: INTERNAL MEDICINE

## 2022-10-03 ENCOUNTER — TELEPHONE (OUTPATIENT)
Dept: FAMILY MEDICINE CLINIC | Facility: CLINIC | Age: 77
End: 2022-10-03

## 2022-10-03 NOTE — TELEPHONE ENCOUNTER
Caller: Shwetha Lane    Relationship to patient: Self    Best call back number: [phone]    Chief complaint: PATIENT CALLED STATING SHE HAS BEEN HAVING EXTREMELY BAD SWEATING AND AN IRREGULAR HEARTBEAT FOR THREE DAYS. PATIENT WAS ADVISED TO GO TO THE ER AND PATIENT AGREED.     Patient directed to call 911 or go to their nearest emergency room.     Patient verbalized understanding: [x] Yes  [] No  If no, why?

## 2022-10-10 RX ORDER — LEVOTHYROXINE SODIUM 137 UG/1
137 TABLET ORAL DAILY
Qty: 90 TABLET | Refills: 3 | Status: SHIPPED | OUTPATIENT
Start: 2022-10-10 | End: 2022-10-12 | Stop reason: SDUPTHER

## 2022-10-10 NOTE — TELEPHONE ENCOUNTER
Caller: Shwetha Lane    Relationship: Self    Best call back number: [phone]    Requested Prescriptions:   Requested Prescriptions     Pending Prescriptions Disp Refills   • levothyroxine (Synthroid) 137 MCG tablet 90 tablet 3     Sig: Take 1 tablet by mouth Daily.        Pharmacy where request should be sent:  [facility] [address]  [phone]    Additional details provided by patient: PATIENT SAYS THAT SHE NEEDS THE SAME MEDICATION THAT DR COOPER ALWAYS PRESCRIBES TO HER.  SHE SAYS SHE WANTS TO COME TO THE OFFICE TO PICK THIS UP    Does the patient have less than a 3 day supply:  [] Yes  [x] No    Lexx Juarez Rep   10/10/22 10:15 EDT

## 2022-10-12 ENCOUNTER — TELEPHONE (OUTPATIENT)
Dept: FAMILY MEDICINE CLINIC | Facility: CLINIC | Age: 77
End: 2022-10-12

## 2022-10-12 RX ORDER — LEVOTHYROXINE SODIUM 137 UG/1
137 TABLET ORAL DAILY
Qty: 90 TABLET | Refills: 3 | Status: SHIPPED | OUTPATIENT
Start: 2022-10-12

## 2022-10-12 NOTE — TELEPHONE ENCOUNTER
A user error has taken place: encounter opened in error, closed for administrative reasons.

## 2022-10-17 ENCOUNTER — FLU SHOT (OUTPATIENT)
Dept: FAMILY MEDICINE CLINIC | Facility: CLINIC | Age: 77
End: 2022-10-17

## 2022-10-17 DIAGNOSIS — Z23 NEED FOR VACCINATION: Primary | ICD-10-CM

## 2022-10-17 PROCEDURE — 90662 IIV NO PRSV INCREASED AG IM: CPT | Performed by: INTERNAL MEDICINE

## 2022-10-17 PROCEDURE — G0008 ADMIN INFLUENZA VIRUS VAC: HCPCS | Performed by: INTERNAL MEDICINE

## 2022-10-31 ENCOUNTER — TELEPHONE (OUTPATIENT)
Dept: FAMILY MEDICINE CLINIC | Facility: CLINIC | Age: 77
End: 2022-10-31

## 2022-10-31 DIAGNOSIS — E55.9 VITAMIN D DEFICIENCY, UNSPECIFIED: ICD-10-CM

## 2022-10-31 DIAGNOSIS — E78.00 PURE HYPERCHOLESTEROLEMIA: Primary | ICD-10-CM

## 2022-10-31 DIAGNOSIS — E03.9 HYPOTHYROIDISM, UNSPECIFIED TYPE: ICD-10-CM

## 2022-10-31 RX ORDER — MELATONIN
Qty: 180 TABLET | Refills: 3 | Status: SHIPPED | OUTPATIENT
Start: 2022-10-31 | End: 2022-11-04 | Stop reason: SDUPTHER

## 2022-11-04 ENCOUNTER — TELEPHONE (OUTPATIENT)
Dept: FAMILY MEDICINE CLINIC | Facility: CLINIC | Age: 77
End: 2022-11-04

## 2022-11-04 RX ORDER — MELATONIN
Qty: 180 TABLET | Refills: 3 | Status: SHIPPED | OUTPATIENT
Start: 2022-11-04 | End: 2022-11-04 | Stop reason: SDUPTHER

## 2022-11-04 RX ORDER — MELATONIN
Qty: 180 TABLET | Refills: 3 | Status: SHIPPED | OUTPATIENT
Start: 2022-11-04

## 2022-11-14 ENCOUNTER — LAB (OUTPATIENT)
Dept: FAMILY MEDICINE CLINIC | Facility: CLINIC | Age: 77
End: 2022-11-14

## 2022-11-14 DIAGNOSIS — E78.00 PURE HYPERCHOLESTEROLEMIA: ICD-10-CM

## 2022-11-14 DIAGNOSIS — E55.9 VITAMIN D DEFICIENCY, UNSPECIFIED: ICD-10-CM

## 2022-11-14 DIAGNOSIS — E03.9 HYPOTHYROIDISM, UNSPECIFIED TYPE: ICD-10-CM

## 2022-11-14 LAB
25(OH)D3 SERPL-MCNC: 43.7 NG/ML (ref 30–100)
ALBUMIN SERPL-MCNC: 3.8 G/DL (ref 3.5–5.2)
ALBUMIN/GLOB SERPL: 1.4 G/DL
ALP SERPL-CCNC: 91 U/L (ref 39–117)
ALT SERPL W P-5'-P-CCNC: 15 U/L (ref 1–33)
ANION GAP SERPL CALCULATED.3IONS-SCNC: 10.5 MMOL/L (ref 5–15)
AST SERPL-CCNC: 15 U/L (ref 1–32)
BILIRUB SERPL-MCNC: 0.9 MG/DL (ref 0–1.2)
BUN SERPL-MCNC: 13 MG/DL (ref 8–23)
BUN/CREAT SERPL: 15.5 (ref 7–25)
CALCIUM SPEC-SCNC: 9.8 MG/DL (ref 8.6–10.5)
CHLORIDE SERPL-SCNC: 105 MMOL/L (ref 98–107)
CHOLEST SERPL-MCNC: 122 MG/DL (ref 0–200)
CO2 SERPL-SCNC: 25.5 MMOL/L (ref 22–29)
CREAT SERPL-MCNC: 0.84 MG/DL (ref 0.57–1)
DEPRECATED RDW RBC AUTO: 42.9 FL (ref 37–54)
EGFRCR SERPLBLD CKD-EPI 2021: 71.7 ML/MIN/1.73
ERYTHROCYTE [DISTWIDTH] IN BLOOD BY AUTOMATED COUNT: 12.7 % (ref 12.3–15.4)
GLOBULIN UR ELPH-MCNC: 2.8 GM/DL
GLUCOSE SERPL-MCNC: 109 MG/DL (ref 65–99)
HCT VFR BLD AUTO: 42.7 % (ref 34–46.6)
HDLC SERPL-MCNC: 38 MG/DL (ref 40–60)
HGB BLD-MCNC: 14.2 G/DL (ref 12–15.9)
LDLC SERPL CALC-MCNC: 64 MG/DL (ref 0–100)
LDLC/HDLC SERPL: 1.64 {RATIO}
MCH RBC QN AUTO: 31 PG (ref 26.6–33)
MCHC RBC AUTO-ENTMCNC: 33.3 G/DL (ref 31.5–35.7)
MCV RBC AUTO: 93.2 FL (ref 79–97)
PLATELET # BLD AUTO: 277 10*3/MM3 (ref 140–450)
PMV BLD AUTO: 10.4 FL (ref 6–12)
POTASSIUM SERPL-SCNC: 4.3 MMOL/L (ref 3.5–5.2)
PROT SERPL-MCNC: 6.6 G/DL (ref 6–8.5)
RBC # BLD AUTO: 4.58 10*6/MM3 (ref 3.77–5.28)
SODIUM SERPL-SCNC: 141 MMOL/L (ref 136–145)
T-UPTAKE NFR SERPL: 0.86 TBI (ref 0.8–1.3)
T4 SERPL-MCNC: 10.3 MCG/DL (ref 4.5–11.7)
TRIGL SERPL-MCNC: 108 MG/DL (ref 0–150)
TSH SERPL DL<=0.05 MIU/L-ACNC: 2.75 UIU/ML (ref 0.27–4.2)
VLDLC SERPL-MCNC: 20 MG/DL (ref 5–40)
WBC NRBC COR # BLD: 11 10*3/MM3 (ref 3.4–10.8)

## 2022-11-14 PROCEDURE — 80061 LIPID PANEL: CPT | Performed by: INTERNAL MEDICINE

## 2022-11-14 PROCEDURE — 36415 COLL VENOUS BLD VENIPUNCTURE: CPT | Performed by: INTERNAL MEDICINE

## 2022-11-14 PROCEDURE — 84436 ASSAY OF TOTAL THYROXINE: CPT | Performed by: INTERNAL MEDICINE

## 2022-11-14 PROCEDURE — 84479 ASSAY OF THYROID (T3 OR T4): CPT | Performed by: INTERNAL MEDICINE

## 2022-11-14 PROCEDURE — 85027 COMPLETE CBC AUTOMATED: CPT | Performed by: INTERNAL MEDICINE

## 2022-11-14 PROCEDURE — 82306 VITAMIN D 25 HYDROXY: CPT | Performed by: INTERNAL MEDICINE

## 2022-11-14 PROCEDURE — 80053 COMPREHEN METABOLIC PANEL: CPT | Performed by: INTERNAL MEDICINE

## 2022-11-14 PROCEDURE — 84443 ASSAY THYROID STIM HORMONE: CPT | Performed by: INTERNAL MEDICINE

## 2022-11-15 ENCOUNTER — OFFICE VISIT (OUTPATIENT)
Dept: FAMILY MEDICINE CLINIC | Facility: CLINIC | Age: 77
End: 2022-11-15

## 2022-11-15 VITALS
HEART RATE: 77 BPM | HEIGHT: 65 IN | OXYGEN SATURATION: 96 % | WEIGHT: 253 LBS | BODY MASS INDEX: 42.15 KG/M2 | TEMPERATURE: 98.2 F | DIASTOLIC BLOOD PRESSURE: 78 MMHG | SYSTOLIC BLOOD PRESSURE: 130 MMHG | RESPIRATION RATE: 20 BRPM

## 2022-11-15 DIAGNOSIS — J18.9 COMMUNITY ACQUIRED PNEUMONIA, UNSPECIFIED LATERALITY: Primary | ICD-10-CM

## 2022-11-15 PROCEDURE — 99213 OFFICE O/P EST LOW 20 MIN: CPT

## 2022-11-15 RX ORDER — AZITHROMYCIN 250 MG/1
TABLET, FILM COATED ORAL
Qty: 6 TABLET | Refills: 0 | Status: SHIPPED | OUTPATIENT
Start: 2022-11-15 | End: 2023-01-26

## 2022-11-15 RX ORDER — MONTELUKAST SODIUM 10 MG/1
10 TABLET ORAL NIGHTLY
Qty: 30 TABLET | Refills: 1 | Status: SHIPPED | OUTPATIENT
Start: 2022-11-15 | End: 2023-01-26

## 2022-11-15 NOTE — PATIENT INSTRUCTIONS
Take Mucinex DM every 12 hours with a full glass of water.    If your shortness of air is not better after 5 days, notify office and we will give you a secondary antibiotic.    Patient agrees with plan of care and understands instructions. Call if worsening symptoms or any problems or concerns.

## 2022-11-15 NOTE — PROGRESS NOTES
"Chief Complaint  Cough (HAD LABS YESTERDAY TOLD HAS ELEVATED WBC, SEES PULM FOR REC PNEUMONIA CANT GET IN FOR AWHILE.)    Subjective        Shwetha Lane presents to [facility] PRIMARY [facility]ARE  History of Present Illness  Patient is a 77-year-old female, patient of Dr. Bhakta last seen in office 8/11/2022. Patient reports she has had a shortness of breath x 1 month with wheezing. Patient reports over the last 3-4 days, she started having productive cough with green phlegm when she takes a deep breath and sinus drainage. Patient had blood work done yesterday, reveals elevated WBC 11.0. Reports she has been using her nebulizer daily over the last 3 days BID, mild temporary relief. Patient denies being on antibiotics in the last three months. Denies fever at home. Patient denies trying any OTC medication. Patient declined repeat chest Xray.    Objective   Vital Signs:  /78 (BP Location: Right arm, Patient Position: Sitting, Cuff Size: Large Adult)   Pulse 77   Temp 98.2 °F (36.8 °C) (Infrared)   Resp 20   Ht 165.1 cm (65\")   Wt 115 kg (253 lb)   SpO2 96%   BMI 42.10 kg/m²   Estimated body mass index is 42.1 kg/m² as calculated from the following:    Height as of this encounter: 165.1 cm (65\").    Weight as of this encounter: 115 kg (253 lb).          Physical Exam  Constitutional:       General: She is awake.      Appearance: Normal appearance.   HENT:      Head: Normocephalic and atraumatic.      Nose: Nose normal.   Eyes:      Extraocular Movements: Extraocular movements intact.      Conjunctiva/sclera: Conjunctivae normal.      Pupils: Pupils are equal, round, and reactive to light.   Cardiovascular:      Rate and Rhythm: Normal rate and regular rhythm.      Pulses: Normal pulses.      Heart sounds: Normal heart sounds.   Pulmonary:      Effort: Pulmonary effort is normal.      Breath sounds: Normal breath sounds and air entry.   Musculoskeletal:      Cervical back: Full passive " range of motion without pain, normal range of motion and neck supple.   Skin:     General: Skin is warm and dry.   Neurological:      General: No focal deficit present.      Mental Status: She is alert and oriented to person, place, and time. Mental status is at baseline.   Psychiatric:         Attention and Perception: Attention normal.         Behavior: Behavior normal. Behavior is cooperative.        Result Review :                Assessment and Plan   Diagnoses and all orders for this visit:    1. Community acquired pneumonia, unspecified laterality (Primary)    Other orders  -     montelukast (Singulair) 10 MG tablet; Take 1 tablet by mouth Every Night.  Dispense: 30 tablet; Refill: 1  -     azithromycin (ZITHROMAX) 250 MG tablet; Take 2 tablets the first day, then 1 tablet daily for 4 days.  Dispense: 6 tablet; Refill: 0    Take Mucinex DM every 12 hours with a full glass of water.    If your shortness of air is not better after 5 days, notify office and we will give you a secondary antibiotic.    Patient agrees with plan of care and understands instructions. Call if worsening symptoms or any problems or concerns.            Follow Up   No follow-ups on file.  Patient was given instructions and counseling regarding her condition or for health maintenance advice. Please see specific information pulled into the AVS if appropriate.

## 2023-01-20 RX ORDER — ATORVASTATIN CALCIUM 40 MG/1
40 TABLET, FILM COATED ORAL NIGHTLY
Qty: 90 TABLET | Refills: 1 | Status: SHIPPED | OUTPATIENT
Start: 2023-01-20 | End: 2023-01-25 | Stop reason: SDUPTHER

## 2023-01-20 RX ORDER — METOPROLOL SUCCINATE 50 MG/1
50 TABLET, EXTENDED RELEASE ORAL DAILY
Qty: 90 TABLET | Refills: 1 | Status: SHIPPED | OUTPATIENT
Start: 2023-01-20 | End: 2023-01-25 | Stop reason: SDUPTHER

## 2023-01-25 RX ORDER — ATORVASTATIN CALCIUM 40 MG/1
40 TABLET, FILM COATED ORAL NIGHTLY
Qty: 90 TABLET | Refills: 1 | Status: SHIPPED | OUTPATIENT
Start: 2023-01-25

## 2023-01-25 RX ORDER — METOPROLOL SUCCINATE 50 MG/1
50 TABLET, EXTENDED RELEASE ORAL DAILY
Qty: 90 TABLET | Refills: 1 | Status: SHIPPED | OUTPATIENT
Start: 2023-01-25

## 2023-01-26 ENCOUNTER — HOSPITAL ENCOUNTER (OUTPATIENT)
Dept: GENERAL RADIOLOGY | Facility: HOSPITAL | Age: 78
Discharge: HOME | End: 2023-01-26
Payer: MEDICARE

## 2023-01-26 ENCOUNTER — OFFICE VISIT (OUTPATIENT)
Dept: FAMILY MEDICINE CLINIC | Facility: CLINIC | Age: 78
End: 2023-01-26
Payer: MEDICARE

## 2023-01-26 VITALS
HEART RATE: 82 BPM | TEMPERATURE: 98.2 F | SYSTOLIC BLOOD PRESSURE: 108 MMHG | HEIGHT: 65 IN | WEIGHT: 245.4 LBS | DIASTOLIC BLOOD PRESSURE: 72 MMHG | BODY MASS INDEX: 40.89 KG/M2 | OXYGEN SATURATION: 93 %

## 2023-01-26 DIAGNOSIS — R06.2 EXPIRATORY WHEEZING: ICD-10-CM

## 2023-01-26 DIAGNOSIS — U09.9 POST-COVID CHRONIC COUGH: ICD-10-CM

## 2023-01-26 DIAGNOSIS — R05.3 POST-COVID CHRONIC COUGH: ICD-10-CM

## 2023-01-26 DIAGNOSIS — R06.02 SOB (SHORTNESS OF BREATH): ICD-10-CM

## 2023-01-26 DIAGNOSIS — R05.3 POST-COVID CHRONIC COUGH: Primary | ICD-10-CM

## 2023-01-26 DIAGNOSIS — U09.9 POST-COVID CHRONIC COUGH: Primary | ICD-10-CM

## 2023-01-26 PROCEDURE — 71046 X-RAY EXAM CHEST 2 VIEWS: CPT

## 2023-01-26 PROCEDURE — 99213 OFFICE O/P EST LOW 20 MIN: CPT

## 2023-01-26 RX ORDER — RIMEGEPANT SULFATE 75 MG/75MG
TABLET, ORALLY DISINTEGRATING ORAL
COMMUNITY
Start: 2023-01-15

## 2023-01-26 NOTE — PROGRESS NOTES
"Chief Complaint  URI (Had for 3 months), Cough, Shortness of Breath (Mostly in the morning till patient coughs up mucus), Wheezing, and Fatigue    Subjective        Shwetha Lane presents to [facility] PRIMARY [facility]ARE  History of Present Illness  Patient is a 77-year-old female, patient of Dr. Bhakta last saw me in office 11/15/2022.  Patient is here today with URI that she has had for 3 months along productive cough shortness of breath that is worse in the morning, wheezing on exhalation, and fatigue. Patient reports a generalized weakness and states she has clear- yellow sputum every time she coughs. Patient reports using nebulizer about 2-4 times daily, with relief.  Patient denies any fever at home. Patient reports she tested positive for covid about 2 weeks ago. Patient was seen at urgent care and was given a steroid inj and given prednisone for 3 days.  Patient use to smoke for about 25 years and quit smoking about 17 years ago. Patient has a lung specialist and states they ruled out COPD about 1 year ago. Patient repots she had prevnar 13 vaccine at 64yo, but has never had a follow up vaccine. Patient has an apt with her pulm with [facility] on 2/22/23. Patient was last on steroid pill about 3 weeks ago after being seen in Urgent Care for the same.    Objective   Vital Signs:  /72 (BP Location: Left arm, Patient Position: Sitting, Cuff Size: Adult)   Pulse 82   Temp 98.2 °F (36.8 °C)   Ht 165.1 cm (65\")   Wt 111 kg (245 lb 6.4 oz)   SpO2 93%   BMI 40.84 kg/m²   Estimated body mass index is 40.84 kg/m² as calculated from the following:    Height as of this encounter: 165.1 cm (65\").    Weight as of this encounter: 111 kg (245 lb 6.4 oz).             Physical Exam  Constitutional:       General: She is awake.      Appearance: Normal appearance.   HENT:      Head: Normocephalic and atraumatic.      Nose: Nose normal.   Eyes:      Extraocular Movements: Extraocular movements intact.    "   Conjunctiva/sclera: Conjunctivae normal.      Pupils: Pupils are equal, round, and reactive to light.   Cardiovascular:      Rate and Rhythm: Normal rate and regular rhythm.      Pulses: Normal pulses.      Heart sounds: Normal heart sounds.   Pulmonary:      Effort: Pulmonary effort is normal.      Breath sounds: Normal air entry. Examination of the right-upper field reveals wheezing. Examination of the left-upper field reveals wheezing. Examination of the right-middle field reveals wheezing. Examination of the left-middle field reveals wheezing. Examination of the right-lower field reveals wheezing. Examination of the left-lower field reveals wheezing. Wheezing (Inspiratory and expiratory) present. No rhonchi or rales.   Skin:     General: Skin is warm and dry.   Neurological:      General: No focal deficit present.      Mental Status: She is alert and oriented to person, place, and time. Mental status is at baseline.   Psychiatric:         Attention and Perception: Attention normal.         Behavior: Behavior normal. Behavior is cooperative.        Result Review :                   Assessment and Plan   Diagnoses and all orders for this visit:    1. Post-COVID chronic cough (Primary)  -     Cancel: XR Chest PA & Lateral; Future  -     XR Chest PA & Lateral; Future  -     Budeson-Glycopyrrol-Formoterol (BREZTRI) 160-9-4.8 MCG/ACT aerosol inhaler; Inhale 2 puffs 2 (Two) Times a Day.  Dispense: 10.7 g; Refill: 11    2. SOB (shortness of breath)  -     Cancel: XR Chest PA & Lateral; Future  -     XR Chest PA & Lateral; Future    3. Expiratory wheezing  -     Budeson-Glycopyrrol-Formoterol (BREZTRI) 160-9-4.8 MCG/ACT aerosol inhaler; Inhale 2 puffs 2 (Two) Times a Day.  Dispense: 10.7 g; Refill: 11    Go to [facility] for Xray.   • [address] [place] [address]  Enter at Entrance A and go straight to the Radiology Department. The order is in the computer system. You do not need an  appointment and you can go at any time.    If this xray shows penumonia, we will start you on antibiotics and do a repeat xray to ensure the infection has cleared. We will write you a paper prescription to  at the .    If this is not pneumonia, I will have you follow up with your pulmonologist on 2/22.         Follow Up   Return if symptoms worsen or fail to improve.  Patient was given instructions and counseling regarding her condition or for health maintenance advice. Please see specific information pulled into the AVS if appropriate.

## 2023-01-26 NOTE — PATIENT INSTRUCTIONS
Go to [facility] for Xray.   [place] [address]  Enter at Entrance A and go straight to the Radiology Department. The order is in the computer system. You do not need an appointment and you can go at any time.    If this xray shows penumonia, we will start you on antibiotics and do a repeat xray to ensure the infection has cleared. We will write you a paper prescription to  at the .    If this is not pneumonia, I will have you follow up with your pulmonologist on 2/22.

## 2023-02-16 ENCOUNTER — OFFICE VISIT (OUTPATIENT)
Dept: FAMILY MEDICINE CLINIC | Facility: CLINIC | Age: 78
End: 2023-02-16
Payer: MEDICARE

## 2023-02-16 VITALS
DIASTOLIC BLOOD PRESSURE: 72 MMHG | WEIGHT: 247.4 LBS | SYSTOLIC BLOOD PRESSURE: 122 MMHG | HEART RATE: 72 BPM | BODY MASS INDEX: 41.22 KG/M2 | HEIGHT: 65 IN | OXYGEN SATURATION: 94 % | TEMPERATURE: 98.6 F

## 2023-02-16 DIAGNOSIS — M54.17 LUMBOSACRAL RADICULOPATHY: ICD-10-CM

## 2023-02-16 DIAGNOSIS — E03.9 HYPOTHYROIDISM, UNSPECIFIED TYPE: ICD-10-CM

## 2023-02-16 DIAGNOSIS — B37.2 SKIN YEAST INFECTION: ICD-10-CM

## 2023-02-16 DIAGNOSIS — E78.00 PURE HYPERCHOLESTEROLEMIA: Primary | ICD-10-CM

## 2023-02-16 PROCEDURE — 99214 OFFICE O/P EST MOD 30 MIN: CPT | Performed by: INTERNAL MEDICINE

## 2023-02-16 RX ORDER — FLUCONAZOLE 200 MG/1
TABLET ORAL
COMMUNITY
End: 2023-02-16 | Stop reason: ALTCHOICE

## 2023-02-16 RX ORDER — NYSTATIN 100000 [USP'U]/G
POWDER TOPICAL 3 TIMES DAILY
Qty: 30 G | Refills: 3 | Status: SHIPPED | OUTPATIENT
Start: 2023-02-16 | End: 2023-02-16 | Stop reason: SDUPTHER

## 2023-02-16 RX ORDER — NYSTATIN 100000 [USP'U]/G
POWDER TOPICAL 3 TIMES DAILY
Qty: 30 G | Refills: 3 | Status: SHIPPED | OUTPATIENT
Start: 2023-02-16

## 2023-02-16 NOTE — PROGRESS NOTES
"Chief Complaint  go over labs  and something for breast yeast    Subjective        Shwetha Lane presents to [facility]  History of Present Illness patient was seen for hyperlipidemia.  Patient uses diet exercise and atorvastatin 40 mg daily.  Triglycerides 108 HDL 38 LDL 64.  Patient will continue present treatment.  Patient's thyroids been treated with levothyroxine 137 mcg daily.  TSH was 2.7.  Patient will continue present dosage.  Patient has developed yeast infections around her breast and was given nystatin powder.  Patient will follow-up if not better.  Patient has severe low back pain and the only relief she gets is with massage therapy.  Patient was given order for massage therapy.  Patient is also taking Flexeril 10 mg 3 times daily as needed and over-the-counter pain medications.    Dictated utilizing Dragon dictation. If there are questions or for further clarification, please contact me.    Objective   Vital Signs:  Blood Pressure 122/72   Pulse 72   Temperature 98.6 °F (37 °C)   Height 165.1 cm (65\")   Weight 112 kg (247 lb 6.4 oz)   Oxygen Saturation 94%   Body Mass Index 41.17 kg/m²   Estimated body mass index is 41.17 kg/m² as calculated from the following:    Height as of this encounter: 165.1 cm (65\").    Weight as of this encounter: 112 kg (247 lb 6.4 oz).             Physical Exam  Vitals and nursing note reviewed.   Constitutional:       Appearance: Normal appearance. She is well-developed.   HENT:      Head: Normocephalic and atraumatic.      Nose: Nose normal.      Mouth/Throat:      Mouth: Mucous membranes are moist.      Pharynx: Oropharynx is clear.   Eyes:      Extraocular Movements: Extraocular movements intact.      Conjunctiva/sclera: Conjunctivae normal.      Pupils: Pupils are equal, round, and reactive to light.   Cardiovascular:      Rate and Rhythm: Normal rate and regular rhythm.      Heart sounds: Normal heart sounds. No murmur heard.   " What Type Of Note Output Would You Prefer (Optional)?: Bullet Format  No friction rub. No gallop.   Pulmonary:      Effort: Pulmonary effort is normal. No respiratory distress.      Breath sounds: Normal breath sounds. No stridor. No wheezing, rhonchi or rales.   Chest:      Chest wall: No tenderness.   Abdominal:      General: Bowel sounds are normal.      Palpations: Abdomen is soft.   Musculoskeletal:         General: Swelling, tenderness and deformity present. Normal range of motion.      Cervical back: Normal range of motion and neck supple.   Skin:     General: Skin is warm and dry.      Findings: Rash present.   Neurological:      General: No focal deficit present.      Mental Status: She is alert and oriented to person, place, and time. Mental status is at baseline.   Psychiatric:         Mood and Affect: Mood normal.         Behavior: Behavior normal.         Thought Content: Thought content normal.         Judgment: Judgment normal.        Result Review :                   Assessment and Plan  #1 continue present treatment for lipids #2 continue levothyroxine dose #3 nystatin powder #4 recommend massage therapy  Diagnoses and all orders for this visit:    1. Pure hypercholesterolemia (Primary)    2. Hypothyroidism, unspecified type    3. Skin yeast infection    4. Lumbosacral radiculopathy  -     Ambulatory Referral to Massage Therapy    Other orders  -     Discontinue: nystatin (MYCOSTATIN) 107738 UNIT/GM powder; Apply  topically to the appropriate area as directed 3 (Three) Times a Day.  Dispense: 30 g; Refill: 3  -     nystatin (MYCOSTATIN) 579328 UNIT/GM powder; Apply  topically to the appropriate area as directed 3 (Three) Times a Day.  Dispense: 30 g; Refill: 3             Follow Up   Return in about 4 months (around 6/16/2023), or if symptoms worsen or fail to improve, for Recheck.  Patient was given instructions and counseling regarding her condition or for health maintenance advice. Please see specific information pulled into the AVS if appropriate.        Hpi Title: Evaluation of Skin Lesions

## 2023-04-26 ENCOUNTER — PREP FOR SURGERY (OUTPATIENT)
Dept: OTHER | Facility: HOSPITAL | Age: 78
End: 2023-04-26
Payer: MEDICARE

## 2023-04-26 ENCOUNTER — OFFICE VISIT (OUTPATIENT)
Dept: ORTHOPEDIC SURGERY | Facility: CLINIC | Age: 78
End: 2023-04-26
Payer: MEDICARE

## 2023-04-26 VITALS — WEIGHT: 247 LBS | BODY MASS INDEX: 41.15 KG/M2 | HEIGHT: 65 IN

## 2023-04-26 DIAGNOSIS — M17.11 PRIMARY OSTEOARTHRITIS OF RIGHT KNEE: Primary | ICD-10-CM

## 2023-04-26 RX ORDER — TRANEXAMIC ACID 10 MG/ML
1000 INJECTION, SOLUTION INTRAVENOUS ONCE
OUTPATIENT
Start: 2023-04-26 | End: 2023-04-26

## 2023-04-26 RX ORDER — CEFAZOLIN SODIUM 2 G/100ML
2 INJECTION, SOLUTION INTRAVENOUS ONCE
OUTPATIENT
Start: 2023-04-26 | End: 2023-04-26

## 2023-04-26 RX ORDER — CEFAZOLIN SODIUM IN 0.9 % NACL 3 G/100 ML
3 INTRAVENOUS SOLUTION, PIGGYBACK (ML) INTRAVENOUS ONCE
OUTPATIENT
Start: 2023-04-26 | End: 2023-04-26

## 2023-05-17 ENCOUNTER — OFFICE VISIT (OUTPATIENT)
Dept: FAMILY MEDICINE CLINIC | Facility: CLINIC | Age: 78
End: 2023-05-17
Payer: MEDICARE

## 2023-05-17 VITALS
TEMPERATURE: 97.5 F | BODY MASS INDEX: 42.05 KG/M2 | OXYGEN SATURATION: 93 % | DIASTOLIC BLOOD PRESSURE: 72 MMHG | WEIGHT: 252.4 LBS | HEART RATE: 77 BPM | SYSTOLIC BLOOD PRESSURE: 122 MMHG | HEIGHT: 65 IN

## 2023-05-17 DIAGNOSIS — E66.01 OBESITY, MORBID (MORE THAN 100 LBS OVER IDEAL WEIGHT OR BMI > 40): ICD-10-CM

## 2023-05-17 DIAGNOSIS — Z23 ENCOUNTER FOR IMMUNIZATION: ICD-10-CM

## 2023-05-17 DIAGNOSIS — Z00.00 ENCOUNTER FOR MEDICAL EXAMINATION TO ESTABLISH CARE: ICD-10-CM

## 2023-05-17 DIAGNOSIS — E78.00 PURE HYPERCHOLESTEROLEMIA: ICD-10-CM

## 2023-05-17 DIAGNOSIS — E03.9 HYPOTHYROIDISM, UNSPECIFIED TYPE: Primary | ICD-10-CM

## 2023-05-17 DIAGNOSIS — G43.001 MIGRAINE WITHOUT AURA AND WITH STATUS MIGRAINOSUS, NOT INTRACTABLE: ICD-10-CM

## 2023-05-17 DIAGNOSIS — K76.0 FATTY LIVER: ICD-10-CM

## 2023-05-17 DIAGNOSIS — M54.17 LUMBOSACRAL RADICULOPATHY: ICD-10-CM

## 2023-05-17 DIAGNOSIS — I47.20 VENTRICULAR TACHYCARDIA: ICD-10-CM

## 2023-05-17 RX ORDER — CYCLOBENZAPRINE HCL 10 MG
10 TABLET ORAL 3 TIMES DAILY PRN
Qty: 30 TABLET | Refills: 0 | Status: SHIPPED | OUTPATIENT
Start: 2023-05-17

## 2023-05-17 NOTE — PROGRESS NOTES
"Chief Complaint  Establish Care (fasting)    Subjective        Shwetha Lane presents to [facility] PRIMARY [facility]ARE  History of Present Illness  Patient presents office today to establish care with me.  She has hypertension which is controlled today 122/72.  She is following a neurologist for migraines.  She is seeing cardiology and pulmonary.  She is taking inhaler only as needed.  She is not taking a daily inhaler.  She has BMI of 42.  She has hypothyroidism we will check thyroid today.  She has a fatty liver I am going to be checking liver enzymes.  Chronic lumbar pain and she is needing a refill on Flexeril today.               Objective   Vital Signs:  /72 (BP Location: Right arm, Patient Position: Sitting, Cuff Size: Large Adult)   Pulse 77   Temp 97.5 °F (36.4 °C)   Ht 165.1 cm (65\")   Wt 114 kg (252 lb 6.4 oz)   SpO2 93%   BMI 42.00 kg/m²   Estimated body mass index is 42 kg/m² as calculated from the following:    Height as of this encounter: 165.1 cm (65\").    Weight as of this encounter: 114 kg (252 lb 6.4 oz).       Class 3 Severe Obesity (BMI >=40). Obesity-related health conditions include the following: hypertension and dyslipidemias. Obesity is unchanged. BMI is is above average; BMI management plan is completed. We discussed portion control and increasing exercise.      Physical Exam  Constitutional:       General: She is not in acute distress.     Appearance: Normal appearance.   HENT:      Head: Normocephalic.   Eyes:      Pupils: Pupils are equal, round, and reactive to light.   Cardiovascular:      Rate and Rhythm: Normal rate and regular rhythm.      Pulses: Normal pulses.      Heart sounds: Normal heart sounds.   Pulmonary:      Effort: Pulmonary effort is normal. No respiratory distress.      Breath sounds: Normal breath sounds. No wheezing.   Abdominal:      General: Abdomen is flat.      Palpations: Abdomen is soft. There is no mass.      Tenderness: There is " no abdominal tenderness.      Hernia: No hernia is present.   Musculoskeletal:         General: Tenderness present. Normal range of motion.      Cervical back: Normal, normal range of motion and neck supple. No spasms or tenderness. Normal range of motion.      Thoracic back: Normal. No spasms or tenderness. Normal range of motion.      Lumbar back: Spasms and tenderness present. No edema or bony tenderness. No scoliosis.   Skin:     General: Skin is warm.   Neurological:      General: No focal deficit present.      Mental Status: She is alert and oriented to person, place, and time.   Psychiatric:         Mood and Affect: Mood normal.         Behavior: Behavior normal.         Thought Content: Thought content normal.         Judgment: Judgment normal.        Result Review :  The following data was reviewed by: NELLY Rivera on 05/17/2023:  Common labs        10/4/2022    00:34 11/14/2022    10:02 5/17/2023    11:01   Common Labs   Glucose  109   103     BUN  13   14     Creatinine  0.84   0.82     Sodium  141   143     Potassium  4.3   4.8     Chloride  105   106     Calcium  9.8   9.6     Total Protein   7.1     Albumin  3.80   4.5     Total Bilirubin  0.9   0.8     Alkaline Phosphatase  91   87     AST (SGOT)  15   20     ALT (SGPT)  15   18     WBC 10.22      11.00   8.26     Hemoglobin 15.0      14.2   15.5     Hematocrit 45.8      42.7   45.5     Platelets 228      277   299     Total Cholesterol  122      Total Cholesterol   133     Triglycerides  108   190     HDL Cholesterol  38   36     LDL Cholesterol   64   65         This result is from an external source.                  Assessment and Plan   Diagnoses and all orders for this visit:    1. Hypothyroidism, unspecified type (Primary)  -     Thyroid Panel With TSH    2. Encounter for medical examination to establish care    3. Pure hypercholesterolemia  -     Lipid Panel    4. Fatty liver  -     Comprehensive Metabolic Panel    5. Obesity,  morbid (more than 100 lbs over ideal weight or BMI > 40)    6. Migraine without aura and with status migrainosus, not intractable  -     CBC & Differential    7. Lumbosacral radiculopathy  -     cyclobenzaprine (FLEXERIL) 10 MG tablet; Take 1 tablet by mouth 3 (Three) Times a Day As Needed for Muscle Spasms.  Dispense: 30 tablet; Refill: 0    8. Encounter for immunization  -     Pneumococcal Conjugate Vaccine 20-Valent (PCV20)    9. Ventricular tachycardia      Hypothyroidism taking levothyroxine as directed check thyroid panel today    Hyperlipidemia working on healthy diet and exercise.    Fatty liver repeat CMP working on healthy diet and exercise.    Migraine following neurologist stable    Chronic back pain refill Flexeril today.  Stable    Ventricular tachycardia stable following cardiology.     I spent 30 minutes caring for Shwetha on this date of service. This time includes time spent by me in the following activities:preparing for the visit, reviewing tests, obtaining and/or reviewing a separately obtained history, performing a medically appropriate examination and/or evaluation , counseling and educating the patient/family/caregiver, ordering medications, tests, or procedures, referring and communicating with other health care professionals , documenting information in the medical record, independently interpreting results and communicating that information with the patient/family/caregiver and care coordination  Follow Up   Return in about 26 weeks (around 11/15/2023) for Medicare Wellness.  Patient was given instructions and counseling regarding her condition or for health maintenance advice. Please see specific information pulled into the AVS if appropriate.

## 2023-05-18 LAB
ALBUMIN SERPL-MCNC: 4.5 G/DL (ref 3.5–5.2)
ALBUMIN/GLOB SERPL: 1.7 G/DL
ALP SERPL-CCNC: 87 U/L (ref 39–117)
ALT SERPL-CCNC: 18 U/L (ref 1–33)
AST SERPL-CCNC: 20 U/L (ref 1–32)
BASOPHILS # BLD AUTO: 0.07 10*3/MM3 (ref 0–0.2)
BASOPHILS NFR BLD AUTO: 0.8 % (ref 0–1.5)
BILIRUB SERPL-MCNC: 0.8 MG/DL (ref 0–1.2)
BUN SERPL-MCNC: 14 MG/DL (ref 8–23)
BUN/CREAT SERPL: 17.1 (ref 7–25)
CALCIUM SERPL-MCNC: 9.6 MG/DL (ref 8.6–10.5)
CHLORIDE SERPL-SCNC: 106 MMOL/L (ref 98–107)
CHOLEST SERPL-MCNC: 133 MG/DL (ref 0–200)
CO2 SERPL-SCNC: 27.6 MMOL/L (ref 22–29)
CREAT SERPL-MCNC: 0.82 MG/DL (ref 0.57–1)
EGFRCR SERPLBLD CKD-EPI 2021: 73.8 ML/MIN/1.73
EOSINOPHIL # BLD AUTO: 0.16 10*3/MM3 (ref 0–0.4)
EOSINOPHIL NFR BLD AUTO: 1.9 % (ref 0.3–6.2)
ERYTHROCYTE [DISTWIDTH] IN BLOOD BY AUTOMATED COUNT: 12.2 % (ref 12.3–15.4)
FT4I SERPL CALC-MCNC: 3.5 (ref 1.2–4.9)
GLOBULIN SER CALC-MCNC: 2.6 GM/DL
GLUCOSE SERPL-MCNC: 103 MG/DL (ref 65–99)
HCT VFR BLD AUTO: 45.5 % (ref 34–46.6)
HDLC SERPL-MCNC: 36 MG/DL (ref 40–60)
HGB BLD-MCNC: 15.5 G/DL (ref 12–15.9)
IMM GRANULOCYTES # BLD AUTO: 0.07 10*3/MM3 (ref 0–0.05)
IMM GRANULOCYTES NFR BLD AUTO: 0.8 % (ref 0–0.5)
LDLC SERPL CALC-MCNC: 65 MG/DL (ref 0–100)
LYMPHOCYTES # BLD AUTO: 1.97 10*3/MM3 (ref 0.7–3.1)
LYMPHOCYTES NFR BLD AUTO: 23.8 % (ref 19.6–45.3)
MCH RBC QN AUTO: 31 PG (ref 26.6–33)
MCHC RBC AUTO-ENTMCNC: 34.1 G/DL (ref 31.5–35.7)
MCV RBC AUTO: 91 FL (ref 79–97)
MONOCYTES # BLD AUTO: 0.68 10*3/MM3 (ref 0.1–0.9)
MONOCYTES NFR BLD AUTO: 8.2 % (ref 5–12)
NEUTROPHILS # BLD AUTO: 5.31 10*3/MM3 (ref 1.7–7)
NEUTROPHILS NFR BLD AUTO: 64.5 % (ref 42.7–76)
NRBC BLD AUTO-RTO: 0 /100 WBC (ref 0–0.2)
PLATELET # BLD AUTO: 299 10*3/MM3 (ref 140–450)
POTASSIUM SERPL-SCNC: 4.8 MMOL/L (ref 3.5–5.2)
PROT SERPL-MCNC: 7.1 G/DL (ref 6–8.5)
RBC # BLD AUTO: 5 10*6/MM3 (ref 3.77–5.28)
SODIUM SERPL-SCNC: 143 MMOL/L (ref 136–145)
T3RU NFR SERPL: 31 % (ref 24–39)
T4 SERPL-MCNC: 11.3 UG/DL (ref 4.5–12)
TRIGL SERPL-MCNC: 190 MG/DL (ref 0–150)
TSH SERPL DL<=0.005 MIU/L-ACNC: 2.9 UIU/ML (ref 0.45–4.5)
VLDLC SERPL CALC-MCNC: 32 MG/DL (ref 5–40)
WBC # BLD AUTO: 8.26 10*3/MM3 (ref 3.4–10.8)

## 2023-05-22 ENCOUNTER — TELEPHONE (OUTPATIENT)
Dept: FAMILY MEDICINE CLINIC | Facility: CLINIC | Age: 78
End: 2023-05-22

## 2023-05-22 NOTE — TELEPHONE ENCOUNTER
Caller: Shwetha Lane    Relationship: Self    Best call back number: [phone]     What medication are you requesting: ANTIBIOTIC FOR SINUS INFECTION      What are your current symptoms: GREEN MUCUS, HEAD CONGESTION    How long have you been experiencing symptoms: 3 DAYS    Have you had these symptoms before:    [x] Yes  [] No    Have you been treated for these symptoms before:   [x] Yes  [] No    If a prescription is needed, what is your preferred pharmacy and phone number:      [facility] [place] [facility] [place] [place] [facility] [address] [facility] [phone] [facility] [phone] FX      Additional notes:    PATIENT IS WANTING TO  A WRITTEN SCRIPT AND HAND DELIVER IT TO [place] HER[facility]LF DUE TO HAVING ISSUES WITH GETTING THE MEDICINES FILLED.      PLEASE CALL AND ADVISE.

## 2023-08-01 ENCOUNTER — TELEPHONE (OUTPATIENT)
Dept: ORTHOPEDIC SURGERY | Facility: CLINIC | Age: 78
End: 2023-08-01
Payer: MEDICARE

## 2023-09-01 ENCOUNTER — ANESTHESIA EVENT (OUTPATIENT)
Dept: PERIOP | Facility: HOSPITAL | Age: 78
End: 2023-09-01
Payer: MEDICARE

## 2023-09-05 ENCOUNTER — PRE-ADMISSION TESTING (OUTPATIENT)
Dept: PREADMISSION TESTING | Facility: HOSPITAL | Age: 78
End: 2023-09-05
Payer: MEDICARE

## 2023-09-05 VITALS
DIASTOLIC BLOOD PRESSURE: 78 MMHG | RESPIRATION RATE: 18 BRPM | SYSTOLIC BLOOD PRESSURE: 142 MMHG | BODY MASS INDEX: 42.17 KG/M2 | TEMPERATURE: 97.1 F | WEIGHT: 253.09 LBS | OXYGEN SATURATION: 90 % | HEART RATE: 76 BPM | HEIGHT: 65 IN

## 2023-09-05 DIAGNOSIS — M17.11 PRIMARY OSTEOARTHRITIS OF RIGHT KNEE: ICD-10-CM

## 2023-09-05 LAB
ALBUMIN SERPL-MCNC: 3.9 G/DL (ref 3.5–5.2)
ALBUMIN/GLOB SERPL: 1.2 G/DL
ALP SERPL-CCNC: 84 U/L (ref 39–117)
ALT SERPL W P-5'-P-CCNC: 22 U/L (ref 1–33)
ANION GAP SERPL CALCULATED.3IONS-SCNC: 9 MMOL/L (ref 5–15)
AST SERPL-CCNC: 19 U/L (ref 1–32)
BASOPHILS # BLD AUTO: 0.1 10*3/MM3 (ref 0–0.2)
BASOPHILS NFR BLD AUTO: 1.3 % (ref 0–1.5)
BILIRUB SERPL-MCNC: 0.8 MG/DL (ref 0–1.2)
BUN SERPL-MCNC: 15 MG/DL (ref 8–23)
BUN/CREAT SERPL: 15.2 (ref 7–25)
CALCIUM SPEC-SCNC: 9 MG/DL (ref 8.6–10.5)
CHLORIDE SERPL-SCNC: 104 MMOL/L (ref 98–107)
CO2 SERPL-SCNC: 26 MMOL/L (ref 22–29)
CREAT SERPL-MCNC: 0.99 MG/DL (ref 0.57–1)
DEPRECATED RDW RBC AUTO: 44.4 FL (ref 37–54)
EGFRCR SERPLBLD CKD-EPI 2021: 58.5 ML/MIN/1.73
EOSINOPHIL # BLD AUTO: 0.23 10*3/MM3 (ref 0–0.4)
EOSINOPHIL NFR BLD AUTO: 2.9 % (ref 0.3–6.2)
ERYTHROCYTE [DISTWIDTH] IN BLOOD BY AUTOMATED COUNT: 13.1 % (ref 12.3–15.4)
GLOBULIN UR ELPH-MCNC: 3.2 GM/DL
GLUCOSE SERPL-MCNC: 105 MG/DL (ref 65–99)
HBA1C MFR BLD: 5.8 % (ref 4.8–5.6)
HCT VFR BLD AUTO: 43.6 % (ref 34–46.6)
HGB BLD-MCNC: 14.7 G/DL (ref 12–15.9)
IMM GRANULOCYTES # BLD AUTO: 0.04 10*3/MM3 (ref 0–0.05)
IMM GRANULOCYTES NFR BLD AUTO: 0.5 % (ref 0–0.5)
INR PPP: 1.06 (ref 0.86–1.15)
LYMPHOCYTES # BLD AUTO: 2.06 10*3/MM3 (ref 0.7–3.1)
LYMPHOCYTES NFR BLD AUTO: 26.1 % (ref 19.6–45.3)
MCH RBC QN AUTO: 31.5 PG (ref 26.6–33)
MCHC RBC AUTO-ENTMCNC: 33.7 G/DL (ref 31.5–35.7)
MCV RBC AUTO: 93.6 FL (ref 79–97)
MONOCYTES # BLD AUTO: 0.78 10*3/MM3 (ref 0.1–0.9)
MONOCYTES NFR BLD AUTO: 9.9 % (ref 5–12)
NEUTROPHILS NFR BLD AUTO: 4.68 10*3/MM3 (ref 1.7–7)
NEUTROPHILS NFR BLD AUTO: 59.3 % (ref 42.7–76)
NRBC BLD AUTO-RTO: 0 /100 WBC (ref 0–0.2)
PLATELET # BLD AUTO: 241 10*3/MM3 (ref 140–450)
PMV BLD AUTO: 9.6 FL (ref 6–12)
POTASSIUM SERPL-SCNC: 4.5 MMOL/L (ref 3.5–5.2)
PROT SERPL-MCNC: 7.1 G/DL (ref 6–8.5)
PROTHROMBIN TIME: 13.9 SECONDS (ref 11.8–14.9)
RBC # BLD AUTO: 4.66 10*6/MM3 (ref 3.77–5.28)
SODIUM SERPL-SCNC: 139 MMOL/L (ref 136–145)
WBC NRBC COR # BLD: 7.89 10*3/MM3 (ref 3.4–10.8)

## 2023-09-05 PROCEDURE — 80053 COMPREHEN METABOLIC PANEL: CPT

## 2023-09-05 PROCEDURE — 85025 COMPLETE CBC W/AUTO DIFF WBC: CPT

## 2023-09-05 PROCEDURE — 93005 ELECTROCARDIOGRAM TRACING: CPT

## 2023-09-05 PROCEDURE — 36415 COLL VENOUS BLD VENIPUNCTURE: CPT

## 2023-09-05 PROCEDURE — 83036 HEMOGLOBIN GLYCOSYLATED A1C: CPT

## 2023-09-05 PROCEDURE — 85610 PROTHROMBIN TIME: CPT

## 2023-09-05 RX ORDER — BUDESONIDE, GLYCOPYRROLATE, AND FORMOTEROL FUMARATE 160; 9; 4.8 UG/1; UG/1; UG/1
2 AEROSOL, METERED RESPIRATORY (INHALATION) 2 TIMES DAILY PRN
COMMUNITY

## 2023-09-05 RX ORDER — FLUCONAZOLE 200 MG/1
200 TABLET ORAL WEEKLY
COMMUNITY

## 2023-09-05 RX ORDER — CHOLECALCIFEROL (VITAMIN D3) 25 MCG
TABLET,CHEWABLE ORAL
COMMUNITY

## 2023-09-05 RX ORDER — NAPROXEN SODIUM 220 MG
220 TABLET ORAL 2 TIMES DAILY PRN
COMMUNITY

## 2023-09-05 RX ORDER — FLUTICASONE PROPIONATE 50 MCG
2 SPRAY, SUSPENSION (ML) NASAL DAILY PRN
COMMUNITY

## 2023-09-05 NOTE — DISCHARGE INSTRUCTIONS
IMPORTANT INSTRUCTIONS - PRE-ADMISSION TESTING  DO NOT EAT OR CHEW anything after midnight the night before your procedure.    You may have CLEAR liquids up to __3____ hours prior to ARRIVAL time.   Take the following medications the morning of your procedure with JUST A SIP OF WATER:  ________  ALBUTEROL NEB IF NEEDED, BREZTRI IF NEEDED AND BRING WITH YOU, CYCLOBENZAPRINE, FLUTICASON IF NEEDED, LEVOTHYROISINE, METOPROLOL, NURTEC IF NEEDED_______________________________________________________________________________________________________________________________________________________________________________    DO NOT BRING your medications to the hospital with you, UNLESS something has changed since your PRE-Admission Testing appointment.  STARTING NOW Hold all vitamins, supplements, and NSAIDS (Non- steroidal anti-inflammatory meds) for one week prior to surgery (you MAY take Tylenol or Acetaminophen).  If you are diabetic, check your blood sugar the morning of your procedure. If it is less than 70 or if you are feeling symptomatic, call the following number for further instructions: [phone]  Use your inhalers/nebulizers as usual, the morning of your procedure. BRING YOUR INHALERS with you.   Bring your CPAP or BIPAP to hospital, ONLY IF YOU WILL BE SPENDING THE NIGHT.   Make sure you have a ride home and have someone who will stay with you the day of your procedure after you go home.  If you have any questions, please call your Pre-Admission Testing Nurse, __MATTHEW______________ at [phone]__.   Per anesthesia request, do not smoke for 24 hours before your procedure or as instructed by your surgeon.    WILL CALL ON 9/11/23 BETWEEN 1 AND 4 PM  TO GIVE OFFICIAL ARRIVAL TIME FOR DAY OF SURGERY  DRINK 20 OZ GATORADE NO RED 3 HOURS PRIOR TO ARRIVAL. REFER TO CLEAR LIQUID DIET SHEET FOR APPROVED CLEAR LIQUIDS  COME TO SAME AREA HAD PAT APPT ENTRANCE A, ELEVATOR A, 3RD FLOOR DAY OF  SURGERY  REFER TO PAGE 9 IN TOTAL JOINT BOOK FOR BATHING INSTRUCTIONS. NO NAIL POLISH UPPER OR LOWER EXT OR JEWELRY OF ANY TYPE DAY OF SURGERY  CASH, CHECK, OR CARD FOR MEDS TO BED IF INDICATED AT DISCHARGE

## 2023-09-05 NOTE — PAT
MESSAGE SENT TO ANGELA REEVES [facility] TO MAKE AWARE UNABLE TO LOCATE UPDATED CARDIAC CLEARANCE AND HX OF NICKEL ALLERGY

## 2023-09-06 DIAGNOSIS — Z47.1 AFTERCARE FOLLOWING RIGHT KNEE JOINT REPLACEMENT SURGERY: Primary | ICD-10-CM

## 2023-09-06 DIAGNOSIS — Z96.651 AFTERCARE FOLLOWING RIGHT KNEE JOINT REPLACEMENT SURGERY: Primary | ICD-10-CM

## 2023-09-06 LAB
QT INTERVAL: 406 MS
QTC INTERVAL: 440 MS

## 2023-09-11 NOTE — PAT
PT HAD CALLED AND REPORTS STARTED FEELING BAD AROUND WED 9/6/23 THOUGHT MAYBE SINUS INFECTION BUT FEELS THAT HAS  CLEARED AND IS FEELING BETTER. REPORTS SHE IS STILL WHEEZING AT TIMES AND HAS OCC COUGH. AFEBRILE ENTIRE TIME. ALSO ASKING ABOUT 3 IN 1. MESSAGE SENT TO ROSALINDA AT [facility] TO INFORM.

## 2023-09-19 ENCOUNTER — TELEPHONE (OUTPATIENT)
Dept: FAMILY MEDICINE CLINIC | Facility: CLINIC | Age: 78
End: 2023-09-19
Payer: MEDICARE

## 2023-09-19 RX ORDER — LEVOTHYROXINE SODIUM 137 UG/1
137 TABLET ORAL DAILY
Qty: 90 TABLET | Refills: 3 | Status: SHIPPED | OUTPATIENT
Start: 2023-09-19

## 2023-09-19 NOTE — TELEPHONE ENCOUNTER
Patient request a Written RX to take to [facility], patient would like to  tomorrow afternoon.

## 2023-09-26 ENCOUNTER — ANESTHESIA (OUTPATIENT)
Dept: PERIOP | Facility: HOSPITAL | Age: 78
End: 2023-09-26
Payer: MEDICARE

## 2023-09-26 ENCOUNTER — APPOINTMENT (OUTPATIENT)
Dept: GENERAL RADIOLOGY | Facility: HOSPITAL | Age: 78
End: 2023-09-26
Payer: MEDICARE

## 2023-09-26 ENCOUNTER — ANESTHESIA EVENT CONVERTED (OUTPATIENT)
Dept: ANESTHESIOLOGY | Facility: HOSPITAL | Age: 78
End: 2023-09-26
Payer: MEDICARE

## 2023-09-26 ENCOUNTER — HOSPITAL ENCOUNTER (OUTPATIENT)
Facility: HOSPITAL | Age: 78
Discharge: HOME | End: 2023-09-27
Attending: ORTHOPAEDIC SURGERY | Admitting: ORTHOPAEDIC SURGERY
Payer: MEDICARE

## 2023-09-26 DIAGNOSIS — Z78.9 IMPAIRED MOBILITY AND ADLS: ICD-10-CM

## 2023-09-26 DIAGNOSIS — Z74.09 IMPAIRED MOBILITY AND ADLS: ICD-10-CM

## 2023-09-26 DIAGNOSIS — R26.2 DIFFICULTY WALKING: Primary | ICD-10-CM

## 2023-09-26 DIAGNOSIS — M17.11 PRIMARY OSTEOARTHRITIS OF RIGHT KNEE: ICD-10-CM

## 2023-09-26 PROCEDURE — 94799 UNLISTED PULMONARY SVC/PX: CPT

## 2023-09-26 PROCEDURE — 25010000002 MORPHINE PER 10 MG: Performed by: ORTHOPAEDIC SURGERY

## 2023-09-26 PROCEDURE — 63710000001 CELECOXIB 100 MG CAPSULE: Performed by: ANESTHESIOLOGY

## 2023-09-26 PROCEDURE — 73560 X-RAY EXAM OF KNEE 1 OR 2: CPT

## 2023-09-26 PROCEDURE — 94761 N-INVAS EAR/PLS OXIMETRY MLT: CPT

## 2023-09-26 PROCEDURE — A9270 NON-COVERED ITEM OR SERVICE: HCPCS | Performed by: ORTHOPAEDIC SURGERY

## 2023-09-26 PROCEDURE — A9270 NON-COVERED ITEM OR SERVICE: HCPCS | Performed by: ANESTHESIOLOGY

## 2023-09-26 PROCEDURE — C1776 JOINT DEVICE (IMPLANTABLE): HCPCS | Performed by: ORTHOPAEDIC SURGERY

## 2023-09-26 PROCEDURE — 25010000002 ONDANSETRON PER 1 MG: Performed by: NURSE ANESTHETIST, CERTIFIED REGISTERED

## 2023-09-26 PROCEDURE — 25010000002 CEFAZOLIN IN DEXTROSE 2-4 GM/100ML-% SOLUTION: Performed by: ORTHOPAEDIC SURGERY

## 2023-09-26 PROCEDURE — 27447 TOTAL KNEE ARTHROPLASTY: CPT | Performed by: ORTHOPAEDIC SURGERY

## 2023-09-26 PROCEDURE — C1713 ANCHOR/SCREW BN/BN,TIS/BN: HCPCS | Performed by: ORTHOPAEDIC SURGERY

## 2023-09-26 PROCEDURE — 25010000002 EPINEPHRINE 1 MG/ML SOLUTION: Performed by: ORTHOPAEDIC SURGERY

## 2023-09-26 PROCEDURE — 25010000002 MIDAZOLAM PER 1MG: Performed by: ANESTHESIOLOGY

## 2023-09-26 PROCEDURE — 20985 CPTR-ASST DIR MS PX: CPT | Performed by: ORTHOPAEDIC SURGERY

## 2023-09-26 PROCEDURE — 25010000002 ROPIVACAINE PER 1 MG: Performed by: ORTHOPAEDIC SURGERY

## 2023-09-26 PROCEDURE — 25010000002 CEFAZOLIN IN DEXTROSE 2000 MG/ 100 ML SOLUTION: Performed by: ORTHOPAEDIC SURGERY

## 2023-09-26 PROCEDURE — 25010000002 PROPOFOL 10 MG/ML EMULSION: Performed by: NURSE ANESTHETIST, CERTIFIED REGISTERED

## 2023-09-26 PROCEDURE — 25010000002 DEXAMETHASONE PER 1 MG: Performed by: NURSE ANESTHETIST, CERTIFIED REGISTERED

## 2023-09-26 PROCEDURE — 63710000001 ACETAMINOPHEN EXTRA STRENGTH 500 MG TABLET: Performed by: ORTHOPAEDIC SURGERY

## 2023-09-26 PROCEDURE — 94640 AIRWAY INHALATION TREATMENT: CPT

## 2023-09-26 PROCEDURE — 25010000002 FENTANYL CITRATE (PF) 50 MCG/ML SOLUTION: Performed by: NURSE ANESTHETIST, CERTIFIED REGISTERED

## 2023-09-26 PROCEDURE — 0 MEPERIDINE PER 100 MG: Performed by: NURSE ANESTHETIST, CERTIFIED REGISTERED

## 2023-09-26 PROCEDURE — 25010000002 KETOROLAC TROMETHAMINE PER 15 MG: Performed by: ORTHOPAEDIC SURGERY

## 2023-09-26 PROCEDURE — 25010000002 HYDROMORPHONE 1 MG/ML SOLUTION: Performed by: NURSE ANESTHETIST, CERTIFIED REGISTERED

## 2023-09-26 PROCEDURE — 97161 PT EVAL LOW COMPLEX 20 MIN: CPT

## 2023-09-26 PROCEDURE — 25010000002 SUGAMMADEX 200 MG/2ML SOLUTION: Performed by: NURSE ANESTHETIST, CERTIFIED REGISTERED

## 2023-09-26 PROCEDURE — 63710000001 ACETAMINOPHEN EXTRA STRENGTH 500 MG TABLET: Performed by: ANESTHESIOLOGY

## 2023-09-26 PROCEDURE — 0 HYDROMORPHONE 1 MG/ML SOLUTION: Performed by: NURSE ANESTHETIST, CERTIFIED REGISTERED

## 2023-09-26 PROCEDURE — S0260 H&P FOR SURGERY: HCPCS | Performed by: ORTHOPAEDIC SURGERY

## 2023-09-26 DEVICE — CAP TOTL KN CMT PRIMARY: Type: IMPLANTABLE DEVICE | Site: KNEE | Status: FUNCTIONAL

## 2023-09-26 DEVICE — CMT BONE PALACOS R HI/VISC 1X40: Type: IMPLANTABLE DEVICE | Site: KNEE | Status: FUNCTIONAL

## 2023-09-26 DEVICE — COMP FEM/KN PERSONA TI/NIDIUM CR CMT STD SZ7 RT: Type: IMPLANTABLE DEVICE | Site: KNEE | Status: FUNCTIONAL

## 2023-09-26 DEVICE — IMPLANTABLE DEVICE: Type: IMPLANTABLE DEVICE | Site: KNEE | Status: FUNCTIONAL

## 2023-09-26 DEVICE — STEM TIB/KN PERSONA CMT 5D SZD RT: Type: IMPLANTABLE DEVICE | Site: KNEE | Status: FUNCTIONAL

## 2023-09-26 DEVICE — ART/SRF KN PERSONA/VE MC EF 6TO7 12MM RT: Type: IMPLANTABLE DEVICE | Site: KNEE | Status: FUNCTIONAL

## 2023-09-26 DEVICE — CAP EXT STEM KN UPCHRG: Type: IMPLANTABLE DEVICE | Site: KNEE | Status: FUNCTIONAL

## 2023-09-26 DEVICE — EXT STEM FEM/KN PERSONA TPR 14XPLS30MM: Type: IMPLANTABLE DEVICE | Site: KNEE | Status: FUNCTIONAL

## 2023-09-26 RX ORDER — FENTANYL CITRATE 50 UG/ML
INJECTION, SOLUTION INTRAMUSCULAR; INTRAVENOUS AS NEEDED
Status: DISCONTINUED | OUTPATIENT
Start: 2023-09-26 | End: 2023-09-26 | Stop reason: SURG

## 2023-09-26 RX ORDER — ONDANSETRON 2 MG/ML
INJECTION INTRAMUSCULAR; INTRAVENOUS AS NEEDED
Status: DISCONTINUED | OUTPATIENT
Start: 2023-09-26 | End: 2023-09-26 | Stop reason: SURG

## 2023-09-26 RX ORDER — BUPIVACAINE HYDROCHLORIDE AND EPINEPHRINE 5; 5 MG/ML; UG/ML
INJECTION, SOLUTION EPIDURAL; INTRACAUDAL; PERINEURAL
Status: COMPLETED | OUTPATIENT
Start: 2023-09-26 | End: 2023-09-26

## 2023-09-26 RX ORDER — ONDANSETRON 4 MG/1
4 TABLET, FILM COATED ORAL EVERY 6 HOURS PRN
Status: DISCONTINUED | OUTPATIENT
Start: 2023-09-26 | End: 2023-09-27 | Stop reason: HOSPADM

## 2023-09-26 RX ORDER — LIDOCAINE HYDROCHLORIDE 20 MG/ML
INJECTION, SOLUTION EPIDURAL; INFILTRATION; INTRACAUDAL; PERINEURAL AS NEEDED
Status: DISCONTINUED | OUTPATIENT
Start: 2023-09-26 | End: 2023-09-26 | Stop reason: SURG

## 2023-09-26 RX ORDER — LEVOTHYROXINE SODIUM 137 UG/1
137 TABLET ORAL DAILY
Status: DISCONTINUED | OUTPATIENT
Start: 2023-09-26 | End: 2023-09-27 | Stop reason: HOSPADM

## 2023-09-26 RX ORDER — HYDROCODONE BITARTRATE AND ACETAMINOPHEN 7.5; 325 MG/1; MG/1
2 TABLET ORAL EVERY 4 HOURS PRN
Status: DISCONTINUED | OUTPATIENT
Start: 2023-09-26 | End: 2023-09-27 | Stop reason: HOSPADM

## 2023-09-26 RX ORDER — ONDANSETRON 2 MG/ML
4 INJECTION INTRAMUSCULAR; INTRAVENOUS ONCE AS NEEDED
Status: DISCONTINUED | OUTPATIENT
Start: 2023-09-26 | End: 2023-09-26 | Stop reason: HOSPADM

## 2023-09-26 RX ORDER — ROCURONIUM BROMIDE 10 MG/ML
INJECTION, SOLUTION INTRAVENOUS AS NEEDED
Status: DISCONTINUED | OUTPATIENT
Start: 2023-09-26 | End: 2023-09-26 | Stop reason: SURG

## 2023-09-26 RX ORDER — DEXAMETHASONE SODIUM PHOSPHATE 4 MG/ML
INJECTION, SOLUTION INTRA-ARTICULAR; INTRALESIONAL; INTRAMUSCULAR; INTRAVENOUS; SOFT TISSUE AS NEEDED
Status: DISCONTINUED | OUTPATIENT
Start: 2023-09-26 | End: 2023-09-26 | Stop reason: SURG

## 2023-09-26 RX ORDER — CELECOXIB 100 MG/1
200 CAPSULE ORAL ONCE
Status: COMPLETED | OUTPATIENT
Start: 2023-09-26 | End: 2023-09-26

## 2023-09-26 RX ORDER — ALBUTEROL SULFATE 2.5 MG/3ML
2.5 SOLUTION RESPIRATORY (INHALATION) EVERY 6 HOURS PRN
Status: DISCONTINUED | OUTPATIENT
Start: 2023-09-26 | End: 2023-09-27 | Stop reason: HOSPADM

## 2023-09-26 RX ORDER — SODIUM CHLORIDE 9 MG/ML
40 INJECTION, SOLUTION INTRAVENOUS AS NEEDED
Status: DISCONTINUED | OUTPATIENT
Start: 2023-09-26 | End: 2023-09-26 | Stop reason: HOSPADM

## 2023-09-26 RX ORDER — PROMETHAZINE HYDROCHLORIDE 25 MG/1
25 SUPPOSITORY RECTAL ONCE AS NEEDED
Status: DISCONTINUED | OUTPATIENT
Start: 2023-09-26 | End: 2023-09-26 | Stop reason: HOSPADM

## 2023-09-26 RX ORDER — ACETAMINOPHEN 500 MG
1000 TABLET ORAL ONCE
Status: COMPLETED | OUTPATIENT
Start: 2023-09-26 | End: 2023-09-26

## 2023-09-26 RX ORDER — CEFAZOLIN SODIUM IN 0.9 % NACL 3 G/100 ML
3 INTRAVENOUS SOLUTION, PIGGYBACK (ML) INTRAVENOUS ONCE
Status: DISCONTINUED | OUTPATIENT
Start: 2023-09-26 | End: 2023-09-26

## 2023-09-26 RX ORDER — BISACODYL 5 MG/1
10 TABLET, DELAYED RELEASE ORAL DAILY PRN
Status: DISCONTINUED | OUTPATIENT
Start: 2023-09-26 | End: 2023-09-27 | Stop reason: HOSPADM

## 2023-09-26 RX ORDER — ONDANSETRON 2 MG/ML
4 INJECTION INTRAMUSCULAR; INTRAVENOUS EVERY 6 HOURS PRN
Status: DISCONTINUED | OUTPATIENT
Start: 2023-09-26 | End: 2023-09-27 | Stop reason: HOSPADM

## 2023-09-26 RX ORDER — SODIUM CHLORIDE 0.9 % (FLUSH) 0.9 %
10 SYRINGE (ML) INJECTION AS NEEDED
Status: DISCONTINUED | OUTPATIENT
Start: 2023-09-26 | End: 2023-09-26 | Stop reason: HOSPADM

## 2023-09-26 RX ORDER — SODIUM CHLORIDE, SODIUM LACTATE, POTASSIUM CHLORIDE, CALCIUM CHLORIDE 600; 310; 30; 20 MG/100ML; MG/100ML; MG/100ML; MG/100ML
9 INJECTION, SOLUTION INTRAVENOUS CONTINUOUS PRN
Status: DISCONTINUED | OUTPATIENT
Start: 2023-09-26 | End: 2023-09-26 | Stop reason: HOSPADM

## 2023-09-26 RX ORDER — MIDAZOLAM HYDROCHLORIDE 2 MG/2ML
2 INJECTION, SOLUTION INTRAMUSCULAR; INTRAVENOUS ONCE
Status: COMPLETED | OUTPATIENT
Start: 2023-09-26 | End: 2023-09-26

## 2023-09-26 RX ORDER — SODIUM CHLORIDE 0.9 % (FLUSH) 0.9 %
10 SYRINGE (ML) INJECTION AS NEEDED
Status: DISCONTINUED | OUTPATIENT
Start: 2023-09-26 | End: 2023-09-27 | Stop reason: HOSPADM

## 2023-09-26 RX ORDER — PROPOFOL 10 MG/ML
VIAL (ML) INTRAVENOUS AS NEEDED
Status: DISCONTINUED | OUTPATIENT
Start: 2023-09-26 | End: 2023-09-26 | Stop reason: SURG

## 2023-09-26 RX ORDER — NALOXONE HCL 0.4 MG/ML
0.4 VIAL (ML) INJECTION
Status: DISCONTINUED | OUTPATIENT
Start: 2023-09-26 | End: 2023-09-27 | Stop reason: HOSPADM

## 2023-09-26 RX ORDER — BISACODYL 10 MG
10 SUPPOSITORY, RECTAL RECTAL DAILY PRN
Status: DISCONTINUED | OUTPATIENT
Start: 2023-09-26 | End: 2023-09-27 | Stop reason: HOSPADM

## 2023-09-26 RX ORDER — KETOROLAC TROMETHAMINE 30 MG/ML
15 INJECTION, SOLUTION INTRAMUSCULAR; INTRAVENOUS EVERY 6 HOURS
Status: DISCONTINUED | OUTPATIENT
Start: 2023-09-26 | End: 2023-09-27 | Stop reason: HOSPADM

## 2023-09-26 RX ORDER — ACETAMINOPHEN 500 MG
1000 TABLET ORAL EVERY 6 HOURS
Status: DISCONTINUED | OUTPATIENT
Start: 2023-09-26 | End: 2023-09-27 | Stop reason: HOSPADM

## 2023-09-26 RX ORDER — ARFORMOTEROL TARTRATE 15 UG/2ML
15 SOLUTION RESPIRATORY (INHALATION)
Status: DISCONTINUED | OUTPATIENT
Start: 2023-09-26 | End: 2023-09-27 | Stop reason: HOSPADM

## 2023-09-26 RX ORDER — MAGNESIUM HYDROXIDE 1200 MG/15ML
LIQUID ORAL AS NEEDED
Status: DISCONTINUED | OUTPATIENT
Start: 2023-09-26 | End: 2023-09-26 | Stop reason: HOSPADM

## 2023-09-26 RX ORDER — SODIUM CHLORIDE 0.9 % (FLUSH) 0.9 %
10 SYRINGE (ML) INJECTION EVERY 12 HOURS SCHEDULED
Status: DISCONTINUED | OUTPATIENT
Start: 2023-09-26 | End: 2023-09-27 | Stop reason: HOSPADM

## 2023-09-26 RX ORDER — TRANEXAMIC ACID 10 MG/ML
1000 INJECTION, SOLUTION INTRAVENOUS ONCE
Status: COMPLETED | OUTPATIENT
Start: 2023-09-26 | End: 2023-09-26

## 2023-09-26 RX ORDER — BUDESONIDE 0.5 MG/2ML
0.5 INHALANT ORAL
Status: DISCONTINUED | OUTPATIENT
Start: 2023-09-26 | End: 2023-09-27 | Stop reason: HOSPADM

## 2023-09-26 RX ORDER — OXYCODONE HYDROCHLORIDE 5 MG/1
5 TABLET ORAL
Status: DISCONTINUED | OUTPATIENT
Start: 2023-09-26 | End: 2023-09-26 | Stop reason: HOSPADM

## 2023-09-26 RX ORDER — PROMETHAZINE HYDROCHLORIDE 12.5 MG/1
25 TABLET ORAL ONCE AS NEEDED
Status: DISCONTINUED | OUTPATIENT
Start: 2023-09-26 | End: 2023-09-26 | Stop reason: HOSPADM

## 2023-09-26 RX ORDER — MEPERIDINE HYDROCHLORIDE 25 MG/ML
12.5 INJECTION INTRAMUSCULAR; INTRAVENOUS; SUBCUTANEOUS
Status: DISCONTINUED | OUTPATIENT
Start: 2023-09-26 | End: 2023-09-26 | Stop reason: HOSPADM

## 2023-09-26 RX ORDER — GLYCOPYRROLATE 0.2 MG/ML
0.2 INJECTION INTRAMUSCULAR; INTRAVENOUS
Status: COMPLETED | OUTPATIENT
Start: 2023-09-26 | End: 2023-09-26

## 2023-09-26 RX ORDER — AMOXICILLIN 250 MG
2 CAPSULE ORAL 2 TIMES DAILY PRN
Status: DISCONTINUED | OUTPATIENT
Start: 2023-09-26 | End: 2023-09-27 | Stop reason: HOSPADM

## 2023-09-26 RX ORDER — CEFAZOLIN SODIUM 2 G/100ML
2 INJECTION, SOLUTION INTRAVENOUS ONCE
Status: COMPLETED | OUTPATIENT
Start: 2023-09-26 | End: 2023-09-26

## 2023-09-26 RX ORDER — SODIUM CHLORIDE 9 MG/ML
40 INJECTION, SOLUTION INTRAVENOUS AS NEEDED
Status: DISCONTINUED | OUTPATIENT
Start: 2023-09-26 | End: 2023-09-27 | Stop reason: HOSPADM

## 2023-09-26 RX ORDER — HYDROCODONE BITARTRATE AND ACETAMINOPHEN 7.5; 325 MG/1; MG/1
1 TABLET ORAL EVERY 4 HOURS PRN
Status: DISCONTINUED | OUTPATIENT
Start: 2023-09-26 | End: 2023-09-27 | Stop reason: HOSPADM

## 2023-09-26 RX ORDER — SODIUM CHLORIDE, SODIUM LACTATE, POTASSIUM CHLORIDE, CALCIUM CHLORIDE 600; 310; 30; 20 MG/100ML; MG/100ML; MG/100ML; MG/100ML
80 INJECTION, SOLUTION INTRAVENOUS CONTINUOUS
Status: DISCONTINUED | OUTPATIENT
Start: 2023-09-26 | End: 2023-09-27

## 2023-09-26 RX ORDER — CEFAZOLIN SODIUM 2 G/100ML
2 INJECTION, SOLUTION INTRAVENOUS EVERY 8 HOURS
Status: COMPLETED | OUTPATIENT
Start: 2023-09-26 | End: 2023-09-27

## 2023-09-26 RX ADMIN — FENTANYL CITRATE 75 MCG: 50 INJECTION, SOLUTION INTRAMUSCULAR; INTRAVENOUS at 11:01

## 2023-09-26 RX ADMIN — HYDROMORPHONE HYDROCHLORIDE 0.5 MG: 1 INJECTION, SOLUTION INTRAMUSCULAR; INTRAVENOUS; SUBCUTANEOUS at 12:02

## 2023-09-26 RX ADMIN — GLYCOPYRROLATE 0.2 MG: 0.2 INJECTION INTRAMUSCULAR; INTRAVENOUS at 10:00

## 2023-09-26 RX ADMIN — PROPOFOL 150 MG: 10 INJECTION, EMULSION INTRAVENOUS at 10:37

## 2023-09-26 RX ADMIN — KETOROLAC TROMETHAMINE 15 MG: 30 INJECTION, SOLUTION INTRAMUSCULAR; INTRAVENOUS at 15:22

## 2023-09-26 RX ADMIN — BUDESONIDE 0.5 MG: 0.5 INHALANT RESPIRATORY (INHALATION) at 18:49

## 2023-09-26 RX ADMIN — MIDAZOLAM HYDROCHLORIDE 2 MG: 1 INJECTION, SOLUTION INTRAMUSCULAR; INTRAVENOUS at 09:59

## 2023-09-26 RX ADMIN — HYDROMORPHONE HYDROCHLORIDE 0.5 MG: 1 INJECTION, SOLUTION INTRAMUSCULAR; INTRAVENOUS; SUBCUTANEOUS at 12:12

## 2023-09-26 RX ADMIN — SODIUM CHLORIDE, POTASSIUM CHLORIDE, SODIUM LACTATE AND CALCIUM CHLORIDE 9 ML/HR: 600; 310; 30; 20 INJECTION, SOLUTION INTRAVENOUS at 09:56

## 2023-09-26 RX ADMIN — KETOROLAC TROMETHAMINE 15 MG: 30 INJECTION, SOLUTION INTRAMUSCULAR; INTRAVENOUS at 21:00

## 2023-09-26 RX ADMIN — SUGAMMADEX 200 MG: 100 INJECTION, SOLUTION INTRAVENOUS at 11:39

## 2023-09-26 RX ADMIN — BUPIVACAINE HYDROCHLORIDE AND EPINEPHRINE BITARTRATE 30 ML: 5; .005 INJECTION, SOLUTION EPIDURAL; INTRACAUDAL; PERINEURAL at 10:21

## 2023-09-26 RX ADMIN — ONDANSETRON 4 MG: 2 INJECTION INTRAMUSCULAR; INTRAVENOUS at 10:46

## 2023-09-26 RX ADMIN — SODIUM CHLORIDE, POTASSIUM CHLORIDE, SODIUM LACTATE AND CALCIUM CHLORIDE: 600; 310; 30; 20 INJECTION, SOLUTION INTRAVENOUS at 11:51

## 2023-09-26 RX ADMIN — ACETAMINOPHEN 1000 MG: 500 TABLET ORAL at 15:22

## 2023-09-26 RX ADMIN — FENTANYL CITRATE 25 MCG: 50 INJECTION, SOLUTION INTRAMUSCULAR; INTRAVENOUS at 10:37

## 2023-09-26 RX ADMIN — HYDROMORPHONE HYDROCHLORIDE 0.25 MG: 1 INJECTION, SOLUTION INTRAMUSCULAR; INTRAVENOUS; SUBCUTANEOUS at 12:34

## 2023-09-26 RX ADMIN — ACETAMINOPHEN 1000 MG: 500 TABLET ORAL at 21:00

## 2023-09-26 RX ADMIN — CEFAZOLIN SODIUM 2 G: 2 INJECTION, SOLUTION INTRAVENOUS at 10:38

## 2023-09-26 RX ADMIN — HYDROMORPHONE HYDROCHLORIDE 1 MG: 1 INJECTION, SOLUTION INTRAMUSCULAR; INTRAVENOUS; SUBCUTANEOUS at 11:57

## 2023-09-26 RX ADMIN — DEXAMETHASONE SODIUM PHOSPHATE 4 MG: 4 INJECTION, SOLUTION INTRAMUSCULAR; INTRAVENOUS at 10:37

## 2023-09-26 RX ADMIN — TRANEXAMIC ACID 1000 MG: 10 INJECTION, SOLUTION INTRAVENOUS at 11:36

## 2023-09-26 RX ADMIN — CEFAZOLIN SODIUM 2 G: 2 INJECTION, SOLUTION INTRAVENOUS at 17:35

## 2023-09-26 RX ADMIN — ARFORMOTEROL TARTRATE 15 MCG: 15 SOLUTION RESPIRATORY (INHALATION) at 18:49

## 2023-09-26 RX ADMIN — TRANEXAMIC ACID 1000 MG: 10 INJECTION, SOLUTION INTRAVENOUS at 09:57

## 2023-09-26 RX ADMIN — SODIUM CHLORIDE, POTASSIUM CHLORIDE, SODIUM LACTATE AND CALCIUM CHLORIDE 80 ML/HR: 600; 310; 30; 20 INJECTION, SOLUTION INTRAVENOUS at 15:23

## 2023-09-26 RX ADMIN — ACETAMINOPHEN 1000 MG: 500 TABLET ORAL at 09:57

## 2023-09-26 RX ADMIN — CELECOXIB 200 MG: 100 CAPSULE ORAL at 09:56

## 2023-09-26 RX ADMIN — LIDOCAINE HYDROCHLORIDE 80 MG: 20 INJECTION, SOLUTION EPIDURAL; INFILTRATION; INTRACAUDAL; PERINEURAL at 10:37

## 2023-09-26 RX ADMIN — ROCURONIUM BROMIDE 50 MG: 50 INJECTION INTRAVENOUS at 10:37

## 2023-09-26 RX ADMIN — Medication 10 ML: at 21:01

## 2023-09-26 RX ADMIN — MEPERIDINE HYDROCHLORIDE 12.5 MG: 25 INJECTION INTRAMUSCULAR; INTRAVENOUS; SUBCUTANEOUS at 12:12

## 2023-09-26 NOTE — THERAPY EVALUATION
Respiratory Therapist Broncho-Pulmonary Hygiene Progress Note      Patient Name:  Shwetha Lane  YOB: 1945    Shwetha Lane meets the qualification for Level 1 of the Bronco-Pulmonary Hygiene Protocol. This was based on my daily patient assessment and includes review of chest x-ray results, cough ability and quality, oxygenation, secretions or risk for secretion development and patient mobility.     Broncho-Pulmonary Hygiene Assessment:    Level of Movement: Actively changing positions without assistance  Alert/ oriented/ cooperative    Breath Sounds: Clear to slightly diminished    Cough: Strong, effective    Chest X-Ray: No CXR available    Sputum Productions: None or small amount of thin or watery secretions with effective cough    History and Physical: None    SpO2 to Oxygen Need: greater than 92% on room air or  less than 3L nasal canula    Current SpO2 is: 95% on 2 lpm     Based on this information, I have completed the following interventions: Teach/Instruct patient on cough and deep breathe      Electronically signed by Ni Ortiz RRT, 09/26/23, 4:02 PM EDT.

## 2023-09-26 NOTE — PLAN OF CARE
Goal Outcome Evaluation:      Patient is status post total knee replacement today. Vital signs stable. Patient ambulated 20 feet to the bathroom. Patient is alert and oriented. Pain well controlled.

## 2023-09-26 NOTE — ANESTHESIA POSTPROCEDURE EVALUATION
Patient: Shwteha Lane    Procedure Summary       Date: 09/26/23 Room / Location: [facility] OR 06 / [facility] MAIN OR    Anesthesia Start: 1028 Anesthesia Stop: 1201    Procedure: RIGHT TOTAL KNEE ARTHROPLASTY WITH ALOK NAVIGATION. (Right: Knee) Diagnosis:       Primary osteoarthritis of right knee      (Primary osteoarthritis of right knee [M17.11])    Surgeons: Denys Mohr MD Provider: Jesse Campoverde MD    Anesthesia Type: ERAS Protocol, general with block ASA Status: 4            Anesthesia Type: ERAS Protocol, general with block    Vitals  Vitals Value Taken Time   /66 09/26/23 1320   Temp 36.1 °C (97 °F) 09/26/23 1200   Pulse 74 09/26/23 1323   Resp 16 09/26/23 1235   SpO2 97 % 09/26/23 1323   Vitals shown include unvalidated device data.        Post Anesthesia Care and Evaluation    Patient location during evaluation: bedside  Patient participation: complete - patient participated  Level of consciousness: awake  Pain management: adequate    Airway patency: patent  PONV Status: none  Cardiovascular status: acceptable and stable  Respiratory status: acceptable  Hydration status: acceptable    Comments: An Anesthesiologist personally participated in the most demanding procedures (including induction and emergence if applicable) in the anesthesia plan, monitored the course of anesthesia administration at frequent intervals and remained physically present and available for immediate diagnosis and treatment of emergencies.

## 2023-09-26 NOTE — ANESTHESIA PROCEDURE NOTES
Peripheral Block      Patient reassessed immediately prior to procedure    Patient location during procedure: pre-op  Start time: 9/26/2023 10:17 AM  Stop time: 9/26/2023 10:21 AM  Reason for block: at surgeon's request and post-op pain management  Preanesthetic Checklist  Completed: patient identified, IV checked, site marked, risks and benefits discussed, surgical consent, monitors and equipment checked, pre-op evaluation and timeout performed  Prep:  Pt Position: supine  Sterile barriers:alcohol skin prep, partial drape, cap, washed/disinfected hands, mask and gloves  Prep: ChloraPrep  Patient monitoring: blood pressure monitoring, continuous pulse oximetry and EKG  Procedure    Sedation: yes  Performed under: local infiltration  Guidance:ultrasound guided    ULTRASOUND INTERPRETATION.  Using ultrasound guidance a 20 G gauge needle was placed in close proximity to the nerve, at which point, under ultrasound guidance anesthetic was injected in the area of the nerve and spread of the anesthesia was seen on ultrasound in close proximity thereto.  There were no abnormalities seen on ultrasound; a digital image was taken; and the patient tolerated the procedure with no complications. Images:still images obtained, printed/placed on chart    Laterality:right  Block Type:adductor canal block  Injection Technique:single-shot  Needle Type:echogenic  Needle Gauge:20 G (4in)  Resistance on Injection: none    Medications Used: bupivacaine-EPINEPHrine PF (MARCAINE w/EPI) 0.5% -1:101465 injection - Injection   30 mL - 9/26/2023 10:21:00 AM      Post Assessment  Injection Assessment: negative aspiration for heme, no paresthesia on injection and incremental injection  Patient Tolerance:comfortable throughout block  Complications:no  Additional Notes  The block or continuous infusion is requested by the referring physician for management of postoperative pain, or pain related to a procedure. Ultrasound guidance (deemed medically  necessary). Painless injection, pt was awake and conversant during the procedure without complications. Needle and surrounding structures visualized throughout procedure. No adverse reactions or complications seen during this period. Post-procedure image showed no signs of complication, and anatomy was consistent with an uncomplicated nerve blockade.

## 2023-09-26 NOTE — H&P
"H and p      Chief Complaint  Follow-up of the Right Knee        Subjective          Shwetha Lane presents to [facility] for follow up of the right knee. She has had osteo arthritis for years and has treated her knee conservatively.  She has difficulty with long distance ambulation and standing.  She is here today for a right knee Synvisc injection. She wants to schedule a right total knee arthroplasty in September.              Allergies   Allergen Reactions    Bee Venom Swelling    Letrozole Swelling    Nickel Rash         Social History   Social History            Socioeconomic History    Marital status:    Tobacco Use    Smoking status: Former       Types: Cigarettes       Quit date:        Years since quittin.3    Smokeless tobacco: Never   Vaping Use    Vaping Use: Never used   Substance and Sexual Activity    Alcohol use: No    Drug use: No    Sexual activity: Defer            Review of Systems            Objective      Vital Signs:   Ht 165.1 cm (65\")   Wt 112 kg (247 lb)   BMI 41.10 kg/m²        Physical Exam  Constitutional:       Appearance: Normal appearance. Patient is well-developed and normal weight.   HENT:      Head: Normocephalic.      Right Ear: Hearing and external ear normal.      Left Ear: Hearing and external ear normal.      Nose: Nose normal.   Eyes:      Conjunctiva/sclera: Conjunctivae normal.   Cardiovascular:      Rate and Rhythm: Normal rate.   Pulmonary:      Effort: Pulmonary effort is normal.      Breath sounds: No wheezing or rales.   Abdominal:      Palpations: Abdomen is soft.      Tenderness: There is no abdominal tenderness.   Musculoskeletal:      Cervical back: Normal range of motion.   Skin:     Findings: No rash.   Neurological:      Mental Status: Patient  is alert and oriented to person, place, and time.   Psychiatric:         Mood and Affect: Mood and affect normal.         Judgment: Judgment normal.         Ortho Exam "       RIGHT KNEE: Good strength to hamstrings, quadriceps, dorsiflexors and plantar flexors. -2 degrees of extension. Flexion to 120 degrees. Calf supple, non-tender, no signs of DVT. Dorsal Pedal Pulse 2+, posterior tibialis pulse 2+. Tender joint lines. Crepitus with motion. Skin intact.         Large Joint Arthrocentesis: R knee  Date/Time: 4/26/2023 1:39 PM  Consent given by: patient  Site marked: site marked  Timeout: Immediately prior to procedure a time out was called to verify the correct patient, procedure, equipment, support staff and site/side marked as required   Supporting Documentation  Indications: pain   Procedure Details  Location: knee - R knee  Preparation: Patient was prepped and draped in the usual sterile fashion  Needle size: 22 G  Medications administered: 48 mg hylan 48 MG/6ML  Patient tolerance: patient tolerated the procedure well with no immediate complications                           Imaging Results (Most Recent)      Procedure Component Value Units Date/Time     XR Knee 3 View Right [[id number]] Resulted: 04/26/23 1305       Updated: 04/26/23 1305                   Result Review    :      X-Ray Report:  Right knee X-Ray  Indication: Evaluation of the right knee  AP/Lateral and [place] view(s)  Findings: Advanced degenerative arthritis.    Prior studies available for comparison: yes                  Assessment      Assessment and Plan      Diagnoses and all orders for this visit:     1. Primary osteoarthritis of right knee (Primary)  -     XR Knee 3 View Right           Discussed the treatment plan with the patient. I reviewed the X-rays that were obtained today with the patient. Discussed the treatment options with the patient, operative vs non-operative. The patient expressed understanding and wished to proceed with a right knee Synvisc injection to get her through until she can schedule a total knee arthroplasty in September.  She tolerated the injection well.      The patient  expressed understanding and wished to proceed with a right total knee arthroplasty with heidi hall.       Educated on risk of elevated BMI.  Discussed options for weight loss/decreasing BMI prior to procedure including dietician consult, weight loss options and exercise program., Discussed surgery., Risks/benefits discussed with patient including, but not limited to: infection, bleeding, neurovascular damage, re-rupture, aesthetic deformity, need for further surgery, and death., Discussed with patient the implant type being used during surgery and patient understands., Surgery pamphlet given., Call or return if worsening symptoms. and DME order for a 3 in 1 given today due to patient will be confined to one room/level of the home that does not offer a toilet during postop recovery.      Follow Up      2 weeks postoperatively       Denys Mohr MD  09/26/23

## 2023-09-26 NOTE — PLAN OF CARE
Goal Outcome Evaluation:  Plan of Care Reviewed With: patient        Progress: no change  Outcome Evaluation: Patient presents with deficits in balance, strength, transfers, and ambulation. Patient will benefit from skilled PT services to address these mobility deficits and decrease risk of falls.      Anticipated Discharge Disposition (PT): home with outpatient therapy services

## 2023-09-26 NOTE — THERAPY EVALUATION
Acute Care - Physical Therapy Initial Evaluation  GEOVANI Ochoa     Patient Name: Shwetha Lane  : 1945  MRN: [id number]  Today's Date: 2023      Visit Dx:     ICD-10-CM ICD-9-CM   1. Difficulty walking  R26.2 719.7   2. Primary osteoarthritis of right knee  M17.11 715.16     Patient Active Problem List   Diagnosis    Pneumonia    Hypothyroidism    Primary osteoarthritis involving multiple joints    Hyperlipemia    Migraine    Obesity, morbid (more than 100 lbs over ideal weight or BMI > 40)    Status following gastric banding surgery for weight loss    Fatty liver    Vertigo    PVC (premature ventricular contraction)    PAC (premature atrial contraction)    Malignant neoplasm of female breast    COVID-19 virus detected    Shingles    Ventricular tachycardia    DDD (degenerative disc disease), cervical    Cervical spondylolysis    Low back pain    Sacroiliac joint pain    Skin yeast infection    Primary osteoarthritis of right knee    Encounter for immunization    Lumbosacral radiculopathy     Past Medical History:   Diagnosis Date    Arthritis     COPD (chronic obstructive pulmonary disease)     PT DENIES HX COPD    COVID     REPORTS HX OF COVID AND SUFFERED FROM LONG HAUL COVID    GERD (gastroesophageal reflux disease)     Hyperlipidemia     Hypertension     Hypothyroidism     Low back pain     Migraines     Pneumonia     DENIES ANY CURRENT ISSUES    PVC (premature ventricular contraction)     FOLLOWED BY DR LOGAN HAD CARDIAC TESTING SEES YEARLY PER PT. DENIES CP/SOA. DECREASED ACTIVITY D/T PAIN AND IINSTABILITY OF RIGHT KNEE    Sleep apnea      Past Surgical History:   Procedure Laterality Date    COLONOSCOPY      HYSTERECTOMY      KNEE ARTHROSCOPY Bilateral     HAS HAD BOTH KNEES SCOPED WITH MENISCUS REPAIR    LAPAROSCOPIC GASTRIC BANDING      MOUTH SURGERY      TOOTH EXTRACTED ABOUT 6 WEEKS AGO CURRENTLY HEALED AND NO ISSUES    REPLACEMENT TOTAL KNEE Left     TONSILLECTOMY      TOTAL KNEE  ARTHROPLASTY Right 9/26/2023    Procedure: RIGHT TOTAL KNEE ARTHROPLASTY WITH ALOK NAVIGATION.;  Surgeon: Denys Mohr MD;  Location: [facility];  Service: Rob[facility] - Ortho;  Laterality: Right;     PT Assessment (last 12 hours)       PT Evaluation and Treatment       Row Name 09/26/23 1528          Physical Therapy Time and Intention    Document Type evaluation  -AV     Mode of Treatment individual therapy;physical therapy  -AV       Row Name 09/26/23 1521          General Information    Patient Profile Reviewed yes  -AV     Patient Observations alert;cooperative  -AV     Prior Level of Function independent:;all household mobility;gait;transfer;ADL's  Ambulated with RW. No home O2.  -AV     Equipment Currently Used at Home walker, rolling  -AV     Existing Precautions/Restrictions fall;weight bearing  WBAT RLE  -AV       Row Name 09/26/23 7691          Living Environment    Current Living Arrangements home  -AV     People in Home significant other  -AV       Row Name 09/26/23 2932          Range of Motion (ROM)    Range of Motion right lower extremity ROM;left lower extremity ROM  -AV     Left Lower Extremity (ROM) left LE ROM is WFL  -AV     Right Lower Extremity (ROM) knee  -AV     Knee, Right (Range of Motion) 15-90°  -AV       Row Name 09/26/23 4132          Strength (Manual Muscle Testing)    Strength (Manual Muscle Testing) right lower extremity strength;left lower extremity strength  -AV     Left Lower Extremity Strength left LE strength is WFL  -AV     Right Lower Extremity Strength knee  -AV     Knee, Right (Strength) 3-/5  -AV       Row Name 09/26/23 3195          Mobility    Extremity Weight-bearing Status right lower extremity  -AV     Right Lower Extremity (Weight-bearing Status) weight-bearing as tolerated (WBAT)  -AV       Row Name 09/26/23 9071          Bed Mobility    Bed Mobility supine-sit  -AV     Supine-Sit [place] (Bed Mobility) minimum assist (75% patient effort)  -AV       Row  Name 09/26/23 1555          Transfers    Transfers sit-stand transfer;stand-sit transfer  -AV       Row Name 09/26/23 1555          Sit-Stand Transfer    Sit-Stand [place] (Transfers) contact guard  -AV     Assistive Device (Sit-Stand Transfers) walker, front-wheeled  -AV       Row Name 09/26/23 1555          Stand-Sit Transfer    Stand-Sit [place] (Transfers) contact guard  -AV     Assistive Device (Stand-Sit Transfers) walker, front-wheeled  -AV       Row Name 09/26/23 1555          Gait/Stairs (Locomotion)    Gait/Stairs Locomotion gait/ambulation independence;gait/ambulation assistive device;distance ambulated  -AV     [place] Level (Gait) contact guard  -AV     Assistive Device (Gait) walker, front-wheeled  -AV     Patient was able to Ambulate yes  -AV     Distance in Feet (Gait) 20  x2  -AV       Row Name 09/26/23 1555          Safety Issues, Functional Mobility    Safety Issues Affecting Function (Mobility) positioning of assistive device  -AV     Impairments Affecting Function (Mobility) balance;endurance/activity tolerance;strength;range of motion (ROM)  -AV       Row Name 09/26/23 1555          Balance    Balance Assessment standing dynamic balance  -AV     Dynamic Standing Balance contact guard  -AV     Position/Device Used, Standing Balance supported;walker, front-wheeled  -AV       Row Name             Wound 09/26/23 1100 Right anterior knee Incision    Wound - Properties Group Placement Date: 09/26/23  -BW Placement Time: 1100  -BW Present on Hospital Admission: N  -BW Side: Right  -BW Orientation: anterior  -BW Location: knee  -BW Primary Wound Type: Incision  -BW    Retired Wound - Properties Group Placement Date: 09/26/23  -BW Placement Time: 1100  -BW Present on Hospital Admission: N  -BW Side: Right  -BW Orientation: anterior  -BW Location: knee  -BW Primary Wound Type: Incision  -BW    Retired Wound - Properties Group Date first assessed: 09/26/23  -BW Time first assessed: 1100   -BW Present on Hospital Admission: N  -BW Side: Right  -BW Location: knee  -BW Primary Wound Type: Incision  -BW      Row Name 09/26/23 1551          Plan of Care Review    Plan of Care Reviewed With patient  -AV     Progress no change  -AV     Outcome Evaluation Patient presents with deficits in balance, strength, transfers, and ambulation. Patient will benefit from skilled PT services to address these mobility deficits and decrease risk of falls.  -AV       Row Name 09/26/23 1556          Therapy Assessment/Plan (PT)    Patient/Family Therapy Goals Statement (PT) Less pain  -AV     Rehab Potential (PT) good, to achieve stated therapy goals  -AV     Criteria for Skilled Interventions Met (PT) yes;skilled treatment is necessary  -AV     Therapy Frequency (PT) 2 times/day  -AV     Predicted Duration of Therapy Intervention (PT) 10 days  -AV     Problem List (PT) problems related to;balance;mobility;strength;range of motion (ROM)  -AV     Activity Limitations Related to Problem List (PT) unable to transfer safely;unable to ambulate safely  -AV       Row Name 09/26/23 8272          PT Evaluation Complexity    History, PT Evaluation Complexity no personal factors and/or comorbidities  -AV     Examination of Body Systems (PT Eval Complexity) total of 4 or more elements  -AV     Clinical Presentation (PT Evaluation Complexity) stable  -AV     Clinical Decision Making (PT Evaluation Complexity) low complexity  -AV     Overall Complexity (PT Evaluation Complexity) low complexity  -AV       Row Name 09/26/23 6649          Therapy Plan Review/Discharge Plan (PT)    Therapy Plan Review (PT) evaluation/treatment results reviewed;patient  -AV       Row Name 09/26/23 1550          Physical Therapy Goals    Transfer Goal Selection (PT) transfer, PT goal 1  -AV     Gait Training Goal Selection (PT) gait training, PT goal 1  -AV     ROM Goal Selection (PT) ROM, PT goal 1  -AV       Row Name 09/26/23 8048          Transfer Goal 1  (PT)    Activity/Assistive Device (Transfer Goal 1, PT) transfers, all;walker, rolling  -AV     [place] Level/Cues Needed (Transfer Goal 1, PT) modified independence  -AV     Time Frame (Transfer Goal 1, PT) 10 days  -AV       Row Name 09/26/23 1555          Gait Training Goal 1 (PT)    Activity/Assistive Device (Gait Training Goal 1, PT) gait (walking locomotion);assistive device use;walker, rolling  -AV     [place] Level (Gait Training Goal 1, PT) modified independence  -AV     Distance (Gait Training Goal 1, PT) 200  -AV     Time Frame (Gait Training Goal 1, PT) 10 days  -AV       Row Name 09/26/23 1555          ROM Goal 1 (PT)    ROM Goal 1 (PT) Patient will demonstrate improvement in right knee range of motion to 0-115°  -AV     Time Frame (ROM Goal 1, PT) 10 days  -AV               User Key  (r) = Recorded By, (t) = Taken By, (c) = Cosigned By      Initials Name Provider Type    Abilio Macias RN Registered Nurse    Desmond Gupta, ROXANNE Physical Therapist                    Physical Therapy Education       Title: PT OT SLP Therapies (In Progress)       Topic: Physical Therapy (In Progress)       Point: Mobility training (Done)       Learning Progress Summary             Patient Acceptance, E,TB, VU by AV at 9/26/2023 1600                         Point: Home exercise program (Not Started)       Learner Progress:  Not documented in this visit.              Point: Body mechanics (Done)       Learning Progress Summary             Patient Acceptance, E,TB, VU by AV at 9/26/2023 1600                         Point: Precautions (Done)       Learning Progress Summary             Patient Acceptance, E,TB, VU by AV at 9/26/2023 1600                                         User Key       Initials Effective Dates Name Provider Type Discipline    AV 06/11/21 -  Desmond Oakes, ROXANNE Physical Therapist PT                  PT Recommendation and Plan  Anticipated Discharge Disposition (PT): home with  outpatient therapy services  Planned Therapy Interventions (PT): balance training, bed mobility training, gait training, home exercise program, neuromuscular re-education, strengthening, transfer training  Therapy Frequency (PT): 2 times/day  Plan of Care Reviewed With: patient  Progress: no change  Outcome Evaluation: Patient presents with deficits in balance, strength, transfers, and ambulation. Patient will benefit from skilled PT services to address these mobility deficits and decrease risk of falls.   Outcome Measures       Row Name 09/26/23 1500             How much help from another person do you currently need...    Turning from your back to your side while in flat bed without using bedrails? 3  -AV      Moving from lying on back to sitting on the side of a flat bed without bedrails? 3  -AV      Moving to and from a bed to a chair (including a wheelchair)? 3  -AV      Standing up from a chair using your arms (e.g., wheelchair, bedside chair)? 3  -AV      Climbing 3-5 steps with a railing? 3  -AV      To walk in hospital room? 3  -AV      AM-PAC 6 Clicks Score (PT) 18  -AV         Functional Assessment    Outcome Measure Options AM-PAC 6 Clicks Basic Mobility (PT)  -AV                User Key  (r) = Recorded By, (t) = Taken By, (c) = Cosigned By      Initials Name Provider Type    Desmond Gupta, PT Physical Therapist                     Time Calculation:    PT Charges       Row Name 09/26/23 1559             Time Calculation    PT Received On 09/26/23  -AV      PT Goal Re-Cert Due Date 10/05/23  -AV         Untimed Charges    PT Eval/Re-eval Minutes 20  -AV         Total Minutes    Untimed Charges Total Minutes 20  -AV       Total Minutes 20  -AV                User Key  (r) = Recorded By, (t) = Taken By, (c) = Cosigned By      Initials Name Provider Type    Desmond Gupta, PT Physical Therapist                  Therapy Charges for Today       Code Description Service Date Service Provider  Modifiers Qty    22860537047 HC PT EVAL LOW COMPLEXITY 2 9/26/2023 Desmond Oakes, PT GP 1            PT G-Codes  Outcome Measure Options: AM-PAC 6 Clicks Basic Mobility (PT)  AM-PAC 6 Clicks Score (PT): 18    Desmond Oakes, PT  9/26/2023

## 2023-09-26 NOTE — H&P
[facility]   HOSPITALIST HISTORY AND PHYSICAL  Date: 2023   Patient Name: Shwetha Lane  : 1945  MRN: [id number]  Primary Care Physician:  Rosibel Scott APRN  Date of admission: 2023    Subjective   Subjective     Chief Complaint: Blood pressure and COPD management in patient postop right total knee arthroplasty    HPI:    Shwetha Lane is a 78 y.o. female with past medical history of obstructive sleep apnea on home CPAP, COPD, hypothyroidism, hypertension, GERD, hyperlipidemia and DJD.  Patient is seen after right total hip arthroplasty and pain is very well controlled.  Does have some scratchy throat but no chest pain, shortness of breath nausea or vomiting.  Denies any abdominal pain.  Denies any numbness tingling in the toes.  Patient is recovering from her sinusitis and bronchitis.      Personal History     Past Medical History:  Past Medical History:   Diagnosis Date    Arthritis     COPD (chronic obstructive pulmonary disease)     PT DENIES HX COPD    COVID     REPORTS HX OF COVID AND SUFFERED FROM LONG HAUL COVID    GERD (gastroesophageal reflux disease)     Hyperlipidemia     Hypertension     Hypothyroidism     Low back pain     Migraines     Pneumonia     DENIES ANY CURRENT ISSUES    PVC (premature ventricular contraction)     FOLLOWED BY DR LOGAN HAD CARDIAC TESTING SEES YEARLY PER PT. DENIES CP/SOA. DECREASED ACTIVITY D/T PAIN AND IINSTABILITY OF RIGHT KNEE    Sleep apnea        Past Surgical History:  Past Surgical History:   Procedure Laterality Date    COLONOSCOPY      HYSTERECTOMY      KNEE ARTHROSCOPY Bilateral     HAS HAD BOTH KNEES SCOPED WITH MENISCUS REPAIR    LAPAROSCOPIC GASTRIC BANDING      MOUTH SURGERY      TOOTH EXTRACTED ABOUT 6 WEEKS AGO CURRENTLY HEALED AND NO ISSUES    REPLACEMENT TOTAL KNEE Left     TONSILLECTOMY      TOTAL KNEE ARTHROPLASTY Right 2023    Procedure: RIGHT TOTAL KNEE ARTHROPLASTY WITH ALOK NAVIGATION.;  Surgeon: Onur  Denys LEE MD;  Location: [facility] OR;  Service: Robotics - Ortho;  Laterality: Right;       Family History:   family history includes Diabetes in her mother; Heart disease in her father.    Social History:    reports that she has quit smoking. Her smoking use included cigarettes. She has never used smokeless tobacco. She reports that she does not drink alcohol and does not use drugs.    Home Medications:  Budeson-Glycopyrrol-Formoterol, Multiple Vitamins-Minerals, Rimegepant Sulfate, atorvastatin, cholecalciferol, cyclobenzaprine, estradiol, fluconazole, fluticasone, levothyroxine, metoprolol succinate XL, and vitamin B-12    Allergies:  Allergies   Allergen Reactions    Bee Venom Swelling    Nickel Itching, Swelling and Rash     EARRINGS (NICKEL)        Review of Systems   All systems were reviewed and negative except for: H&P    Objective   Objective     Vitals:   Temp:  [96.8 °F (36 °C)-98.5 °F (36.9 °C)] 97.8 °F (36.6 °C)  Heart Rate:  [64-88] 79  Resp:  [16-19] 16  BP: (122-143)/(49-79) 131/66  Flow (L/min):  [2-3] 2    Physical Exam    Constitutional: Awake, alert, no acute distress   Eyes: Pupils equal, sclerae anicteric, no conjunctival injection   HENT: NCAT, mucous membranes moist   Neck: Supple, no thyromegaly, no lymphadenopathy, trachea midline   Respiratory: Clear to auscultation bilaterally, nonlabored respirations    Cardiovascular: RRR, no murmurs, rubs, or gallops, palpable pedal pulses bilaterally   Gastrointestinal: Positive bowel sounds, soft, nontender, nondistended   Musculoskeletal: Right knee and ice wrap and restricted range of motion due to recent surgery.  No bilateral ankle edema, no clubbing or cyanosis to extremities   Psychiatric: Appropriate affect, cooperative   Neurologic: Oriented x 3, strength symmetric in all extremities, Cranial Nerves grossly intact to confrontation, speech clear   Skin: No rashes     Result Review    Result Review:  I have personally reviewed the results  from the time of this admission to 9/26/2023 16:50 EDT and agree with these findings:  [x]  Laboratory  []  Microbiology  [x]  Radiology  []  EKG/Telemetry   []  Cardiology/Vascular   []  Pathology  [x]  Old records  [x]  Other: Medications      Assessment & Plan   Assessment / Plan     Assessment:  DJD of right knee s/p right total knee arthroplasty September 26.  Obstructive sleep apnea on home CPAP.  COPD stable.  Morbid obesity BMI 41.2.  Hypertension.  Hypothyroidism.  GERD.  Hyperlipidemia.       Plan;  Wean off oxygen.  Continue IV fluid.  Hold home blood pressure medication  Resume appropriate home medications including Synthroid  IV and oral narcotics along with Toradol for pain control.  Home CPAP.  Bowel regimen.  Pulmicort, brovana and as needed albuterol neb.  Bronchopulmonary hygiene protocol.  PT OT.  Appreciate Ortho input.  Likely discharge home in a.m. if cleared by Ortho and remained stable    DVT prophylaxis:  Medical and mechanical DVT prophylaxis orders are present.  Eliquis    CODE STATUS:         Admission Status:  I believe this patient meets observation status.    Part of this note may be an electronic transcription/translation of spoken language to printed text using the Dragon Dictation System.     Electronically signed by Jona Paz MD, 09/26/23, 4:50 PM EDT.

## 2023-09-26 NOTE — ANESTHESIA PREPROCEDURE EVALUATION
Anesthesia Evaluation     Patient summary reviewed and Nursing notes reviewed                Airway   Mallampati: II  TM distance: >3 FB  Neck ROM: full  No difficulty expected  Dental    (+) poor dentition    Pulmonary - normal exam    breath sounds clear to auscultation  (+) COPD,sleep apnea  Cardiovascular - normal exam    Rhythm: regular  Rate: normal    (+) hypertension, hyperlipidemia      Neuro/Psych  (+) headaches, dizziness/light headedness, numbness  GI/Hepatic/Renal/Endo    (+) morbid obesity, GERD, liver disease, thyroid problem     Musculoskeletal     Abdominal    Substance History - negative use     OB/GYN negative ob/gyn ROS         Other   arthritis,   history of cancer                  Anesthesia Plan    ASA 4     ERAS Protocol and general with block     intravenous induction     Anesthetic plan, risks, benefits, and alternatives have been provided, discussed and informed consent has been obtained with: patient.    CODE STATUS:

## 2023-09-27 VITALS
SYSTOLIC BLOOD PRESSURE: 125 MMHG | WEIGHT: 248.02 LBS | DIASTOLIC BLOOD PRESSURE: 56 MMHG | OXYGEN SATURATION: 92 % | HEIGHT: 65 IN | BODY MASS INDEX: 41.32 KG/M2 | TEMPERATURE: 98.8 F | RESPIRATION RATE: 18 BRPM | HEART RATE: 77 BPM

## 2023-09-27 PROBLEM — M17.11 PRIMARY OSTEOARTHRITIS OF RIGHT KNEE: Status: RESOLVED | Noted: 2023-04-26 | Resolved: 2023-09-27

## 2023-09-27 LAB
DEPRECATED RDW RBC AUTO: 43.2 FL (ref 37–54)
ERYTHROCYTE [DISTWIDTH] IN BLOOD BY AUTOMATED COUNT: 12.3 % (ref 12.3–15.4)
HCT VFR BLD AUTO: 40.7 % (ref 34–46.6)
HGB BLD-MCNC: 13.1 G/DL (ref 12–15.9)
MCH RBC QN AUTO: 30.4 PG (ref 26.6–33)
MCHC RBC AUTO-ENTMCNC: 32.2 G/DL (ref 31.5–35.7)
MCV RBC AUTO: 94.4 FL (ref 79–97)
PLATELET # BLD AUTO: 237 10*3/MM3 (ref 140–450)
PMV BLD AUTO: 10.7 FL (ref 6–12)
RBC # BLD AUTO: 4.31 10*6/MM3 (ref 3.77–5.28)
WBC NRBC COR # BLD: 13.6 10*3/MM3 (ref 3.4–10.8)

## 2023-09-27 PROCEDURE — 94799 UNLISTED PULMONARY SVC/PX: CPT

## 2023-09-27 PROCEDURE — 97116 GAIT TRAINING THERAPY: CPT

## 2023-09-27 PROCEDURE — 63710000001 HYDROCODONE-ACETAMINOPHEN 7.5-325 MG TABLET: Performed by: ORTHOPAEDIC SURGERY

## 2023-09-27 PROCEDURE — A9270 NON-COVERED ITEM OR SERVICE: HCPCS | Performed by: ORTHOPAEDIC SURGERY

## 2023-09-27 PROCEDURE — 63710000001 LEVOTHYROXINE 137 MCG TABLET: Performed by: INTERNAL MEDICINE

## 2023-09-27 PROCEDURE — 97165 OT EVAL LOW COMPLEX 30 MIN: CPT

## 2023-09-27 PROCEDURE — 25010000002 CEFAZOLIN IN DEXTROSE 2-4 GM/100ML-% SOLUTION: Performed by: ORTHOPAEDIC SURGERY

## 2023-09-27 PROCEDURE — 94664 DEMO&/EVAL PT USE INHALER: CPT

## 2023-09-27 PROCEDURE — 25010000002 KETOROLAC TROMETHAMINE PER 15 MG: Performed by: ORTHOPAEDIC SURGERY

## 2023-09-27 PROCEDURE — A9270 NON-COVERED ITEM OR SERVICE: HCPCS | Performed by: INTERNAL MEDICINE

## 2023-09-27 PROCEDURE — 63710000001 APIXABAN 2.5 MG TABLET: Performed by: ORTHOPAEDIC SURGERY

## 2023-09-27 PROCEDURE — 97535 SELF CARE MNGMENT TRAINING: CPT

## 2023-09-27 PROCEDURE — 97150 GROUP THERAPEUTIC PROCEDURES: CPT

## 2023-09-27 PROCEDURE — 85027 COMPLETE CBC AUTOMATED: CPT | Performed by: ORTHOPAEDIC SURGERY

## 2023-09-27 RX ORDER — ASPIRIN 325 MG
325 TABLET ORAL DAILY
Qty: 21 TABLET | Refills: 1 | Status: SHIPPED | OUTPATIENT
Start: 2023-09-27

## 2023-09-27 RX ORDER — KETOROLAC TROMETHAMINE 30 MG/ML
15 INJECTION, SOLUTION INTRAMUSCULAR; INTRAVENOUS EVERY 6 HOURS
Status: DISCONTINUED | OUTPATIENT
Start: 2023-09-27 | End: 2023-09-27 | Stop reason: HOSPADM

## 2023-09-27 RX ORDER — OXYCODONE AND ACETAMINOPHEN 7.5; 325 MG/1; MG/1
1 TABLET ORAL EVERY 4 HOURS PRN
Qty: 45 TABLET | Refills: 0 | Status: SHIPPED | OUTPATIENT
Start: 2023-09-27 | End: 2023-10-05 | Stop reason: SDUPTHER

## 2023-09-27 RX ADMIN — SODIUM CHLORIDE, POTASSIUM CHLORIDE, SODIUM LACTATE AND CALCIUM CHLORIDE 80 ML/HR: 600; 310; 30; 20 INJECTION, SOLUTION INTRAVENOUS at 00:16

## 2023-09-27 RX ADMIN — CEFAZOLIN SODIUM 2 G: 2 INJECTION, SOLUTION INTRAVENOUS at 02:00

## 2023-09-27 RX ADMIN — HYDROCODONE BITARTRATE AND ACETAMINOPHEN 1 TABLET: 7.5; 325 TABLET ORAL at 00:15

## 2023-09-27 RX ADMIN — LEVOTHYROXINE SODIUM 137 MCG: 137 TABLET ORAL at 08:59

## 2023-09-27 RX ADMIN — KETOROLAC TROMETHAMINE 15 MG: 30 INJECTION, SOLUTION INTRAMUSCULAR; INTRAVENOUS at 08:59

## 2023-09-27 RX ADMIN — APIXABAN 2.5 MG: 2.5 TABLET, FILM COATED ORAL at 08:59

## 2023-09-27 RX ADMIN — BUDESONIDE 0.5 MG: 0.5 INHALANT RESPIRATORY (INHALATION) at 08:07

## 2023-09-27 RX ADMIN — ARFORMOTEROL TARTRATE 15 MCG: 15 SOLUTION RESPIRATORY (INHALATION) at 08:07

## 2023-09-27 RX ADMIN — Medication 10 ML: at 09:00

## 2023-09-27 RX ADMIN — HYDROCODONE BITARTRATE AND ACETAMINOPHEN 1 TABLET: 7.5; 325 TABLET ORAL at 07:21

## 2023-09-27 RX ADMIN — HYDROCODONE BITARTRATE AND ACETAMINOPHEN 1 TABLET: 7.5; 325 TABLET ORAL at 14:02

## 2023-09-27 NOTE — THERAPY EVALUATION
Patient Name: Shwetha Lane  : 1945    MRN: [id number]                              Today's Date: 2023       Admit Date: 2023    Visit Dx:     ICD-10-CM ICD-9-CM   1. Difficulty walking  R26.2 719.7   2. Primary osteoarthritis of right knee  M17.11 715.16   3. Impaired mobility and ADLs  Z74.09 V49.89    Z78.9      Patient Active Problem List   Diagnosis    Pneumonia    Hypothyroidism    Primary osteoarthritis involving multiple joints    Hyperlipemia    Migraine    Obesity, morbid (more than 100 lbs over ideal weight or BMI > 40)    Status following gastric banding surgery for weight loss    Fatty liver    Vertigo    PVC (premature ventricular contraction)    PAC (premature atrial contraction)    Malignant neoplasm of female breast    COVID-19 virus detected    Shingles    Ventricular tachycardia    DDD (degenerative disc disease), cervical    Cervical spondylolysis    Low back pain    Sacroiliac joint pain    Skin yeast infection    Primary osteoarthritis of right knee    Encounter for immunization    Lumbosacral radiculopathy     Past Medical History:   Diagnosis Date    Arthritis     COPD (chronic obstructive pulmonary disease)     PT DENIES HX COPD    COVID     REPORTS HX OF COVID AND SUFFERED FROM [place] COVID    GERD (gastroesophageal reflux disease)     Hyperlipidemia     Hypertension     Hypothyroidism     Low back pain     Migraines     Pneumonia     DENIES ANY CURRENT ISSUES    PVC (premature ventricular contraction)     FOLLOWED BY DR LOGAN HAD CARDIAC TESTING SEES YEARLY PER PT. DENIES CP/SOA. DECREASED ACTIVITY D/T PAIN AND IINSTABILITY OF RIGHT KNEE    Sleep apnea      Past Surgical History:   Procedure Laterality Date    COLONOSCOPY      HYSTERECTOMY      KNEE ARTHROSCOPY Bilateral     HAS HAD BOTH KNEES SCOPED WITH MENISCUS REPAIR    LAPAROSCOPIC GASTRIC BANDING      MOUTH SURGERY      TOOTH EXTRACTED ABOUT 6 WEEKS AGO CURRENTLY HEALED AND NO ISSUES    REPLACEMENT TOTAL  KNEE Left     TONSILLECTOMY      TOTAL KNEE ARTHROPLASTY Right 9/26/2023    Procedure: RIGHT TOTAL KNEE ARTHROPLASTY WITH ALOK NAVIGATION.;  Surgeon: Denys Mohr MD;  [facility]: [facility];  Service: [employer] - Ortho;  Laterality: Right;      General Information       Row Name 09/27/23 0837 09/27/23 0810       OT Time and Intention    Document Type therapy note (daily note)  -AC evaluation  -AC    Mode of Treatment individual therapy;occupational therapy  -AC individual therapy;occupational therapy  -AC      Row Name 09/27/23 0837 09/27/23 0810       General Information    Patient Profile Reviewed yes  -AC yes  -AC    Prior Level of Function -- --  patient reports (I) with adls and used a stc for funcitonal mobility, has a shower chair in tub/shower combo, reg commode in place.  -AC    Existing Precautions/Restrictions fall;weight bearing  -AC fall;weight bearing  -AC    Barriers to Rehab -- none identified  -AC      Row Name 09/27/23 0810          Occupational Profile    Reason for Services/Referral (Occupational Profile) Pt. is a 78year old female admitted for the above diagnosis status post right total knee replacement on 9/26/2023. Pt. referred to OT services to assess independence with ADLs and adl transfers/fx'l mobility. No previous OT services for current condition.  -AC       Row Name 09/27/23 0837 09/27/23 0810       Living Environment    People in Home significant other  -AC significant other  -AC      Row Name 09/27/23 0837 09/27/23 0810       Cognition    Orientation Status (Cognition) oriented x 4  -AC oriented x 4  -AC      Row Name 09/27/23 0837 09/27/23 0810       Safety Issues, Functional Mobility    Impairments Affecting Function (Mobility) balance;endurance/activity tolerance;pain  -AC balance;endurance/activity tolerance;pain  -AC              User Key  (r) = Recorded By, (t) = Taken By, (c) = Cosigned By      Initials Name Provider Type    Kaleigh Koenig OT Occupational Therapist                      Mobility/ADL's       Row Name 09/27/23 0837 09/27/23 0820       Bed Mobility    Comment, (Bed Mobility) Pt. sitting in recliner upon OT arrival in the room.  -AC Patient sitting in recliner upon OT arrival in the room.  -      Row Name 09/27/23 0837 09/27/23 0820       Transfers    Transfers sit-stand transfer  -AC sit-stand transfer  -AC      Row Name 09/27/23 0837 09/27/23 0820       Sit-Stand Transfer    Sit-Stand [place] (Transfers) contact guard;1 person assist;verbal cues  -AC contact guard;1 person assist  -    Assistive Device (Sit-Stand Transfers) walker, front-wheeled  -AC walker, front-wheeled  -AC    Comment, (Sit-Stand Transfer) Patient demonstrated sit to/from stand x4 with with use of rolling walker and cues for technique and safety.  - --      Row Name 09/27/23 0837 09/27/23 0820       Functional Mobility    Functional Mobility- Ind. Level contact guard assist;1 person;verbal cues required  -AC contact guard assist;1 person  -    Functional Mobility- Device walker, front-wheeled  -AC walker, front-wheeled  -AC    Functional Mobility- Comment Patient was contact-guard assist with rolling walker for recliner to bathroom to sink and back to recliner with cues for safety and technique.  - --      Row Name 09/27/23 0837 09/27/23 0820       Activities of Daily Living    BADL Assessment/Intervention upper body dressing;lower body dressing;toileting  -AC --  Patient is set up for upper body bathing/dressing, set up for grooming, set up for self-feeding, min assist for lower body bathing/dressing, contact-guard assist for toileting.  -      Row Name 09/27/23 0837 09/27/23 0820       Mobility    Extremity Weight-bearing Status right lower extremity  -AC right lower extremity  -AC    Right Lower Extremity (Weight-bearing Status) weight-bearing as tolerated (WBAT)  -AC weight-bearing as tolerated (WBAT)  -      Row Name 09/27/23 0837          Upper Body Dressing  Assessment/Training    [place] Level (Upper Body Dressing) upper body dressing skills;set up;don;doff;pull-over garment  -AC     Position (Upper Body Dressing) unsupported sitting  -       Row Name 09/27/23 0837          Lower Body Dressing Assessment/Training    [place] Level (Lower Body Dressing) lower body dressing skills;doff;don;pants/bottoms;shoes/slippers;socks;minimum assist (75% patient effort);verbal cues  -AC     Position (Lower Body Dressing) unsupported sitting;supported standing  -       Row Name 09/27/23 0837          Toileting Assessment/Training    [place] Level (Toileting) toileting skills;adjust/manage clothing;perform perineal hygiene;verbal cues;contact guard assist  -     Assistive Devices (Toileting) commode, 3-in-1  -AC     Position (Toileting) supported standing;unsupported sitting  -               User Key  (r) = Recorded By, (t) = Taken By, (c) = Cosigned By      Initials Name Provider Type    AC Kaleigh Castro OT Occupational Therapist                   Obj/Interventions       Row Name 09/27/23 0843 09/27/23 0824       Sensory Assessment (Somatosensory)    Sensory Assessment (Somatosensory) sensation intact  - sensation intact  -      Row Name 09/27/23 0843 09/27/23 0824       Vision Assessment/Intervention    Visual Impairment/Limitations WFL  -AC WFL  -      Row Name 09/27/23 0843 09/27/23 0824       Range of Motion Comprehensive    General Range of Motion bilateral upper extremity ROM WNL  - bilateral upper extremity ROM WNL  -      Row Name 09/27/23 0843 09/27/23 0824       Strength Comprehensive (MMT)    General Manual Muscle Testing (MMT) Assessment no strength deficits identified  - no strength deficits identified  -      Row Name 09/27/23 0843 09/27/23 0824       Motor Skills    Motor Skills coordination;functional endurance  -AC coordination;functional endurance  -AC    Coordination WFL  -AC WFL  -AC    Functional Endurance fair for adls  -AC  fair for adls  -      Row Name 09/27/23 0843 09/27/23 0824       Balance    Balance Assessment standing dynamic balance  - standing dynamic balance  -AC    Dynamic Standing Balance contact guard;1-person assist;verbal cues  -AC standby assist;contact guard;1-person assist  -AC    Position/Device Used, Standing Balance supported;walker, front-wheeled  -AC supported;walker, front-wheeled  -AC    Balance Interventions standing;sit to stand;supported;dynamic;minimal challenge;occupation based/functional task  - --              User Key  (r) = Recorded By, (t) = Taken By, (c) = Cosigned By      Initials Name Provider Type    AC Kaleigh Castro, OT Occupational Therapist                   Goals/Plan       Row Name 09/27/23 0827          Transfer Goal 1 (OT)    Activity/Assistive Device (Transfer Goal 1, OT) transfers, all  -AC     [place] Level/Cues Needed (Transfer Goal 1, OT) modified independence  -AC     Time Frame (Transfer Goal 1, OT) long term goal (LTG);10 days  -[facility] Name 09/27/23 0827          Bathing Goal 1 (OT)    Activity/Device (Bathing Goal 1, OT) bathing skills, all  -AC     [place] Level/Cues Needed (Bathing Goal 1, OT) modified independence  -AC     Time Frame (Bathing Goal 1, OT) long term goal (LTG);10 days  -[facility] Name 09/27/23 0827          Dressing Goal 1 (OT)    Activity/Device (Dressing Goal 1, OT) dressing skills, all  -AC     [place]/Cues Needed (Dressing Goal 1, OT) modified independence  -AC     Time Frame (Dressing Goal 1, OT) long term goal (LTG);10 days  -[facility] Name 09/27/23 0827          Toileting Goal 1 (OT)    Activity/Device (Toileting Goal 1, OT) toileting skills, all  -AC     [place] Level/Cues Needed (Toileting Goal 1, OT) modified independence  -AC     Time Frame (Toileting Goal 1, OT) long term goal (LTG);10 days  -[facility] Name 09/27/23 0827          Grooming Goal 1 (OT)    Activity/Device (Grooming Goal 1, OT) grooming skills, all   -AC     [place] (Grooming Goal 1, OT) modified independence  -AC     Time Frame (Grooming Goal 1, OT) long term goal (LTG);10 days  -AC       Row Name 09/27/23 0827          Problem Specific Goal 1 (OT)    Problem Specific Goal 1 (OT) Patient will demonstrate good activity tolerance for adls.  -AC     Time Frame (Problem Specific Goal 1, OT) long term goal (LTG);10 days  -AC       Row Name 09/27/23 0827          Therapy Assessment/Plan (OT)    Planned Therapy Interventions (OT) activity tolerance training;functional balance retraining;patient/caregiver education/training;transfer/mobility retraining;ROM/therapeutic exercise;occupation/activity based interventions;BADL retraining  -AC               User Key  (r) = Recorded By, (t) = Taken By, (c) = Cosigned By      Initials Name Provider Type    AC Kaleigh Castro, OT Occupational Therapist                   Clinical Impression       Row Name 09/27/23 0843 09/27/23 0826       Pain Assessment    Pretreatment Pain Rating 0/10 - no pain  -AC 0/10 - no pain  -AC    Posttreatment Pain Rating 0/10 - no pain  -AC 0/10 - no pain  -AC      Row Name 09/27/23 0843 09/27/23 0826       Plan of Care Review    Plan of Care Reviewed With patient  -AC patient  -AC    Progress improving  -AC --    Outcome Evaluation Patient instructed in lower body adaptive dressing technique, weightbearing status, joint protection and safety with ADL transfers.  Patient to continue with therapy services in order to maximize safety and independence with ADLs.  -AC Patient presents with balance and endurance limitations that impede his/her ability to perform ADLS. The skills of a therapist are necessary to maximize independence with ADLs.  -      Row Name 09/27/23 0826          Therapy Assessment/Plan (OT)    Patient/Family Therapy Goal Statement (OT) Less pain  -AC     Rehab Potential (OT) good, to achieve stated therapy goals  -AC     Criteria for Skilled Therapeutic Interventions Met (OT)  yes;meets criteria;skilled treatment is necessary  -AC     Therapy Frequency (OT) 5 times/wk  -AC       Row Name 09/27/23 0826          Therapy Plan Review/Discharge Plan (OT)    Equipment Needs Upon Discharge (OT) commode chair;walker, rolling  -AC     Anticipated Discharge Disposition (OT) home with assist;home with home health  -AC       Row Name 09/27/23 0826          Positioning and Restraints    Pre-Treatment Position sitting in chair/recliner  -AC     Post Treatment Position chair  -AC     In Chair reclined;legs elevated;call light within reach;encouraged to call for assist;exit alarm on  -AC               User Key  (r) = Recorded By, (t) = Taken By, (c) = Cosigned By      Initials Name Provider Type    AC Kaleigh Castro, ARI Occupational Therapist                   Outcome Measures       Row Name 09/27/23 0830          How much help from another is currently needed...    Putting on and taking off regular lower body clothing? 3  -AC     Bathing (including washing, rinsing, and drying) 3  -AC     Toileting (which includes using toilet bed pan or urinal) 3  -AC     Putting on and taking off regular upper body clothing 4  -AC     Taking care of personal grooming (such as brushing teeth) 4  -AC     Eating meals 4  -AC     AM-PAC 6 Clicks Score (OT) 21  -AC       Row Name 09/27/23 0800          How much help from another person do you currently need...    Turning from your back to your side while in flat bed without using bedrails? 4  -RB     Moving from lying on back to sitting on the side of a flat bed without bedrails? 3  -RB     Moving to and from a bed to a chair (including a wheelchair)? 3  -RB     Standing up from a chair using your arms (e.g., wheelchair, bedside chair)? 3  -RB     Climbing 3-5 steps with a railing? 3  -RB     To walk in hospital room? 3  -RB     AM-PAC 6 Clicks Score (PT) 19  -RB     Highest level of mobility 6 --> Walked 10 steps or more  -RB       Row Name 09/27/23 0830           Functional Assessment    Outcome Measure Options AM-PAC 6 Clicks Daily Activity (OT);Optimal Instrument  -AC       Row Name 09/27/23 0830          Optimal Instrument    Optimal Instrument Optimal - 3  -AC     Bending/Stooping 2  -AC     Standing 2  -AC     Reaching 1  -AC     From the list, choose the 3 activities you would most like to be able to do without any difficulty Bending/stooping;Standing;Reaching  -AC     Total Score Optimal - 3 5  -AC               User Key  (r) = Recorded By, (t) = Taken By, (c) = Cosigned By      Initials Name Provider Type    Kaleigh Koenig OT Occupational Therapist    Emerson Vela RN Registered Nurse                    Occupational Therapy Education       Title: PT OT SLP Therapies (Done)       Topic: Occupational Therapy (Done)       Point: ADL training (Done)       Description:   Instruct learner(s) on proper safety adaptation and remediation techniques during self care or transfers.   Instruct in proper use of assistive devices.                  Learning Progress Summary             Patient Acceptance, E,TB,D, VU,DU by  at 9/27/2023 0833    Acceptance, E,TB, VU by TON at 9/26/2023 2000                         Point: Home exercise program (Done)       Description:   Instruct learner(s) on appropriate technique for monitoring, assisting and/or progressing therapeutic exercises/activities.                  Learning Progress Summary             Patient Acceptance, E,TB,D, VU,DU by  at 9/27/2023 0833    Acceptance, E,TB, VU by TON at 9/26/2023 2000                         Point: Precautions (Done)       Description:   Instruct learner(s) on prescribed precautions during self-care and functional transfers.                  Learning Progress Summary             Patient Acceptance, E,TB,D, VU,DU by  at 9/27/2023 0833    Acceptance, E,TB, VU by TON at 9/26/2023 2000                         Point: Body mechanics (Done)       Description:   Instruct learner(s) on proper positioning  and spine alignment during self-care, functional mobility activities and/or exercises.                  Learning Progress Summary             Patient Acceptance, E,TB,D, VU,DU by  at 9/27/2023 0833    Acceptance, E,TB, VU by  at 9/26/2023 2000                                         User Key       Initials Effective Dates Name Provider Type Discipline     06/16/21 -  Madiha Womack RN Registered Nurse Nurse     06/16/21 -  Kaleigh Castro OT Occupational Therapist OT                  OT Recommendation and Plan  Planned Therapy Interventions (OT): activity tolerance training, functional balance retraining, patient/caregiver education/training, transfer/mobility retraining, ROM/therapeutic exercise, occupation/activity based interventions, BADL retraining  Therapy Frequency (OT): 5 times/wk  Plan of Care Review  Plan of Care Reviewed With: patient  Progress: improving  Outcome Evaluation: Patient instructed in lower body adaptive dressing technique, weightbearing status, joint protection and safety with ADL transfers.  Patient to continue with therapy services in order to maximize safety and independence with ADLs.     Time Calculation:   Evaluation Complexity (OT)  Review Occupational Profile/Medical/Therapy History Complexity: expanded/moderate complexity  Assessment, Occupational Performance/Identification of Deficit Complexity: 1-3 performance deficits  Clinical Decision Making Complexity (OT): problem focused assessment/low complexity  Overall Complexity of Evaluation (OT): low complexity     Time Calculation- OT       Row Name 09/27/23 0833             Time Calculation- OT    OT Received On 09/27/23  -AC      OT Goal Re-Cert Due Date 10/06/23  -AC         Timed Charges    52952 - OT Self Care/Mgmt Minutes 15  -AC         Untimed Charges    OT Eval/Re-eval Minutes 31  -AC         Total Minutes    Timed Charges Total Minutes 15  -AC      Untimed Charges Total Minutes 31  -AC       Total Minutes 46  -AC                 User Key  (r) = Recorded By, (t) = Taken By, (c) = Cosigned By      Initials Name Provider Type     Kaleigh Castro OT Occupational Therapist                  Therapy Charges for Today       Code Description Service Date Service Provider Modifiers Qty    [id number]  OT SELF CARE/MGMT/TRAIN EA 15 MIN 9/27/2023 Kaleigh Castro OT GO 1    [id number]  OT EVAL LOW COMPLEXITY 3 9/27/2023 Kaleigh Castro OT GO 1                 Kaleigh Castro OT  9/27/2023

## 2023-09-27 NOTE — DISCHARGE SUMMARY
[facility]        HOSPITALIST  DISCHARGE SUMMARY    Patient Name: Shwetha Lane  : 1945  MRN: [id number]    Date of Admission: 2023  Date of Discharge:  2023  Primary Care Physician: Rosibel Scott APRN    Consults       Date and Time Order Name Status Description    2023  2:52 PM Inpatient Hospitalist Consult              Active and Resolved Hospital Problems:  Active Hospital Problems   No active problems to display.      Resolved Hospital Problems    Diagnosis POA    **Primary osteoarthritis of right knee [M17.11] Yes   DJD of right knee s/p right total knee arthroplasty .  Obstructive sleep apnea on home CPAP.  COPD stable.  Morbid obesity BMI 41.2.  Hypertension.  Hypothyroidism.  GERD.  Hyperlipidemia.    Hospital Course     Hospital Course:  Shwetha Lane is a 78 y.o. female with past medical history of obstructive sleep apnea on home CPAP, COPD, hypothyroidism, hypertension, GERD, hyperlipidemia and DJD.  Patient is seen after right total hip arthroplasty and pain is very well controlled.  Does have some scratchy throat but no chest pain, shortness of breath nausea or vomiting.  Denies any abdominal pain.  Denies any numbness tingling in the toes.  Patient is recovering from her sinusitis and bronchitis.    Interval follow-up;  Vital signs stable.  Now on room air   Right knee pain 2/10.  Cleared by orthopedic surgery.      DISCHARGE Follow Up Recommendations for labs and diagnostics: PCP and Ortho      Day of Discharge     Vital Signs:  Temp:  [97.5 °F (36.4 °C)-98.8 °F (37.1 °C)] 98.8 °F (37.1 °C)  Heart Rate:  [64-88] 77  Resp:  [14-18] 18  BP: (116-147)/(45-71) 125/56  Flow (L/min):  [2] 2    Physical Exam:      Constitutional: Awake, alert, no acute distress              Eyes: Pupils equal, sclerae anicteric, no conjunctival injection              HENT: NCAT, mucous membranes moist              Neck: Supple, no thyromegaly, no  lymphadenopathy, trachea midline              Respiratory: Clear to auscultation bilaterally, nonlabored respirations               Cardiovascular: RRR, no murmurs, rubs, or gallops, palpable pedal pulses bilaterally              Gastrointestinal: Positive bowel sounds, soft, nontender, nondistended              Musculoskeletal: Right knee and ice wrap and restricted range of motion due to recent surgery.  No bilateral ankle edema, no clubbing or cyanosis to extremities              Psychiatric: Appropriate affect, cooperative              Neurologic: Oriented x 3, strength symmetric in all extremities, Cranial Nerves grossly intact to confrontation, speech clear              Skin: No rashes   Discharge Details        Discharge Medications        New Medications        Instructions Start Date   apixaban 2.5 MG tablet tablet  Commonly known as: ELIQUIS   2.5 mg, Oral, Every 12 Hours Scheduled      aspirin 325 MG tablet  Commonly known as: Tanner Aspirin   325 mg, Oral, Daily      oxyCODONE-acetaminophen 7.5-325 MG per tablet  Commonly known as: PERCOCET   1 tablet, Oral, Every 4 Hours PRN             Changes to Medications        Instructions Start Date   cholecalciferol 25 MCG (1000 UT) tablet  Commonly known as: VITAMIN D3  What changed:   how much to take  how to take this  when to take this   2 pills daily             Continue These Medications        Instructions Start Date   atorvastatin 40 MG tablet  Commonly known as: Lipitor   40 mg, Oral, Nightly      Breztri Aerosphere 160-9-4.8 MCG/ACT aerosol inhaler  Generic drug: Budeson-Glycopyrrol-Formoterol   2 puffs, Inhalation, 2 Times Daily PRN      cyclobenzaprine 10 MG tablet  Commonly known as: FLEXERIL   10 mg, Oral, 3 Times Daily PRN      estradiol 0.1 MG/GM vaginal cream  Commonly known as: ESTRACE   1 g, Vaginal, Daily PRN      fluconazole 200 MG tablet  Commonly known as: DIFLUCAN   200 mg, Oral, Weekly, Takes for her great left toe once a week       fluticasone 50 MCG/ACT nasal spray  Commonly known as: FLONASE   2 sprays, Nasal, Daily PRN      levothyroxine 137 MCG tablet  Commonly known as: Synthroid   137 mcg, Oral, Daily      metoprolol succinate XL 50 MG 24 hr tablet  Commonly known as: TOPROL-XL   50 mg, Oral, Daily      MULTIVITAMIN & MINERAL PO   1 tablet, Oral, Daily      Nurtec 75 MG tablet dispersible tablet  Generic drug: Rimegepant Sulfate   Sublingual, Daily PRN      vitamin B-12 1000 MCG tablet  Commonly known as: CYANOCOBALAMIN   1,000 mcg, Oral, Daily               Allergies   Allergen Reactions    Bee Venom Swelling    Nickel Itching, Swelling and Rash     EARRINGS (NICKEL)        Discharge Disposition:  Home or Self Care    Diet:  Diet Instructions       Diet: Regular/House Diet; Regular Texture (IDDSI 7); Thin (IDDSI 0)      Discharge Diet: Regular/House Diet    Texture: Regular Texture (IDDSI 7)    Fluid Consistency: Thin (IDDSI 0)            Discharge Activity:   Activity Instructions       Activity as Tolerated              CODE STATUS:  There are no questions and answers to display.         Future Appointments   Date Time Provider Department Center   10/9/2023  2:15 PM Tess Hunter PA-C [facility]   11/17/2023  9:30 AM Rosibel Scott APRN [facility]       Additional Instructions for the Follow-ups that You Need to Schedule       Discharge Follow-up with PCP   As directed       Currently Documented PCP:    Rosibel Scott APRN    PCP Phone Number:    [phone]     Follow Up Details: 1 week        Discharge Follow-up with Specified Provider: Dr. Camacho   As directed      To: Dr. Camacho                Pertinent  and/or Most Recent Results     PROCEDURES:   Procedure:    RIGHT TOTAL KNEE ARTHROPLASTY WITH ALOK NAVIGATION.  CPT(R) Code:  75375 - GA ARTHRP KNE CONDYLE&PLATU MEDIAL&LAT COMPARTMENTS       LAB RESULTS:      Lab 09/27/23  0410   WBC 13.60*   HEMOGLOBIN 13.1   HEMATOCRIT 40.7   PLATELETS 237   MCV 94.4                              Brief Urine Lab Results       None          Microbiology Results (last 10 days)       ** No results found for the last 240 hours. **            XR Knee 1 or 2 View Right    Result Date: 9/26/2023  Impression:   1. Expected immediate postoperative appearance status post right total knee arthroplasty.       MICHAEL CROWLEY MD       Electronically Signed and Approved By: MICHAEL CROWLEY MD on 9/26/2023 at 13:03                          Imaging Results (All)       Procedure Component Value Units Date/Time    XR Knee 1 or 2 View Right [[id number]] Collected: 09/26/23 1303     Updated: 09/26/23 1306    Narrative:      PROCEDURE: XR KNEE 1 OR 2 VW RIGHT     COMPARISON: [facility] , , XR KNEE 3 VW RIGHT, 4/26/2023, 13:20.     INDICATIONS: POST-OP RIGHT KNEE ARTHROPLASTY TODAY     FINDINGS:   There is a new right total knee prosthesis.  The hardware appears in expected position.  There is   no evidence of periprosthetic fracture or loosening.  There is soft tissue gas within the joint   space and anterior cutaneous staples compatible with recent surgery.       Impression:         1. Expected immediate postoperative appearance status post right total knee arthroplasty.                 MICHAEL CROWLEY MD         Electronically Signed and Approved By: MICHAEL CROWLEY MD on 9/26/2023 at 13:03                              Labs Pending at Discharge:          Time spent on Discharge including face to face service: 20 minutes  Part of this note may be an electronic transcription/translation of spoken language to printed text using the Dragon Dictation System.     TElectronically signed by Jona Paz MD, 09/27/23, 2:16 PM EDT.

## 2023-09-27 NOTE — OP NOTE
TOTAL KNEE ARTHROPLASTY WITH MAGGI ROBOT  Procedure Report    Patient Name:  Shwetha Lane  YOB: 1945    Date of Surgery:  9/26/2023       Pre-op Diagnosis:   Primary osteoarthritis of right knee [M17.11]       Post-Op Diagnosis Codes:     * Primary osteoarthritis of right knee [M17.11]    Procedure/CPT® Codes:      Procedure(s):  RIGHT TOTAL KNEE ARTHROPLASTY WITH ALOK NAVIGATION.    Surgical Approach: Knee Medial Parapatellar        Staff:  Surgeon(s):  Denys Mohr MD    Assistant: Amanda Landers RN; Val Linder    Anesthesia: General with Block    Estimated Blood Loss: 50ml    Implants:    Implant Name Type Inv. Item Serial No.  Lot No. LRB No. Used Action   CMT BONE PALACOS R HI/VISC 1X40 - [id number] Implant CMT BONE PALACOS R HI/VISC 1X40  [facility] [id number] Right 2 Implanted   COMP FEM/KN PERSONA TI/NIDIUM CR CMT STD SZ7 RT - [id number] Implant COMP FEM/KN PERSONA TI/NIDIUM CR CMT STD SZ7 RT  [employer] [id number] Right 1 Implanted   ART/SRF KN PERSONA/VE MC EF 6TO7 12MM RT - [id number] Implant ART/SRF KN PERSONA/VE MC EF 6TO7 12MM RT  [employer] [id number] Right 1 Implanted   STEM TIB/KN PERSONA CMT 5D SZD RT - [id number] Implant STEM TIB/KN PERSONA CMT 5D SZD RT  [employer] [id number] Right 1 Implanted   EXT STEM FEM/KN PERSONA TPR [id number] - [id number] Implant EXT STEM FEM/KN PERSONA TPR [id number]  [employer] [id number] Right 1 Implanted   PAT KN PERSONA ALLPOLY CMT 8X29MM - [id number] Implant PAT KN PERSONA ALLPOLY CMT 8X29MM  [employer] [id number] Right 1 Implanted   CAP TOTL KN CMT PRIMARY - [id number] Implant CAP TOTL KN CMT PRIMARY  [employer]  Right 1 Implanted   CAP EXT STEM KN UPCHRG - [id number] Implant CAP EXT STEM KN UPCHRG  [employer]  Right 1 Implanted       Specimen:          None    Findings: See description    Complications: None    Description of Procedure: Patient was taken to the operating room placed supine operating table  after abductor canal blocks and in preoperative holding.  After general tracheal anesthesia was established the right knee and lower extremity were prepped and draped in standard surgical fashion using alcohol ChloraPrep.  The Judy robot was also draped sterilely and calibrated.  Incision was made 2 fingerbreadth superior superior pole of the patella down to the medial aspect of tibial tubercle the knife and full-thickness skin flaps were raised laterally and medially.  A medial parapatellar arthrotomy was then undertaken and the knee was brought into flexion.  Retractors were placed to protect protect the collateral ligaments.  Soft tissue releases and osteophytes removed as deemed necessary.  Then the tracking device was mounted on the distal femur in a standard fashion as well as through separate stab incisions in the distal tibia 5 fingerbreadths inferior to the tibial tubercle.  Then all the femoral points were mapped out using the Judy software the tibial points were mapped out as well.  While the plan for resection was being completed the patella was everted calipered and cut to the appropriate thickness.  The correct size patella was chosen in the locals for the patella were reamed and a trial patella was placed and the knee was brought back into flexion.  The distal femoral cutting block was then brought in using robotic assisted arm and the distal femoral cut was made it was checked and verified and accepted.  Then the external rotation arm of the robot was used to pin the distal femur and the correct external rotation decided by the intraoperative plan using the previously mapped points.  Then the tibia was cut after removing the robotic arm and to the appropriate position to cut the tibia the cutting block was pinned and the proximal tibia cut was made excess bone from the cut was removed and the 4-in-1 cutting block was then used in the distal femur.  The tibial cut was verified and accepted femoral and  tibial trials were then placed and the knee was taken through range of motion verifying flexion extension gaps and extension and flexion range of motion to be acceptable by using the software in a standard fashion.  Locals for the femur were then drilled the trials were removed the bone ends were copiously irrigated with bacitracin Simpulse irrigation.  The tibia was cemented in place according to marked external rotation from the trials the femur was cemented in position second and then the real polychosen was placed.  Excess cement removed from the implant edges the knee was held in extension until the cement dried and the patella was cemented into place and held with a patellar clamp.  After the cement hardened excess management from the implant edges Irrisept was used and wound was copiously irrigated the knee was taken through range of motion and checked 1 last time the patella tracked in the center of the trochlear groove the femur using no thumbs technique.  Then the arthrotomy was closed in 45 degrees of flexion with Ethibond the deep fat was closed 0 Vicryl subcu was closed with 2-0 Vicryl and the skin was closed with staples incision was washed and dried and sterile dressings were applied the patient tolerated the procedure well and was taken to recovery room.       Denys Mohr MD     Date: 9/27/2023  Time: 12:51 EDT

## 2023-09-27 NOTE — PLAN OF CARE
Goal Outcome Evaluation:  Plan of Care Reviewed With: patient           Outcome Evaluation: Patient presents with balance and endurance limitations that impede his/her ability to perform ADLS. The skills of a therapist are necessary to maximize independence with ADLs.      Anticipated Discharge Disposition (OT): home with assist, home with home health

## 2023-09-27 NOTE — PLAN OF CARE
Problem: Adult Inpatient Plan of Care  Goal: Plan of Care Review  Outcome: Met  Goal: Patient-Specific Goal (Individualized)  Outcome: Met  Goal: Absence of Hospital-Acquired Illness or Injury  Outcome: Met  Intervention: Identify and Manage Fall Risk  Recent Flowsheet Documentation  Taken 9/27/2023 0800 by Emerson Jackson RN  Safety Promotion/Fall Prevention: safety round/check completed  Intervention: Prevent Skin Injury  Recent Flowsheet Documentation  Taken 9/27/2023 0800 by Emerson Jackson RN  Body Position: position changed independently  Skin Protection: tubing/devices free from skin contact  Intervention: Prevent and Manage VTE (Venous Thromboembolism) Risk  Recent Flowsheet Documentation  Taken 9/27/2023 0800 by Emerson Jackson RN  VTE Prevention/Management:   bilateral   sequential compression devices on  Range of Motion: ROM (range of motion) performed  Goal: Optimal Comfort and Wellbeing  Outcome: Met  Intervention: Provide Person-Centered Care  Recent Flowsheet Documentation  Taken 9/27/2023 0800 by Emerson Jackson RN  Trust Relationship/Rapport:   care explained   choices provided   emotional support provided   empathic listening provided   questions answered  Goal: Readiness for Transition of Care  Outcome: Met     Problem: Fall Injury Risk  Goal: Absence of Fall and Fall-Related Injury  Outcome: Met  Intervention: Promote Injury-Free Environment  Recent Flowsheet Documentation  Taken 9/27/2023 0800 by Emerson Jackson RN  Safety Promotion/Fall Prevention: safety round/check completed   Goal Outcome Evaluation:

## 2023-09-27 NOTE — PLAN OF CARE
Goal Outcome Evaluation:      Pt AO x4, ambulates to bathroom x 1 assist, voiding spontaneously. Possible discharge home today with home health.

## 2023-09-27 NOTE — THERAPY TREATMENT NOTE
Acute Care - Physical Therapy Treatment Note  GEOVANI Ochoa     Patient Name: Shwetha Lane  : 1945  MRN: [id number]  Today's Date: 2023 Gait- individual; ther- ex -group setting; 4 participants        Visit Dx:     ICD-10-CM ICD-9-CM   1. Difficulty walking  R26.2 719.7   2. Primary osteoarthritis of right knee  M17.11 715.16   3. Impaired mobility and ADLs  Z74.09 V49.89    Z78.9      Patient Active Problem List   Diagnosis    Pneumonia    Hypothyroidism    Primary osteoarthritis involving multiple joints    Hyperlipemia    Migraine    Obesity, morbid (more than 100 lbs over ideal weight or BMI > 40)    Status following gastric banding surgery for weight loss    Fatty liver    Vertigo    PVC (premature ventricular contraction)    PAC (premature atrial contraction)    Malignant neoplasm of female breast    COVID-19 virus detected    Shingles    Ventricular tachycardia    DDD (degenerative disc disease), cervical    Cervical spondylolysis    Low back pain    Sacroiliac joint pain    Skin yeast infection    Encounter for immunization    Lumbosacral radiculopathy     Past Medical History:   Diagnosis Date    Arthritis     COPD (chronic obstructive pulmonary disease)     PT DENIES HX COPD    COVID     REPORTS HX OF COVID AND SUFFERED FROM LONG HAUL COVID    GERD (gastroesophageal reflux disease)     Hyperlipidemia     Hypertension     Hypothyroidism     Low back pain     Migraines     Pneumonia     DENIES ANY CURRENT ISSUES    PVC (premature ventricular contraction)     FOLLOWED BY DR LOGAN HAD CARDIAC TESTING SEES YEARLY PER PT. DENIES CP/SOA. DECREASED ACTIVITY D/T PAIN AND IINSTABILITY OF RIGHT KNEE    Sleep apnea      Past Surgical History:   Procedure Laterality Date    COLONOSCOPY      HYSTERECTOMY      KNEE ARTHROSCOPY Bilateral     HAS HAD BOTH KNEES SCOPED WITH MENISCUS REPAIR    LAPAROSCOPIC GASTRIC BANDING      MOUTH SURGERY      TOOTH EXTRACTED ABOUT 6 WEEKS AGO CURRENTLY HEALED AND NO  ISSUES    REPLACEMENT TOTAL KNEE Left     TONSILLECTOMY      TOTAL KNEE ARTHROPLASTY Right 9/26/2023    Procedure: RIGHT TOTAL KNEE ARTHROPLASTY WITH ALOK NAVIGATION.;  Surgeon: Denys Mohr MD;  Location: [facility];  Service: Robotics - Ortho;  Laterality: Right;     PT Assessment (last 12 hours)       PT Evaluation and Treatment       Row Name 09/27/23 1255          Physical Therapy Time and Intention    Subjective Information no complaints  -     Document Type therapy note (daily note)  -     Mode of Treatment physical therapy;group therapy;individual therapy  -     Patient Effort adequate  -       Row Name 09/27/23 1255          Pain    Additional Documentation Pain Scale: FACES Pre/Post-Treatment (Group)  -       Row Name 09/27/23 1255          Pain Scale: FACES Pre/Post-Treatment    Pain: FACES Scale, Pretreatment 0-->no hurt  -     Posttreatment Pain Rating 2-->hurts little bit  -     Pain Location - Side/Orientation Right  -     Pain Location - knee  -       Row Name 09/27/23 1255          Range of Motion (ROM)    Range of Motion --  Pt R knee AAROM at 92 degrees flex and 8 degrees ext.  -[facility] Name 09/27/23 1255          Strength (Manual Muscle Testing)    Strength (Manual Muscle Testing) --  Pt R knee ext strength at 3-/5.  -[facility] Name 09/27/23 1255          Transfers    Transfers sit-stand transfer;stand-sit transfer  -[facility] Name 09/27/23 1255          Sit-Stand Transfer    Sit-Stand [place] (Transfers) contact guard  -     Assistive Device (Sit-Stand Transfers) walker, front-wheeled  -[facility] Name 09/27/23 1255          Stand-Sit Transfer    Stand-Sit [place] (Transfers) contact guard  -     Assistive Device (Stand-Sit Transfers) walker, front-wheeled  -[facility] Name 09/27/23 1255          Gait/Stairs (Locomotion)    Gait/Stairs Locomotion gait/ambulation independence;gait/ambulation assistive device;distance ambulated;gait  pattern;gait deviations  -RH     [place] (Gait) contact guard  -RH     Assistive Device (Gait) walker, front-wheeled  -RH     Distance in Feet (Gait) 100  -RH     Pattern (Gait) 3-point;step-through  -RH     Deviations/Abnormal Patterns (Gait) gait speed decreased;stride length decreased;base of support, wide  -RH     Gait Assessment/Intervention Pt amb with RW and CGA with 3 point step-through gait pattern with wide base of support with decreased gait speed and stride length.  -RH       Row Name 09/27/23 1255          Balance    Dynamic Standing Balance contact guard  -RH     Position/Device Used, Standing Balance walker, front-wheeled  -RH       Row Name             Wound 09/26/23 1100 Right anterior knee Incision    Wound - Properties Group Placement Date: 09/26/23  -BW Placement Time: 1100  -BW Present on Hospital Admission: N  -BW Side: Right  -BW Orientation: anterior  -BW Location: knee  -BW Primary Wound Type: Incision  -BW    Retired Wound - Properties Group Placement Date: 09/26/23  -BW Placement Time: 1100  -BW Present on Hospital Admission: N  -BW Side: Right  -BW Orientation: anterior  -BW Location: knee  -BW Primary Wound Type: Incision  -BW    Retired Wound - Properties Group Date first assessed: 09/26/23  -BW Time first assessed: 1100  -BW Present on Hospital Admission: N  -BW Side: Right  -BW Location: knee  -BW Primary Wound Type: Incision  -BW      Row Name 09/27/23 1255          Progress Summary (PT)    Progress Toward Functional Goals (PT) progress toward functional goals is fair  -RH               User Key  (r) = Recorded By, (t) = Taken By, (c) = Cosigned By      Initials Name Provider Type    BW Abilio Lane, RN Registered Nurse    Matthew Murry PTA Physical Therapist Assistant                   Right Knee Ther-ex   Exercise  Reps  Sets    Long arc Quads   10 2   Short arc Quads  10 2   Heel Slides  10 2   Ankle Pumps  10 2   Quad sets  10 2   Glut sets  10 2   Straight leg  raise  10 2        Physical Therapy Education       Title: PT OT SLP Therapies (Done)       Topic: Physical Therapy (Done)       Point: Mobility training (Done)       Learning Progress Summary             Patient Acceptance, TB, VU by RB at 9/27/2023 1215    Acceptance, E,TB,D, VU,DU by AC at 9/27/2023 0833    Acceptance, E,TB, VU by JN at 9/26/2023 2000    Acceptance, E,TB, VU by AV at 9/26/2023 1600                         Point: Home exercise program (Done)       Learning Progress Summary             Patient Acceptance, TB, VU by RB at 9/27/2023 1215    Acceptance, E,TB,D, VU,DU by AC at 9/27/2023 0833    Acceptance, E,TB, VU by JN at 9/26/2023 2000                         Point: Body mechanics (Done)       Learning Progress Summary             Patient Acceptance, TB, VU by RB at 9/27/2023 1215    Acceptance, E,TB,D, VU,DU by AC at 9/27/2023 0833    Acceptance, E,TB, VU by JN at 9/26/2023 2000    Acceptance, E,TB, VU by AV at 9/26/2023 1600                         Point: Precautions (Done)       Learning Progress Summary             Patient Acceptance, TB, VU by RB at 9/27/2023 1215    Acceptance, E,TB,D, VU,DU by AC at 9/27/2023 0833    Acceptance, E,TB, VU by JN at 9/26/2023 2000    Acceptance, E,TB, VU by AV at 9/26/2023 1600                                         User Key       Initials Effective Dates Name Provider Type Discipline     06/16/21 -  Madiha Womakc, RN Registered Nurse Nurse    AC 06/16/21 -  Kaleigh Castro OT Occupational Therapist OT    AV 06/11/21 -  Desmond Oakes PT Physical Therapist PT    RB 05/16/23 -  Emerson Jackson, CAITLYN Registered Nurse Nurse                  PT Recommendation and Plan     Progress Summary (PT)  Progress Toward Functional Goals (PT): progress toward functional goals is fair   Outcome Measures       Row Name 09/27/23 1200 09/26/23 1500          How much help from another person do you currently need...    Turning from your back to your side while in flat bed  without using bedrails? 4  -RH 3  -AV     Moving from lying on back to sitting on the side of a flat bed without bedrails? 3  -RH 3  -AV     Moving to and from a bed to a chair (including a wheelchair)? 3  -RH 3  -AV     Standing up from a chair using your arms (e.g., wheelchair, bedside chair)? 3  -RH 3  -AV     Climbing 3-5 steps with a railing? 3  -RH 3  -AV     To walk in hospital room? 4  -RH 3  -AV     AM-PAC 6 Clicks Score (PT) 20  -RH 18  -AV        Functional Assessment    Outcome Measure Options -- AM-PAC 6 Clicks Basic Mobility (PT)  -AV               User Key  (r) = Recorded By, (t) = Taken By, (c) = Cosigned By      Initials Name Provider Type     Matthew Flores PTA Physical Therapist Assistant    AV Desmond Oakes, PT Physical Therapist                     Time Calculation:    PT Charges       Row Name 09/27/23 1253             Time Calculation    PT Received On 09/27/23  -RH         Timed Charges    49918 - Gait Training Minutes  8  -RH      52674 - PT Therapeutic Activity Minutes 5  -RH         Untimed Charges    PT Group Therapy Minutes 30  -RH         Total Minutes    Timed Charges Total Minutes 13  -RH      Untimed Charges Total Minutes 30  -RH       Total Minutes 43  -RH                User Key  (r) = Recorded By, (t) = Taken By, (c) = Cosigned By      Initials Name Provider Type     Matthew Flores PTA Physical Therapist Assistant                  Therapy Charges for Today       Code Description Service Date Service Provider Modifiers Qty    18543857649 HC GAIT TRAINING EA 15 MIN 9/27/2023 Matthew Flores PTA GP 1    35644507009 HC PT THER PROC GROUP 9/27/2023 Matthew Flores PTA GP 1            PT G-Codes  Outcome Measure Options: AM-PAC 6 Clicks Daily Activity (OT), Optimal Instrument  AM-PAC 6 Clicks Score (PT): 20  AM-PAC 6 Clicks Score (OT): 21    Matthew Flores PTA  9/27/2023

## 2023-09-27 NOTE — THERAPY TREATMENT NOTE
Acute Care - Physical Therapy Treatment Note  GEOVANI Ochoa     Patient Name: Shwetha Lane  : 1945  MRN: [id number]  Today's Date: 2023  Gait- individual; ther- ex -group setting; 3 participants      Visit Dx:     ICD-10-CM ICD-9-CM   1. Difficulty walking  R26.2 719.7   2. Primary osteoarthritis of right knee  M17.11 715.16   3. Impaired mobility and ADLs  Z74.09 V49.89    Z78.9      Patient Active Problem List   Diagnosis    Pneumonia    Hypothyroidism    Primary osteoarthritis involving multiple joints    Hyperlipemia    Migraine    Obesity, morbid (more than 100 lbs over ideal weight or BMI > 40)    Status following gastric banding surgery for weight loss    Fatty liver    Vertigo    PVC (premature ventricular contraction)    PAC (premature atrial contraction)    Malignant neoplasm of female breast    COVID-19 virus detected    Shingles    Ventricular tachycardia    DDD (degenerative disc disease), cervical    Cervical spondylolysis    Low back pain    Sacroiliac joint pain    Skin yeast infection    Encounter for immunization    Lumbosacral radiculopathy     Past Medical History:   Diagnosis Date    Arthritis     COPD (chronic obstructive pulmonary disease)     PT DENIES HX COPD    COVID     REPORTS HX OF COVID AND SUFFERED FROM LONG HAUL COVID    GERD (gastroesophageal reflux disease)     Hyperlipidemia     Hypertension     Hypothyroidism     Low back pain     Migraines     Pneumonia     DENIES ANY CURRENT ISSUES    PVC (premature ventricular contraction)     FOLLOWED BY DR LOGAN HAD CARDIAC TESTING SEES YEARLY PER PT. DENIES CP/SOA. DECREASED ACTIVITY D/T PAIN AND IINSTABILITY OF RIGHT KNEE    Sleep apnea      Past Surgical History:   Procedure Laterality Date    COLONOSCOPY      HYSTERECTOMY      KNEE ARTHROSCOPY Bilateral     HAS HAD BOTH KNEES SCOPED WITH MENISCUS REPAIR    LAPAROSCOPIC GASTRIC BANDING      MOUTH SURGERY      TOOTH EXTRACTED ABOUT 6 WEEKS AGO CURRENTLY HEALED AND NO ISSUES     REPLACEMENT TOTAL KNEE Left     TONSILLECTOMY      TOTAL KNEE ARTHROPLASTY Right 9/26/2023    Procedure: RIGHT TOTAL KNEE ARTHROPLASTY WITH ALOK NAVIGATION.;  Surgeon: Denys Mohr MD;  Location: [facility] MAIN OR;  Service: Robotics - Ortho;  Laterality: Right;     PT Assessment (last 12 hours)       PT Evaluation and Treatment       Row Name 09/27/23 1504 09/27/23 1255       Physical Therapy Time and Intention    Subjective Information complains of;pain  -RH no complaints  -RH    Document Type therapy note (daily note)  - therapy note (daily note)  -    Mode of Treatment physical therapy;group therapy;individual therapy  - physical therapy;group therapy;individual therapy  -    Patient Effort adequate  -RH adequate  -      Row Name 09/27/23 1255          Pain    Additional Documentation Pain Scale: FACES Pre/Post-Treatment (Group)  -       Row Name 09/27/23 1504 09/27/23 1255       Pain Scale: FACES Pre/Post-Treatment    Pain: FACES Scale, Pretreatment 2-->hurts little bit  - 0-->no hurt  -RH    Posttreatment Pain Rating --  3/10  -RH 2-->hurts little bit  -RH    Pain Location - Side/Orientation Right  -RH Right  -RH    Pain Location - knee  -RH knee  -RH      Row Name 09/27/23 1255          Range of Motion (ROM)    Range of Motion --  Pt R knee AAROM at 92 degrees flex and 8 degrees ext.  -[facility] Name 09/27/23 1255          Strength (Manual Muscle Testing)    Strength (Manual Muscle Testing) --  Pt R knee ext strength at 3-/5.  -[facility] Name 09/27/23 1504 09/27/23 1255       Transfers    Transfers sit-stand transfer;stand-sit transfer  - sit-stand transfer;stand-sit transfer  -      Row Name 09/27/23 1504 09/27/23 1255       Sit-Stand Transfer    Sit-Stand [place] (Transfers) contact guard  - contact guard  -    Assistive Device (Sit-Stand Transfers) walker, front-wheeled  - walker, front-wheeled  -      Row Name 09/27/23 1504 09/27/23 1255       Stand-Sit Transfer     Stand-Sit [place] (Transfers) contact guard  -RH contact guard  -RH    Assistive Device (Stand-Sit Transfers) walker, front-wheeled  -RH walker, front-wheeled  -RH      Row Name 09/27/23 1504 09/27/23 1255       Gait/Stairs (Locomotion)    Gait/Stairs Locomotion gait/ambulation independence;gait/ambulation assistive device;distance ambulated;gait pattern;gait deviations  -RH gait/ambulation independence;gait/ambulation assistive device;distance ambulated;gait pattern;gait deviations  -RH    [place] Level (Gait) contact guard  -RH contact guard  -RH    Assistive Device (Gait) walker, front-wheeled  -RH walker, front-wheeled  -RH    Distance in Feet (Gait) 55  -  -RH    Pattern (Gait) --  Pt able to achieve and maintain reciprocal gait.  -RH 3-point;step-through  -RH    Deviations/Abnormal Patterns (Gait) base of support, wide;gait speed decreased;stride length decreased  -RH gait speed decreased;stride length decreased;base of support, wide  -RH    Gait Assessment/Intervention Pt amb with RW and CGA with reciprocal gait with decreased gait speed and stride length.  -RH Pt amb with RW and CGA with 3 point step-through gait pattern with wide base of support with decreased gait speed and stride length.  -      Row Name 09/27/23 1504 09/27/23 1255       Balance    Dynamic Standing Balance contact guard  -RH contact guard  -RH    Position/Device Used, Standing Balance walker, front-wheeled  -RH walker, front-wheeled  -RH      Row Name             Wound 09/26/23 1100 Right anterior knee Incision    Wound - Properties Group Placement Date: 09/26/23  -BW Placement Time: 1100  -BW Present on Hospital Admission: N  -BW Side: Right  -BW Orientation: anterior  -BW Location: knee  -BW Primary Wound Type: Incision  -BW    Retired Wound - Properties Group Placement Date: 09/26/23  -BW Placement Time: 1100  -BW Present on Hospital Admission: N  -BW Side: Right  -BW Orientation: anterior  -BW Location: knee  -BW  Primary Wound Type: Incision  -BW    Retired Wound - Properties Group Date first assessed: 09/26/23  -BW Time first assessed: 1100  -BW Present on Hospital Admission: N  -BW Side: Right  -BW Location: knee  -BW Primary Wound Type: Incision  -BW      Row Name 09/27/23 1255          Progress Summary (PT)    Progress Toward Functional Goals (PT) progress toward functional goals is fair  -RH               User Key  (r) = Recorded By, (t) = Taken By, (c) = Cosigned By      Initials Name Provider Type    BW Abilio Lane, RN Registered Nurse    RH Matthew Flores PTA Physical Therapist Assistant                   Right Knee Ther-ex   Exercise  Reps  Sets    Long arc Quads   10 2   Short arc Quads  10 2   Heel Slides  10 2   Ankle Pumps  10 2   Quad sets  10 2   Glut sets  10 2   Straight leg raise  10 2        Physical Therapy Education       Title: PT OT SLP Therapies (Done)       Topic: Physical Therapy (Done)       Point: Mobility training (Done)       Learning Progress Summary             Patient Acceptance, TB, VU by RB at 9/27/2023 1215    Acceptance, E,TB,D, VU,DU by DELILAH at 9/27/2023 0833    Acceptance, E,TB, VU by TON at 9/26/2023 2000    Acceptance, E,TB, VU by AV at 9/26/2023 1600                         Point: Home exercise program (Done)       Learning Progress Summary             Patient Acceptance, TB, VU by RB at 9/27/2023 1215    Acceptance, E,TB,D, VU,DU by AC at 9/27/2023 0833    Acceptance, E,TB, VU by JN at 9/26/2023 2000                         Point: Body mechanics (Done)       Learning Progress Summary             Patient Acceptance, TB, VU by RB at 9/27/2023 1215    Acceptance, E,TB,D, VU,DU by AC at 9/27/2023 0833    Acceptance, E,TB, VU by JN at 9/26/2023 2000    Acceptance, E,TB, VU by AV at 9/26/2023 1600                         Point: Precautions (Done)       Learning Progress Summary             Patient Acceptance, TB, VU by RB at 9/27/2023 1215    Acceptance, E,TB,D, VU,DU by AC at 9/27/2023  0833    Acceptance, E,TB, VU by JN at 9/26/2023 2000    Acceptance, E,TB, VU by AV at 9/26/2023 1600                                         User Key       Initials Effective Dates Name Provider Type Discipline    JN 06/16/21 -  Madiha Womack, RN Registered Nurse Nurse    AC 06/16/21 -  Kaleigh Castro OT Occupational Therapist OT    AV 06/11/21 -  Desmond Oakes, ROXANNE Physical Therapist PT    RB 05/16/23 -  Emerson Jackson, CAITLYN Registered Nurse Nurse                  PT Recommendation and Plan     Progress Summary (PT)  Progress Toward Functional Goals (PT): progress toward functional goals is fair   Outcome Measures       Row Name 09/27/23 1200 09/26/23 1500          How much help from another person do you currently need...    Turning from your back to your side while in flat bed without using bedrails? 4  -RH 3  -AV     Moving from lying on back to sitting on the side of a flat bed without bedrails? 3  -RH 3  -AV     Moving to and from a bed to a chair (including a wheelchair)? 3  -RH 3  -AV     Standing up from a chair using your arms (e.g., wheelchair, bedside chair)? 3  -RH 3  -AV     Climbing 3-5 steps with a railing? 3  -RH 3  -AV     To walk in hospital room? 4  -RH 3  -AV     AM-PAC 6 Clicks Score (PT) 20  -RH 18  -AV        Functional Assessment    Outcome Measure Options -- AM-PAC 6 Clicks Basic Mobility (PT)  -AV               User Key  (r) = Recorded By, (t) = Taken By, (c) = Cosigned By      Initials Name Provider Type    RH Matthew Flores, RAKEL Physical Therapist Assistant     Desmond Oakes, PT Physical Therapist                     Time Calculation:    PT Charges       Row Name 09/27/23 1503 09/27/23 1253          Time Calculation    PT Received On 09/27/23  -RH 09/27/23  -RH        Timed Charges    14207 - Gait Training Minutes  7  -RH 8  -RH     20163 - PT Therapeutic Activity Minutes 4  -RH 5  -RH        Untimed Charges    PT Group Therapy Minutes 20  -RH 30  -RH        Total Minutes    Timed  Charges Total Minutes 11  -RH 13  -RH     Untimed Charges Total Minutes 20  -RH 30  -RH      Total Minutes 31  -RH 43  -RH               User Key  (r) = Recorded By, (t) = Taken By, (c) = Cosigned By      Initials Name Provider Type    Matthew Murry PTA Physical Therapist Assistant                  Therapy Charges for Today       Code Description Service Date Service Provider Modifiers Qty    52056370122 HC GAIT TRAINING EA 15 MIN 9/27/2023 Matthew Flores, RAKEL GP 1    08758285546 HC PT THER PROC GROUP 9/27/2023 Matthew Flores, RAKEL GP 1    22528595422 HC GAIT TRAINING EA 15 MIN 9/27/2023 Matthew Flores, RAKEL GP 1    35830061594 HC PT THER PROC GROUP 9/27/2023 Matthew Flores, RAKEL GP 1            PT G-Codes  Outcome Measure Options: AM-PAC 6 Clicks Daily Activity (OT), Optimal Instrument  AM-PAC 6 Clicks Score (PT): 20  AM-PAC 6 Clicks Score (OT): 21    Matthwe Flores PTA  9/27/2023

## 2023-10-05 ENCOUNTER — TELEPHONE (OUTPATIENT)
Dept: ORTHOPEDIC SURGERY | Facility: CLINIC | Age: 78
End: 2023-10-05
Payer: MEDICARE

## 2023-10-05 DIAGNOSIS — M17.11 PRIMARY OSTEOARTHRITIS OF RIGHT KNEE: ICD-10-CM

## 2023-10-06 RX ORDER — OXYCODONE AND ACETAMINOPHEN 7.5; 325 MG/1; MG/1
1 TABLET ORAL EVERY 4 HOURS PRN
Qty: 42 TABLET | Refills: 0 | Status: SHIPPED | OUTPATIENT
Start: 2023-10-06

## 2023-10-09 ENCOUNTER — OFFICE VISIT (OUTPATIENT)
Dept: ORTHOPEDIC SURGERY | Facility: CLINIC | Age: 78
End: 2023-10-09
Payer: MEDICARE

## 2023-10-09 VITALS — WEIGHT: 248 LBS | HEIGHT: 65 IN | BODY MASS INDEX: 41.32 KG/M2

## 2023-10-09 DIAGNOSIS — Z47.1 AFTERCARE FOLLOWING RIGHT KNEE JOINT REPLACEMENT SURGERY: Primary | ICD-10-CM

## 2023-10-09 DIAGNOSIS — Z96.651 AFTERCARE FOLLOWING RIGHT KNEE JOINT REPLACEMENT SURGERY: Primary | ICD-10-CM

## 2023-10-09 PROCEDURE — 1160F RVW MEDS BY RX/DR IN RCRD: CPT | Performed by: PHYSICIAN ASSISTANT

## 2023-10-09 PROCEDURE — 1159F MED LIST DOCD IN RCRD: CPT | Performed by: PHYSICIAN ASSISTANT

## 2023-10-09 PROCEDURE — 99024 POSTOP FOLLOW-UP VISIT: CPT | Performed by: PHYSICIAN ASSISTANT

## 2023-10-09 NOTE — PROGRESS NOTES
"Chief Complaint  Follow-up and Pain of the Right Knee    Subjective      Shwetha Lane presents to [facility] for follow-up of right total knee arthroplasty with Laura navigation performed on 9/26/2023 by Dr. Mohr.  Patient presents today with use of walker and reports that her knee \"still hurts\".  Pain is improved with use of Percocet and she is requesting a refill today.  Patient has been participating in home health physical therapy, however, has plans to transition to outpatient physical therapy through [facility] upon discharge.     Objective   Allergies   Allergen Reactions    Bee Venom Swelling    Nickel Itching, Swelling and Rash     EARRINGS (NICKEL)        Vital Signs:   Ht 165.1 cm (65\")   Wt 112 kg (248 lb)   BMI 41.27 kg/mý       Physical Exam    Constitutional: Awake, alert. Well nourished appearance.    Integumentary: Warm, dry, intact. No obvious rashes.    HENT: Atraumatic, normocephalic.   Respiratory: Non labored respirations .   Cardiovascular: Intact peripheral pulses.    Psychiatric: Normal mood and affect. A&O X3    Ortho Exam  Right knee: Skin is warm, dry, and intact.  Well-healing surgical incision visualized.  No evidence of wound dehiscence, surrounding erythema, warmth, or drainage.  Patella is well tracking and knee is stable to varus and valgus stress.  Full knee extension and flexion to 95 degrees.  Full plantarflexion and dorsiflexion of the ankle.  Sensation intact light touch.  Distal neurovascular intact.  Patient is ambulatory with assistance of a walker.    Imaging Results (Most Recent)       Procedure Component Value Units Date/Time    XR Knee 3 View Right [[id number]] Resulted: 10/09/23 1513     Updated: 10/09/23 1514    Narrative:      X-Ray Report:  Study: X-rays ordered, taken in the office, and reviewed today.   Site: Right knee Xray  Indication: TKA  View: AP/Lateral, Standing, and [place] view(s)  Findings: Intact right total knee " arthroplasty. No evidence of hardware   malfunction.   Prior studies available for comparison: yes                    Assessment and Plan   Problem List Items Addressed This Visit    None  Visit Diagnoses       Aftercare following right knee joint replacement surgery    -  Primary    Relevant Orders    XR Knee 3 View Right (Completed)    Ambulatory Referral to Physical Therapy POST OP (Completed)          Follow Up   Return in about 4 weeks (around 11/6/2023).    Tobacco Use: Medium Risk (10/9/2023)    Patient History     Smoking Tobacco Use: Former     Smokeless Tobacco Use: Never     Passive Exposure: Not on file     Patient is a former smoker.  Encouraged continued tobacco cessation.  Did not discuss options for smoking cessation.    Patient Instructions   X-rays taken and reviewed, showing intact hardware.    Staples removed in office and Steri-Strips applied.  Patient educated on incision care.  Please keep incision clean and dry.  Do not soak or submerge in water until incision is fully healed.  Do not apply creams or lotions over the incision.  Please allow Steri-Strips to fall off on their own within 7 to 10 days.    Continue icing and elevation of the knee as needed to help with pain and swelling.  Ice knee up to 3 or 4 times daily for no longer than 15 to 20 minutes at a time.    Continue PT to progress ROM, strength, and weightbearing status.    Follow-up in 4 weeks. Repeat x-rays not needed at this visit.  Please call with questions or concerns.    Patient was given instructions and counseling regarding her condition or for health maintenance advice. Please see specific information pulled into the AVS if appropriate.

## 2023-10-24 ENCOUNTER — TELEPHONE (OUTPATIENT)
Dept: ORTHOPEDIC SURGERY | Facility: CLINIC | Age: 78
End: 2023-10-24

## 2023-10-24 NOTE — TELEPHONE ENCOUNTER
Caller: Shwetha Lane    Relationship: Self    Best call back number: [phone]    What is the best time to reach you: ANY     Who are you requesting to speak with (clinical staff, provider,  specific staff member): PROVIDER     Do you know the name of the person who called: YES     What was the call regarding: PATIENT HAD KNEE SURGERY 09/26/2023 PATIENT WOULD LIKE TO KNOW WHEN SHE WILL BE RELEASED TO DRIVE

## 2023-11-01 ENCOUNTER — TELEPHONE (OUTPATIENT)
Dept: ORTHOPEDIC SURGERY | Facility: CLINIC | Age: 78
End: 2023-11-01
Payer: MEDICARE

## 2023-11-01 RX ORDER — OXYCODONE HYDROCHLORIDE AND ACETAMINOPHEN 5; 325 MG/1; MG/1
1 TABLET ORAL EVERY 4 HOURS PRN
Qty: 42 TABLET | Refills: 0 | Status: SHIPPED | OUTPATIENT
Start: 2023-11-01

## 2023-11-01 NOTE — TELEPHONE ENCOUNTER
OUTPATIENT PROGRESS NOTE  TRANSITIONAL CARE MANAGEMENT - HOSPITAL DISCHARGE FOLLOW-UP      CHIEF COMPLAINT  Chief Complaint   Patient presents with   • Transitional Care Management     TCM   admit  9\10 to 9\15   • UTI     R\o uti         Yadi Oneil is is unaccompanied today. Pt got a ride from a friend who is waiting in the waiting room.     SUBJECTIVE   The patient was discharged from the hospital on 9/15/22. The Discharge Summary was reviewed. It documents that the patient was hospitalized for:     · Acute lower GI bleeding likely secondary to diverticular bleed. EGD and colonoscopy on 09/12/2022 showed fresh blood in the left colon without active bleeding.  CT angiogram of the abdomen and pelvis was concerning for left colonic bleed prior to the colonoscopy.  Patient's H&H stabilized.  No further rectal bleeds noted in the hospital.  It is presumed that the diverticular bleed has spontaneously stopped.  Patient will follow-up as an outpatient with GI, Dr. Porras about.  4-6 weeks recommended.    · Acute on chronic anemia secondary to GI bleed. No transfusion was needed.  · Hypertension with hypertensive urgency. Stable BPs on discharge.  · Presyncope likely vasovagal in the setting of acute GI bleed and with metoprolol on board. Metoprolol decreased to 50 mg daily & hospital reports that she was still bradycardic in the hospital, although asymptomatic.      Recurrent GI bleed, found bleeding area in sigmoid colon on CTA as recommended by GI Dr. Richards.  No known triggers. Feels like she's going to have diarrhea, but ends up being blood. Does get constipated.    No more candida esophagitis on last EGD 9/12/22.    Bradycardia. Improved with lower dose metoprolol 50mg. HR in 60s now.    Recurrent muscle spasms. Needs refill of tizanidine. Never got rx from [facility]/22 [facility]  Had been having issues with them.    UI, chronic.  Sees Dr. Pyle.  Tried a lot of medical treatments and failed. Tried bladder PT and  PATIENT REQUESTING PAIN MEDICATION REFILL. SHE STATES THE PERCOCET 7.5/325MG IS BECOMING TOO STRONG, SHE WOULD LIKE TO DROP DOWN TO 5MG OR SOMETHING LESS STRONG.   [facility].    no help. Was going to try a bladder stim, but it was too close to sciatic nerve and decided not to do that. Is considering another botox trial. Once bladder is stimulated, has to go. But if no stimulus can avoid going to the bathroom for hours.     UTI. Is having sx.     HTN. BPs at home - 160 systolic. One day, took Procardia and MTP before bed, and a PRN hydralazine, then 3am took BP and it was 108/70, HR nl.  [place] comfortable, no HA. In the morning:     Edema. By am, it's down. Hydrochlorothiazide - not taking due to urinary frequency.     Trouble sleeping.  Has 3 air purifiers and can still smell exhaust from cars at night.    Started Coq10. Other supplements - doesn't take it every day. Ex. Biotin 2x a week.    Makes her own meals and eats supper at building - [employer].    Pertinent un-finalized hospital performed diagnostic tests - were reviewed..  Pertinent un-finalized hospital lab tests - were reviewed.    Advance Directives:  on file  Durable Medical Equipment/Assistive devices prescribed: None     MEDICATIONS  The discharge medication list was reviewed. Outpatient medications were updated today. she is partially compliant with most of the medications prescribed, but is not taking ASA. Barriers to full compliance include med list not clear on dc. Stressed to the patient the importance of taking the full regimen of medications as prescribed.    PROBLEM LIST  Patient Active Problem List    Diagnosis Date Noted   • Neutropenia (CMS/HCC) 05/09/2018     Priority: Medium     Overview Note:     Sees Dr. Adame at [facility]  09/08/2017, CBC with white blood cell count of 2.8 and absolute neutrophil count 1.4.  BM biopsy: Bone marrow, unilateral biopsy, aspirate clot, aspirate smears, touch prep smear and peripheral blood smear:     - Normocellular bone marrow  with trilineage hematopoiesis and without an increase in blasts.  No histologic or flow cytometric evidence of increased bone marrow large     granular  lymphocytes. Normal 46,XX female karyotype in bone marrow cells. Peripheral blood with slight leukopenia, a low normal absolute neutrophil count and a mild relative increase in granular lymphocytes (without an absolute increase in granular lymphocytes).      • Acute lower GI bleeding 09/10/2022     Priority: Low     Overview Note:     EGD and colonoscopy on 09/12/2022 showed fresh blood in the left colon without active bleeding.  CT angiogram of the abdomen and pelvis was concerning for left colonic bleed prior to the colonoscopy.  Patient's H&H stabilized.  No further rectal bleeds noted in the hospital.  It is presumed that the diverticular bleed has spontaneously stopped.  Follows outpatient with GI, Dr. Porras.     • Keratitis sicca, bilateral (CMS/[facility]) 11/26/2021     Priority: Low   • Candidiasis 11/26/2021     Priority: Low     Overview Note:     multiple sites, on PO fluconazole day 2/14. follows w/ID     • Dysphagia 10/07/2021     Priority: Low     Overview Note:     EGD w/dilation, last done 11/11/21 by Dr. Hay King.   Post-Endoscopic diagnosis:   1. Moderate gastritis  2. Scant esophageal candidiasis noted, status post brushings were reviewed  3. Distal esophageal diverticulum noted  4. Status post Larsen dilatation to 54 [place]  Unable to view pathology report.     • Esophageal diverticulum 10/07/2021     Priority: Low   • Atresia of esophagus without fistula 10/07/2021     Priority: Low   • Candidiasis, esophageal (CMS/[facility]) 10/07/2021     Priority: Low   • Gastritis 10/07/2021     Priority: Low   • Terminal esophageal web 10/07/2021     Priority: Low   • Age-related osteoporosis without current pathological fracture 09/16/2021     Priority: Low   • Cerebral infarction, unspecified (CMS/[facility]) 09/16/2021     Priority: Low   • Chronic sphenoidal sinusitis 09/16/2021     Priority: Low   • Constipation, unspecified 09/16/2021     Priority: Low   • Dysphasia following cerebral infarction 09/16/2021      Priority: Low   • Generalized muscle weakness 09/16/2021     Priority: Low   • Hemiplegia and hemiparesis following cerebral infarction affecting right dominant side (CMS/HCC) 09/16/2021     Priority: Low   • Insomnia, unspecified 09/16/2021     Priority: Low   • Lymphedema, not elsewhere classified 09/16/2021     Priority: Low   • Sympathetic uveitis, right eye 09/16/2021     Priority: Low   • Unspecified glaucoma 09/16/2021     Priority: Low   • Unspecified macular degeneration 09/16/2021     Priority: Low   • Vitamin D deficiency, unspecified 09/16/2021     Priority: Low   • Endocrine disorder, unspecified 09/16/2021     Priority: Low   • S/P drug eluting coronary stent placement 08/12/2021     Priority: Low   • Exertional dyspnea 07/12/2021     Priority: Low   • Paroxysmal atrial fibrillation (CMS/HCC) 07/12/2021     Priority: Low   • Lymphedema of left leg 03/26/2021     Priority: Low   • s/p I&D of left calf infection 12/22/2020     Priority: Low   • Surgical wound, non healing, initial encounter 12/13/2020     Priority: Low   • S/P CABG x 2 10/13/2020     Priority: Low   • Presence of aortocoronary bypass graft 10/13/2020     Priority: Low   • Coronary artery disease 09/14/2020     Priority: Low     Overview Note:     10/13/2020 CABG x2     • Heart failure with preserved left ventricular function (HFpEF) (CMS/HCC) 09/04/2020     Priority: Low   • Demand ischemia 09/04/2020     Priority: Low   • OAB (overactive bladder) 08/20/2017     Priority: Low   • Obesity (BMI 30-39.9) 09/20/2016     Priority: Low   • Reactive depression (situational) 07/11/2016     Priority: Low     Overview Note:      with dementia     • Hypercortisolism (CMS/HCC)      Priority: Low     Overview Note:     Late night peak, possible [place] - endo Dr. Leo Post     • Hypercortisolism (CMS/HCC) 02/15/2016     Priority: Low     Overview Note:     Formatting of this note might be different from the original.  Formatting of this  note might be different from the original.  Late night peak, possible [place] - endo Dr. Leo Post     • Foot pain, left 02/10/2016     Priority: Low   • Kidney cysts 12/08/2015     Priority: Low     Overview Note:     Right - 2cm - 12/2015. Consider repeat imaging.     • Hand lesion 11/15/2015     Priority: Low   • Urge incontinence of urine 09/04/2015     Priority: Low   • Irritable bowel syndrome 07/23/2015     Priority: Low   • Keratosis, actinic 07/07/2015     Priority: Low     Overview Note:     Premalignant, derm Dr. Praveen Rizzo     • Atherosclerosis of aorta (CMS/[facility]) 02/20/2015     Priority: Low   • Osteopenia 02/11/2015     Priority: Low     Overview Note:     DEXA with -1.4     • Sensorineural hearing loss, bilateral      Priority: Low     Overview Note:     ENT Dr. Tashi Archuleta, Brielle Caballero APNP     • Benign neoplasm of colon 11/30/2012     Priority: Low   • Diverticulosis 11/30/2012     Priority: Low   • Esophageal obstruction 10/23/2012     Priority: Low   • Rectal bleeding 10/23/2012     Priority: Low   • Lumbago 06/06/2012     Priority: Low   • PVD (peripheral vascular disease) (CMS/[facility]) 06/04/2012     Priority: Low   • Chronic gout, unspecified, with tophus (tophi) 06/04/2012     Priority: Low     Overview Note:     Tophi       • Primary hypertension 06/04/2012     Priority: Low     Overview Note:     Now on isosorbide mononitrate and carvedilol.      Subsequent considerations if needed: clonidine, hydralazine, alpha blocker, CoQ10    - None of these preferred options:  Amlodipine per increased edema.  Calcium channel blocker per bradycardia.  HCTZ per gout.  Valsartan with possible gout flare.  Losartan per hair loss.  ACE Inh with cough.  Carvedilol 6.25mg BID with severe dry mouth. - tolerating as of 1/4/2018   Bisopropol 5mg not tolerated per poor BP control, heart rate 49-59, wild dreams, weight gain 5 lb, sleepy/fatigue, blood sugar increased.   (not on formulary: metroprolol XL)      • Mixed hyperlipidemia 06/04/2012     Priority: Low     Overview Note:     Declines statin, taking psyllium and eating oats frequently    Formatting of this note might be different from the original.  Formatting of this note might be different from the original.  Formatting of this note might be different from the original.  Declines statin, taking psyllium and eating oats frequently  Formatting of this note might be different from the original.  Declines statin, taking psyllium and eating oats frequently     • Hypothyroidism 06/04/2012     Priority: Low   • Metabolic syndrome 06/04/2012     Priority: Low   • MS (multiple sclerosis) (CMS/[facility]) 06/04/2012     Priority: Low     Overview Note:     Last seen by neurology Dr. Kelley 4/26/22 w/plan as follows:    MS. Overall MS does appear to be stable. Not very typical to see attacks of MS at the age of patient. However it has been sometime since she has had any image studies completed. We will move forward with brain, and cervical spine MRI. Additionally lab work will be completed to rule out underlying deficiencies. Patient with significant weakness to bilateral lower extremities we recommend patient does begin physical therapy order was placed today. This is most likely related to deconditioning. we will have patient return to clinic after completion of the image studies discuss plan of care moving forward.  - MRI Brain W WO Contrast; Future  - MRI Cervical Spine W WO Contrast; Future  - Vitamin D 25 Hydroxy, Total; Future  - Vitamin B12; Future  - Folate (Folic Acid); Future  - PT Evaluate and Treat  - Folate (Folic Acid); Standing  - Folate (Folic Acid)  - Vitamin B12; Standing  - Vitamin B12  - Vitamin D 25 Hydroxy, Total; Standing  - Vitamin D 25 Hydroxy, Total         • Atrophy of vagina 06/04/2012     Priority: Low   • Abdominal pain, RUQ 11/02/2006     Priority: Low   • Other fecal abnormalities 11/02/2006     Priority: Low   • Other fecal abnormalities  11/02/2006     Priority: Low       HISTORIES  I have personally reviewed and updated the following electronic medical record sections: Allergies, Problem List, Past Medical History, Past Surgical History, Social History and Family History.    REVIEW OF SYSTEMS  A complete review of systems done and is negative, except as noted in HPI.     Outpatient Medications Marked as Taking for the 9/26/22 encounter (Office Visit) with Karen Padua, DO   Medication Sig Dispense Refill   • Coenzyme Q10 (CO Q 10 PO)      • tiZANidine (ZANAFLEX) 2 MG tablet Take 1 tablet by mouth at bedtime as needed for Muscle spasms. 30 tablet 1   • hydroCHLOROthiazide (HYDRODIURIL) 25 MG tablet Take 25 mg by mouth as needed. Taking as needed for swelling in legs     • hydrALAZINE (APRESOLINE) 10 MG tablet Take 10 mg by mouth as needed. Patient takes when systolic bp is over 160     • Probiotic Product (PROBIOTIC & ACIDOPHILUS EX ST PO) Take 1 capsule by mouth daily.     • Zinc 50 MG Tab Take 50 mg by mouth daily.     • D-Mannose 500 MG Cap Take 1 capsule by mouth daily.     • Vitamin D-Vitamin K (K2 PLUS D3 PO) Take 1 capsule by mouth daily.     • CHROMIUM-CINNAMON PO Take 1 capsule by mouth daily.     • Lycopene 25 MG Tab Take 25 mg by mouth daily.     • BIOTIN PO Take 1 capsule by mouth as needed.     • Multiple Vitamins-Minerals (EYEPROTECT PO) Take 1 capsule by mouth daily.     • sertraline (ZOLOFT) 25 MG tablet TAKE 1 TABLET BY MOUTH EVERY DAY 30 tablet 2   • metoPROLOL succinate (TOPROL-XL) 100 MG 24 hr tablet Take 0.5 tablets by mouth daily. Do not start before September 16, 2022.     • Ascorbic Acid (vitamin C) 1000 MG tablet Take 0.5 tablets by mouth daily.     • ferrous sulfate 325 (65 FE) MG tablet Take 325 mg by mouth 2 times daily. Do not start before September 22, 2022.     • polyethylene glycol (MIRALAX) 17 g packet Take 17 g by mouth 3 days a week. Stir and dissolve powder in any 4 to 8 ounces of beverage, then drink.     •  thyroid (ARMOUR) 120 MG tablet Take 120 mg by mouth daily.     • potassium CHLORIDE (KLOR-CON M) 20 MEQ [place] ER tablet Take 20 mEq by mouth daily. Do not start before September 22, 2022.     • VITAMIN A PO Take 1 tablet by mouth daily.     • guaiFENesin (MUCINEX) 600 MG 12 hr tablet Take 600-1,200 mg by mouth 2 times daily as needed for Congestion (sleep + cough).     • acetaminophen (TYLENOL) 500 MG tablet Take 500-1,000 mg by mouth every 6 hours as needed for Pain (Sleep).     • clobetasol (TEMOVATE) 0.05 % topical solution Apply twice a day to scaly rash of scalp as needed for scaling 50 mL 11   • omeprazole (PriLOSEC) 40 MG capsule Take 40 mg by mouth nightly.     • NIFEdipine CC (ADALAT CC) 90 MG 24 hr tablet Take 90 mg by mouth nightly. Do not start before September 22, 2022.     • ZINC SULFATE PO Take 1 tablet by mouth daily.     • Dentifrices (BIOTENE DRY MOUTH DT) Take 2 sprays by mouth as needed (dry mouth).     • B Complex Vitamins (VITAMIN B COMPLEX) tablet Take 1 tablet by mouth daily.           ALLERGIES:  Darvon [darvon], Demerol, Fioricet [butalbital-apap-caffeine], Percocet [oxycodone-acetaminophen], Percodan [oxycodone-aspirin], Tape [adhesive   (environmental)], Clopidogrel, Ezetimibe, Losartan, Silvasorb, Advair diskus, Amlodipine, and Atorvastatin    PHYSICAL EXAM  VITALS:   Vitals:    09/26/22 1606   BP: (!) 146/78   BP Location: LUE - Left upper extremity   Patient Position: Sitting   Cuff Size: Regular   Pulse: 72   Resp: 16   Temp: 97.2 °F (36.2 °C)   TempSrc: Other (comment)   SpO2: 97%   Weight: 80.3 kg (177 lb 1.6 oz)   Height: 5' 5\" (1.651 m)       Body mass index is 29.47 kg/m².    Wt Readings from Last 4 Encounters:   09/26/22 80.3 kg (177 lb 1.6 oz)   09/13/22 77.5 kg (170 lb 13.7 oz)   05/12/22 76.7 kg (169 lb 3.2 oz)   04/20/22 75.9 kg (167 lb 5.3 oz)      Glucose (mg/dL)   Date Value   09/13/2022 99   09/11/2022 148 (H)   09/11/2022 101 (H)       GENERAL:  Awake, alert and oriented  x3. No acute distress or pain.   HEENT:  Pupils equal. No conjunctival pallor or icterus. Oral mucosa is moist. No thrush.   NECK:  No JVD (jugular venous distention), thyromegaly or carotid bruit.   CHEST:  Bilateral clear. No rhonchi or crepitations.   CARDIOVASCULAR:  S1 and S2 normal, regular. No gallops, murmur or rub.   ABDOMEN:  BS+ (Bowel sounds positive), soft, nontender.   EXTREMITIES:  No edema.   SKIN:  Warm and dry. No erythema.   NEUROLOGIC:  CN II - XII grossly intact. No new focal neurological deficit.   PSYCHIATRIC:  Mood and affect euthymic. Thought content appropriate.   GAIT:  not evaluated , in w/c today, unable to to rise from chair without using arm rests.     LABS  Lab Results   Component Value Date    WBC 4.1 (L) 09/13/2022    WBC 5.6 09/11/2022    HGB 11.0 (L) 09/14/2022    HGB 10.5 (L) 09/13/2022    MCV 89.9 09/13/2022    MCV 92.6 09/11/2022     09/13/2022     (L) 09/11/2022    SODIUM 145 09/13/2022    SODIUM 144 09/11/2022    POTASSIUM 3.9 09/14/2022    POTASSIUM 3.4 09/13/2022    CO2 29 09/13/2022    CO2 25 09/11/2022    BUN 8 09/13/2022    BUN 16 09/11/2022    CREATININE 1.03 (H) 09/14/2022    CREATININE 0.86 09/13/2022    GFRESTIMATE 53 (L) 09/14/2022    GFRESTIMATE 66 09/13/2022    GLUCOSE 99 09/13/2022    GLUCOSE 148 (H) 09/11/2022    MG 2.1 09/14/2022    MG 1.7 09/13/2022     Lab Results   Component Value Date    ALBUMIN 3.5 (L) 09/10/2022    ALBUMIN 3.7 01/04/2022    BILIRUBIN 0.5 09/10/2022    BILIRUBIN 0.5 01/04/2022    DBIL 0.3 (H) 03/19/2020    DBIL 0.2 05/19/2019    ALKPT 68 09/10/2022    ALKPT 82 01/04/2022    GPT 18 09/10/2022    GPT 21 01/04/2022    AST 15 09/10/2022    AST 21 01/04/2022    TOTPROTEIN 6.6 09/10/2022    TOTPROTEIN 7.0 01/04/2022    GGTP 35 08/19/2011    GLOB 3.1 09/10/2022    GLOB 3.3 01/04/2022    Lab Results   Component Value Date    CHOLESTEROL 252 (H) 01/04/2022    CHOLESTEROL 262 (H) 03/25/2021    TRIGLYCERIDE 78 01/04/2022    TRIGLYCERIDE  149 03/25/2021    HDL 66 01/04/2022    HDL 65 03/25/2021    CALCLDL 170 (H) 01/04/2022    CALCLDL 167 (H) 03/25/2021    TSH 0.295 (L) 01/04/2022    TSH 1.341 10/06/2020    HGBA1C 5.7 (H) 01/26/2021    HGBA1C 5.6 10/06/2020    MALBCR 24.7 03/25/2021    VITD25 81.0 01/04/2022    VITD25 45.6 10/07/2017    VB12 1,028 (H) 01/04/2022    VB12 1,015 (H) 10/06/2020    LUNA 22.7 01/04/2022    BNP 64 11/03/2017    BNP 64 11/15/2015    IRON 111 09/10/2022    IRON 46 (L) 04/18/2022    URIC 3.8 11/30/2017    URIC 4.7 10/12/2015          ASSESSMENT/PLAN  Yadi was seen today for transitional care management and uti.    Diagnoses and all orders for this visit:    Hospital discharge follow-up    Acute lower GI bleeding. 2/2 diverticulosis. Resolved. EGD and colonoscopy on 09/12/2022 showed fresh blood in the left colon without active bleeding.  CT angiogram of the abdomen and pelvis was concerning for left colonic bleed prior to the colonoscopy.   Anemia due to GI blood loss, DEV  - f/up with GI as directed  - continue omeprazole 40mg at bedtime, iron supplement - ok to decrease to once daily    Primary hypertension. Goal 140-150 SBP given hx presyncope & BP fluctuations/hypotensive episodes.  Hx hypertensive urgency during hospitalization, resolved.  -     Continue nifedipine 90mg at bedtime, hydralazine 10mg daily PRN SBP > 160   -     Start spironolactone (ALDACTONE) 25 MG tablet; Take 1 tablet by mouth daily.  -     Stop hydrochlorothiazide - hx hypokalemia    Bradycardia. HR 40s 9/2022 hospitalization. meds adjusted during hospitalization.  Paroxysmal atrial fibrillation  - continue with metoprolol XL (decreased to 50mg from 100mg due to bradycardia).    - Not on long-term anticoagulants 2/2 GI bleeds. Pt aware of higher CVA/MI risk. Ok to resume ASA 2x weekly per dc summary.    Other urinary incontinence  Acute cystitis without hematuria  -     cefUROXime (CEFTIN) 250 MG tablet; Take 1 tablet by mouth in the morning and 1  tablet in the evening. Do all this for 7 days.  Discussed resuming candida diet.  -     Ambulate as tolerated to the bathroom, continue HH    Bilateral leg edema  -     spironolactone (ALDACTONE) 25 MG tablet; Take 1 tablet by mouth daily.   -     Stop hydrochlorothiazide, monitor K      CAD status post PCI, S/P CABG x 2  Peripheral vascular disease  - stopped ASA (after hospitalization) and Plavix (SEs).  OK to resume ASA 2x weekly per dc summary  - pt is statin intolerant, continue cardiac diet  - continue BB, monitor HR      Multiple sclerosis  Hx CVA /hemiplegia and hemiparesis  - resumed aspirin on discharge 2x weekly  - continue HH SN/PT/OT and continue supportive care     Muscle spasm of both lower legs  -     tiZANidine (ZANAFLEX) 2 MG tablet; Take 1 tablet by mouth at bedtime as needed for Muscle spasms. D/w pt. Would like to resume as for her, benefit outweighs risk.     Hypothyroidism  -on [facility] which is non formulary        Disease/condition/illness specific self-management education was provided to the patient. Substantive elements include:  See above & instructions.  Independent living and activities of daily living education was also provided. Substantive elements include:  See above & instructions.   Home Healthcare services was initiated after hospital discharge, but prior to this office visit, by me to address physical deconditioning, HTN, hx presyncope and bradycardia.  Referrals to [facility] were initiated after hospital discharge by me and will continue.    Patient adherence to his treatment plan was assessed. she is partially compliant with the discharge treatment plan. Barriers to full compliance include medication list discrepancies. Patient was seen for a comprehensive history, comprehensive examination, medical decision making of high complexity.    Orders Placed This Encounter   • Coenzyme Q10 (CO Q 10 PO)   • DISCONTD: cefUROXime (CEFTIN) 250 MG tablet   • tiZANidine  (ZANAFLEX) 2 MG tablet   • spironolactone (ALDACTONE) 25 MG tablet       Patient Instructions   Thank you for coming in today!    As discussed, the plan for today & to prepare for the next visit is:    • Anemia  o Your iron levels have been good, so I think it's ok to cut down to one iron pill a day.  • UTI  o I sent an antibiotic.  o Please consider implementing a candida diet while you're on the antibiotic to minimize risk of recurrent candida.  • Blood pressure  o I think we're ok if your blood pressures are in the 140s-150s.   o I think it's ok not to take the hydrochlorothiazide.  o We'll try spironolactone. Let us know if it is causing urinary frequency.  - This can increase your potassium, so try cutting down to half tablet a day.  - We'll check your potassium in 2 weeks & see if we can do a home lab draw.    Please contact us if you have any questions or concerns. Thank you for allowing us to be a part of your healthcare.            ADVANCED DIRECTIVES: on file    CODE STATUS:  Selective Treatment/DNR, Maximal Medical Support  (continue usual medical care until cessation of heartbeat and respiration)     FOLLOW UP: Return in about 4 weeks (around 10/24/2022) for HTN, bradycardia, anemia GI bleed, afib, leg edema, urine frequency., or sooner if needed.      Future Appointments   Date Time Provider Department Center   9/29/2022  9:00 AM Sari Billings RN [id number]   11/2/2022  2:45 PM [facility] Porras DO [facility]   12/29/2022 11:45 AM Mary Ann Nice MD [facility] Karen Padua, DO  9/26/2022

## 2023-11-06 ENCOUNTER — OFFICE VISIT (OUTPATIENT)
Dept: ORTHOPEDIC SURGERY | Facility: CLINIC | Age: 78
End: 2023-11-06
Payer: MEDICARE

## 2023-11-06 VITALS — HEIGHT: 65 IN | WEIGHT: 248 LBS | BODY MASS INDEX: 41.32 KG/M2

## 2023-11-06 DIAGNOSIS — Z96.651 AFTERCARE FOLLOWING RIGHT KNEE JOINT REPLACEMENT SURGERY: Primary | ICD-10-CM

## 2023-11-06 DIAGNOSIS — Z47.1 AFTERCARE FOLLOWING RIGHT KNEE JOINT REPLACEMENT SURGERY: Primary | ICD-10-CM

## 2023-11-06 PROCEDURE — 1159F MED LIST DOCD IN RCRD: CPT | Performed by: PHYSICIAN ASSISTANT

## 2023-11-06 PROCEDURE — 99024 POSTOP FOLLOW-UP VISIT: CPT | Performed by: PHYSICIAN ASSISTANT

## 2023-11-06 PROCEDURE — 1160F RVW MEDS BY RX/DR IN RCRD: CPT | Performed by: PHYSICIAN ASSISTANT

## 2023-11-06 NOTE — PROGRESS NOTES
"Chief Complaint  Follow-up and Pain of the Right Knee    Subjective      Shwetha Lane presents to [facility] for follow-up of right total knee arthroplasty with Laura navigation performed on 9/26/2023 by Dr. Mohr.  Patient presents today with use of cane.  Reports that her knee is \"getting better\".  She has been participating in home health physical therapy, although has plans to transition to outpatient PT later this week.  She reports pain is well controlled.    Objective   Allergies   Allergen Reactions    Bee Venom Swelling    Nickel Itching, Swelling and Rash     EARRINGS (NICKEL)        Vital Signs:   Ht 165.1 cm (65\")   Wt 112 kg (248 lb)   BMI 41.27 kg/m²       Physical Exam    Constitutional: Awake, alert. Well nourished appearance.    Integumentary: Warm, dry, intact. No obvious rashes.    HENT: Atraumatic, normocephalic.   Respiratory: Non labored respirations .   Cardiovascular: Intact peripheral pulses.    Psychiatric: Normal mood and affect. A&O X3    Ortho Exam  Right knee: Skin is warm, dry, and intact.  Well-healing surgical incision noted.  There is moderate residual scab formation present.  No surrounding erythema, warmth, or drainage.  Patella is well tracking and knee is stable to varus and valgus stress.  Full knee extension and flexion to 110 degrees.  Full plantarflexion and dorsiflexion of the ankle.  Sensation intact light touch.  Distal neurovascular intact.  Smooth sit to stand transition.  Patient ambulatory with assistance of a cane.    Imaging Results (Most Recent)       None                      Assessment and Plan   Problem List Items Addressed This Visit    None  Visit Diagnoses       Aftercare following right knee joint replacement surgery    -  Primary          Follow Up   No follow-ups on file.    Tobacco Use: Medium Risk (11/6/2023)    Patient History     Smoking Tobacco Use: Former     Smokeless Tobacco Use: Never     Passive Exposure: " Not on file     Patient is a former smoker.  Encouraged continued tobacco cessation.  Did not discuss options for smoking cessation.    Patient Instructions   Patient is doing very well. Advised to continue PT to completion to progress strength and ROM. Continue home exercises.     Continue icing knee as needed up to 4 times daily for no longer than 15-20 mins at a time.     Follow-up in 6 weeks. Repeat x-rays needed. Call with changes or concerns.     Patient was given instructions and counseling regarding her condition or for health maintenance advice. Please see specific information pulled into the AVS if appropriate.

## 2023-11-07 RX ORDER — METOPROLOL SUCCINATE 50 MG/1
50 TABLET, EXTENDED RELEASE ORAL DAILY
Qty: 90 TABLET | Refills: 1 | Status: SHIPPED | OUTPATIENT
Start: 2023-11-07

## 2023-11-09 ENCOUNTER — FLU SHOT (OUTPATIENT)
Dept: FAMILY MEDICINE CLINIC | Facility: CLINIC | Age: 78
End: 2023-11-09
Payer: MEDICARE

## 2023-11-09 PROCEDURE — 90662 IIV NO PRSV INCREASED AG IM: CPT | Performed by: NURSE PRACTITIONER

## 2023-11-09 PROCEDURE — G0008 ADMIN INFLUENZA VIRUS VAC: HCPCS | Performed by: NURSE PRACTITIONER

## 2023-11-10 ENCOUNTER — OUTSIDE FACILITY SERVICE (OUTPATIENT)
Dept: FAMILY MEDICINE CLINIC | Facility: CLINIC | Age: 78
End: 2023-11-10
Payer: MEDICARE

## 2023-11-15 ENCOUNTER — OFFICE VISIT (OUTPATIENT)
Dept: ORTHOPEDIC SURGERY | Facility: CLINIC | Age: 78
End: 2023-11-15
Payer: MEDICARE

## 2023-11-15 DIAGNOSIS — M25.551 BILATERAL HIP PAIN: Primary | ICD-10-CM

## 2023-11-15 DIAGNOSIS — M70.62 TROCHANTERIC BURSITIS OF BOTH HIPS: ICD-10-CM

## 2023-11-15 DIAGNOSIS — M70.61 TROCHANTERIC BURSITIS OF BOTH HIPS: ICD-10-CM

## 2023-11-15 DIAGNOSIS — M25.552 BILATERAL HIP PAIN: Primary | ICD-10-CM

## 2023-11-15 PROCEDURE — 20610 DRAIN/INJ JOINT/BURSA W/O US: CPT | Performed by: PHYSICIAN ASSISTANT

## 2023-11-15 PROCEDURE — 1159F MED LIST DOCD IN RCRD: CPT | Performed by: PHYSICIAN ASSISTANT

## 2023-11-15 PROCEDURE — 1160F RVW MEDS BY RX/DR IN RCRD: CPT | Performed by: PHYSICIAN ASSISTANT

## 2023-11-15 PROCEDURE — 99214 OFFICE O/P EST MOD 30 MIN: CPT | Performed by: PHYSICIAN ASSISTANT

## 2023-11-15 RX ADMIN — LIDOCAINE HYDROCHLORIDE 5 ML: 10 INJECTION, SOLUTION EPIDURAL; INFILTRATION; INTRACAUDAL; PERINEURAL at 13:40

## 2023-11-15 RX ADMIN — TRIAMCINOLONE ACETONIDE 40 MG: 40 INJECTION, SUSPENSION INTRA-ARTICULAR; INTRAMUSCULAR at 13:40

## 2023-11-15 NOTE — PROGRESS NOTES
Chief Complaint  Follow-up and Pain of the Right Hip and Pain and Follow-up of the Left Hip    Subjective      Shwetha Lane presents to [facility] for follow-up of bilateral hip pain, osteoarthritis, and trochanteric bursitis.  She was last evaluated in office on 5/2/2022 and received a left hip bursa steroid injection.  She presents today independently ambulatory without use of assistive device.  Reports that she is now having severe pain across her low back with radiation into the bilateral groin.  However, localizes maximum pain to bilateral hip greater trochanters and states that pain is worsened with palpation or sleeping on either side.    Objective   Allergies   Allergen Reactions    Bee Venom Swelling    Nickel Itching, Swelling and Rash     EARRINGS (NICKEL)        Vital Signs:   There were no vitals taken for this visit.      Physical Exam    Constitutional: Awake, alert. Well nourished appearance.    Integumentary: Warm, dry, intact. No obvious rashes.    HENT: Atraumatic, normocephalic.   Respiratory: Non labored respirations .   Cardiovascular: Intact peripheral pulses.    Psychiatric: Normal mood and affect. A&O X3    Ortho Exam  Bilateral hips: Skin is warm, dry, and intact.  Tenderness to palpation of bilateral hip greater trochanters.  No pain reported with passive internal or external rotation of the hips.  Good strength to hip flexors, extensors, abductors, and adductors.  Full flexion and extension of the knees.  Full plantarflexion and dorsiflexion of the ankles.  Sensation intact light touch.  Distal neurovascular intact.  Smooth sit to stand transition.  Patient fully weightbearing with nonantalgic gait.    Imaging:  X-Ray Report:  Study: X-rays ordered, taken in the office, and reviewed today.   Site: Bilateral hip Xray  Indication: Pain  View: AP/Lateral view(s)  Findings: Mild arthritis of the right hip and mild to moderate arthritis of the left hip.  No  acute osseous abnormalities identified.  Prior studies available for comparison: yes        Large Joint Arthrocentesis: R hip joint  Date/Time: 11/15/2023 1:40 PM  Consent given by: patient  Site marked: site marked  Timeout: Immediately prior to procedure a time out was called to verify the correct patient, procedure, equipment, support staff and site/side marked as required   Supporting Documentation  Indications: pain   Procedure Details  Location: hip - R hip joint  Needle gauge: 21g.  Medications administered: 5 mL lidocaine PF 1% 1 %; 40 mg triamcinolone acetonide 40 MG/ML  Patient tolerance: patient tolerated the procedure well with no immediate complications      Large Joint Arthrocentesis: L hip joint  Date/Time: 11/15/2023 1:40 PM  Consent given by: patient  Site marked: site marked  Timeout: Immediately prior to procedure a time out was called to verify the correct patient, procedure, equipment, support staff and site/side marked as required   Supporting Documentation  Indications: pain   Procedure Details  Location: hip - L hip joint  Needle gauge: 21g.  Medications administered: 5 mL lidocaine PF 1% 1 %; 40 mg triamcinolone acetonide 40 MG/ML  Patient tolerance: patient tolerated the procedure well with no immediate complications              Assessment and Plan   Problem List Items Addressed This Visit    None  Visit Diagnoses       Bilateral hip pain    -  Primary    Relevant Orders    XR Hips Bilateral With or Without Pelvis 3-4 View (Completed)    Large Joint Arthrocentesis: R hip joint    Large Joint Arthrocentesis: L hip joint    Trochanteric bursitis of both hips              Follow Up   Return in about 6 weeks (around 12/27/2023).    Tobacco Use: Medium Risk (11/17/2023)    Patient History     Smoking Tobacco Use: Former     Smokeless Tobacco Use: Never     Passive Exposure: Not on file     Patient is a former smoker.  Encouraged continued tobacco cessation.  Did not discuss options for smoking  cessation.    Patient Instructions   X-rays taken and reviewed.  Her symptoms and localization of pain appear more consistent with bilateral hip trochanteric bursitis, as opposed to true hip pathology or osteoarthritis.  Discussed options with patient, including physical therapy, home exercise program, NSAIDs, trial of hip bursa steroid injections, or additional imaging.  Patient elects to proceed with bilateral hip bursa steroid injections, administered in office today.  Advised on 3 months duration between injections.  Follow-up in 6 weeks.  No imaging.  Call changes or concerns.  Patient was given instructions and counseling regarding her condition or for health maintenance advice. Please see specific information pulled into the AVS if appropriate.

## 2023-11-17 ENCOUNTER — OUTSIDE FACILITY SERVICE (OUTPATIENT)
Dept: FAMILY MEDICINE CLINIC | Facility: CLINIC | Age: 78
End: 2023-11-17
Payer: MEDICARE

## 2023-11-17 ENCOUNTER — OFFICE VISIT (OUTPATIENT)
Dept: FAMILY MEDICINE CLINIC | Facility: CLINIC | Age: 78
End: 2023-11-17
Payer: MEDICARE

## 2023-11-17 VITALS
WEIGHT: 242.8 LBS | TEMPERATURE: 97.3 F | OXYGEN SATURATION: 95 % | HEART RATE: 66 BPM | DIASTOLIC BLOOD PRESSURE: 78 MMHG | BODY MASS INDEX: 40.45 KG/M2 | SYSTOLIC BLOOD PRESSURE: 128 MMHG | HEIGHT: 65 IN

## 2023-11-17 DIAGNOSIS — E78.00 PURE HYPERCHOLESTEROLEMIA: ICD-10-CM

## 2023-11-17 DIAGNOSIS — I10 PRIMARY HYPERTENSION: ICD-10-CM

## 2023-11-17 DIAGNOSIS — E03.9 HYPOTHYROIDISM, UNSPECIFIED TYPE: ICD-10-CM

## 2023-11-17 DIAGNOSIS — M54.17 LUMBOSACRAL RADICULOPATHY: ICD-10-CM

## 2023-11-17 DIAGNOSIS — J30.2 SEASONAL ALLERGIES: ICD-10-CM

## 2023-11-17 DIAGNOSIS — Z00.00 MEDICARE ANNUAL WELLNESS VISIT, SUBSEQUENT: Primary | ICD-10-CM

## 2023-11-17 PROCEDURE — 3074F SYST BP LT 130 MM HG: CPT | Performed by: NURSE PRACTITIONER

## 2023-11-17 PROCEDURE — 3078F DIAST BP <80 MM HG: CPT | Performed by: NURSE PRACTITIONER

## 2023-11-17 PROCEDURE — 1170F FXNL STATUS ASSESSED: CPT | Performed by: NURSE PRACTITIONER

## 2023-11-17 PROCEDURE — G0439 PPPS, SUBSEQ VISIT: HCPCS | Performed by: NURSE PRACTITIONER

## 2023-11-17 RX ORDER — MELATONIN
2000 DAILY
Qty: 90 TABLET | Refills: 3 | Status: SHIPPED | OUTPATIENT
Start: 2023-11-17 | End: 2023-11-17 | Stop reason: SDUPTHER

## 2023-11-17 RX ORDER — CYCLOBENZAPRINE HCL 10 MG
10 TABLET ORAL 3 TIMES DAILY PRN
Qty: 90 TABLET | Refills: 3 | Status: SHIPPED | OUTPATIENT
Start: 2023-11-17 | End: 2023-11-17 | Stop reason: SDUPTHER

## 2023-11-17 RX ORDER — CYCLOBENZAPRINE HCL 10 MG
10 TABLET ORAL 3 TIMES DAILY PRN
Qty: 90 TABLET | Refills: 3 | Status: SHIPPED | OUTPATIENT
Start: 2023-11-17

## 2023-11-17 RX ORDER — MELATONIN
2000 DAILY
Qty: 90 TABLET | Refills: 3 | Status: SHIPPED | OUTPATIENT
Start: 2023-11-17

## 2023-11-17 NOTE — PROGRESS NOTES
The ABCs of the Annual Wellness Visit  Subsequent Medicare Wellness Visit    Subjective      Shwetha Lane is a 78 y.o. female who presents for a Subsequent Medicare Wellness Visit.    The following portions of the patient's history were reviewed and   updated as appropriate: allergies, current medications, past family history, past medical history, past social history, past surgical history, and problem list.    Compared to one year ago, the patient feels her physical   health is the same.    Compared to one year ago, the patient feels her mental   health is the same.    Recent Hospitalizations:  She was admitted within the past 365 days at [facility].       Current Medical Providers:  Patient Care Team:  Rosibel Scott APRN as PCP - General (Nurse Practitioner)  BeenDenys MD as Surgeon (Orthopedic Surgery)    Outpatient Medications Prior to Visit   Medication Sig Dispense Refill    aspirin (Tanner Aspirin) 325 MG tablet Take 1 tablet by mouth Daily. 21 tablet 1    atorvastatin (Lipitor) 40 MG tablet Take 1 tablet by mouth Every Night. 90 tablet 1    estradiol (ESTRACE) 0.1 MG/GM vaginal cream Insert 1 applicator into the vagina Daily As Needed.      fluconazole (DIFLUCAN) 200 MG tablet Take 1 tablet by mouth 1 (One) Time Per Week. Takes for her great left toe once a week      fluticasone (FLONASE) 50 MCG/ACT nasal spray 2 sprays into the nostril(s) as directed by provider Daily As Needed for Rhinitis.      levothyroxine (Synthroid) 137 MCG tablet Take 1 tablet by mouth Daily. 90 tablet 3    metoprolol succinate XL (TOPROL-XL) 50 MG 24 hr tablet Take 1 tablet by mouth Daily. 90 tablet 1    Multiple Vitamins-Minerals (MULTIVITAMIN & MINERAL PO) Take 1 tablet by mouth Daily.      Nurtec 75 MG dispersible tablet Place  under the tongue Daily As Needed.      vitamin B-12 (CYANOCOBALAMIN) 1000 MCG tablet Take 1 tablet by mouth Daily.      apixaban (ELIQUIS) 2.5 MG tablet tablet Take 1 tablet by  mouth Every 12 (Twelve) Hours. 14 tablet 0    cholecalciferol (VITAMIN D3) 25 MCG (1000 UT) tablet 2 pills daily (Patient taking differently: Take 2 tablets by mouth Daily. 2 pills daily) 180 tablet 3    cyclobenzaprine (FLEXERIL) 10 MG tablet Take 1 tablet by mouth 3 (Three) Times a Day As Needed for Muscle Spasms. (Patient taking differently: Take 0.5 tablets by mouth 3 (Three) Times a Day As Needed for Muscle Spasms. Takes only half a tablet prn) 30 tablet 0    oxyCODONE-acetaminophen (PERCOCET) 5-325 MG per tablet Take 1 tablet by mouth Every 4 (Four) Hours As Needed for Moderate Pain. 42 tablet 0    Budeson-Glycopyrrol-Formoterol (Breztri Aerosphere) 160-9-4.8 MCG/ACT aerosol inhaler Inhale 2 puffs 2 (Two) Times a Day As Needed. (Patient not taking: Reported on 11/17/2023)       No facility-administered medications prior to visit.       No opioid medication identified on active medication list. I have reviewed chart for other potential  high risk medication/s and harmful drug interactions in the elderly.        Aspirin is on active medication list. Aspirin use is indicated based on review of current medical condition/s. Pros and cons of this therapy have been discussed today. Benefits of this medication outweigh potential harm.  Patient has been encouraged to continue taking this medication.  .      Patient Active Problem List   Diagnosis    Pneumonia    Hypothyroidism    Primary osteoarthritis involving multiple joints    Hyperlipemia    Migraine    Obesity, morbid (more than 100 lbs over ideal weight or BMI > 40)    Status following gastric banding surgery for weight loss    Fatty liver    Vertigo    PVC (premature ventricular contraction)    PAC (premature atrial contraction)    Malignant neoplasm of female breast    COVID-19 virus detected    Shingles    Ventricular tachycardia    DDD (degenerative disc disease), cervical    Cervical spondylolysis    Low back pain    Sacroiliac joint pain    Skin yeast  "infection    Medicare annual wellness visit, subsequent    Lumbosacral radiculopathy    Seasonal allergies    Primary hypertension     Advance Care Planning   Advance Care Planning     Advance Directive is not on file.  ACP discussion was held with the patient during this visit. Patient has an advance directive (not in EMR), copy requested.     Objective    Vitals:    23   BP: 128/78   BP Location: Right arm   Patient Position: Sitting   Cuff Size: Large Adult   Pulse: 66   Temp: 97.3 °F (36.3 °C)   SpO2: 95%   Weight: 110 kg (242 lb 12.8 oz)   Height: 165.1 cm (65\")     Estimated body mass index is 40.4 kg/m² as calculated from the following:    Height as of this encounter: 165.1 cm (65\").    Weight as of this encounter: 110 kg (242 lb 12.8 oz).           Does the patient have evidence of cognitive impairment?   No    Lab Results   Component Value Date    HGBA1C 5.80 (H) 2023          HEALTH RISK ASSESSMENT    Smoking Status:  Social History     Tobacco Use   Smoking Status Former    Packs/day: 0.50    Years: 15.00    Additional pack years: 0.00    Total pack years: 7.50    Types: Cigarettes   Smokeless Tobacco Never   Tobacco Comments    QUIT ABOUT 16 YEARS AGO     Alcohol Consumption:  Social History     Substance and Sexual Activity   Alcohol Use Never     Fall Risk Screen:    DENIZ Fall Risk Assessment was completed, and patient is at LOW risk for falls.Assessment completed on:2023    Depression Screenin/17/2023     9:27 AM   PHQ-2/PHQ-9 Depression Screening   Little Interest or Pleasure in Doing Things 0-->not at all   Feeling Down, Depressed or Hopeless 0-->not at all   PHQ-9: Brief Depression Severity Measure Score 0       Health Habits and Functional and Cognitive Screenin/17/2023     9:25 AM   Functional & Cognitive Status   Do you have difficulty preparing food and eating? No   Do you have difficulty bathing yourself, getting dressed or grooming yourself? No "   Do you have difficulty using the toilet? No   Do you have difficulty moving around from place to place? Yes   Do you have trouble with steps or getting out of a bed or a chair? Yes   Current Diet Well Balanced Diet   Dental Exam Up to date   Eye Exam Up to date   Exercise (times per week) 0 times per week   Current Exercises Include No Regular Exercise   Do you need help using the phone?  No   Are you deaf or do you have serious difficulty hearing?  No   Do you need help to go to places out of walking distance? Yes   Do you need help shopping? No   Do you need help preparing meals?  No   Do you need help with housework?  No   Do you need help with laundry? No   Do you need help taking your medications? No   Do you need help managing money? No   Do you ever drive or ride in a car without wearing a seat belt? No   Have you felt unusual stress, anger or loneliness in the last month? No   Who do you live with? Spouse   If you need help, do you have trouble finding someone available to you? No   Have you been bothered in the last four weeks by sexual problems? No   Do you have difficulty concentrating, remembering or making decisions? No       Age-appropriate Screening Schedule:  Refer to the list below for future screening recommendations based on patient's age, sex and/or medical conditions. Orders for these recommended tests are listed in the plan section. The patient has been provided with a written plan.    Health Maintenance   Topic Date Due    TDAP/TD VACCINES (1 - Tdap) Never done    ANNUAL WELLNESS VISIT  08/11/2023    COVID-19 Vaccine (4 - 2023-24 season) 09/01/2023    ZOSTER VACCINE (1 of 2) 02/16/2024 (Originally 7/22/1995)    MAMMOGRAM  03/13/2024    LIPID PANEL  05/17/2024    BMI FOLLOWUP  10/09/2024    DXA SCAN  11/23/2024    COLORECTAL CANCER SCREENING  04/14/2032    HEPATITIS C SCREENING  Completed    INFLUENZA VACCINE  Completed    Pneumococcal Vaccine 65+  Completed                  CMS Preventative  Services Quick Reference  Risk Factors Identified During Encounter:    Immunizations Discussed/Encouraged: Tdap, Influenza, COVID19, and RSV (Respiratory Syncytial Virus)  Dental Screening Recommended  Vision Screening Recommended    The above risks/problems have been discussed with the patient.  Pertinent information has been shared with the patient in the After Visit Summary.    Diagnoses and all orders for this visit:    1. Medicare annual wellness visit, subsequent (Primary)  Assessment & Plan:  Counseling was provided on nutrition, physical activity, development, and injury prevention, dental health, and safe sex practices patient verbalizes understanding no additional questions were asked.        2. Pure hypercholesterolemia  Assessment & Plan:  Vital stable check updated labs today continue working on healthy diet and exercise.    Orders:  -     CBC & Differential  -     Comprehensive Metabolic Panel  -     Lipid Panel  -     Thyroid Panel With TSH    3. Hypothyroidism, unspecified type  Assessment & Plan:  Hypothyroidism check thyroid panel today.  Continue working on healthy diet and exercise.  Takes medication as directed.    Orders:  -     CBC & Differential  -     Comprehensive Metabolic Panel  -     Lipid Panel  -     Thyroid Panel With TSH    4. Primary hypertension  Assessment & Plan:  Hypertension stable taking medication as directed.    Orders:  -     CBC & Differential  -     Comprehensive Metabolic Panel  -     Lipid Panel  -     Thyroid Panel With TSH    5. Seasonal allergies  Assessment & Plan:  Seasonal allergies stable takes over-the-counter medication as needed.    Orders:  -     CBC & Differential  -     Comprehensive Metabolic Panel  -     Lipid Panel  -     Thyroid Panel With TSH    Counseling was provided on nutrition, physical activity, development, and injury prevention, dental health, and safe sex practices patient verbalizes understanding no additional questions were  asked.      Follow Up:   Next Medicare Wellness visit to be scheduled in 1 year.      An After Visit Summary and PPPS were made available to the patient.

## 2023-11-17 NOTE — ASSESSMENT & PLAN NOTE
Hypothyroidism check thyroid panel today.  Continue working on healthy diet and exercise.  Takes medication as directed.

## 2023-11-18 LAB
ALBUMIN SERPL-MCNC: 4.4 G/DL (ref 3.5–5.2)
ALBUMIN/GLOB SERPL: 1.6 G/DL
ALP SERPL-CCNC: 87 U/L (ref 39–117)
ALT SERPL-CCNC: 22 U/L (ref 1–33)
AST SERPL-CCNC: 16 U/L (ref 1–32)
BASOPHILS # BLD AUTO: 0.07 10*3/MM3 (ref 0–0.2)
BASOPHILS NFR BLD AUTO: 0.7 % (ref 0–1.5)
BILIRUB SERPL-MCNC: 0.5 MG/DL (ref 0–1.2)
BUN SERPL-MCNC: 16 MG/DL (ref 8–23)
BUN/CREAT SERPL: 24.6 (ref 7–25)
CALCIUM SERPL-MCNC: 9.8 MG/DL (ref 8.6–10.5)
CHLORIDE SERPL-SCNC: 102 MMOL/L (ref 98–107)
CHOLEST SERPL-MCNC: 128 MG/DL (ref 0–200)
CO2 SERPL-SCNC: 25.9 MMOL/L (ref 22–29)
CREAT SERPL-MCNC: 0.65 MG/DL (ref 0.57–1)
EGFRCR SERPLBLD CKD-EPI 2021: 90.2 ML/MIN/1.73
EOSINOPHIL # BLD AUTO: 0.05 10*3/MM3 (ref 0–0.4)
EOSINOPHIL NFR BLD AUTO: 0.5 % (ref 0.3–6.2)
ERYTHROCYTE [DISTWIDTH] IN BLOOD BY AUTOMATED COUNT: 12.7 % (ref 12.3–15.4)
FT4I SERPL CALC-MCNC: 3.1 (ref 1.2–4.9)
GLOBULIN SER CALC-MCNC: 2.8 GM/DL
GLUCOSE SERPL-MCNC: 119 MG/DL (ref 65–99)
HCT VFR BLD AUTO: 42.8 % (ref 34–46.6)
HDLC SERPL-MCNC: 46 MG/DL (ref 40–60)
HGB BLD-MCNC: 14.5 G/DL (ref 12–15.9)
IMM GRANULOCYTES # BLD AUTO: 0.05 10*3/MM3 (ref 0–0.05)
IMM GRANULOCYTES NFR BLD AUTO: 0.5 % (ref 0–0.5)
LDLC SERPL CALC-MCNC: 64 MG/DL (ref 0–100)
LYMPHOCYTES # BLD AUTO: 2.07 10*3/MM3 (ref 0.7–3.1)
LYMPHOCYTES NFR BLD AUTO: 22 % (ref 19.6–45.3)
MCH RBC QN AUTO: 31 PG (ref 26.6–33)
MCHC RBC AUTO-ENTMCNC: 33.9 G/DL (ref 31.5–35.7)
MCV RBC AUTO: 91.5 FL (ref 79–97)
MONOCYTES # BLD AUTO: 0.66 10*3/MM3 (ref 0.1–0.9)
MONOCYTES NFR BLD AUTO: 7 % (ref 5–12)
NEUTROPHILS # BLD AUTO: 6.53 10*3/MM3 (ref 1.7–7)
NEUTROPHILS NFR BLD AUTO: 69.3 % (ref 42.7–76)
NRBC BLD AUTO-RTO: 0 /100 WBC (ref 0–0.2)
PLATELET # BLD AUTO: 301 10*3/MM3 (ref 140–450)
POTASSIUM SERPL-SCNC: 4.6 MMOL/L (ref 3.5–5.2)
PROT SERPL-MCNC: 7.2 G/DL (ref 6–8.5)
RBC # BLD AUTO: 4.68 10*6/MM3 (ref 3.77–5.28)
SODIUM SERPL-SCNC: 140 MMOL/L (ref 136–145)
T3RU NFR SERPL: 32 % (ref 24–39)
T4 SERPL-MCNC: 9.6 UG/DL (ref 4.5–12)
TRIGL SERPL-MCNC: 94 MG/DL (ref 0–150)
TSH SERPL DL<=0.005 MIU/L-ACNC: 2.74 UIU/ML (ref 0.45–4.5)
VLDLC SERPL CALC-MCNC: 18 MG/DL (ref 5–40)
WBC # BLD AUTO: 9.43 10*3/MM3 (ref 3.4–10.8)

## 2023-11-20 RX ORDER — LIDOCAINE HYDROCHLORIDE 10 MG/ML
5 INJECTION, SOLUTION EPIDURAL; INFILTRATION; INTRACAUDAL; PERINEURAL
Status: COMPLETED | OUTPATIENT
Start: 2023-11-15 | End: 2023-11-15

## 2023-11-20 RX ORDER — TRIAMCINOLONE ACETONIDE 40 MG/ML
40 INJECTION, SUSPENSION INTRA-ARTICULAR; INTRAMUSCULAR
Status: COMPLETED | OUTPATIENT
Start: 2023-11-15 | End: 2023-11-15

## 2023-11-20 NOTE — PATIENT INSTRUCTIONS
X-rays taken and reviewed.  Her symptoms and localization of pain appear more consistent with bilateral hip trochanteric bursitis, as opposed to true hip pathology or osteoarthritis.  Discussed options with patient, including physical therapy, home exercise program, NSAIDs, trial of hip bursa steroid injections, or additional imaging.  Patient elects to proceed with bilateral hip bursa steroid injections, administered in office today.  Advised on 3 months duration between injections.  Follow-up in 6 weeks.  No imaging.  Call changes or concerns.

## 2023-12-18 ENCOUNTER — OFFICE VISIT (OUTPATIENT)
Dept: ORTHOPEDIC SURGERY | Facility: CLINIC | Age: 78
End: 2023-12-18
Payer: MEDICARE

## 2023-12-18 VITALS
HEIGHT: 65 IN | WEIGHT: 250 LBS | HEART RATE: 83 BPM | SYSTOLIC BLOOD PRESSURE: 136 MMHG | OXYGEN SATURATION: 94 % | DIASTOLIC BLOOD PRESSURE: 72 MMHG | BODY MASS INDEX: 41.65 KG/M2

## 2023-12-18 DIAGNOSIS — Z96.651 AFTERCARE FOLLOWING RIGHT KNEE JOINT REPLACEMENT SURGERY: ICD-10-CM

## 2023-12-18 DIAGNOSIS — Z47.1 AFTERCARE FOLLOWING RIGHT KNEE JOINT REPLACEMENT SURGERY: ICD-10-CM

## 2023-12-18 DIAGNOSIS — M25.561 RIGHT KNEE PAIN, UNSPECIFIED CHRONICITY: Primary | ICD-10-CM

## 2023-12-18 RX ORDER — LEVOTHYROXINE SODIUM 137 UG/1
137 TABLET ORAL DAILY
Qty: 90 TABLET | Refills: 3 | Status: SHIPPED | OUTPATIENT
Start: 2023-12-18

## 2023-12-18 NOTE — PROGRESS NOTES
"Chief Complaint  Follow-up of the Right Knee    Subjective      Shwetha Lane presents to [facility] for follow-up of right total knee arthroplasty with Laura navigation performed on 9/26/2023 by Dr. Mohr.  Patient presents today independently ambulatory without use of assistive device.  She states her knee \"still hurts, but is getting better\".  She has been out of physical therapy for approximately 1 week due to recent influenza.  She does have plans to return later this week.    Objective   Allergies   Allergen Reactions    Bee Venom Swelling    Nickel Itching, Swelling and Rash     EARRINGS (NICKEL)        Vital Signs:   /72   Pulse 83   Ht 165.1 cm (65\")   Wt 113 kg (250 lb)   SpO2 94%   BMI 41.60 kg/m²       Physical Exam    Constitutional: Awake, alert. Well nourished appearance.    Integumentary: Warm, dry, intact. No obvious rashes.    HENT: Atraumatic, normocephalic.   Respiratory: Non labored respirations .   Cardiovascular: Intact peripheral pulses.    Psychiatric: Normal mood and affect. A&O X3    Ortho Exam  Right knee: Skin is warm, dry, and intact.  Well-healed surgical scar noted.  Patella is well tracking and knee is stable to varus and valgus stress.  Full knee extension and flexion to 115.  Full plantarflexion and dorsiflexion of the ankle.  Sensation intact to light touch.  Distal neurovascular intact.    Imaging Results (Most Recent)       Procedure Component Value Units Date/Time    XR Knee 3 View Right [[id number]] Resulted: 12/18/23 1450     Updated: 12/18/23 1451    Narrative:      X-Ray Report:  Study: X-rays ordered, taken in the office, and reviewed today.   Site: Right knee Xray  Indication: TKA  View: AP/Lateral, Standing, and Kidron view(s)  Findings: Intact right total knee arthroplasty. No evidence of hardware   malfunction.   Prior studies available for comparison: yes                       Assessment and Plan   Problem List Items " Addressed This Visit    None  Visit Diagnoses       Right knee pain, unspecified chronicity    -  Primary    Relevant Orders    XR Knee 3 View Right (Completed)    Aftercare following right knee joint replacement surgery                Follow Up   Return in about 6 weeks (around 1/29/2024).    Tobacco Use: Medium Risk (12/18/2023)    Patient History     Smoking Tobacco Use: Former     Smokeless Tobacco Use: Never     Passive Exposure: Not on file     Patient is a former smoker.  Encouraged continued tobacco cessation.  Did not discuss options for smoking cessation.    Patient Instructions   X-rays reviewed, showing hardware intact. Advised return to PT. Updated order placed. Continue home exercise program to progress strength and ROM. Continue icing knee as needed up to 3-4 times daily for no longer than 15 to 20 minutes at a time.    Continue with lifelong antibiotic prophylaxis with dental procedures following total joint replacement.    Follow-up in 6 weeks. Call sooner, if needed with any changes or concerns. No x-rays.     Patient was given instructions and counseling regarding her condition or for health maintenance advice. Please see specific information pulled into the AVS if appropriate.

## 2023-12-18 NOTE — PATIENT INSTRUCTIONS
X-rays reviewed, showing hardware intact. Advised return to PT. Updated order placed. Continue home exercise program to progress strength and ROM. Continue icing knee as needed up to 3-4 times daily for no longer than 15 to 20 minutes at a time.    Continue with lifelong antibiotic prophylaxis with dental procedures following total joint replacement.    Follow-up in 6 weeks. Call sooner, if needed with any changes or concerns. No x-rays.

## 2024-01-29 ENCOUNTER — OFFICE VISIT (OUTPATIENT)
Dept: ORTHOPEDIC SURGERY | Facility: CLINIC | Age: 79
End: 2024-01-29
Payer: MEDICARE

## 2024-01-29 VITALS — HEART RATE: 73 BPM | BODY MASS INDEX: 41.65 KG/M2 | WEIGHT: 250 LBS | HEIGHT: 65 IN | OXYGEN SATURATION: 93 %

## 2024-01-29 DIAGNOSIS — Z96.651 AFTERCARE FOLLOWING RIGHT KNEE JOINT REPLACEMENT SURGERY: Primary | ICD-10-CM

## 2024-01-29 DIAGNOSIS — Z47.1 AFTERCARE FOLLOWING RIGHT KNEE JOINT REPLACEMENT SURGERY: Primary | ICD-10-CM

## 2024-01-29 PROCEDURE — 1159F MED LIST DOCD IN RCRD: CPT | Performed by: PHYSICIAN ASSISTANT

## 2024-01-29 PROCEDURE — 1160F RVW MEDS BY RX/DR IN RCRD: CPT | Performed by: PHYSICIAN ASSISTANT

## 2024-01-29 PROCEDURE — 99213 OFFICE O/P EST LOW 20 MIN: CPT | Performed by: PHYSICIAN ASSISTANT

## 2024-01-29 NOTE — PROGRESS NOTES
"Chief Complaint  Follow-up and Pain of the Right Knee    Subjective      Shwetha Lane presents to [facility] for follow-up of right total knee arthroplasty with [place] navigation performed on 9/26/2023 by Dr. Mohr.  She presents today independently ambulatory without use of assistive device.  Reports she has been discharged from outpatient physical therapy since her last office visit.  She has been able to return to baseline activities and ADLs.  Reports that her knee is \"still hurting a little, but healing and getting better\".    Objective   Allergies   Allergen Reactions    Bee Venom Swelling    Nickel Itching, Swelling and Rash     EARRINGS (NICKEL)        Vital Signs:   Pulse 73   Ht 165.1 cm (65\")   Wt 113 kg (250 lb)   SpO2 93%   BMI 41.60 kg/m²       Physical Exam    Constitutional: Awake, alert. Well nourished appearance.    Integumentary: Warm, dry, intact. No obvious rashes.    HENT: Atraumatic, normocephalic.   Respiratory: Non labored respirations .   Cardiovascular: Intact peripheral pulses.    Psychiatric: Normal mood and affect. A&O X3    Ortho Exam  Right knee: Skin is warm, dry, and intact.  Well-healed surgical scar noted.  Mild to moderate edema.  Patella is well tracking and knee is stable to varus and valgus stress.  Full knee extension and flexion to 120 degrees.  Full plantarflexion and dorsiflexion of the ankle.  Sensation and distal neurovascular intact.  Smooth sit to stand transition.  Patient is fully weightbearing with nonantalgic gait.    Imaging Results (Most Recent)       None                      Assessment and Plan   Problem List Items Addressed This Visit    None  Visit Diagnoses       Aftercare following right knee joint replacement surgery    -  Primary            Follow Up   Return in about 8 months (around 9/29/2024).    Tobacco Use: Medium Risk (1/29/2024)    Patient History     Smoking Tobacco Use: Former     Smokeless Tobacco Use: " Never     Passive Exposure: Not on file       Educated on risk of smoking. Discussed options for smoking cessation.    Patient Instructions   Patient is doing well following discharge from PT. Advised to continue home exercise program to progress strength and ROM. Continue icing knee as needed up to 3-4 times daily for no longer than 15 to 20 minutes at a time..     Continue with lifelong antibiotic prophylaxis with dental procedures following total joint replacement.    Follow-up in 8 months. Call sooner, if needed with any changes or concerns. Repeat x-rays.     Patient was given instructions and counseling regarding her condition or for health maintenance advice. Please see specific information pulled into the AVS if appropriate.

## 2024-01-29 NOTE — PATIENT INSTRUCTIONS
Patient is doing well following discharge from PT. Advised to continue home exercise program to progress strength and ROM. Continue icing knee as needed up to 3-4 times daily for no longer than 15 to 20 minutes at a time..     Continue with lifelong antibiotic prophylaxis with dental procedures following total joint replacement.    Follow-up in 8 months. Call sooner, if needed with any changes or concerns. Repeat x-rays.

## 2024-03-26 RX ORDER — ATORVASTATIN CALCIUM 40 MG/1
40 TABLET, FILM COATED ORAL NIGHTLY
Qty: 90 TABLET | Refills: 1 | Status: SHIPPED | OUTPATIENT
Start: 2024-03-26 | End: 2024-03-26 | Stop reason: SDUPTHER

## 2024-03-26 RX ORDER — ATORVASTATIN CALCIUM 40 MG/1
40 TABLET, FILM COATED ORAL NIGHTLY
Qty: 90 TABLET | Refills: 1 | Status: SHIPPED | OUTPATIENT
Start: 2024-03-26

## 2024-05-17 ENCOUNTER — OFFICE VISIT (OUTPATIENT)
Dept: FAMILY MEDICINE CLINIC | Facility: CLINIC | Age: 79
End: 2024-05-17
Payer: MEDICARE

## 2024-05-17 VITALS
DIASTOLIC BLOOD PRESSURE: 66 MMHG | BODY MASS INDEX: 42.78 KG/M2 | HEART RATE: 77 BPM | WEIGHT: 256.8 LBS | TEMPERATURE: 98 F | OXYGEN SATURATION: 94 % | HEIGHT: 65 IN | SYSTOLIC BLOOD PRESSURE: 118 MMHG

## 2024-05-17 DIAGNOSIS — E78.00 PURE HYPERCHOLESTEROLEMIA: ICD-10-CM

## 2024-05-17 DIAGNOSIS — J30.2 SEASONAL ALLERGIES: ICD-10-CM

## 2024-05-17 DIAGNOSIS — M54.17 LUMBOSACRAL RADICULOPATHY: ICD-10-CM

## 2024-05-17 DIAGNOSIS — E03.9 HYPOTHYROIDISM, UNSPECIFIED TYPE: ICD-10-CM

## 2024-05-17 DIAGNOSIS — I10 PRIMARY HYPERTENSION: Primary | ICD-10-CM

## 2024-05-17 PROBLEM — Z82.49 FAMILY HISTORY OF HEART DISEASE: Status: ACTIVE | Noted: 2024-05-17

## 2024-05-17 PROCEDURE — 1159F MED LIST DOCD IN RCRD: CPT | Performed by: NURSE PRACTITIONER

## 2024-05-17 PROCEDURE — 3074F SYST BP LT 130 MM HG: CPT | Performed by: NURSE PRACTITIONER

## 2024-05-17 PROCEDURE — 1160F RVW MEDS BY RX/DR IN RCRD: CPT | Performed by: NURSE PRACTITIONER

## 2024-05-17 PROCEDURE — 3078F DIAST BP <80 MM HG: CPT | Performed by: NURSE PRACTITIONER

## 2024-05-17 PROCEDURE — 99214 OFFICE O/P EST MOD 30 MIN: CPT | Performed by: NURSE PRACTITIONER

## 2024-05-17 PROCEDURE — 1126F AMNT PAIN NOTED NONE PRSNT: CPT | Performed by: NURSE PRACTITIONER

## 2024-05-17 RX ORDER — LIDOCAINE 50 MG/G
1 PATCH TOPICAL EVERY 24 HOURS
Qty: 30 PATCH | Refills: 1 | Status: SHIPPED | OUTPATIENT
Start: 2024-05-17

## 2024-05-17 RX ORDER — METOPROLOL SUCCINATE 50 MG/1
50 TABLET, EXTENDED RELEASE ORAL DAILY
Qty: 90 TABLET | Refills: 1 | Status: SHIPPED | OUTPATIENT
Start: 2024-05-17

## 2024-05-17 NOTE — PROGRESS NOTES
"Chief Complaint  Hyperlipidemia (6 month follow up/Not fasting/), Hypothyroidism (Fatigued, weight gain, cold all the time/Would like TSH recheck), and Med Refill (Metoprolol and Lidocaine patch (not filled since 2022))    Subjective        Shwetha Lane presents to [facility]  History of Present Illness  Patient presents office today for follow-up on hypertension.  Blood pressure is controlled 118/66.  With hypothyroidism stable taking levothyroxine.  She is reporting being symptomatic with fatigue, weight gain cold intolerance.  She is requesting medication refills today.  With chronic lumbar pain she had lidocaine in 2022 in which this did help with her pain.  With hypertension continue metoprolol 50 mg daily.  With hyperlipidemia continue statin therapy.      Objective   Vital Signs:  /66 (BP Location: Right arm, Patient Position: Sitting, Cuff Size: Large Adult)   Pulse 77   Temp 98 °F (36.7 °C)   Ht 165.1 cm (65\")   Wt 116 kg (256 lb 12.8 oz)   SpO2 94%   BMI 42.73 kg/m²   Estimated body mass index is 42.73 kg/m² as calculated from the following:    Height as of this encounter: 165.1 cm (65\").    Weight as of this encounter: 116 kg (256 lb 12.8 oz).               Physical Exam  Constitutional:       General: She is not in acute distress.     Appearance: Normal appearance.   HENT:      Head: Normocephalic.   Eyes:      Pupils: Pupils are equal, round, and reactive to light.   Cardiovascular:      Rate and Rhythm: Normal rate and regular rhythm.      Pulses: Normal pulses.      Heart sounds: Normal heart sounds.   Pulmonary:      Effort: Pulmonary effort is normal. No respiratory distress.      Breath sounds: Normal breath sounds. No wheezing.   Abdominal:      General: Abdomen is flat. Bowel sounds are normal.      Palpations: Abdomen is soft. There is no mass.      Tenderness: There is no abdominal tenderness.      Hernia: No hernia is present.   Musculoskeletal: "         General: Tenderness present. Normal range of motion.      Cervical back: Normal, normal range of motion and neck supple. No spasms or tenderness. Normal range of motion.      Thoracic back: Normal. No spasms or tenderness. Normal range of motion.      Lumbar back: Spasms and tenderness present. No edema or bony tenderness. Decreased range of motion. No scoliosis.   Skin:     General: Skin is warm.   Neurological:      General: No focal deficit present.      Mental Status: She is alert and oriented to person, place, and time.   Psychiatric:         Mood and Affect: Mood normal.         Behavior: Behavior normal.         Thought Content: Thought content normal.         Judgment: Judgment normal.        Result Review :    The following data was reviewed by: NELLY Rivera on 05/17/2024:  Common labs          11/17/2023    10:08 1/9/2024    11:34 5/17/2024    13:24   Common Labs   Glucose 119   121    BUN 16   17    Creatinine 0.65   0.78    Sodium 140   142    Potassium 4.6   4.5    Chloride 102   104    Calcium 9.8   9.6    Total Protein 7.2   6.8    Albumin 4.4   4.2    Total Bilirubin 0.5   0.6    Alkaline Phosphatase 87   100    AST (SGOT) 16   18    ALT (SGPT) 22   19    WBC 9.43  8.93     8.3    Hemoglobin 14.5  14.7     15.2    Hematocrit 42.8  45.6     45.9    Platelets 301  273     288    Total Cholesterol 128   145    Triglycerides 94   206    HDL Cholesterol 46   38    LDL Cholesterol  64   73       Details          This result is from an external source.             Data reviewed : Radiologic studies mammogram normal             Assessment and Plan     Diagnoses and all orders for this visit:    1. Primary hypertension (Primary)  -     metoprolol succinate XL (TOPROL-XL) 50 MG 24 hr tablet; Take 1 tablet by mouth Daily.  Dispense: 90 tablet; Refill: 1  -     Comprehensive Metabolic Panel    2. Hypothyroidism, unspecified type  -     Thyroid Panel With TSH    3. Pure hypercholesterolemia  -      Lipid Panel    4. Lumbosacral radiculopathy  -     lidocaine (LIDODERM) 5 %; Place 1 patch on the skin as directed by provider Daily. Remove & Discard patch within 12 hours or as directed by MD  Dispense: 30 patch; Refill: 1    5. Seasonal allergies  -     CBC & Differential           I spent 30 minutes caring for Shwetha on this date of service. This time includes time spent by me in the following activities:preparing for the visit, reviewing tests, obtaining and/or reviewing a separately obtained history, performing a medically appropriate examination and/or evaluation , counseling and educating the patient/family/caregiver, ordering medications, tests, or procedures, documenting information in the medical record, independently interpreting results and communicating that information with the patient/family/caregiver, and care coordination  Follow Up     Return in about 6 months (around 11/18/2024) for Medicare Wellness.  Patient was given instructions and counseling regarding her condition or for health maintenance advice. Please see specific information pulled into the AVS if appropriate.

## 2024-05-18 LAB
ALBUMIN SERPL-MCNC: 4.2 G/DL (ref 3.8–4.8)
ALBUMIN/GLOB SERPL: 1.6 {RATIO} (ref 1.2–2.2)
ALP SERPL-CCNC: 100 IU/L (ref 44–121)
ALT SERPL-CCNC: 19 IU/L (ref 0–32)
AST SERPL-CCNC: 18 IU/L (ref 0–40)
BASOPHILS # BLD AUTO: 0.1 X10E3/UL (ref 0–0.2)
BASOPHILS NFR BLD AUTO: 1 %
BILIRUB SERPL-MCNC: 0.6 MG/DL (ref 0–1.2)
BUN SERPL-MCNC: 17 MG/DL (ref 8–27)
BUN/CREAT SERPL: 22 (ref 12–28)
CALCIUM SERPL-MCNC: 9.6 MG/DL (ref 8.7–10.3)
CHLORIDE SERPL-SCNC: 104 MMOL/L (ref 96–106)
CHOLEST SERPL-MCNC: 145 MG/DL (ref 100–199)
CO2 SERPL-SCNC: 23 MMOL/L (ref 20–29)
CREAT SERPL-MCNC: 0.78 MG/DL (ref 0.57–1)
EGFRCR SERPLBLD CKD-EPI 2021: 78 ML/MIN/1.73
EOSINOPHIL # BLD AUTO: 0.2 X10E3/UL (ref 0–0.4)
EOSINOPHIL NFR BLD AUTO: 2 %
ERYTHROCYTE [DISTWIDTH] IN BLOOD BY AUTOMATED COUNT: 12.5 % (ref 11.7–15.4)
FT4I SERPL CALC-MCNC: 3.6 (ref 1.2–4.9)
GLOBULIN SER CALC-MCNC: 2.6 G/DL (ref 1.5–4.5)
GLUCOSE SERPL-MCNC: 121 MG/DL (ref 70–99)
HCT VFR BLD AUTO: 45.9 % (ref 34–46.6)
HDLC SERPL-MCNC: 38 MG/DL
HGB BLD-MCNC: 15.2 G/DL (ref 11.1–15.9)
IMM GRANULOCYTES # BLD AUTO: 0.1 X10E3/UL (ref 0–0.1)
IMM GRANULOCYTES NFR BLD AUTO: 1 %
LDLC SERPL CALC-MCNC: 73 MG/DL (ref 0–99)
LYMPHOCYTES # BLD AUTO: 2.3 X10E3/UL (ref 0.7–3.1)
LYMPHOCYTES NFR BLD AUTO: 28 %
MCH RBC QN AUTO: 30.7 PG (ref 26.6–33)
MCHC RBC AUTO-ENTMCNC: 33.1 G/DL (ref 31.5–35.7)
MCV RBC AUTO: 93 FL (ref 79–97)
MONOCYTES # BLD AUTO: 0.6 X10E3/UL (ref 0.1–0.9)
MONOCYTES NFR BLD AUTO: 8 %
NEUTROPHILS # BLD AUTO: 5 X10E3/UL (ref 1.4–7)
NEUTROPHILS NFR BLD AUTO: 60 %
PLATELET # BLD AUTO: 288 X10E3/UL (ref 150–450)
POTASSIUM SERPL-SCNC: 4.5 MMOL/L (ref 3.5–5.2)
PROT SERPL-MCNC: 6.8 G/DL (ref 6–8.5)
RBC # BLD AUTO: 4.95 X10E6/UL (ref 3.77–5.28)
SODIUM SERPL-SCNC: 142 MMOL/L (ref 134–144)
T3RU NFR SERPL: 34 % (ref 24–39)
T4 SERPL-MCNC: 10.5 UG/DL (ref 4.5–12)
TRIGL SERPL-MCNC: 206 MG/DL (ref 0–149)
TSH SERPL DL<=0.005 MIU/L-ACNC: 2.61 UIU/ML (ref 0.45–4.5)
VLDLC SERPL CALC-MCNC: 34 MG/DL (ref 5–40)
WBC # BLD AUTO: 8.3 X10E3/UL (ref 3.4–10.8)

## 2024-09-23 ENCOUNTER — OFFICE VISIT (OUTPATIENT)
Dept: ORTHOPEDIC SURGERY | Facility: CLINIC | Age: 79
End: 2024-09-23
Payer: MEDICARE

## 2024-09-23 VITALS
DIASTOLIC BLOOD PRESSURE: 60 MMHG | HEIGHT: 65 IN | OXYGEN SATURATION: 92 % | HEART RATE: 80 BPM | BODY MASS INDEX: 42.65 KG/M2 | WEIGHT: 256 LBS | SYSTOLIC BLOOD PRESSURE: 128 MMHG

## 2024-09-23 DIAGNOSIS — M70.61 TROCHANTERIC BURSITIS OF BOTH HIPS: Primary | ICD-10-CM

## 2024-09-23 DIAGNOSIS — M70.62 TROCHANTERIC BURSITIS OF BOTH HIPS: Primary | ICD-10-CM

## 2024-09-23 PROCEDURE — 20610 DRAIN/INJ JOINT/BURSA W/O US: CPT | Performed by: ORTHOPAEDIC SURGERY

## 2024-09-23 PROCEDURE — 3074F SYST BP LT 130 MM HG: CPT | Performed by: ORTHOPAEDIC SURGERY

## 2024-09-23 PROCEDURE — 3078F DIAST BP <80 MM HG: CPT | Performed by: ORTHOPAEDIC SURGERY

## 2024-09-23 RX ORDER — TRIAMCINOLONE ACETONIDE 40 MG/ML
40 INJECTION, SUSPENSION INTRA-ARTICULAR; INTRAMUSCULAR
Status: COMPLETED | OUTPATIENT
Start: 2024-09-23 | End: 2024-09-23

## 2024-09-23 RX ORDER — LIDOCAINE HYDROCHLORIDE 10 MG/ML
5 INJECTION, SOLUTION INFILTRATION; PERINEURAL
Status: COMPLETED | OUTPATIENT
Start: 2024-09-23 | End: 2024-09-23

## 2024-09-23 RX ADMIN — TRIAMCINOLONE ACETONIDE 40 MG: 40 INJECTION, SUSPENSION INTRA-ARTICULAR; INTRAMUSCULAR at 13:56

## 2024-09-23 RX ADMIN — LIDOCAINE HYDROCHLORIDE 5 ML: 10 INJECTION, SOLUTION INFILTRATION; PERINEURAL at 13:56

## 2024-10-03 ENCOUNTER — FLU SHOT (OUTPATIENT)
Dept: FAMILY MEDICINE CLINIC | Facility: CLINIC | Age: 79
End: 2024-10-03
Payer: MEDICARE

## 2024-10-03 PROCEDURE — 90656 IIV3 VACC NO PRSV 0.5 ML IM: CPT | Performed by: NURSE PRACTITIONER

## 2024-10-03 PROCEDURE — G0008 ADMIN INFLUENZA VIRUS VAC: HCPCS | Performed by: NURSE PRACTITIONER

## 2024-10-09 ENCOUNTER — OFFICE VISIT (OUTPATIENT)
Dept: ORTHOPEDIC SURGERY | Facility: CLINIC | Age: 79
End: 2024-10-09
Payer: MEDICARE

## 2024-10-09 ENCOUNTER — HOSPITAL ENCOUNTER (EMERGENCY)
Facility: HOSPITAL | Age: 79
Discharge: HOME | End: 2024-10-09
Attending: EMERGENCY MEDICINE
Payer: MEDICARE

## 2024-10-09 ENCOUNTER — APPOINTMENT (OUTPATIENT)
Dept: CT IMAGING | Facility: HOSPITAL | Age: 79
End: 2024-10-09
Payer: MEDICARE

## 2024-10-09 VITALS
WEIGHT: 251.54 LBS | BODY MASS INDEX: 41.91 KG/M2 | DIASTOLIC BLOOD PRESSURE: 68 MMHG | HEART RATE: 71 BPM | OXYGEN SATURATION: 93 % | HEIGHT: 65 IN | RESPIRATION RATE: 18 BRPM | SYSTOLIC BLOOD PRESSURE: 126 MMHG | TEMPERATURE: 97.9 F

## 2024-10-09 VITALS
HEART RATE: 68 BPM | DIASTOLIC BLOOD PRESSURE: 74 MMHG | SYSTOLIC BLOOD PRESSURE: 156 MMHG | BODY MASS INDEX: 42.61 KG/M2 | WEIGHT: 255.73 LBS | HEIGHT: 65 IN | OXYGEN SATURATION: 92 %

## 2024-10-09 DIAGNOSIS — M25.561 RIGHT KNEE PAIN, UNSPECIFIED CHRONICITY: Primary | ICD-10-CM

## 2024-10-09 DIAGNOSIS — G43.909 MIGRAINE WITHOUT STATUS MIGRAINOSUS, NOT INTRACTABLE, UNSPECIFIED MIGRAINE TYPE: Primary | ICD-10-CM

## 2024-10-09 DIAGNOSIS — Z96.651 AFTERCARE FOLLOWING RIGHT KNEE JOINT REPLACEMENT SURGERY: ICD-10-CM

## 2024-10-09 DIAGNOSIS — Z96.651 HISTORY OF TOTAL KNEE ARTHROPLASTY, RIGHT: ICD-10-CM

## 2024-10-09 DIAGNOSIS — Z47.1 AFTERCARE FOLLOWING RIGHT KNEE JOINT REPLACEMENT SURGERY: ICD-10-CM

## 2024-10-09 PROCEDURE — 1159F MED LIST DOCD IN RCRD: CPT

## 2024-10-09 PROCEDURE — 63710000001 ONDANSETRON ODT 4 MG TABLET DISPERSIBLE

## 2024-10-09 PROCEDURE — 1160F RVW MEDS BY RX/DR IN RCRD: CPT

## 2024-10-09 PROCEDURE — 99284 EMERGENCY DEPT VISIT MOD MDM: CPT

## 2024-10-09 PROCEDURE — 3077F SYST BP >= 140 MM HG: CPT

## 2024-10-09 PROCEDURE — 63710000001 DIPHENHYDRAMINE PER 50 MG

## 2024-10-09 PROCEDURE — 99213 OFFICE O/P EST LOW 20 MIN: CPT

## 2024-10-09 PROCEDURE — 70450 CT HEAD/BRAIN W/O DYE: CPT

## 2024-10-09 PROCEDURE — 3078F DIAST BP <80 MM HG: CPT

## 2024-10-09 RX ORDER — DIPHENHYDRAMINE HCL 25 MG
25 CAPSULE ORAL ONCE
Status: COMPLETED | OUTPATIENT
Start: 2024-10-09 | End: 2024-10-09

## 2024-10-09 RX ORDER — ATORVASTATIN CALCIUM 40 MG/1
40 TABLET, FILM COATED ORAL NIGHTLY
Qty: 90 TABLET | Refills: 1 | Status: SHIPPED | OUTPATIENT
Start: 2024-10-09

## 2024-10-09 RX ORDER — ONDANSETRON 4 MG/1
4 TABLET, ORALLY DISINTEGRATING ORAL ONCE
Status: COMPLETED | OUTPATIENT
Start: 2024-10-09 | End: 2024-10-09

## 2024-10-09 RX ORDER — ACETAMINOPHEN 325 MG/1
975 TABLET ORAL ONCE
Status: COMPLETED | OUTPATIENT
Start: 2024-10-09 | End: 2024-10-09

## 2024-10-09 RX ADMIN — ONDANSETRON 4 MG: 4 TABLET, ORALLY DISINTEGRATING ORAL at 16:50

## 2024-10-09 RX ADMIN — DIPHENHYDRAMINE HYDROCHLORIDE 25 MG: 25 CAPSULE ORAL at 16:49

## 2024-10-09 RX ADMIN — ACETAMINOPHEN 975 MG: 325 TABLET ORAL at 16:49

## 2024-10-09 NOTE — PROGRESS NOTES
"Chief Complaint  Follow-up of the Right Knee    Subjective      Shwetha Lane presents to [facility] for follow up of her right knee.  Patient is 1 year status post right total knee arthroplasty with [place] navigation performed Dr. Mohr on 9/26/2023.  Patient denies any complaints today.  She denies any falls or injuries since her last office visit.    Allergies   Allergen Reactions    Bee Venom Swelling    Nickel Itching, Swelling and Rash     EARRINGS (NICKEL)        Objective     Vital Signs:   Vitals:    10/09/24 1328   BP: 156/74   Pulse: 68   SpO2: 92%   Weight: 116 kg (255 lb 11.7 oz)   Height: 165.1 cm (65\")     Body mass index is 42.56 kg/m².    I reviewed the patient's chief complaint, history of present illness, review of systems, past medical history, surgical history, family history, social history, medications, and allergy list.     REVIEW OF SYSTEMS    Constitutional: Denies fevers, chills, weight loss  Cardiovascular: Denies chest pain, shortness of breath  Skin: Denies rashes, acute skin changes  Neurologic: Denies headache, loss of consciousness  MSK: Right knee pain.     Ortho Exam  Knee   General: Alert, no acute distress.   Right knee: No pain with passive hip ROM.  Incision.  Knee stable to varus/valgus stress.  Knee extensor mechanism intact.  0 degrees knee extension. Flexion to 120 degrees. Calf soft, non-tender.  Sensation and neurovascularly intact.  Demonstrates active ankle dorsiflexion and plantarflexion.  Palpable pedal pulses.               Imaging Results (Most Recent)       Procedure Component Value Units Date/Time    XR Knee 3 View Right [[id number]] Resulted: 10/11/24 0900     Updated: 10/11/24 0900    Narrative:      X-Ray Report:  Study: X-rays ordered, taken in the office, and reviewed today.   Site: Right knee Xray  Indication: Right total knee arthroplasty follow up.   View: AP/Lateral, Standing, and [place] view(s)  Findings: Intact right " total knee arthroplasty. No evidence of hardware   malfunction, complication or loosening. No periprosthetic fractures   visualized. Patella is midline tracking on sunrise view.   Prior studies available for comparison: yes                    Assessment and Plan   Diagnoses and all orders for this visit:    1. Right knee pain, unspecified chronicity (Primary)  -     XR Knee 3 View Right    2. Aftercare following right knee joint replacement surgery    3. History of total knee arthroplasty, right         Shwetha Lane is 1 year post-op right total knee arthroplasty performed by Dr Mohr on 9/26/2023. X-rays reviewed, showing hardware intact and no complications.  Patient is doing well. Continue home exercise program to progress strength and ROM.     Discussed the importance of lifelong antibiotic prophylaxis with dental procedures following total joint replacement. In the event of a dental procedure, patient is welcome to contact our office to obtain antibiotics prior to the procedure if their dentist does not provide the prescription. Patient verbalized understanding.     Follow-up in 1 year. We will repeat xray's of the prosthetic joint at that time.   Call sooner or return to care, if needed with any changes or concerns.            Follow Up   Return in about 1 year (around 10/9/2025).  There are no Patient Instructions on file for this visit.  Patient was given instructions and counseling regarding her condition or for health maintenance advice. Please see specific information pulled into the AVS if appropriate.       Dictated Utilizing Dragon Dictation. Please note that portions of this note were completed with a voice recognition program. Part of this note may be an electronic transcription/translation of spoken language to printed text using the Dragon Dictation System.

## 2024-10-09 NOTE — ED PROVIDER NOTES
Time: 4:13 PM EDT  Date of encounter:  10/9/2024  Independent Historian/Clinical History and Information was obtained by:   Patient    History is limited by: N/A    Chief Complaint: Migraine      History of Present Illness:  Patient is a 79 y.o. year old female who presents to the emergency department for evaluation of migraine headache.  Patient states she has not had 1 in 6 weeks but had back-to-back migraines in the last 2 days that have not wanted to go away with her normal migraine medication.  Patient states it is worse with bright lights and loud noises.  Patient denies head trauma.  Patient denies blurry vision.      Patient Care Team  Primary Care Provider: Rosibel Scott APRN    Past Medical History:     Allergies   Allergen Reactions    Bee Venom Swelling    Nickel Itching, Swelling and Rash     EARRINGS (NICKEL)      Past Medical History:   Diagnosis Date    Anxiety Sometimes    Arthritis     Cancer Breast, 3 years ago    Cataract Had surgery in April    COPD (chronic obstructive pulmonary disease)     PT DENIES HX COPD    COVID     REPORTS HX OF COVID AND SUFFERED FROM LONG HA[place] COVID    Diverticulosis     GERD (gastroesophageal reflux disease)     Heart murmur 3 years ago    Hyperlipidemia     Hypertension     Hypothyroidism     Low back pain     Migraines     Obesity     Pneumonia     DENIES ANY CURRENT ISSUES    PVC (premature ventricular contraction)     FOLLOWED BY DR LOGAN HAD CARDIAC TESTING SEES YEARLY PER PT. DENIES CP/SOA. DECREASED ACTIVITY D/T PAIN AND IINSTABILITY OF RIGHT KNEE    Scoliosis     Sleep apnea      Past Surgical History:   Procedure Laterality Date    APPENDECTOMY  1955    BARIATRIC SURGERY  Got the Band 2011    BREAST SURGERY  2020    COLONOSCOPY      HYSTERECTOMY      JOINT REPLACEMENT  Left Knee 2017    KNEE ARTHROSCOPY Bilateral     HAS HAD BOTH KNEES SCOPED WITH MENISCUS REPAIR    LAPAROSCOPIC GASTRIC BANDING      MOUTH SURGERY      TOOTH EXTRACTED ABOUT 6 WEEKS AGO  CURRENTLY HEALED AND NO ISSUES    REPLACEMENT TOTAL KNEE Left     TONSILLECTOMY      TOTAL KNEE ARTHROPLASTY Right 09/26/2023    Procedure: TOTAL KNEE ARTHROPLASTY WITH ALOK NAVIGATION;  Surgeon: Denys Mohr MD;  Location: [facility] MAIN OR;  Service: Robotics - Ortho;  Laterality: Right;     Family History   Problem Relation Age of Onset    Diabetes Mother     Asthma Mother     Depression Mother     Heart disease Father     Asthma Sister     Malig Hyperthermia Neg Hx        Home Medications:  Prior to Admission medications    Medication Sig Start Date End Date Taking? Authorizing Provider   aspirin (Tanner Aspirin) 325 MG tablet Take 1 tablet by mouth Daily. 9/27/23   Denys Mohr MD   atorvastatin (Lipitor) 40 MG tablet Take 1 tablet by mouth Every Night. 10/9/24   Rosibel Scott APRN   Budeson-Glycopyrrol-Formoterol (Breztri Aerosphere) 160-9-4.8 MCG/ACT aerosol inhaler Inhale 2 puffs 2 (Two) Times a Day As Needed.  Patient not taking: Reported on 9/23/2024    ProviderLeonardo MD   cholecalciferol (VITAMIN D3) 25 MCG (1000 UT) tablet Take 2 tablets by mouth Daily. 2 pills daily 11/17/23   Rosibel Scott APRN   cyclobenzaprine (FLEXERIL) 10 MG tablet Take 1 tablet by mouth 3 (Three) Times a Day As Needed for Muscle Spasms. 11/17/23   Rosibel Scott APRN   diclofenac (VOLTAREN) 50 MG EC tablet Take 1 tablet by mouth 2 (Two) Times a Day As Needed (pain).  Patient not taking: Reported on 9/23/2024 12/18/23   Tess Hunter PA-C   estradiol (ESTRACE) 0.1 MG/GM vaginal cream Insert 1 g into the vagina Daily As Needed. 4/19/21   Leonardo Hagen MD   fluticasone (FLONASE) 50 MCG/ACT nasal spray Administer 2 sprays into the nostril(s) as directed by provider Daily As Needed for Rhinitis.    Leonadro Hagen MD   levothyroxine (Synthroid) 137 MCG tablet Take 1 tablet by mouth Daily. 12/18/23   Rosibel Scott APRN   lidocaine (LIDODERM) 5 % Place 1 patch on the skin as directed by  "provider Daily. Remove & Discard patch within 12 hours or as directed by MD 5/17/24   Rosibel Scott APRN   metoprolol succinate XL (TOPROL-XL) 50 MG 24 hr tablet Take 1 tablet by mouth Daily. 5/17/24   Rosibel Scott APRN   Multiple Vitamins-Minerals (MULTIVITAMIN & MINERAL PO) Take 1 tablet by mouth Daily.    ProviderLeonardo MD   Nurtec 75 MG dispersible tablet Place  under the tongue Daily As Needed. 1/15/23   Leonardo Hagen MD   vitamin B-12 (CYANOCOBALAMIN) 1000 MCG tablet Take 1 tablet by mouth Daily.    ProviderLeonardo MD        Social History:   Social History     Tobacco Use    Smoking status: Former     Current packs/day: 0.50     Average packs/day: 0.5 packs/day for 15.0 years (7.5 ttl pk-yrs)     Types: Cigarettes    Smokeless tobacco: Never    Tobacco comments:     QUIT ABOUT 16 YEARS AGO   Vaping Use    Vaping status: Never Used   Substance Use Topics    Alcohol use: Never    Drug use: Never         Review of Systems:  Review of Systems   Constitutional:  Negative for chills and fever.   HENT:  Negative for congestion, rhinorrhea and sore throat.    Eyes:  Positive for photophobia. Negative for pain and visual disturbance.   Respiratory:  Negative for apnea, cough, chest tightness and shortness of breath.    Cardiovascular:  Negative for chest pain and palpitations.   Gastrointestinal:  Negative for abdominal pain, diarrhea, nausea and vomiting.   Genitourinary:  Negative for difficulty urinating and dysuria.   Musculoskeletal:  Negative for joint swelling and myalgias.   Skin:  Negative for color change.   Neurological:  Positive for headaches. Negative for seizures.   Psychiatric/Behavioral: Negative.     All other systems reviewed and are negative.       Physical Exam:  /68 (BP Location: Right arm, Patient Position: Lying)   Pulse 71   Temp 97.9 °F (36.6 °C) (Oral)   Resp 18   Ht 165.1 cm (65\")   Wt 114 kg (251 lb 8.7 oz)   SpO2 93%   BMI 41.86 kg/m² "     Physical Exam  Vitals and nursing note reviewed.   Constitutional:       General: She is not in acute distress.     Appearance: Normal appearance. She is not toxic-appearing.   HENT:      Head: Normocephalic and atraumatic.      Jaw: There is normal jaw occlusion.   Eyes:      General: Lids are normal.      Extraocular Movements: Extraocular movements intact.      Conjunctiva/sclera: Conjunctivae normal.      Pupils: Pupils are equal, round, and reactive to light.   Cardiovascular:      Rate and Rhythm: Normal rate and regular rhythm.      Pulses: Normal pulses.      Heart sounds: Normal heart sounds.   Pulmonary:      Effort: Pulmonary effort is normal. No respiratory distress.      Breath sounds: Normal breath sounds. No wheezing or rhonchi.   Abdominal:      General: Abdomen is flat.      Palpations: Abdomen is soft.      Tenderness: There is no abdominal tenderness. There is no guarding or rebound.   Musculoskeletal:         General: Normal range of motion.      Cervical back: Normal range of motion and neck supple.      Right lower leg: No edema.      Left lower leg: No edema.   Skin:     General: Skin is warm and dry.   Neurological:      Mental Status: She is alert and oriented to person, place, and time. Mental status is at baseline.   Psychiatric:         Mood and Affect: Mood normal.                  Procedures:  Procedures      Medical Decision Making:      Comorbidities that affect care:    Thyroid disease, COPD, hypertension, cancer    External Notes reviewed:          The following orders were placed and all results were independently analyzed by me:  Orders Placed This Encounter   Procedures    CT Head Without Contrast       Medications Given in the Emergency Department:  Medications   acetaminophen (TYLENOL) tablet 975 mg (has no administration in time range)   diphenhydrAMINE (BENADRYL) capsule 25 mg (has no administration in time range)   ondansetron ODT (ZOFRAN-ODT) disintegrating tablet 4 mg  (has no administration in time range)        ED Course:         Labs:    Lab Results (last 24 hours)       ** No results found for the last 24 hours. **             Imaging:    CT Head Without Contrast    Result Date: 10/9/2024  CT HEAD WO CONTRAST Date of Exam: 10/9/2024 3:38 PM EDT Indication: intractable migraine. Comparison: None available. Technique: Axial CT images were obtained of the head without contrast administration.  Reconstructed coronal and sagittal images were also obtained. Automated exposure control and iterative construction methods were used. Findings: No acute intracranial hemorrhage.Intact gray-white differentiation.No extra-axial fluid collection.No significant mass effect. 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 No hydrocephalus. There is mild generalized parenchymal volume loss. Scattered areas of periventricular and subcortical white matter hypoattenuation, nonspecific, perhaps from small vessel ischemic/hypertensive changes in a patient of this age.There are intracranial atherosclerotic calcifications. Mild scattered mucosal thickening in the paranasal sinuses.Mastoid air cells are essentially clear.. There are bilateral cataract lens replacements. No acute or aggressive appearing bony or extracranial soft tissue process.     Impression: No acute intracranial finding. Electronically Signed: Sarthak Griffith  10/9/2024 3:54 PM EDT  Workstation ID: [id number]    XR Knee 3 View Right    Result Date: 10/9/2024  X-Ray Report: Study: X-rays ordered, taken in the office, and reviewed today. Site: Right knee Xray Indication: Right total knee arthroplasty follow up. View: AP/Lateral, Standing, and [place] view(s) Findings: Intact right total knee arthroplasty. No evidence of hardware malfunction, complication or loosening. No periprosthetic fractures visualized. Patella is midline tracking on sunrise view. Prior studies available  for comparison: yes         Differential Diagnosis and Discussion:    Headache: Differential diagnosis includes but is not limited to migraine, cluster headache, hypertension, tumor, subarachnoid bleeding, pseudotumor cerebri, temporal arteritis, infections, tension headache, and TMJ syndrome.    CT scan radiology impression was interpreted by me.    MDM     Amount and/or Complexity of Data Reviewed  Tests in the radiology section of CPT®: reviewed       CT head without contrast shows no acute intracranial abnormality.  I instructed patient to return to ED she develops any new or worsening symptoms.  Patient states her migraine is resolved at this time.  Patient's cranial nerves are intact.  There is no neurodeficit.  Patient is alert and oriented answering all questions.  Patient denies vision changes and headache at this time.  Instructed patient to return to ED if she develops new or worsening symptoms.  Otherwise follow-up PCP and neurologist.  Patient states she understands and agrees with plan of care.                Patient Care Considerations:          Consultants/Shared Management Plan:    None    Social Determinants of Health:    Patient is independent, reliable, and has access to care.       Disposition and Care Coordination:    Discharged: The patient is suitable and stable for discharge with no need for consideration of admission.    I have explained the patient´s condition, diagnoses and treatment plan based on the information available to me at this time. I have answered questions and addressed any concerns. The patient has a good  understanding of the patient´s diagnosis, condition, and treatment plan as can be expected at this point. The vital signs have been stable. The patient´s condition is stable and appropriate for discharge from the emergency department.      The patient will pursue further outpatient evaluation with the primary care physician or other designated or consulting physician as  outlined in the discharge instructions. They are agreeable to this plan of care and follow-up instructions have been explained in detail. The patient has received these instructions in written format and has expressed an understanding of the discharge instructions. The patient is aware that any significant change in condition or worsening of symptoms should prompt an immediate return to this or the closest emergency department or call to 911.  I have explained discharge medications and the need for follow up with the patient/caretakers. This was also printed in the discharge instructions. Patient was discharged with the following medications and follow up:      Medication List      No changes were made to your prescriptions during this visit.      Rosibel Scott, APRN  [address] [address]  [phone]    Call in 1 day  To schedule follow-up       Final diagnoses:   Migraine without status migrainosus, not intractable, unspecified migraine type        ED Disposition       ED Disposition   Discharge    Condition   Stable    Comment   --               This medical record created using voice recognition software.             Waldemar Ortiz PA-C  10/09/24 0741

## 2024-10-10 ENCOUNTER — PATIENT OUTREACH (OUTPATIENT)
Dept: CASE MANAGEMENT | Facility: OTHER | Age: 79
End: 2024-10-10
Payer: MEDICARE

## 2024-10-10 NOTE — OUTREACH NOTE
AMBULATORY CASE MANAGEMENT NOTE    Names and Relationships of Patient/Support Persons: Contact: Shwetha Lane; Relationship: Self -     Patient Outreach    Outgoing call to the patient for ED follow up and to offer HRCM program and services.  Introduced self and explained role and purpose of outreach.  She denies questions related to her ED visit.  She declined offered assistance with making PCP follow up appointment.  Discussed HRCM program and services and she declines at this time.  Did provide contact information and she will outreach with any future interest.      Carmen AGUIAR  Ambulatory Case Management    10/10/2024, 12:03 EDT

## 2024-11-06 ENCOUNTER — APPOINTMENT (OUTPATIENT)
Dept: GENERAL RADIOLOGY | Facility: HOSPITAL | Age: 79
End: 2024-11-06
Payer: MEDICARE

## 2024-11-06 ENCOUNTER — HOSPITAL ENCOUNTER (EMERGENCY)
Facility: HOSPITAL | Age: 79
Discharge: HOME | End: 2024-11-06
Attending: EMERGENCY MEDICINE | Admitting: EMERGENCY MEDICINE
Payer: MEDICARE

## 2024-11-06 VITALS
SYSTOLIC BLOOD PRESSURE: 148 MMHG | BODY MASS INDEX: 40.84 KG/M2 | RESPIRATION RATE: 22 BRPM | HEART RATE: 72 BPM | HEIGHT: 65 IN | TEMPERATURE: 98.2 F | DIASTOLIC BLOOD PRESSURE: 75 MMHG | WEIGHT: 245.15 LBS | OXYGEN SATURATION: 94 %
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
## 2024-11-06 DIAGNOSIS — M25.551 RIGHT HIP PAIN: ICD-10-CM

## 2024-11-06 PROCEDURE — 72100 X-RAY EXAM L-S SPINE 2/3 VWS: CPT

## 2024-11-06 PROCEDURE — 99283 EMERGENCY DEPT VISIT LOW MDM: CPT

## 2024-11-06 PROCEDURE — 73502 X-RAY EXAM HIP UNI 2-3 VIEWS: CPT

## 2024-11-06 RX ORDER — IBUPROFEN 400 MG/1
800 TABLET, FILM COATED ORAL ONCE
Status: COMPLETED | OUTPATIENT
Start: 2024-11-06 | End: 2024-11-06

## 2024-11-06 RX ADMIN — IBUPROFEN 800 MG: 400 TABLET, FILM COATED ORAL at 14:28

## 2024-11-06 NOTE — DISCHARGE INSTRUCTIONS
Your x-rays completed in the emergency department today do not show any fracture.  You may take the Flexeril that you have at home to improve your pain.  You may also alternate Tylenol and ibuprofen as needed for pain.  If symptoms are not improving within 1 week please follow-up with your primary care provider for reevaluation.

## 2024-11-06 NOTE — ED PROVIDER NOTES
Time: 2:13 PM EST  Date of encounter:  11/6/2024  Independent Historian/Clinical History and Information was obtained by:   Patient    History is limited by: N/A    Chief Complaint: Hip pain      History of Present Illness:  Patient is a 79 y.o. year old female who presents to the emergency department for evaluation of hip pain.  Patient presents to the emergency department today for evaluation of right hip pain and back pain after a fall.  She states that she was walking down stairs and she was holding onto the handrail however when she got to the last step she slipped and fell onto her right side.  Patient is now complaining of right lateral back pain and right hip pain that radiates to the groin.  She states she has been able to ambulate but it causes worsening pain.  She states she did not have any dizziness, weakness, or syncope.      Patient Care Team  Primary Care Provider: Rosibel Scott APRN    Past Medical History:     Allergies   Allergen Reactions    Bee Venom Swelling    Nickel Itching, Swelling and Rash     EARRINGS (NICKEL)      Past Medical History:   Diagnosis Date    Anxiety Sometimes    Arthritis     Cancer Breast, 3 years ago    Cataract Had surgery in April    COPD (chronic obstructive pulmonary disease)     PT DENIES HX COPD    COVID     REPORTS HX OF COVID AND SUFFERED FROM LONG HA[place] COVID    Diverticulosis     GERD (gastroesophageal reflux disease)     Heart murmur 3 years ago    Hyperlipidemia     Hypertension     Hypothyroidism     Low back pain     Migraines     Obesity     Pneumonia     DENIES ANY CURRENT ISSUES    PVC (premature ventricular contraction)     FOLLOWED BY DR LOGAN HAD CARDIAC TESTING SEES YEARLY PER PT. DENIES CP/SOA. DECREASED ACTIVITY D/T PAIN AND IINSTABILITY OF RIGHT KNEE    Scoliosis     Sleep apnea      Past Surgical History:   Procedure Laterality Date    APPENDECTOMY  1955    BARIATRIC SURGERY  Got the Band 2011    BREAST SURGERY  2020    COLONOSCOPY       HYSTERECTOMY      JOINT REPLACEMENT  Left Knee 2017    KNEE ARTHROSCOPY Bilateral     HAS HAD BOTH KNEES SCOPED WITH MENISCUS REPAIR    LAPAROSCOPIC GASTRIC BANDING      MOUTH SURGERY      TOOTH EXTRACTED ABOUT 6 WEEKS AGO CURRENTLY HEALED AND NO ISSUES    REPLACEMENT TOTAL KNEE Left     TONSILLECTOMY      TOTAL KNEE ARTHROPLASTY Right 09/26/2023    Procedure: TOTAL KNEE ARTHROPLASTY WITH ALOK NAVIGATION;  Surgeon: Denys Mohr MD;  Location: [facility] OR;  Service: Robotics - Ortho;  Laterality: Right;     Family History   Problem Relation Age of Onset    Diabetes Mother     Asthma Mother     Depression Mother     Heart disease Father     Asthma Sister     Malig Hyperthermia Neg Hx        Home Medications:  Prior to Admission medications    Medication Sig Start Date End Date Taking? Authorizing Provider   aspirin (Tanner Aspirin) 325 MG tablet Take 1 tablet by mouth Daily. 9/27/23   Denys Mohr MD   atorvastatin (Lipitor) 40 MG tablet Take 1 tablet by mouth Every Night. 10/9/24   Rosibel Scott APRN   Budeson-Glycopyrrol-Formoterol (Breztri Aerosphere) 160-9-4.8 MCG/ACT aerosol inhaler Inhale 2 puffs 2 (Two) Times a Day As Needed.  Patient not taking: Reported on 9/23/2024    ProviderLeonardo MD   cholecalciferol (VITAMIN D3) 25 MCG (1000 UT) tablet Take 2 tablets by mouth Daily. 2 pills daily 11/17/23   Rosibel Scott APRN   cyclobenzaprine (FLEXERIL) 10 MG tablet Take 1 tablet by mouth 3 (Three) Times a Day As Needed for Muscle Spasms. 11/17/23   Rosibel Scott APRN   diclofenac (VOLTAREN) 50 MG EC tablet Take 1 tablet by mouth 2 (Two) Times a Day As Needed (pain).  Patient not taking: Reported on 9/23/2024 12/18/23   Tess Hunter PA-C   estradiol (ESTRACE) 0.1 MG/GM vaginal cream Insert 1 g into the vagina Daily As Needed. 4/19/21   Leonardo Hagen MD   fluticasone (FLONASE) 50 MCG/ACT nasal spray Administer 2 sprays into the nostril(s) as directed by provider Daily As  "Needed for Rhinitis.    Leonardo Hagen MD   levothyroxine (Synthroid) 137 MCG tablet Take 1 tablet by mouth Daily. 12/18/23   Rosibel Scott APRN   lidocaine (LIDODERM) 5 % Place 1 patch on the skin as directed by provider Daily. Remove & Discard patch within 12 hours or as directed by MD 5/17/24   Rosibel Scott APRN   metoprolol succinate XL (TOPROL-XL) 50 MG 24 hr tablet Take 1 tablet by mouth Daily. 5/17/24   Rosibel Scott APRN   Multiple Vitamins-Minerals (MULTIVITAMIN & MINERAL PO) Take 1 tablet by mouth Daily.    Leonardo Hagen MD   Nurtec 75 MG dispersible tablet Place  under the tongue Daily As Needed. 1/15/23   Leonardo Hagen MD   vitamin B-12 (CYANOCOBALAMIN) 1000 MCG tablet Take 1 tablet by mouth Daily.    Leonardo Hagen MD        Social History:   Social History     Tobacco Use    Smoking status: Former     Current packs/day: 0.50     Average packs/day: 0.5 packs/day for 15.0 years (7.5 ttl pk-yrs)     Types: Cigarettes    Smokeless tobacco: Never    Tobacco comments:     QUIT ABOUT 16 YEARS AGO   Vaping Use    Vaping status: Never Used   Substance Use Topics    Alcohol use: Never    Drug use: Never         Review of Systems:  Review of Systems   Musculoskeletal:  Positive for arthralgias and back pain. Negative for neck pain.   Neurological:  Negative for dizziness, syncope and weakness.        Physical Exam:  /75 (BP Location: Left arm, Patient Position: Lying)   Pulse 72   Temp 98.2 °F (36.8 °C) (Oral)   Resp 22   Ht 165.1 cm (65\")   Wt 111 kg (245 lb 2.4 oz)   SpO2 94%   BMI 40.80 kg/m²     Physical Exam  Vitals and nursing note reviewed.   Constitutional:       Appearance: Normal appearance. She is obese.   HENT:      Head: Normocephalic and atraumatic.      Nose: Nose normal.   Eyes:      Conjunctiva/sclera: Conjunctivae normal.   Cardiovascular:      Rate and Rhythm: Normal rate and regular rhythm.      Heart sounds: Normal heart sounds. "   Pulmonary:      Effort: Pulmonary effort is normal.      Breath sounds: Normal breath sounds.   Musculoskeletal:      Cervical back: Normal, normal range of motion and neck supple.      Thoracic back: Normal.      Lumbar back: Normal.      Right hip: Tenderness and bony tenderness present. Decreased range of motion.      Left hip: Normal.   Skin:     General: Skin is warm and dry.   Neurological:      General: No focal deficit present.      Mental Status: She is alert and oriented to person, place, and time.   Psychiatric:         Mood and Affect: Mood normal.         Behavior: Behavior normal.         Thought Content: Thought content normal.         Judgment: Judgment normal.                Procedures:  Procedures      Medical Decision Making:    Comorbidities that affect care:    Hypothyroidism, COPD, GERD, obesity, hypertension, hyperlipidemia    External Notes reviewed:    Previous Clinic Note: Patient last seen by orthopedics on 10-9-2024 for follow-up from previous TKA      The following orders were placed and all results were independently analyzed by me:  Orders Placed This Encounter   Procedures    XR Spine Lumbar 2 or 3 View    XR Hip With or Without Pelvis 2 - 3 View Right       Medications Given in the Emergency Department:  Medications   ibuprofen (ADVIL,MOTRIN) tablet 800 mg (800 mg Oral Given 11/6/24 1428)        ED Course:         Labs:    Lab Results (last 24 hours)       ** No results found for the last 24 hours. **             Imaging:    XR Spine Lumbar 2 or 3 View    Result Date: 11/6/2024  XR SPINE LUMBAR 2 OR 3 VW Date of Exam: 11/6/2024 2:51 PM EST Indication: Fall, back pain Comparison: 2022 MRI Findings: There is no evidence of acute fracture. Normal lumbar spine alignment. Scattered endplate osteophyte formations with facet arthropathy. Degenerative disc height loss at L5-S1. Atherosclerotic calcifications of the aorta. Gastric lap band noted.     Impression: Degenerative changes of the  lumbar spine without acute abnormality. Electronically Signed: Alexander Huang MD  11/6/2024 3:12 PM EST  Workstation ID: [id number]    XR Hip With or Without Pelvis 2 - 3 View Right    Result Date: 11/6/2024  XR HIP W OR WO PELVIS 2-3 VIEW RIGHT Date of Exam: 11/6/2024 2:51 PM EST Indication: Fall, hip pain Comparison: 2023 Findings: There is no evidence of acute fracture. Normal pelvic bone alignment. Mild bilateral hip arthritis. Soft tissues are unremarkable.     Impression: No evidence of acute right hip abnormality. If patient is unable to bear weight or clinical suspicion for hip fracture is high, MRI or CT would be more sensitive for further evaluation. Electronically Signed: Alexander Huang MD  11/6/2024 3:11 PM EST  Workstation ID: [id number]       Differential Diagnosis and Discussion:    Joint Pain: Differential diagnosis includes but is not limited to polyarticular arthritis, gout, tendinitis, hemarthrosis, septic arthritis, rheumatoid arthritis, bursitis, degenerative joint disease, joint effusion, autoimmune disorder, trauma, and occult neoplasm.    All X-rays impressions were independently interpreted by me.    MDM  Number of Diagnoses or Management Options  Fall, initial encounter  Right hip pain  Diagnosis management comments: Patient presented to the emergency department today after a fall for evaluation of back and hip pain.  X-rays were obtained and note degenerative changes but no acute findings.  Discussed with patient about treatment at home.  She states that she has Flexeril at home and would like to just take this for pain control which I am agreeable to.  Discussed with patient she may also alternate Tylenol and ibuprofen.  Will have patient follow-up with PCP if symptoms do not improve within 1 week.       Amount and/or Complexity of Data Reviewed  Tests in the radiology section of CPT®: reviewed and ordered    Risk of Complications, Morbidity, and/or Mortality  Presenting problems:  moderate  Diagnostic procedures: low  Management options: low    Patient Progress  Patient progress: stable       Patient Care Considerations:    NARCOTICS: I considered prescribing opiate pain medication as an outpatient, however there is no fracture on x-ray      Consultants/Shared Management Plan:    None    Social Determinants of Health:    Patient is independent, reliable, and has access to care.       Disposition and Care Coordination:    Discharged: The patient is suitable and stable for discharge with no need for consideration of admission.    I have explained the patient´s condition, diagnoses and treatment plan based on the information available to me at this time. I have answered questions and addressed any concerns. The patient has a good  understanding of the patient´s diagnosis, condition, and treatment plan as can be expected at this point. The vital signs have been stable. The patient´s condition is stable and appropriate for discharge from the emergency department.      The patient will pursue further outpatient evaluation with the primary care physician or other designated or consulting physician as outlined in the discharge instructions. They are agreeable to this plan of care and follow-up instructions have been explained in detail. The patient has received these instructions in written format and has expressed an understanding of the discharge instructions. The patient is aware that any significant change in condition or worsening of symptoms should prompt an immediate return to this or the closest emergency department or call to 911.  I have explained discharge medications and the need for follow up with the patient/caretakers. This was also printed in the discharge instructions. Patient was discharged with the following medications and follow up:      Medication List      No changes were made to your prescriptions during this visit.      Rosibel Scott, APRN  [address]  [address]  [facility] [address]  [phone]             Final diagnoses:   Fall, initial encounter   Right hip pain        ED Disposition       ED Disposition   Discharge    Condition   Stable    Comment   --               This medical record created using voice recognition software.             Michael Rayo PA-C  11/06/24 1521

## 2024-11-08 ENCOUNTER — HOSPITAL ENCOUNTER (EMERGENCY)
Facility: HOSPITAL | Age: 79
Discharge: HOME | End: 2024-11-08
Attending: EMERGENCY MEDICINE
Payer: MEDICARE

## 2024-11-08 ENCOUNTER — APPOINTMENT (OUTPATIENT)
Dept: CT IMAGING | Facility: HOSPITAL | Age: 79
End: 2024-11-08
Payer: MEDICARE

## 2024-11-08 VITALS
BODY MASS INDEX: 41.65 KG/M2 | TEMPERATURE: 98 F | RESPIRATION RATE: 18 BRPM | SYSTOLIC BLOOD PRESSURE: 128 MMHG | HEART RATE: 64 BPM | DIASTOLIC BLOOD PRESSURE: 63 MMHG | OXYGEN SATURATION: 95 % | WEIGHT: 250 LBS | HEIGHT: 65 IN
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
## 2024-11-08 DIAGNOSIS — S32.020A COMPRESSION FRACTURE OF L2 VERTEBRA, INITIAL ENCOUNTER: Primary | ICD-10-CM

## 2024-11-08 PROCEDURE — 72131 CT LUMBAR SPINE W/O DYE: CPT

## 2024-11-08 PROCEDURE — 99284 EMERGENCY DEPT VISIT MOD MDM: CPT

## 2024-11-08 PROCEDURE — 72192 CT PELVIS W/O DYE: CPT

## 2024-11-08 RX ORDER — HYDROCODONE BITARTRATE AND ACETAMINOPHEN 5; 325 MG/1; MG/1
1 TABLET ORAL EVERY 6 HOURS PRN
Qty: 10 TABLET | Refills: 0 | Status: SHIPPED | OUTPATIENT
Start: 2024-11-08 | End: 2024-11-12 | Stop reason: SDUPTHER

## 2024-11-08 RX ORDER — HYDROCODONE BITARTRATE AND ACETAMINOPHEN 5; 325 MG/1; MG/1
1 TABLET ORAL ONCE
Status: COMPLETED | OUTPATIENT
Start: 2024-11-08 | End: 2024-11-08

## 2024-11-08 RX ADMIN — HYDROCODONE BITARTRATE AND ACETAMINOPHEN 1 TABLET: 5; 325 TABLET ORAL at 12:24

## 2024-11-08 NOTE — DISCHARGE INSTRUCTIONS
Follow-up with Dr. Coffman and with primary care provider.  Continue to use walker to help ambulate.  Use the hydrocodone as needed for severe pain.  This medication may make you drowsy.  This may cause constipation or nausea.  You may alternate ibuprofen in between the hydrocodone.  Return to emergency department for any worsening symptoms.

## 2024-11-08 NOTE — ED PROVIDER NOTES
EMERGENCY DEPARTMENT ENCOUNTER  Room Number:  HE3/V  PCP: Rosibel Scott APRN  Independent Historians: Patient      HPI:  Chief Complaint: had concerns including Pain.     A complete HPI/ROS/PMH/PSH/SH/FH are unobtainable due to: None    Chronic or social conditions impacting patient care (Social Determinants of Health): None      Context: Shwetha Lane is a 79 y.o. female with a medical history of hypothyroidism, osteoarthritis, hyperlipidemia, migraine headache, morbid obesity, fatty liver, chronic low back pain, COPD, GERD, hypertension and diabetes who presents to the ED c/o acute right sided low back pain.  Patient reports she had a mechanical fall down a cement stair on Tuesday.  She missed the last step and fell backwards.  She hit the right side of her low back on the stair.  No head injury or LOC.  Reports she was seen at [facility] where they performed x-rays and discharged her.  She has tried taking ibuprofen at home without relief.  Reports pain is worse with movement and with standing.  Denies bowel/bladder incontinence, saddle anesthesia, numbness of the lower extremities.  She takes full-strength aspirin.  Patient has no other systemic complaints at this time.      Review of prior external notes (non-ED) -and- Review of prior external test results outside of this encounter:  Patient seen in office by orthopedic surgery on 10/9/2024 for right knee pain.  Reviewed assessment and plan.  Patient will follow-up in 1 year.  Reviewed labs collected on 5/17/2024.  CBC with hemoglobin 15.2, CMP with creatinine 0.78.    Prescription drug monitoring program review:     N/A    PAST MEDICAL HISTORY  Active Ambulatory Problems     Diagnosis Date Noted    Pneumonia     Hypothyroidism     Primary osteoarthritis involving multiple joints 06/03/2016    Hyperlipemia 06/03/2016    Migraine 06/03/2016    Obesity, morbid (more than 100 lbs over ideal weight or BMI > 40) 05/02/2012    Status following gastric  banding surgery for weight loss 01/29/2014    Fatty liver 12/14/2017    Vertigo 05/03/2018    PVC (premature ventricular contraction) 12/11/2019    PAC (premature atrial contraction) 12/11/2019    Malignant neoplasm of female breast 08/18/2020    COVID-19 virus detected 09/10/2021    Shingles 10/22/2021    Ventricular tachycardia 01/19/2022    DDD (degenerative disc disease), cervical 06/07/2022    Cervical spondylolysis 06/07/2022    Low back pain     Sacroiliac joint pain     Skin yeast infection 02/16/2023    Medicare annual wellness visit, subsequent 05/17/2023    Lumbosacral radiculopathy 05/17/2023    Seasonal allergies 11/17/2023    Primary hypertension 11/17/2023    Family history of heart disease 05/17/2024     Resolved Ambulatory Problems     Diagnosis Date Noted    Type 2 diabetes mellitus, without long-term current use of insulin 02/04/2021    Primary osteoarthritis of right knee 04/26/2023     Past Medical History:   Diagnosis Date    Anxiety Sometimes    Arthritis     Cancer Breast, 3 years ago    Cataract Had surgery in April    COPD (chronic obstructive pulmonary disease)     COVID     Diverticulosis     GERD (gastroesophageal reflux disease)     Heart murmur 3 years ago    Hyperlipidemia     Hypertension     Migraines     Obesity     Scoliosis     Sleep apnea          PAST SURGICAL HISTORY  Past Surgical History:   Procedure Laterality Date    APPENDECTOMY  1955    BARIATRIC SURGERY  Got the Band 2011    BREAST SURGERY  2020    COLONOSCOPY      HYSTERECTOMY      JOINT REPLACEMENT  Left Knee 2017    KNEE ARTHROSCOPY Bilateral     HAS HAD BOTH KNEES SCOPED WITH MENISCUS REPAIR    LAPAROSCOPIC GASTRIC BANDING      MOUTH SURGERY      TOOTH EXTRACTED ABOUT 6 WEEKS AGO CURRENTLY HEALED AND NO ISSUES    REPLACEMENT TOTAL KNEE Left     TONSILLECTOMY      TOTAL KNEE ARTHROPLASTY Right 09/26/2023    Procedure: TOTAL KNEE ARTHROPLASTY WITH ALOK NAVIGATION;  Surgeon: Denys Mohr MD;  Location: [facility]  MAIN OR;  Service: Robotics - Ortho;  Laterality: Right;         FAMILY HISTORY  Family History   Problem Relation Age of Onset    Diabetes Mother     Asthma Mother     Depression Mother     Heart disease Father     Asthma Sister     Malig Hyperthermia Neg Hx          SOCIAL HISTORY  Social History     Socioeconomic History    Marital status:    Tobacco Use    Smoking status: Former     Current packs/day: 0.50     Average packs/day: 0.5 packs/day for 15.0 years (7.5 ttl pk-yrs)     Types: Cigarettes    Smokeless tobacco: Never    Tobacco comments:     QUIT ABOUT 16 YEARS AGO   Vaping Use    Vaping status: Never Used   Substance and Sexual Activity    Alcohol use: Never    Drug use: Never    Sexual activity: Not Currently     Partners: Male         ALLERGIES  Bee venom and Nickel      REVIEW OF SYSTEMS  Included in HPI  All systems reviewed and negative except for those discussed in HPI.      PHYSICAL EXAM    I have reviewed the triage vital signs and nursing notes.    ED Triage Vitals [11/08/24 1126]   Temp Heart Rate Resp BP SpO2   98 °F (36.7 °C) 73 18 -- 94 %      Temp src Heart Rate Source Patient Position BP Location FiO2 (%)   Tympanic Monitor -- -- --       Physical Exam  Constitutional:       General: She is not in acute distress.     Appearance: She is well-developed.   HENT:      Head: Normocephalic and atraumatic.   Eyes:      Extraocular Movements: Extraocular movements intact.   Cardiovascular:      Rate and Rhythm: Normal rate and regular rhythm.      Pulses:           Dorsalis pedis pulses are 2+ on the right side and 2+ on the left side.      Heart sounds: Normal heart sounds.   Pulmonary:      Effort: Pulmonary effort is normal.      Breath sounds: Normal breath sounds.   Abdominal:      General: There is no distension.   Musculoskeletal:      Comments: There is mild tenderness over the mid lumbar spine as well as the right SI joint.  No step-off or deformity.   Skin:     General: Skin is  warm.   Neurological:      General: No focal deficit present.      Mental Status: She is alert and oriented to person, place, and time.      Comments: SILT BLE.  Motor strength 5/5 BLE   Psychiatric:         Mood and Affect: Mood normal.           RADIOLOGY  CT Pelvis Without Contrast    Result Date: 11/8/2024  CT PELVIS WITHOUT CONTRAST  HISTORY: Fall 3 days ago with right hip pain.  TECHNIQUE: Pelvic CT includes axial imaging through the pelvis without contrast and data reconstructed in coronal and sagittal planes. Radiation does reduction techniques were utilized, including automated exposure control and exposure modulation based on body size.  COMPARISON: X-rays of the pelvis and right hip 11/06/2024  FINDINGS: Mild arthritic changes are present in both hips, greater on the right and there is mild marginal spur formation. There is no CT evidence for fracture or acute osseous abnormality. There are mild degenerative changes at the sacroiliac joints. CT lumbar spine has been completed and will be reported separately.  Atherosclerotic calcifications are present involving the abdominal aorta and iliac vasculature. There is sigmoid diverticulosis.      1. No evidence for fracture or acute abnormality of the pelvis or right hip. 2. Mild arthritic changes both hips, greater on the right. 3. Atherosclerotic disease. 4. Sigmoid diverticulosis. 5. CT lumbar spine has been completed and will be reported separately.  This report was finalized on 11/8/2024 2:29 PM by Weston Person M.D on Workstation: [id number]      CT Lumbar Spine Without Contrast    Result Date: 11/8/2024  CT SCAN OF THE LUMBAR SPINE WITHOUT CONTRAST  CLINICAL HISTORY:right low back pain after fall 3 days ago; landed on cement step  TECHNIQUE: CT scan of the lumbar spine was obtained with axial images and sagittal and coronal reconstructions.  FINDINGS:  There is an acute appearing fracture of the right lateral aspect of the superior endplate of L2.  Additionally, there is a subtle linear area of increased density within the superior aspect of the L2 vertebral body compatible with a mild compression fracture. There is less than 10% loss of vertebral body height within the maximally compressed central portion of the vertebral body.  Mild levoconvex scoliotic curvature of the lumbar spine is noted with its apex centered at L1.  At T11-12, T12-L1, L1-2, and L2-3, there is no significant canal or foraminal stenosis.  At L3-4, there are mild facet hypertrophic changes. There is a mild to moderate degree of left foraminal narrowing secondary to bulging disc material and facet arthropathy.  At L4-5, moderate facet hypertrophic changes are noted. There is degenerative anterior spondylolisthesis of L4 on L5 by approximately 3 mm. The facet hypertrophy and unroofed disc material results in mild foraminal narrowing. No significant canal stenosis is identified.  At L5-S1, there are advanced degenerative disc changes with marked loss of disc base height. Vacuum disc phenomena is noted. Degenerative plate sclerotic changes are noted. There is mild facet hypertrophic change. A combination of a disc osteophyte complex, loss of foraminal height, and facet hypertrophic change results in mild to moderate left and mild right foraminal compromise.       There is an acute appearing fracture of the right lateral aspect of the superior endplate of L2. Additionally, there is a subtle linear area of increased density within the superior aspect of the L2 vertebral body compatible with a mild compression fracture. There is less than 10% loss of vertebral body height within the maximally compressed central portion of the vertebral body.  Incidental degenerative phenomena within the lumbar spine as discussed in detail above.  These findings were discussed with Ni Vogel on 11/8/2024 at approximately 12:38 p.m.   Radiation dose reduction techniques were utilized, including automated  exposure control and exposure modulation based on body size.  This report was finalized on 11/8/2024 12:38 PM by Dr. Edward Francois M.D on Workstation: [id number]         MEDICATIONS GIVEN IN ER  Medications   HYDROcodone-acetaminophen (NORCO) 5-325 MG per tablet 1 tablet (1 tablet Oral Given 11/8/24 1224)         ORDERS PLACED DURING THIS VISIT:  Orders Placed This Encounter   Procedures    CT Lumbar Spine Without Contrast    CT Pelvis Without Contrast         OUTPATIENT MEDICATION MANAGEMENT:  No current Epic-ordered facility-administered medications on file.     Current Outpatient Medications Ordered in Epic   Medication Sig Dispense Refill    aspirin (Tanner Aspirin) 325 MG tablet Take 1 tablet by mouth Daily. 21 tablet 1    atorvastatin (Lipitor) 40 MG tablet Take 1 tablet by mouth Every Night. 90 tablet 1    Budeson-Glycopyrrol-Formoterol (Breztri Aerosphere) 160-9-4.8 MCG/ACT aerosol inhaler Inhale 2 puffs 2 (Two) Times a Day As Needed. (Patient not taking: Reported on 9/23/2024)      cholecalciferol (VITAMIN D3) 25 MCG (1000 UT) tablet Take 2 tablets by mouth Daily. 2 pills daily 90 tablet 3    cyclobenzaprine (FLEXERIL) 10 MG tablet Take 1 tablet by mouth 3 (Three) Times a Day As Needed for Muscle Spasms. 90 tablet 3    diclofenac (VOLTAREN) 50 MG EC tablet Take 1 tablet by mouth 2 (Two) Times a Day As Needed (pain). (Patient not taking: Reported on 9/23/2024) 60 tablet 1    estradiol (ESTRACE) 0.1 MG/GM vaginal cream Insert 1 g into the vagina Daily As Needed.      fluticasone (FLONASE) 50 MCG/ACT nasal spray Administer 2 sprays into the nostril(s) as directed by provider Daily As Needed for Rhinitis.      HYDROcodone-acetaminophen (NORCO) 5-325 MG per tablet Take 1 tablet by mouth Every 6 (Six) Hours As Needed for Moderate Pain for up to 15 doses. 10 tablet 0    levothyroxine (Synthroid) 137 MCG tablet Take 1 tablet by mouth Daily. 90 tablet 3    lidocaine (LIDODERM) 5 % Place 1 patch on the skin as  directed by provider Daily. Remove & Discard patch within 12 hours or as directed by MD 30 patch 1    metoprolol succinate XL (TOPROL-XL) 50 MG 24 hr tablet Take 1 tablet by mouth Daily. 90 tablet 1    Multiple Vitamins-Minerals (MULTIVITAMIN & MINERAL PO) Take 1 tablet by mouth Daily.      Nurtec 75 MG dispersible tablet Place  under the tongue Daily As Needed.      vitamin B-12 (CYANOCOBALAMIN) 1000 MCG tablet Take 1 tablet by mouth Daily.             PROGRESS, DATA ANALYSIS, CONSULTS, AND MEDICAL DECISION MAKING  All labs have been independently interpreted by me.  All radiology studies have been reviewed by me. All EKG's have been independently viewed and interpreted by me.  Discussion below represents my analysis of pertinent findings related to patient's condition, differential diagnosis, treatment plan and final disposition.    Differential diagnosis includes but is not limited to herniated lumbar disc, vertebral compression fracture, SI joint dislocation.        ED Course as of 11/08/24 1742   Fri Nov 08, 2024   1238 Preliminary report per Dr. Francois is acute compression fracture L2 [MP]   1356 Patient reports pain is feeling improved after Norco.  Will attempt to ambulate patient [MP]   1404 Patient did ambulate with walker but complained of severe pain [MP]   1741 Patient presents to emergency department with low back pain after fall 3 days ago.  Worked up with CT of lumbar spine and pelvis.  Noted to have acute L2 compression fracture.  Treated with Norco here which did help with pain.  She was able to ambulate with a walker.  I did offer her admission for pain control and neurosurgery evaluation but she declines.  Will discharge with short course of hydrocodone and have her follow-up in office with neurosurgery.  Encouraged her to follow-up with primary care and discussed ED return precautions.  She is otherwise well-appearing, hemodynamically stable, and therefore appropriate for discharge. [MP]      ED  Course User Index  [MP] Ni Vogel PA-C             AS OF 17:41 EST VITALS:    BP - 128/63  HR - 64  TEMP - 98 °F (36.7 °C) (Tympanic)  O2 SATS - 95%    COMPLEXITY OF CARE  Admission was considered but after careful review of the patient's presentation, physical examination, diagnostic results, and response to treatment the patient may be safely discharged with outpatient follow-up.      DIAGNOSIS  Final diagnoses:   Compression fracture of L2 vertebra, initial encounter   Fall, initial encounter         DISPOSITION  ED Disposition       ED Disposition   Discharge    Condition   Stable    Comment   --                Please note that portions of this document were completed with a voice recognition program.    Note Disclaimer: At [facility], we believe that sharing information builds trust and better relationships. You are receiving this note because you recently visited [facility]. It is possible you will see health information before a provider has talked with you about it. This kind of information can be easy to misunderstand. To help you fully understand what it means for your health, we urge you to discuss this note with your provider.     Ni Vogel PA-C  11/08/24 0305

## 2024-11-08 NOTE — ED PROVIDER NOTES
I supervised care provided by the midlevel provider.   We have discussed this patient's history, physical exam, and treatment plan.  I have reviewed the note and personally saw and examined the patient and agree with the plan of care.   This is a patient that had a mechanical fall on 11/6/2024.  She slipped and fell and landed basically on her right buttocks.  Went to [facility] ED and had x-rays.  They told her everything was okay but she would have pain.  She comes here because she is having persistent pain with movement such as rolling over going to sit up the pain is mainly in her mid to lower back.  She has received a pain pill and her pain is better when I am seeing her at this time.  Denies any chest injury or chest pain, denies any shortness of breath.  Denies any abdominal pain.  She also mentioned that she has a little bit of strain or injury to her right hip but that is better as well.  Denies any head injury or headache.  No focal weakness numbness or tingling.  No saddle anesthesia no urinary or fecal incontinence.    GENERAL: Elderly female.  She is morbidly obese.  Not distressed  HENT: nares patent  Head/neck/ face are symmetric without gross deformity or swelling  EYES: no scleral icterus  CV: regular rhythm, regular rate with intact distal pulses  RESPIRATORY: normal effort and no respiratory distress  ABDOMEN: soft and nontender.  No tenderness on diffuse exam and normal inspection.  On back exam her location of pain is mid to lower lumbar area it is reproduced with palpation.  It is reproduced when she sits up when she rolls to the left or right side.  Does not have flank pain.  She has normal inspection to her flanks with no bruising.  No pain to cervical or thoracic back diffusely.  MUSCULOSKELETAL: No pain with external and internal rotation of hips bilaterally.  Patient is able to passively and actively move all extremities with no obvious pain or deformity.  Some mild disc comfort in the lateral  right hip region with palpation.  NEURO: alert and appropriate, moves all extremities, follows commands  SKIN: warm, dry    Vital signs and nursing notes reviewed.    Plan   ED Course as of 11/09/24 1526   Fri Nov 08, 2024   1238 Preliminary report per Dr. Francois is acute compression fracture L2 [MP]   1356 Patient reports pain is feeling improved after Norco.  Will attempt to ambulate patient [MP]   1404 Patient did ambulate with walker but complained of severe pain [MP]   1741 Patient presents to emergency department with low back pain after fall 3 days ago.  Worked up with CT of lumbar spine and pelvis.  Noted to have acute L2 compression fracture.  Treated with Norco here which did help with pain.  She was able to ambulate with a walker.  I did offer her admission for pain control and neurosurgery evaluation but she declines.  Will discharge with short course of hydrocodone and have her follow-up in office with neurosurgery.  Encouraged her to follow-up with primary care and discussed ED return precautions.  She is otherwise well-appearing, hemodynamically stable, and therefore appropriate for discharge. [MP]      ED Course User Index  [MP] Ni Vogel PA-C   As well as a CT scan of the abdomen pelvis there was done here in the ER there is no obvious pelvic or hip fracture.  Clinically I do not believe she has a pelvic or hip fracture.  On the CT of the lumbar spine she has an acute appearing fracture of the right lateral aspect of the superior endplate of L2 and a subtle linear area of increased density within the superior aspect of the L2 vertebral body compatible with a mild compression deformity.  Please see complete dictated report from radiologist.  I really think this is the etiology of her pain and symptoms.  Again clinically do not think she has a hip or pelvic fracture.      I reviewed the CT scan report  Old records reviewed:  Reviewed the radiologist report from 11/6/2024 of the lumbar spine as  well as of the hip and pelvis.  There was no evidence of acute fracture in the hip no obvious pelvic fracture.  Everything appeared to be aligned appropriately.  Degenerative changes seen in the lumbar spine with no obvious fracture.     Vernon Cole MD  11/08/24 1544       Vernon Cole MD  11/09/24 1529

## 2024-11-12 ENCOUNTER — OFFICE VISIT (OUTPATIENT)
Dept: FAMILY MEDICINE CLINIC | Facility: CLINIC | Age: 79
End: 2024-11-12
Payer: MEDICARE

## 2024-11-12 ENCOUNTER — PATIENT OUTREACH (OUTPATIENT)
Dept: CASE MANAGEMENT | Facility: OTHER | Age: 79
End: 2024-11-12
Payer: MEDICARE

## 2024-11-12 VITALS
SYSTOLIC BLOOD PRESSURE: 130 MMHG | HEART RATE: 81 BPM | WEIGHT: 256.8 LBS | TEMPERATURE: 96.5 F | OXYGEN SATURATION: 95 % | HEIGHT: 65 IN | DIASTOLIC BLOOD PRESSURE: 60 MMHG | BODY MASS INDEX: 42.78 KG/M2

## 2024-11-12 DIAGNOSIS — I10 PRIMARY HYPERTENSION: ICD-10-CM

## 2024-11-12 DIAGNOSIS — S32.020D COMPRESSION FRACTURE OF L2 VERTEBRA WITH ROUTINE HEALING, SUBSEQUENT ENCOUNTER: Primary | ICD-10-CM

## 2024-11-12 PROBLEM — S32.020A COMPRESSION FRACTURE OF L2 LUMBAR VERTEBRA: Status: ACTIVE | Noted: 2024-11-12

## 2024-11-12 RX ORDER — METOPROLOL SUCCINATE 50 MG/1
50 TABLET, EXTENDED RELEASE ORAL DAILY
Qty: 90 TABLET | Refills: 1 | Status: SHIPPED | OUTPATIENT
Start: 2024-11-12

## 2024-11-12 RX ORDER — HYDROCODONE BITARTRATE AND ACETAMINOPHEN 5; 325 MG/1; MG/1
1 TABLET ORAL EVERY 6 HOURS PRN
Qty: 10 TABLET | Refills: 0 | Status: SHIPPED | OUTPATIENT
Start: 2024-11-12

## 2024-11-12 NOTE — OUTREACH NOTE
AMBULATORY CASE MANAGEMENT NOTE    Names and Relationships of Patient/Support Persons: Contact: Shwetha Lane; Relationship: Self -     Patient Outreach    Outgoing call to the patient for ED follow up outreach and to offer and discuss HRCM program and services.  Patient denies any questions related to her visit.  She prefers to make her PCP follow up appointment.  She states that the pain medication does help with her back pain however she was only given a prescription for 10 pills.  She will try ice for her back pain for 20 min intervals.  She states her back pain prevents her from her baseline activity but she is able to stand but prefers bedrest.  Encouraged increased activity as hellen and to make PCP follow up to address her decreased activity and continued pain.  Discussed HRCM program and services.  She is not interested and declines.     Carmen AGUIAR  Ambulatory Case Management    11/12/2024, 11:46 EST

## 2024-11-12 NOTE — PROGRESS NOTES
"Chief Complaint  Hospital Follow Up Visit (Pt was in the hospital due to fracture vertabrae. Needs refill on metoprolol/)    Subjective        Shwetha Lane presents to [facility]  History of Present Illness  Patient presents office today for a follow-up from ER visit.  Patient fell 1 week ago on 11/6/2024.  She reports slipping and falling landing basically on her right buttocks..   She is reporting pain in her back and pelvic region.  She was noted to have an acute appearing fracture of the right lateral aspect of the L2.  She is without chest pain shortness of air.  She denies dizziness.  She is negative for fever and/or chills.  Blood pressure is 130/60.  Patient had 3-day course of hydrocodone 5 mg.  She reports this is helped her pain.  Today she states pain is rating at a 9.  With hypertension stable with metoprolol.          Objective   Vital Signs:  /60 (BP Location: Right arm, Patient Position: Sitting, Cuff Size: Adult)   Pulse 81   Temp 96.5 °F (35.8 °C) (Temporal)   Ht 165.1 cm (65\")   Wt 116 kg (256 lb 12.8 oz)   SpO2 95%   BMI 42.73 kg/m²   Estimated body mass index is 42.73 kg/m² as calculated from the following:    Height as of this encounter: 165.1 cm (65\").    Weight as of this encounter: 116 kg (256 lb 12.8 oz).    Class 3 Severe Obesity (BMI >=40). Obesity-related health conditions include the following: hypertension and dyslipidemias. Obesity is unchanged. BMI is is above average; BMI management plan is completed. We discussed portion control and increasing exercise.      Physical Exam  Constitutional:       Appearance: Normal appearance.   HENT:      Head: Normocephalic.   Cardiovascular:      Rate and Rhythm: Normal rate and regular rhythm.   Pulmonary:      Effort: Pulmonary effort is normal. No respiratory distress.      Breath sounds: Normal breath sounds. No wheezing.   Abdominal:      General: Abdomen is flat.      Palpations: Abdomen is " soft. There is no mass.      Tenderness: There is no abdominal tenderness.      Hernia: No hernia is present.   Musculoskeletal:         General: Tenderness present.      Cervical back: Normal. No spasms or tenderness. Normal range of motion.      Thoracic back: Normal. No spasms or tenderness. Normal range of motion.      Lumbar back: Spasms and tenderness present. No edema or bony tenderness. Decreased range of motion. No scoliosis.      Right hip: Tenderness present. Decreased range of motion.   Neurological:      Mental Status: She is alert.        Result Review :  The following data was reviewed by: NELLY Rivera on 11/12/2024:  Common labs          5/17/2024    13:24 11/18/2024    10:37 11/19/2024    10:07   Common Labs   Glucose 121  108  114    BUN 17  14  14    Creatinine 0.78  0.80  0.85    Sodium 142  141  138    Potassium 4.5  4.6  4.0    Chloride 104  105  102    Calcium 9.6  9.4  9.1    Total Protein 6.8  6.9     Albumin 4.2  4.1  3.7    Total Bilirubin 0.6  0.6  0.7    Alkaline Phosphatase 100  103  102    AST (SGOT) 18  20  16    ALT (SGPT) 19  19  18    WBC 8.3  8.78  8.20    Hemoglobin 15.2  14.9  14.9    Hematocrit 45.9  45.7  43.0    Platelets 288  280  253    Total Cholesterol 145  141     Triglycerides 206  145     HDL Cholesterol 38  39     LDL Cholesterol  73  77       Data reviewed : Radiologic studies CT of pelvis and CT of lumbar spine.  Hip x-ray, lumbar x-ray.           Assessment and Plan   Diagnoses and all orders for this visit:    1. Compression fracture of L2 vertebra with routine healing, subsequent encounter (Primary)  -     HYDROcodone-acetaminophen (NORCO) 5-325 MG per tablet; Take 1 tablet by mouth Every 6 (Six) Hours As Needed for Moderate Pain for up to 15 doses.  Dispense: 10 tablet; Refill: 0  -     Ambulatory Referral to Physical Therapy for Evaluation & Treatment    2. Primary hypertension  -     metoprolol succinate XL (TOPROL-XL) 50 MG 24 hr tablet; Take 1  tablet by mouth Daily.  Dispense: 90 tablet; Refill: 1    Side effects of all new and old medications reviewed with the patient -willing to accept all risks involved.  Advised to rto if no improvement or worsening of symptoms.  Patient instructed to  clinical summary at .          I spent 30 minutes caring for Shwetha on this date of service. This time includes time spent by me in the following activities:preparing for the visit, reviewing tests, obtaining and/or reviewing a separately obtained history, performing a medically appropriate examination and/or evaluation , counseling and educating the patient/family/caregiver, ordering medications, tests, or procedures, referring and communicating with other health care professionals , documenting information in the medical record, independently interpreting results and communicating that information with the patient/family/caregiver, and care coordination  Follow Up   No follow-ups on file.  Patient was given instructions and counseling regarding her condition or for health maintenance advice. Please see specific information pulled into the AVS if appropriate.

## 2024-11-18 ENCOUNTER — OFFICE VISIT (OUTPATIENT)
Dept: FAMILY MEDICINE CLINIC | Facility: CLINIC | Age: 79
End: 2024-11-18
Payer: MEDICARE

## 2024-11-18 VITALS
DIASTOLIC BLOOD PRESSURE: 80 MMHG | WEIGHT: 254.2 LBS | SYSTOLIC BLOOD PRESSURE: 120 MMHG | HEART RATE: 75 BPM | BODY MASS INDEX: 42.35 KG/M2 | HEIGHT: 65 IN | OXYGEN SATURATION: 94 % | TEMPERATURE: 97.7 F

## 2024-11-18 DIAGNOSIS — G43.001 MIGRAINE WITHOUT AURA AND WITH STATUS MIGRAINOSUS, NOT INTRACTABLE: ICD-10-CM

## 2024-11-18 DIAGNOSIS — E78.00 PURE HYPERCHOLESTEROLEMIA: ICD-10-CM

## 2024-11-18 DIAGNOSIS — Z00.00 MEDICARE ANNUAL WELLNESS VISIT, SUBSEQUENT: Primary | ICD-10-CM

## 2024-11-18 DIAGNOSIS — E03.9 HYPOTHYROIDISM, UNSPECIFIED TYPE: ICD-10-CM

## 2024-11-18 DIAGNOSIS — S32.020D COMPRESSION FRACTURE OF L2 VERTEBRA WITH ROUTINE HEALING, SUBSEQUENT ENCOUNTER: ICD-10-CM

## 2024-11-18 DIAGNOSIS — J30.2 SEASONAL ALLERGIES: ICD-10-CM

## 2024-11-18 DIAGNOSIS — I10 PRIMARY HYPERTENSION: ICD-10-CM

## 2024-11-18 PROCEDURE — 99214 OFFICE O/P EST MOD 30 MIN: CPT | Performed by: NURSE PRACTITIONER

## 2024-11-18 PROCEDURE — 3074F SYST BP LT 130 MM HG: CPT | Performed by: NURSE PRACTITIONER

## 2024-11-18 PROCEDURE — 1160F RVW MEDS BY RX/DR IN RCRD: CPT | Performed by: NURSE PRACTITIONER

## 2024-11-18 PROCEDURE — 1159F MED LIST DOCD IN RCRD: CPT | Performed by: NURSE PRACTITIONER

## 2024-11-18 PROCEDURE — 1125F AMNT PAIN NOTED PAIN PRSNT: CPT | Performed by: NURSE PRACTITIONER

## 2024-11-18 PROCEDURE — G0439 PPPS, SUBSEQ VISIT: HCPCS | Performed by: NURSE PRACTITIONER

## 2024-11-18 PROCEDURE — 3079F DIAST BP 80-89 MM HG: CPT | Performed by: NURSE PRACTITIONER

## 2024-11-18 NOTE — ASSESSMENT & PLAN NOTE
Counseling was provided on nutrition, physical activity, development, and injury prevention, dental health, and safe sex practices patient verbalizes understanding no additional questions were asked.

## 2024-11-18 NOTE — ASSESSMENT & PLAN NOTE
Headaches are stable.    Plan:  Resume taking the following medication/s;  Nurtec.     Discussed medication dosage, use, side effects, and goals of treatment in detail.    Discussed monitoring symptoms and use of quick-relief medications and maintenance medication.    General Treatment Goals:   symptom prevention  minimize work absence  minimizing limitation in activity  prevention of exacerbations  decrease use of ER/inpatient care  minimization of adverse effects of treatment    Followup in 6 months                Orders:    Ambulatory Referral to Neurology

## 2024-11-18 NOTE — PROGRESS NOTES
Subjective   The ABCs of the Annual Wellness Visit  Medicare Wellness Visit      Shwetha Lane is a 79 y.o. patient who presents for a Medicare Wellness Visit.    The following portions of the patient's history were reviewed and   updated as appropriate: allergies, current medications, past family history, past medical history, past social history, past surgical history, and problem list.    Compared to one year ago, the patient's physical   health is the same.  Compared to one year ago, the patient's mental   health is the same.    Recent Hospitalizations:  She was not admitted to the hospital during the last year.     Current Medical Providers:  Patient Care Team:  Rosibel Scott APRN as PCP - General (Nurse Practitioner)  Been, Denys LEE MD as Surgeon (Orthopedic Surgery)  Katerin Freeman MD as Consulting Physician (Hematology and Oncology)    Outpatient Medications Prior to Visit   Medication Sig Dispense Refill   • atorvastatin (Lipitor) 40 MG tablet Take 1 tablet by mouth Every Night. 90 tablet 1   • Budeson-Glycopyrrol-Formoterol (Breztri Aerosphere) 160-9-4.8 MCG/ACT aerosol inhaler Inhale 2 puffs 2 (Two) Times a Day As Needed.     • cholecalciferol (VITAMIN D3) 25 MCG (1000 UT) tablet Take 2 tablets by mouth Daily. 2 pills daily 90 tablet 3   • cyclobenzaprine (FLEXERIL) 10 MG tablet Take 1 tablet by mouth 3 (Three) Times a Day As Needed for Muscle Spasms. 90 tablet 3   • estradiol (ESTRACE) 0.1 MG/GM vaginal cream Insert 1 g into the vagina Daily As Needed.     • fluticasone (FLONASE) 50 MCG/ACT nasal spray Administer 2 sprays into the nostril(s) as directed by provider Daily As Needed for Rhinitis.     • HYDROcodone-acetaminophen (NORCO) 5-325 MG per tablet Take 1 tablet by mouth Every 6 (Six) Hours As Needed for Moderate Pain for up to 15 doses. 10 tablet 0   • levothyroxine (Synthroid) 137 MCG tablet Take 1 tablet by mouth Daily. 90 tablet 3   • lidocaine (LIDODERM) 5 % Place 1 patch on  the skin as directed by provider Daily. Remove & Discard patch within 12 hours or as directed by MD 30 patch 1   • metoprolol succinate XL (TOPROL-XL) 50 MG 24 hr tablet Take 1 tablet by mouth Daily. 90 tablet 1   • Multiple Vitamins-Minerals (MULTIVITAMIN & MINERAL PO) Take 1 tablet by mouth Daily.     • Nurtec 75 MG dispersible tablet Place  under the tongue Daily As Needed.     • vitamin B-12 (CYANOCOBALAMIN) 1000 MCG tablet Take 1 tablet by mouth Daily.     • aspirin (Tanner Aspirin) 325 MG tablet Take 1 tablet by mouth Daily. (Patient not taking: Reported on 11/12/2024) 21 tablet 1     No facility-administered medications prior to visit.     Opioid medication/s are on active medication list.  and I have evaluated her active treatment plan and pain score trends (see table).  There were no vitals filed for this visit.  I have reviewed the chart for potential of high risk medication and harmful drug interactions in the elderly.        Aspirin is on active medication list. Aspirin use is indicated based on review of current medical condition/s. Pros and cons of this therapy have been discussed today. Benefits of this medication outweigh potential harm.  Patient has been encouraged to continue taking this medication.  .      Patient Active Problem List   Diagnosis   • Pneumonia   • Hypothyroidism   • Primary osteoarthritis involving multiple joints   • Hyperlipemia   • Migraine   • Obesity, morbid (more than 100 lbs over ideal weight or BMI > 40)   • Status following gastric banding surgery for weight loss   • Fatty liver   • Vertigo   • PVC (premature ventricular contraction)   • PAC (premature atrial contraction)   • Malignant neoplasm of female breast   • COVID-19 virus detected   • Shingles   • Ventricular tachycardia   • DDD (degenerative disc disease), cervical   • Cervical spondylolysis   • Low back pain   • Sacroiliac joint pain   • Skin yeast infection   • Medicare annual wellness visit, subsequent   •  "Lumbosacral radiculopathy   • Seasonal allergies   • Primary hypertension   • Family history of heart disease   • Compression fracture of L2 lumbar vertebra     Advance Care Planning Advance Directive is not on file.  ACP discussion was held with the patient during this visit. Patient has an advance directive (not in EMR), copy requested.            Objective   Vitals:    24 0955   BP: 120/80   BP Location: Left arm   Patient Position: Sitting   Cuff Size: Large Adult   Pulse: 75   Temp: 97.7 °F (36.5 °C)   SpO2: 94%   Weight: 115 kg (254 lb 3.2 oz)   Height: 165.1 cm (65\")       Estimated body mass index is 42.3 kg/m² as calculated from the following:    Height as of this encounter: 165.1 cm (65\").    Weight as of this encounter: 115 kg (254 lb 3.2 oz).            Does the patient have evidence of cognitive impairment? No  Lab Results   Component Value Date    CHLPL 141 2024    TRIG 145 2024    HDL 39 (L) 2024    LDL 77 2024    VLDL 25 2024                                                                                                Health  Risk Assessment    Smoking Status:  Social History     Tobacco Use   Smoking Status Former   • Current packs/day: 0.50   • Average packs/day: 0.5 packs/day for 15.0 years (7.5 ttl pk-yrs)   • Types: Cigarettes   Smokeless Tobacco Never   Tobacco Comments    QUIT ABOUT 16 YEARS AGO     Alcohol Consumption:  Social History     Substance and Sexual Activity   Alcohol Use Never       Fall Risk Screen  STEADI Fall Risk Assessment was completed, and patient is at HIGH risk for falls. Assessment completed on:2024    Depression Screening   Little interest or pleasure in doing things? Not at all   Feeling down, depressed, or hopeless? Several days   PHQ-2 Total Score 1      Health Habits and Functional and Cognitive Screenin/18/2024     9:58 AM   Functional & Cognitive Status   Do you have difficulty preparing food and eating? No   Do " you have difficulty bathing yourself, getting dressed or grooming yourself? No   Do you have difficulty using the toilet? Yes   Do you have difficulty moving around from place to place? Yes   Do you have trouble with steps or getting out of a bed or a chair? Yes   Current Diet Well Balanced Diet   Dental Exam Up to date   Eye Exam Up to date   Exercise (times per week) 0 times per week   Current Exercises Include No Regular Exercise   Do you need help using the phone?  No   Are you deaf or do you have serious difficulty hearing?  No   Do you need help to go to places out of walking distance? Yes   Do you need help shopping? No   Do you need help preparing meals?  No   Do you need help with housework?  No   Do you need help with laundry? No   Do you need help taking your medications? No   Do you need help managing money? No   Do you ever drive or ride in a car without wearing a seat belt? No   Have you felt unusual stress, anger or loneliness in the last month? No   Who do you live with? Spouse   If you need help, do you have trouble finding someone available to you? No   Have you been bothered in the last four weeks by sexual problems? No   Do you have difficulty concentrating, remembering or making decisions? Yes           Age-appropriate Screening Schedule:  Refer to the list below for future screening recommendations based on patient's age, sex and/or medical conditions. Orders for these recommended tests are listed in the plan section. The patient has been provided with a written plan.    Health Maintenance List  Health Maintenance   Topic Date Due   • TDAP/TD VACCINES (1 - Tdap) Never done   • ZOSTER VACCINE (1 of 2) Never done   • RSV Vaccine - Adults (1 - 1-dose 75+ series) Never done   • COVID-19 Vaccine (4 - 2024-25 season) 09/01/2024   • DXA SCAN  11/23/2024   • BMI FOLLOWUP  11/12/2025   • ANNUAL WELLNESS VISIT  11/18/2025   • LIPID PANEL  11/18/2025   • COLORECTAL CANCER SCREENING  04/14/2032   •  HEPATITIS C SCREENING  Completed   • INFLUENZA VACCINE  Completed   • Pneumococcal Vaccine 65+  Completed   • MAMMOGRAM  Discontinued                                                                                                                                                CMS Preventative Services Quick Reference  Risk Factors Identified During Encounter  Chronic Pain: Natural history and expected course discussed. Questions answered.  Chronic Pain Educational material Discussed and Shared in After Visit Summary for Patient.  Fall Risk-High or Moderate: Discussed Fall Prevention in the home  Immunizations Discussed/Encouraged: Influenza, Prevnar 20 (Pneumococcal 20-valent conjugate), COVID19, and RSV (Respiratory Syncytial Virus)  Dental Screening Recommended  Vision Screening Recommended    The above risks/problems have been discussed with the patient.  Pertinent information has been shared with the patient in the After Visit Summary.  An After Visit Summary and PPPS were made available to the patient.    Follow Up:   Next Medicare Wellness visit to be scheduled in 1 year.         Additional E&M Note during same encounter follows:  Patient has additional, significant, and separately identifiable condition(s)/problem(s) that require work above and beyond the Medicare Wellness Visit     Chief Complaint  Medicare Wellness-subsequent (Fasting/), back fracture (Need for referral to Dr. Coffman ), and Migraine (Need for new neurology referral for Nurtec refills/(Present neurologist retired) )    Subjective   Patient presents office today for Medicare wellness visit.  Today she is reporting back pain shows compression fracture related to recent fall.  She was treated in ER.  Patient reports pain is stable but consistent.  Patient had CT of lumbar region with placing MRI referring to neurosurgeon Dr. Cuevas.  With migraine she is following neurology needs updated neurology referral.  She is stable with Nurtec    Shwetha  "is also being seen today for additional medical problem/s.  Patient presents office today for Medicare wellness visit.  Today she is reporting back pain shows compression fracture related to recent fall.  She was treated in ER.  Patient reports pain is stable but consistent.  Patient had CT of lumbar region with placing MRI referring to neurosurgeon Dr. Cuevas.  With migraine she is following neurology needs updated neurology referral.  She is stable with [facility]  Review of Systems   Constitutional:  Negative for activity change.   HENT:  Negative for congestion and sinus pressure.    Eyes:  Negative for discharge.   Respiratory:  Negative for apnea and wheezing.    Cardiovascular:  Negative for chest pain, palpitations and leg swelling.   Gastrointestinal:  Negative for abdominal pain.   Endocrine: Negative for cold intolerance and heat intolerance.   Genitourinary:  Negative for dysuria.   Musculoskeletal:  Positive for back pain.   Skin:  Negative for rash.   Neurological:  Negative for dizziness.   Hematological:  Negative for adenopathy.   Psychiatric/Behavioral:  The patient is not nervous/anxious.               Objective   Vital Signs:  /80 (BP Location: Left arm, Patient Position: Sitting, Cuff Size: Large Adult)   Pulse 75   Temp 97.7 °F (36.5 °C)   Ht 165.1 cm (65\")   Wt 115 kg (254 lb 3.2 oz)   SpO2 94%   BMI 42.30 kg/m²   Physical Exam  Constitutional:       Appearance: Normal appearance.   HENT:      Head: Normocephalic.      Right Ear: Tympanic membrane normal.      Left Ear: Tympanic membrane normal.      Nose: Nose normal.   Eyes:      Pupils: Pupils are equal, round, and reactive to light.   Cardiovascular:      Rate and Rhythm: Normal rate and regular rhythm.   Pulmonary:      Effort: Pulmonary effort is normal. No respiratory distress.      Breath sounds: Normal breath sounds. No wheezing.   Abdominal:      General: Abdomen is flat.      Palpations: Abdomen is soft. There is no mass.     "  Tenderness: There is no abdominal tenderness.      Hernia: No hernia is present.   Musculoskeletal:         General: Tenderness present.      Cervical back: Normal. No spasms or tenderness. Normal range of motion.      Thoracic back: Normal. No spasms or tenderness. Normal range of motion.      Lumbar back: Spasms and tenderness present. No edema or bony tenderness. Decreased range of motion. No scoliosis.   Neurological:      General: No focal deficit present.      Mental Status: She is alert.   Psychiatric:         Mood and Affect: Mood normal.       The following data was reviewed by: NELLY Rivera on 11/18/2024:  Data reviewed : Radiologic studies CT  lumbar/ pelvis  Common labs          5/17/2024    13:24 11/18/2024    10:37 11/19/2024    10:07   Common Labs   Glucose 121  108  114    BUN 17  14  14    Creatinine 0.78  0.80  0.85    Sodium 142  141  138    Potassium 4.5  4.6  4.0    Chloride 104  105  102    Calcium 9.6  9.4  9.1    Total Protein 6.8  6.9     Albumin 4.2  4.1  3.7    Total Bilirubin 0.6  0.6  0.7    Alkaline Phosphatase 100  103  102    AST (SGOT) 18  20  16    ALT (SGPT) 19  19  18    WBC 8.3  8.78  8.20    Hemoglobin 15.2  14.9  14.9    Hematocrit 45.9  45.7  43.0    Platelets 288  280  253    Total Cholesterol 145  141     Triglycerides 206  145     HDL Cholesterol 38  39     LDL Cholesterol  73  77               Assessment and Plan            Medicare annual wellness visit, subsequent  Counseling was provided on nutrition, physical activity, development, and injury prevention, dental health, and safe sex practices patient verbalizes understanding no additional questions were asked.        Primary hypertension  Blood pressure controlled continue healthy heart diet and exercise.    Orders:  •  Comprehensive Metabolic Panel    Pure hypercholesterolemia  Continue healthy diet and exercise stable with medication.    Orders:  •  Lipid Panel    Seasonal allergies    Orders:  •  CBC &  Differential    Hypothyroidism, unspecified type    Orders:  •  Thyroid Panel With TSH    Compression fracture of L2 vertebra with routine healing, subsequent encounter    Orders:  •  MRI Lumbar Spine Without Contrast; Future  •  Ambulatory Referral to Neurosurgery    Migraine without aura and with status migrainosus, not intractable  Headaches are stable.    Plan:  Resume taking the following medication/s;  Nurtec.     Discussed medication dosage, use, side effects, and goals of treatment in detail.    Discussed monitoring symptoms and use of quick-relief medications and maintenance medication.    General Treatment Goals:   symptom prevention  minimize work absence  minimizing limitation in activity  prevention of exacerbations  decrease use of ER/inpatient care  minimization of adverse effects of treatment    Followup in 6 months                Orders:  •  Ambulatory Referral to Neurology          I spent 30 minutes caring for Shwetha on this date of service. This time includes time spent by me in the following activities:preparing for the visit, reviewing tests, obtaining and/or reviewing a separately obtained history, performing a medically appropriate examination and/or evaluation , counseling and educating the patient/family/caregiver, ordering medications, tests, or procedures, referring and communicating with other health care professionals , documenting information in the medical record, independently interpreting results and communicating that information with the patient/family/caregiver, and care coordination  Follow Up   Return in about 6 weeks (around 12/30/2024) for Recheck.  Patient was given instructions and counseling regarding her condition or for health maintenance advice. Please see specific information pulled into the AVS if appropriate.     [phone]

## 2024-11-18 NOTE — ASSESSMENT & PLAN NOTE
Blood pressure controlled continue healthy heart diet and exercise.    Orders:    Comprehensive Metabolic Panel

## 2024-11-19 ENCOUNTER — HOSPITAL ENCOUNTER (EMERGENCY)
Facility: HOSPITAL | Age: 79
Discharge: HOME | End: 2024-11-19
Attending: STUDENT IN AN ORGANIZED HEALTH CARE EDUCATION/TRAINING PROGRAM | Admitting: STUDENT IN AN ORGANIZED HEALTH CARE EDUCATION/TRAINING PROGRAM
Payer: MEDICARE

## 2024-11-19 ENCOUNTER — APPOINTMENT (OUTPATIENT)
Dept: CT IMAGING | Facility: HOSPITAL | Age: 79
End: 2024-11-19
Payer: MEDICARE

## 2024-11-19 VITALS
SYSTOLIC BLOOD PRESSURE: 125 MMHG | HEART RATE: 71 BPM | RESPIRATION RATE: 20 BRPM | TEMPERATURE: 97.5 F | OXYGEN SATURATION: 94 % | BODY MASS INDEX: 41.65 KG/M2 | WEIGHT: 250 LBS | DIASTOLIC BLOOD PRESSURE: 61 MMHG | HEIGHT: 65 IN

## 2024-11-19 DIAGNOSIS — R10.84 GENERALIZED ABDOMINAL PAIN: Primary | ICD-10-CM

## 2024-11-19 DIAGNOSIS — R19.4 DECREASED FREQUENCY OF BOWEL MOVEMENTS: ICD-10-CM

## 2024-11-19 LAB
ALBUMIN SERPL-MCNC: 3.7 G/DL (ref 3.5–5.2)
ALBUMIN SERPL-MCNC: 4.1 G/DL (ref 3.5–5.2)
ALBUMIN/GLOB SERPL: 1.1 G/DL
ALBUMIN/GLOB SERPL: 1.5 G/DL
ALP SERPL-CCNC: 102 U/L (ref 39–117)
ALP SERPL-CCNC: 103 U/L (ref 39–117)
ALT SERPL W P-5'-P-CCNC: 18 U/L (ref 1–33)
ALT SERPL-CCNC: 19 U/L (ref 1–33)
ANION GAP SERPL CALCULATED.3IONS-SCNC: 10.2 MMOL/L (ref 5–15)
AST SERPL-CCNC: 16 U/L (ref 1–32)
AST SERPL-CCNC: 20 U/L (ref 1–32)
BASOPHILS # BLD AUTO: 0.08 10*3/MM3 (ref 0–0.2)
BASOPHILS # BLD AUTO: 0.1 10*3/MM3 (ref 0–0.2)
BASOPHILS NFR BLD AUTO: 1 % (ref 0–1.5)
BASOPHILS NFR BLD AUTO: 1.1 % (ref 0–1.5)
BILIRUB SERPL-MCNC: 0.6 MG/DL (ref 0–1.2)
BILIRUB SERPL-MCNC: 0.7 MG/DL (ref 0–1.2)
BILIRUB UR QL STRIP: NEGATIVE
BUN SERPL-MCNC: 14 MG/DL (ref 8–23)
BUN SERPL-MCNC: 14 MG/DL (ref 8–23)
BUN/CREAT SERPL: 16.5 (ref 7–25)
BUN/CREAT SERPL: 17.5 (ref 7–25)
CALCIUM SERPL-MCNC: 9.4 MG/DL (ref 8.6–10.5)
CALCIUM SPEC-SCNC: 9.1 MG/DL (ref 8.6–10.5)
CHLORIDE SERPL-SCNC: 102 MMOL/L (ref 98–107)
CHLORIDE SERPL-SCNC: 105 MMOL/L (ref 98–107)
CHOLEST SERPL-MCNC: 141 MG/DL (ref 0–200)
CLARITY UR: ABNORMAL
CO2 SERPL-SCNC: 20.2 MMOL/L (ref 22–29)
CO2 SERPL-SCNC: 25.8 MMOL/L (ref 22–29)
COLOR UR: YELLOW
CREAT SERPL-MCNC: 0.8 MG/DL (ref 0.57–1)
CREAT SERPL-MCNC: 0.85 MG/DL (ref 0.57–1)
DEPRECATED RDW RBC AUTO: 42.7 FL (ref 37–54)
EGFRCR SERPLBLD CKD-EPI 2021: 69.8 ML/MIN/1.73
EGFRCR SERPLBLD CKD-EPI 2021: 75.1 ML/MIN/1.73
EOSINOPHIL # BLD AUTO: 0.1 10*3/MM3 (ref 0–0.4)
EOSINOPHIL # BLD AUTO: 0.16 10*3/MM3 (ref 0–0.4)
EOSINOPHIL NFR BLD AUTO: 1.2 % (ref 0.3–6.2)
EOSINOPHIL NFR BLD AUTO: 1.8 % (ref 0.3–6.2)
ERYTHROCYTE [DISTWIDTH] IN BLOOD BY AUTOMATED COUNT: 12.6 % (ref 12.3–15.4)
ERYTHROCYTE [DISTWIDTH] IN BLOOD BY AUTOMATED COUNT: 12.8 % (ref 12.3–15.4)
FT4I SERPL CALC-MCNC: 4.5 (ref 1.2–4.9)
GLOBULIN SER CALC-MCNC: 2.8 GM/DL
GLOBULIN UR ELPH-MCNC: 3.4 GM/DL
GLUCOSE SERPL-MCNC: 108 MG/DL (ref 65–99)
GLUCOSE SERPL-MCNC: 114 MG/DL (ref 65–99)
GLUCOSE UR STRIP-MCNC: NEGATIVE MG/DL
HCT VFR BLD AUTO: 43 % (ref 34–46.6)
HCT VFR BLD AUTO: 45.7 % (ref 34–46.6)
HDLC SERPL-MCNC: 39 MG/DL (ref 40–60)
HGB BLD-MCNC: 14.9 G/DL (ref 12–15.9)
HGB BLD-MCNC: 14.9 G/DL (ref 12–15.9)
HGB UR QL STRIP.AUTO: NEGATIVE
IMM GRANULOCYTES # BLD AUTO: 0.07 10*3/MM3 (ref 0–0.05)
IMM GRANULOCYTES # BLD AUTO: 0.09 10*3/MM3 (ref 0–0.05)
IMM GRANULOCYTES NFR BLD AUTO: 0.9 % (ref 0–0.5)
IMM GRANULOCYTES NFR BLD AUTO: 1 % (ref 0–0.5)
KETONES UR QL STRIP: NEGATIVE
LDLC SERPL CALC-MCNC: 77 MG/DL (ref 0–100)
LEUKOCYTE ESTERASE UR QL STRIP.AUTO: NEGATIVE
LIPASE SERPL-CCNC: 21 U/L (ref 13–60)
LYMPHOCYTES # BLD AUTO: 2.03 10*3/MM3 (ref 0.7–3.1)
LYMPHOCYTES # BLD AUTO: 2.37 10*3/MM3 (ref 0.7–3.1)
LYMPHOCYTES NFR BLD AUTO: 24.8 % (ref 19.6–45.3)
LYMPHOCYTES NFR BLD AUTO: 27 % (ref 19.6–45.3)
MCH RBC QN AUTO: 30.5 PG (ref 26.6–33)
MCH RBC QN AUTO: 31.8 PG (ref 26.6–33)
MCHC RBC AUTO-ENTMCNC: 32.6 G/DL (ref 31.5–35.7)
MCHC RBC AUTO-ENTMCNC: 34.7 G/DL (ref 31.5–35.7)
MCV RBC AUTO: 91.9 FL (ref 79–97)
MCV RBC AUTO: 93.5 FL (ref 79–97)
MONOCYTES # BLD AUTO: 0.79 10*3/MM3 (ref 0.1–0.9)
MONOCYTES # BLD AUTO: 0.79 10*3/MM3 (ref 0.1–0.9)
MONOCYTES NFR BLD AUTO: 9 % (ref 5–12)
MONOCYTES NFR BLD AUTO: 9.6 % (ref 5–12)
NEUTROPHILS # BLD AUTO: 5.27 10*3/MM3 (ref 1.7–7)
NEUTROPHILS NFR BLD AUTO: 5.13 10*3/MM3 (ref 1.7–7)
NEUTROPHILS NFR BLD AUTO: 60.1 % (ref 42.7–76)
NEUTROPHILS NFR BLD AUTO: 62.5 % (ref 42.7–76)
NITRITE UR QL STRIP: NEGATIVE
NRBC BLD AUTO-RTO: 0 /100 WBC (ref 0–0.2)
NRBC BLD AUTO-RTO: 0 /100 WBC (ref 0–0.2)
PH UR STRIP.AUTO: <=5 [PH] (ref 5–8)
PLATELET # BLD AUTO: 253 10*3/MM3 (ref 140–450)
PLATELET # BLD AUTO: 280 10*3/MM3 (ref 140–450)
PMV BLD AUTO: 9.7 FL (ref 6–12)
POTASSIUM SERPL-SCNC: 4 MMOL/L (ref 3.5–5.2)
POTASSIUM SERPL-SCNC: 4.6 MMOL/L (ref 3.5–5.2)
PROT SERPL-MCNC: 6.9 G/DL (ref 6–8.5)
PROT SERPL-MCNC: 7.1 G/DL (ref 6–8.5)
PROT UR QL STRIP: NEGATIVE
RBC # BLD AUTO: 4.68 10*6/MM3 (ref 3.77–5.28)
RBC # BLD AUTO: 4.89 10*6/MM3 (ref 3.77–5.28)
SODIUM SERPL-SCNC: 138 MMOL/L (ref 136–145)
SODIUM SERPL-SCNC: 141 MMOL/L (ref 136–145)
SP GR UR STRIP: 1.02 (ref 1–1.03)
T3RU NFR SERPL: 35 % (ref 24–39)
T4 SERPL-MCNC: 12.9 UG/DL (ref 4.5–12)
TRIGL SERPL-MCNC: 145 MG/DL (ref 0–150)
TSH SERPL DL<=0.005 MIU/L-ACNC: 3.39 UIU/ML (ref 0.45–4.5)
UROBILINOGEN UR QL STRIP: ABNORMAL
VLDLC SERPL CALC-MCNC: 25 MG/DL (ref 5–40)
WBC # BLD AUTO: 8.78 10*3/MM3 (ref 3.4–10.8)
WBC NRBC COR # BLD AUTO: 8.2 10*3/MM3 (ref 3.4–10.8)

## 2024-11-19 PROCEDURE — 25510000001 IOPAMIDOL 61 % SOLUTION: Performed by: STUDENT IN AN ORGANIZED HEALTH CARE EDUCATION/TRAINING PROGRAM

## 2024-11-19 PROCEDURE — 99285 EMERGENCY DEPT VISIT HI MDM: CPT

## 2024-11-19 PROCEDURE — 80053 COMPREHEN METABOLIC PANEL: CPT | Performed by: STUDENT IN AN ORGANIZED HEALTH CARE EDUCATION/TRAINING PROGRAM

## 2024-11-19 PROCEDURE — 85025 COMPLETE CBC W/AUTO DIFF WBC: CPT | Performed by: STUDENT IN AN ORGANIZED HEALTH CARE EDUCATION/TRAINING PROGRAM

## 2024-11-19 PROCEDURE — 74177 CT ABD & PELVIS W/CONTRAST: CPT

## 2024-11-19 PROCEDURE — 81003 URINALYSIS AUTO W/O SCOPE: CPT | Performed by: STUDENT IN AN ORGANIZED HEALTH CARE EDUCATION/TRAINING PROGRAM

## 2024-11-19 PROCEDURE — 83690 ASSAY OF LIPASE: CPT | Performed by: STUDENT IN AN ORGANIZED HEALTH CARE EDUCATION/TRAINING PROGRAM

## 2024-11-19 RX ORDER — OMEPRAZOLE 40 MG/1
40 CAPSULE, DELAYED RELEASE ORAL DAILY
Qty: 30 CAPSULE | Refills: 0 | Status: SHIPPED | OUTPATIENT
Start: 2024-11-19 | End: 2024-11-19

## 2024-11-19 RX ORDER — IOPAMIDOL 612 MG/ML
100 INJECTION, SOLUTION INTRAVASCULAR
Status: COMPLETED | OUTPATIENT
Start: 2024-11-19 | End: 2024-11-19

## 2024-11-19 RX ORDER — ONDANSETRON 2 MG/ML
4 INJECTION INTRAMUSCULAR; INTRAVENOUS ONCE
Status: DISCONTINUED | OUTPATIENT
Start: 2024-11-19 | End: 2024-11-19 | Stop reason: HOSPADM

## 2024-11-19 RX ORDER — OMEPRAZOLE 40 MG/1
40 CAPSULE, DELAYED RELEASE ORAL DAILY
Qty: 30 CAPSULE | Refills: 0 | Status: SHIPPED | OUTPATIENT
Start: 2024-11-19 | End: 2024-12-19

## 2024-11-19 RX ADMIN — IOPAMIDOL 85 ML: 612 INJECTION, SOLUTION INTRAVENOUS at 10:12

## 2024-11-19 NOTE — ED PROVIDER NOTES
EMERGENCY DEPARTMENT ENCOUNTER  Room Number:  30/30  PCP: Rosibel Scott APRN  Independent Historians: Patient      HPI:  Chief Complaint: had concerns including Abdominal Pain, Nausea, and Constipation.     A complete HPI/ROS/PMH/PSH/SH/FH are unobtainable due to: None    Chronic or social conditions impacting patient care (Social Determinants of Health): None      Context: Shwetha Lane is a 79 y.o. female with a medical history of hypothyroidism, osteoarthritis, hyperlipidemia, BMI 42, who presents to the ED c/o acute abdominal pain, nausea, inability to have a bowel movement.  Patient reports that she had a compression fracture of her L2 she was seen here for 11 days ago.  She only took 2 point orthotics for this but did not take anymore.  She states for the past week she has been unable to have a bowel movement.  She reports take multiple laxatives and stool softeners without improvement.  Worsening abdominal pain and nausea.  Also mild suprapubic pain.  No other complaints at this time.    She saw her PCP yesterday but did not mention this to her at this time, had thyroid screening labs.      Review of prior external notes (non-ED) -and- Review of prior external test results outside of this encounter:  CT lumbar spine 11/8/2024, L2 compression fracture.    Prescription drug monitoring program review:         PAST MEDICAL HISTORY  Active Ambulatory Problems     Diagnosis Date Noted    Pneumonia     Hypothyroidism     Primary osteoarthritis involving multiple joints 06/03/2016    Hyperlipemia 06/03/2016    Migraine 06/03/2016    Obesity, morbid (more than 100 lbs over ideal weight or BMI > 40) 05/02/2012    Status following gastric banding surgery for weight loss 01/29/2014    Fatty liver 12/14/2017    Vertigo 05/03/2018    PVC (premature ventricular contraction) 12/11/2019    PAC (premature atrial contraction) 12/11/2019    Malignant neoplasm of female breast 08/18/2020    COVID-19 virus detected  09/10/2021    Shingles 10/22/2021    Ventricular tachycardia 01/19/2022    DDD (degenerative disc disease), cervical 06/07/2022    Cervical spondylolysis 06/07/2022    Low back pain     Sacroiliac joint pain     Skin yeast infection 02/16/2023    Medicare annual wellness visit, subsequent 05/17/2023    Lumbosacral radiculopathy 05/17/2023    Seasonal allergies 11/17/2023    Primary hypertension 11/17/2023    Family history of heart disease 05/17/2024    Compression fracture of L2 lumbar vertebra 11/12/2024     Resolved Ambulatory Problems     Diagnosis Date Noted    Type 2 diabetes mellitus, without long-term current use of insulin 02/04/2021    Primary osteoarthritis of right knee 04/26/2023     Past Medical History:   Diagnosis Date    Anxiety Sometimes    Arthritis     Cancer Breast, 3 years ago    Cataract Had surgery in April    COPD (chronic obstructive pulmonary disease)     COVID     Diverticulosis     GERD (gastroesophageal reflux disease)     Heart murmur 3 years ago    Hyperlipidemia     Hypertension     Migraines     Obesity     Scoliosis     Sleep apnea          PAST SURGICAL HISTORY  Past Surgical History:   Procedure Laterality Date    APPENDECTOMY  1955    BARIATRIC SURGERY  Got the Band 2011    BREAST SURGERY  2020    COLONOSCOPY      HYSTERECTOMY      JOINT REPLACEMENT  Left Knee 2017    KNEE ARTHROSCOPY Bilateral     HAS HAD BOTH KNEES SCOPED WITH MENISCUS REPAIR    LAPAROSCOPIC GASTRIC BANDING      MOUTH SURGERY      TOOTH EXTRACTED ABOUT 6 WEEKS AGO CURRENTLY HEALED AND NO ISSUES    REPLACEMENT TOTAL KNEE Left     TONSILLECTOMY      TOTAL KNEE ARTHROPLASTY Right 09/26/2023    Procedure: TOTAL KNEE ARTHROPLASTY WITH ALOK NAVIGATION;  Surgeon: Denys Mohr MD;  Location: [facility];  Service: Robotics - Ortho;  Laterality: Right;         FAMILY HISTORY  Family History   Problem Relation Age of Onset    Diabetes Mother     Asthma Mother     Depression Mother     Heart disease Father      Asthma Sister     [place] Hyperthermia Neg Hx          SOCIAL HISTORY  Social History     Socioeconomic History    Marital status:    Tobacco Use    Smoking status: Former     Current packs/day: 0.50     Average packs/day: 0.5 packs/day for 15.0 years (7.5 ttl pk-yrs)     Types: Cigarettes    Smokeless tobacco: Never    Tobacco comments:     QUIT ABOUT 16 YEARS AGO   Vaping Use    Vaping status: Never Used   Substance and Sexual Activity    Alcohol use: Never    Drug use: Never    Sexual activity: Not Currently     Partners: Male         ALLERGIES  Bee venom and Nickel      REVIEW OF SYSTEMS  Review of Systems  Included in HPI  All systems reviewed and negative except for those discussed in HPI.      PHYSICAL EXAM    I have reviewed the triage vital signs and nursing notes.    ED Triage Vitals [11/19/24 0926]   Temp Heart Rate Resp BP SpO2   97.5 °F (36.4 °C) 88 20 -- 94 %      Temp src Heart Rate Source Patient Position BP Location FiO2 (%)   Tympanic -- -- -- --       Physical Exam  GENERAL: alert, no acute distress  SKIN: Warm, dry  HENT: Normocephalic, atraumatic  EYES: no scleral icterus  CV: regular rhythm, regular rate  RESPIRATORY: normal effort, lungs clear  ABDOMEN: soft, nondistended, mild epigastric tenderness to palpation, hypoactive bowel sounds  MUSCULOSKELETAL: no deformity  NEURO: alert, moves all extremities, follows commands            LAB RESULTS  Recent Results (from the past 24 hours)   Urinalysis With Microscopic If Indicated (No Culture) - Urine, Clean Catch    Collection Time: 11/19/24 10:02 AM    Specimen: Urine, Clean Catch   Result Value Ref Range    Color, UA Yellow Yellow, Straw    Appearance, UA Cloudy (A) Clear    pH, UA <=5.0 5.0 - 8.0    Specific Gravity, UA 1.020 1.005 - 1.030    Glucose, UA Negative Negative    Ketones, UA Negative Negative    Bilirubin, UA Negative Negative    Blood, UA Negative Negative    Protein, UA Negative Negative    Leuk Esterase, UA Negative Negative     Nitrite, UA Negative Negative    Urobilinogen, UA 1.0 E.U./dL 0.2 - 1.0 E.U./dL   Comprehensive Metabolic Panel    Collection Time: 11/19/24 10:07 AM    Specimen: Blood   Result Value Ref Range    Glucose 114 (H) 65 - 99 mg/dL    BUN 14 8 - 23 mg/dL    Creatinine 0.85 0.57 - 1.00 mg/dL    Sodium 138 136 - 145 mmol/L    Potassium 4.0 3.5 - 5.2 mmol/L    Chloride 102 98 - 107 mmol/L    CO2 25.8 22.0 - 29.0 mmol/L    Calcium 9.1 8.6 - 10.5 mg/dL    Total Protein 7.1 6.0 - 8.5 g/dL    Albumin 3.7 3.5 - 5.2 g/dL    ALT (SGPT) 18 1 - 33 U/L    AST (SGOT) 16 1 - 32 U/L    Alkaline Phosphatase 102 39 - 117 U/L    Total Bilirubin 0.7 0.0 - 1.2 mg/dL    Globulin 3.4 gm/dL    A/G Ratio 1.1 g/dL    BUN/Creatinine Ratio 16.5 7.0 - 25.0    Anion Gap 10.2 5.0 - 15.0 mmol/L    eGFR 69.8 >60.0 mL/min/1.73   Lipase    Collection Time: 11/19/24 10:07 AM    Specimen: Blood   Result Value Ref Range    Lipase 21 13 - 60 U/L   CBC Auto Differential    Collection Time: 11/19/24 10:07 AM    Specimen: Blood   Result Value Ref Range    WBC 8.20 3.40 - 10.80 10*3/mm3    RBC 4.68 3.77 - 5.28 10*6/mm3    Hemoglobin 14.9 12.0 - 15.9 g/dL    Hematocrit 43.0 34.0 - 46.6 %    MCV 91.9 79.0 - 97.0 fL    MCH 31.8 26.6 - 33.0 pg    MCHC 34.7 31.5 - 35.7 g/dL    RDW 12.8 12.3 - 15.4 %    RDW-SD 42.7 37.0 - 54.0 fl    MPV 9.7 6.0 - 12.0 fL    Platelets 253 140 - 450 10*3/mm3    Neutrophil % 62.5 42.7 - 76.0 %    Lymphocyte % 24.8 19.6 - 45.3 %    Monocyte % 9.6 5.0 - 12.0 %    Eosinophil % 1.2 0.3 - 6.2 %    Basophil % 1.0 0.0 - 1.5 %    Immature Grans % 0.9 (H) 0.0 - 0.5 %    Neutrophils, Absolute 5.13 1.70 - 7.00 10*3/mm3    Lymphocytes, Absolute 2.03 0.70 - 3.10 10*3/mm3    Monocytes, Absolute 0.79 0.10 - 0.90 10*3/mm3    Eosinophils, Absolute 0.10 0.00 - 0.40 10*3/mm3    Basophils, Absolute 0.08 0.00 - 0.20 10*3/mm3    Immature Grans, Absolute 0.07 (H) 0.00 - 0.05 10*3/mm3    nRBC 0.0 0.0 - 0.2 /100 WBC         RADIOLOGY  CT Abdomen Pelvis With  Contrast    Result Date: 11/19/2024  CT ABDOMEN PELVIS W CONTRAST-  INDICATION: Abdominal pain, nausea, no bowel movement  COMPARISON: CT pelvis November 8, 2024  TECHNIQUE: Routine CT abdomen/pelvis with IV contrast. Coronal and sagittal reformats. Radiation dose reduction techniques were utilized, including automated exposure control and exposure modulation based on body size.  FINDINGS:  Lung bases: Small amount of subsegmental atelectasis or scarring at the bases. Calcified pulmonary nodule in the right costophrenic angle, consistent with prior granulomatous infection.  ABDOMEN: Hypoattenuating mass at the right hepatic dome, segment 8, series 2, axial mage 12, measures 2.5 cm, with peripheral nodular area of enhancement that is suggestive of a hemangioma. Normal gallbladder. No biliary ductal dilatation. Focal calcification along the inferior splenic capsule. Spleen is normal in size. Mild to moderate pancreatic atrophy. No pancreatic ductal dilatation or mass seen. Subcentimeter bilateral adrenal nodules, too small to characterize. No solid-appearing renal mass or hydronephrosis.  Pelvis: Underdistended bladder. No bladder calculus. Hysterectomy. No adnexal mass. Phleboliths in the pelvis.  Bowel: Gastric band. Stomach is under distended. No obstruction. Colonic diverticulosis. Moderate gas and stool seen at the splenic flexure of the colon. Appendix not identified though no secondary findings of appendicitis.  Abdominal wall: Fat-containing umbilical hernia. Periumbilical and pelvic wall scarring. Fat-containing inguinal hernias.  Retroperitoneum: No lymphadenopathy.  Vasculature: Patent. No abdominal aortic aneurysm. Moderate aortoiliac atherosclerotic calcification. Multiple small periaortic collateral vessels seen.  Osseous structures: Hemangioma seen in L3. Lumbar facet degenerative arthropathy with grade 1 anterolisthesis of L4 on L5. Severe spondylosis/degenerative disc disease at L5/S1.       1. No  acute findings identified in the abdomen or pelvis. 2. Colonic diverticulosis. 3. Hypoattenuating mass at the right hepatic dome is suggestive of a hemangioma  This report was finalized on 11/19/2024 10:47 AM by Dr. Michael Carrasco M.D on Workstation: [id number]         MEDICATIONS GIVEN IN ER  Medications   ondansetron (ZOFRAN) injection 4 mg (4 mg Intravenous Not Given 11/19/24 1029)   iopamidol (ISOVUE-300) 61 % injection 100 mL (85 mL Intravenous Given by Other 11/19/24 1012)         ORDERS PLACED DURING THIS VISIT:  Orders Placed This Encounter   Procedures    CT Abdomen Pelvis With Contrast    Comprehensive Metabolic Panel    Lipase    Urinalysis With Microscopic If Indicated (No Culture) - Urine, Clean Catch    CBC Auto Differential    CBC & Differential         OUTPATIENT MEDICATION MANAGEMENT:  Current Facility-Administered Medications Ordered in Epic   Medication Dose Route Frequency Provider Last Rate Last Admin    ondansetron (ZOFRAN) injection 4 mg  4 mg Intravenous Once Tony Riggins MD         Current Outpatient Medications Ordered in Epic   Medication Sig Dispense Refill    aspirin (Tanner Aspirin) 325 MG tablet Take 1 tablet by mouth Daily. (Patient not taking: Reported on 11/12/2024) 21 tablet 1    atorvastatin (Lipitor) 40 MG tablet Take 1 tablet by mouth Every Night. 90 tablet 1    Budeson-Glycopyrrol-Formoterol (Breztri Aerosphere) 160-9-4.8 MCG/ACT aerosol inhaler Inhale 2 puffs 2 (Two) Times a Day As Needed.      cholecalciferol (VITAMIN D3) 25 MCG (1000 UT) tablet Take 2 tablets by mouth Daily. 2 pills daily 90 tablet 3    cyclobenzaprine (FLEXERIL) 10 MG tablet Take 1 tablet by mouth 3 (Three) Times a Day As Needed for Muscle Spasms. 90 tablet 3    estradiol (ESTRACE) 0.1 MG/GM vaginal cream Insert 1 g into the vagina Daily As Needed.      fluticasone (FLONASE) 50 MCG/ACT nasal spray Administer 2 sprays into the nostril(s) as directed by provider Daily As Needed for Rhinitis.       HYDROcodone-acetaminophen (NORCO) 5-325 MG per tablet Take 1 tablet by mouth Every 6 (Six) Hours As Needed for Moderate Pain for up to 15 doses. 10 tablet 0    levothyroxine (Synthroid) 137 MCG tablet Take 1 tablet by mouth Daily. 90 tablet 3    lidocaine (LIDODERM) 5 % Place 1 patch on the skin as directed by provider Daily. Remove & Discard patch within 12 hours or as directed by MD 30 patch 1    metoprolol succinate XL (TOPROL-XL) 50 MG 24 hr tablet Take 1 tablet by mouth Daily. 90 tablet 1    Multiple Vitamins-Minerals (MULTIVITAMIN & MINERAL PO) Take 1 tablet by mouth Daily.      Nurtec 75 MG dispersible tablet Place  under the tongue Daily As Needed.      omeprazole (priLOSEC) 40 MG capsule Take 1 capsule by mouth Daily for 30 days. 30 capsule 0    vitamin B-12 (CYANOCOBALAMIN) 1000 MCG tablet Take 1 tablet by mouth Daily.           PROCEDURES  Procedures            PROGRESS, DATA ANALYSIS, CONSULTS, AND MEDICAL DECISION MAKING  All labs have been independently interpreted by me.  All radiology studies have been reviewed by me. All EKG's have been independently viewed and interpreted by me.  Discussion below represents my analysis of pertinent findings related to patient's condition, differential diagnosis, treatment plan and final disposition.    Differential diagnosis includes but is not limited to obstruction, constipation, ileus, gastritis, UTI, CHUN, electrolyte abnormality.    Clinical Scores:                                     ED Course as of 11/19/24 1125   Tue Nov 19, 2024   0948 Patient reports last bowel movement over 1 week ago, is having worsening abdominal pain and nausea.  She has taken multiple stool softeners and laxatives without improvement.  Reports that she had a fracture of her spine approximately 2 weeks ago but only took 2 of her narcotics before not taking anymore.  No other complaints at this time.  Will obtain basic labs, urine, abdominal imaging.  Patient and family are agreeable to  plan  [DN]   1016 CT Abdomen Pelvis With Contrast  I reviewed CT image, no obvious obstruction, small stool burden, gastric lap band present [DN]   1118 I discussed results with patient family at bedside.  They are agreeable with plan for discharge and PPI prescription [DN]      ED Course User Index  [DN] Tony Riggins MD             AS OF 11:25 EST VITALS:    BP - 132/74  HR - 69  TEMP - 97.5 °F (36.4 °C) (Tympanic)  O2 SATS - 90%    COMPLEXITY OF CARE  Admission was considered but after careful review of the patient's presentation, physical examination, diagnostic results, and response to treatment the patient may be safely discharged with outpatient follow-up.      DIAGNOSIS  Final diagnoses:   Generalized abdominal pain   Decreased frequency of bowel movements         DISPOSITION  ED Disposition       ED Disposition   Discharge    Condition   Stable    Comment   --                Please note that portions of this document were completed with a voice recognition program.    Note Disclaimer: At [facility], we believe that sharing information builds trust and better relationships. You are receiving this note because you recently visited [facility]. It is possible you will see health information before a provider has talked with you about it. This kind of information can be easy to misunderstand. To help you fully understand what it means for your health, we urge you to discuss this note with your provider.         Tony Riggins MD  11/19/24 0952       Tony Riggins MD  11/19/24 [id number]

## 2024-11-19 NOTE — ED NOTES
Patient to er from home with c/o ABD pain along with nausea. Patient reported no BM in almost a week.

## 2024-12-05 ENCOUNTER — TREATMENT (OUTPATIENT)
Dept: PHYSICAL THERAPY | Facility: CLINIC | Age: 79
End: 2024-12-05
Payer: MEDICARE

## 2024-12-05 DIAGNOSIS — M54.50 ACUTE BILATERAL LOW BACK PAIN WITHOUT SCIATICA: ICD-10-CM

## 2024-12-05 DIAGNOSIS — S32.020D COMPRESSION FRACTURE OF L2 VERTEBRA WITH ROUTINE HEALING, SUBSEQUENT ENCOUNTER: Primary | ICD-10-CM

## 2024-12-05 NOTE — PROGRESS NOTES
Physical Therapy Initial Evaluation and Plan of Care  [address], [address], [place] [address]    Patient: Shwetha Lane   : 1945  Diagnosis/ICD-10 Code:  Compression fracture of L2 vertebra with routine healing, subsequent encounter [S32.020D]  Referring practitioner: NELLY Rivera    Subjective Evaluation    History of Present Illness  Date of onset: 2024  Mechanism of injury: Patient was walking down concrete steps and slipped on the last step, falling backward onto her bottom, striking her spine on the concrete step.  She presented to the ED via ambulance where initial imaging was done to r/o fractures but presented to ED again on 2024 due to persistent pain and mild compression fx of L2 was found on lumbar CT. Other findings included: Hemangioma seen in L3. Lumbar facet degenerative arthropathy with grade 1 anterolisthesis of L4 on L5. Severe spondylosis/degenerative disc disease at L5/S1.    Patient reports she has pain starting in central lumbar spine travelling around (R) hip to (R) anterior hip/groin.  She has been found previously to have (B) hip OA and has undergone (B) TKAs.     She reports back spasms with sitting or standing very long (5 minutes) as well as any walking, but she finds some relief with supine lying position.  She has 6 pain pills left that she reserves for severe pain; otherwise she takes ibuprofen 400mg.  She reports mild improvement since initial injury.  She has great difficulty with bed mobility.  She has some sleep interruption due to pain.  She has pain with riding in the car and hitting a bump. She expresses fearfulness of walking outside due to the possibility of falling again, and she has been very cautious with all spinal ROM for fear of making the pain worse.    She is scheduled for lumbar MRI on 2024. She reports a history of another fall 3 years ago, slipping on ice and falling forward onto her face.       Patient  Occupation: N/A Quality of life: good    Pain  Current pain ratin  At best pain ratin (supine lying with (B) LEs slightly rotated to the left)  At worst pain rating: 10  Location: central lower lumbar region, (R) posterolateral hip, (R) lateral hip, (R) groin  Quality: dull ache and sharp (constant)  Relieving factors: ice, medications, relaxation and change in position  Aggravating factors: lifting, standing, stairs, ambulation, prolonged positioning, sleeping and squatting  Progression: improved    Diagnostic Tests  CT scan: abnormal (L2 compression fx)    Patient Goals  Patient goals for therapy: decreased pain and independence with ADLs/IADLs             Subjective Questionnaire: Oswestry: 40/50 = 80%     Objective          Neurological Testing     Sensation     Lumbar   Left   Intact: light touch    Right   Intact: light touch    Additional Neurological Details  Diminished light touch of (R) L5 and S1 dermatomes that patient believes to be related to her (R) TKA 1 year ago    Active Range of Motion     Additional Active Range of Motion Details  Lumbar AROM assessed in sitting this date:   Flexion 50%, pain upon return to neutral  Right lateral flexion 50%   Left lateral flexion 25% with pain  Right rotation 25% with pain  Left rotation 50%    Strength/Myotome Testing     Additional Strength Details  LE strength testing deferred due to acuity of compression fx and pain           Assessment & Plan       Assessment  Impairments: abnormal coordination, abnormal gait, abnormal muscle firing, abnormal or restricted ROM, activity intolerance, impaired balance, impaired physical strength, lacks appropriate home exercise program, pain with function and safety issue   Functional limitations: carrying objects, lifting, sleeping, walking, pulling, pushing, uncomfortable because of pain, moving in bed, sitting, standing and stooping   Assessment details: Patient is a 79 y.o female who reports onset of low back and  (R) hip pain after a fall down steps on 11/06/2024.  Lumbar CT confirmed an L2 compression fracture.  She reports central lumbar pain which is constant, deep, and aching as well as a sharp intermittent pain travelling from central lumbar region to (R) posterolateral hip, lateral hip, and (R) groin, the intensity of these rated 2-10/10.  She does describe some improvement in the intensity of the constant pain as well as her ability to move in bed, however significant pain persists with sitting, standing, walking, lumbar AROM, bed mobility, and transfers.  She exhibits antalgic and compensated gait with decreased trunk rotation and shortened step length (B), decreased lumbar AROM, poor positional tolerance, and decreased trunk strength.  Her Oswestry score is 80% indicating a profoundly disabling level of functional limitation.  She will benefit from skilled PT services to address these deficits and assist patient in optimal recovery of tolerance to ADLs, positions, and functional mobility.  Prognosis: good    Goals  Plan Goals: STGs: to be met in 6 weeks  1. Patient will be independent with initial HEP  2. Patient will report improved lumbar symptoms 0-4/10 for improved positional tolerance  3. Patient will report consistently sleeping without pain interruption for improved restorative healing  4. Patient will demonstrate improved lumbar AROM all planes except extension >/= 75% for improved positional tolerance and trunk mobility during ambulation  5. Patient will tolerate sitting x 30 minutes and standing x 15 minutes without symptoms > 4/10    LTGs: to be met in 12 weeks  1. Patient will be independent with progressed HEP  2. Patient will report improved lumbar symptoms 0-2/10 with no radicular symptoms beyond lumbar region x >/= 1 week  3. Patient will demonstrate sufficient functional trunk strength to tolerate prolonged sitting, standing, and walking for functional periods  4. Patient will have improved Oswestry  score </= 20% for subjective evidence of functional improvement  5. Patient will report 50-75% improved confidence in her ambulation and stair negotiation ability    Plan  Therapy options: will be seen for skilled therapy services  Planned modality interventions: cryotherapy, thermotherapy (hydrocollator packs) and electrical stimulation/Russian stimulation  Planned therapy interventions: abdominal trunk stabilization, ADL retraining, balance/weight-bearing training, fine motor coordination training, flexibility, functional ROM exercises, gait training, home exercise program, joint mobilization, manual therapy, neuromuscular re-education, postural training, soft tissue mobilization, strengthening, stretching and therapeutic activities  Frequency: 2x week  Duration in weeks: 12  Treatment plan discussed with: patient  Plan details: Plan 1 x wk sessions until MRI results are known with possible transition to 2 x wk        Manual Therapy:         mins  63005;  Therapeutic Exercise:    10     mins  [id number];     Neuromuscular Los:        mins  40142;    Therapeutic Activity:     15     mins  88875;     Gait Training:           mins  10448;     Ultrasound:          mins  04744;    Electrical Stimulation:         mins  57717 ( );  Dry Needling          mins self-pay    Timed Treatment:   25   mins   Total Treatment:     41   mins    PT SIGNATURE: Kristin Azevedo PT, DPT, OCS  Electronically signed by: Kristin Azevedo PT, 12/05/24, 1:46 PM EST  [place] License #[id number]     DATE TREATMENT INITIATED: 12/5/2024    Medicare Initial Certification  Certification Period: 12/5/2024 thru 3/4/2025  I certify that the therapy services are furnished while this patient is under my care.  The services outlined above are required by this patient, and will be reviewed every 90 days.     PHYSICIAN: Rosibel Scott APRN  [id number]                                          DATE:     Please sign and return via fax to [phone]. Thank you,  [facility].

## 2024-12-06 RX ORDER — CHOLECALCIFEROL (VITAMIN D3) 25 MCG
2000 TABLET ORAL DAILY
Qty: 90 TABLET | Refills: 3 | Status: SHIPPED | OUTPATIENT
Start: 2024-12-06

## 2024-12-11 ENCOUNTER — TREATMENT (OUTPATIENT)
Dept: PHYSICAL THERAPY | Facility: CLINIC | Age: 79
End: 2024-12-11
Payer: MEDICARE

## 2024-12-11 DIAGNOSIS — S32.020D COMPRESSION FRACTURE OF L2 VERTEBRA WITH ROUTINE HEALING, SUBSEQUENT ENCOUNTER: Primary | ICD-10-CM

## 2024-12-11 DIAGNOSIS — M54.50 ACUTE BILATERAL LOW BACK PAIN WITHOUT SCIATICA: ICD-10-CM

## 2024-12-11 NOTE — PROGRESS NOTES
Physical Therapy Daily Treatment Note      [address] [facility], [address], [place] [id number]    Patient: Shwetha Lane   : 1945  Referring practitioner: NELLY Rivera  Date of Initial Visit: Type: THERAPY  Noted: 2024  Today's Date: 2024  Patient seen for 2 sessions         Visit Diagnoses:     ICD-10-CM ICD-9-CM   1. Compression fracture of L2 vertebra with routine healing, subsequent encounter  S32.020D V54.17   2. Acute bilateral low back pain without sciatica  M54.50 724.2     338.19         Subjective Evaluation    History of Present Illness    Subjective comment: I'm still having quite a bit of pain.  If I take 2 ibuprofen it helps me be able to cook a meal, but most of the day I am still on the couch.  [Being sedentary] is aggravating my (R) hip more now too. I'm having a lot of trouble moving my (R) leg in and out of the 's seat; there is pain in my groin and I feel very weak in my leg trying to do that.       Objective   See Exercise, Manual, and Modality Logs for complete treatment.   *patient declines exercise progression this date    Assessment & Plan       Assessment  Assessment details: Patient continues to experience lumbar pain, (R) hip and (R) groin pain with sit to stand transfers, standing, and walking.  She has difficulty actively moving (R) LE in/out of her vehicle, most notably when driving.  She does obtain moderate relief with ibuprofen.  She declines exercise progression this date and opts to await results of lumbar MRI prior to activity progression.  Encouraged persistent HEP compliance and will await recommended plan by MD once MRI results are known.          Awaiting MD orders; await results of patient's MRI (2024) and MD interpretation and plan.           Timed:  Manual Therapy:         mins  53205;  Therapeutic Exercise:    18     mins  36354;     Neuromuscular Los:        mins  61099;    Therapeutic Activity:          mins  63753;      Gait Training:           mins  79535;     Ultrasound:          mins  41899;    Untimed:  Electrical Stimulation:         mins  08217 ( );  Mechanical Traction:         mins  28903;   Dry Needling              ___  mins   20561    Timed Treatment:   18   mins   Total Treatment:     18   mins    Kristin Azevedo PT, DPT, OCS  Physical Therapist  [place] License #[id number]  Electronically signed by: Kristin Azevedo PT, 12/11/24, 1:30 PM EST

## 2024-12-20 RX ORDER — LEVOTHYROXINE SODIUM 137 UG/1
137 TABLET ORAL DAILY
Qty: 90 TABLET | Refills: 3 | Status: SHIPPED | OUTPATIENT
Start: 2024-12-20

## 2024-12-23 NOTE — H&P (VIEW-ONLY)
Subjective   Patient ID: Shwetha Lane is a 79 y.o. female is here today for follow-up for F/U EXT: Compression fracture of L2 vertebra with routine healing CT LUMBAR 11/08/24 @ [facility], MRI LUMBAR 12/24/24 @ [facility]     History of Present Illness  79 y.o. year old female who presented to the emergency department for evaluation of hip pain on 11-9-2024 after a fall. The evaluation of right hip pain and back pain included a CT of the lumbar spine which suggested an L2 vertebral compression fracture.  She states that she was walking down stairs and she was holding onto the handrail however when she got to the last step she slipped and fell onto her right side.  Patient is still complaining of right lateral back pain and right hip pain that radiates to the groin.  She states she has been able to ambulate, but it causes worsening pain.  She feels best when sitting on her couch not moving.  She states she did not have any dizziness, weakness, or syncope leading to the fall.  An MRI was obtained on 12/24/2024 which shows acute edema in the superior endplate of both L2 and L3.  She had a normal DEXA scan approximately 2 years ago but has not had a repeat.  She is on estrogen replacement as well as Lipitor and antihypertensive medications plus aspirin for her hypertension, hyperlipidemia, PVCs and previous breast cancer.  She presents today in significant pain with hope of being a kyphoplasty candidate.    The following portions of the patient's history were reviewed and updated as appropriate: She  has a past medical history of Anxiety (Sometimes), Arthritis, Cancer (Breast, 3 years ago), Cataract (Had surgery in April), COPD (chronic obstructive pulmonary disease), COVID, Diverticulosis, GERD (gastroesophageal reflux disease), Heart murmur (3 years ago), Hyperlipidemia, Hypertension, Hypothyroidism, Low back pain, Migraines, Obesity, Pneumonia, PVC (premature ventricular contraction), Scoliosis, and Sleep apnea.  She  does not have any pertinent problems on file.  She  has a past surgical history that includes Tonsillectomy; Hysterectomy; Laparoscopic gastric banding; Colonoscopy; Replacement total knee (Left); Knee arthroscopy (Bilateral); Mouth surgery; Total knee arthroplasty (Right, 09/26/2023); Appendectomy (1955); Bariatric Surgery (Got the Band 2011); Breast surgery (2020); and Joint replacement (Left Knee 2017).  Her family history includes Asthma in her mother and sister; Depression in her mother; Diabetes in her mother; Heart disease in her father.  She  reports that she has quit smoking. Her smoking use included cigarettes. She has a 7.5 pack-year smoking history. She has never used smokeless tobacco. She reports that she does not drink alcohol and does not use drugs.  Current Outpatient Medications   Medication Sig Dispense Refill    aspirin (Tanner Aspirin) 325 MG tablet Take 1 tablet by mouth Daily. 21 tablet 1    atorvastatin (Lipitor) 40 MG tablet Take 1 tablet by mouth Every Night. 90 tablet 1    Budeson-Glycopyrrol-Formoterol (Breztri Aerosphere) 160-9-4.8 MCG/ACT aerosol inhaler Inhale 2 puffs 2 (Two) Times a Day As Needed.      cholecalciferol (VITAMIN D3) 25 MCG (1000 UT) tablet Take 2 tablets by mouth Daily. 2 pills daily 90 tablet 3    cyclobenzaprine (FLEXERIL) 10 MG tablet Take 1 tablet by mouth 3 (Three) Times a Day As Needed for Muscle Spasms. 90 tablet 3    estradiol (ESTRACE) 0.1 MG/GM vaginal cream Insert 1 g into the vagina Daily As Needed.      fluticasone (FLONASE) 50 MCG/ACT nasal spray Administer 2 sprays into the nostril(s) as directed by provider Daily As Needed for Rhinitis.      levothyroxine (Synthroid) 137 MCG tablet Take 1 tablet by mouth Daily. 90 tablet 3    lidocaine (LIDODERM) 5 % Place 1 patch on the skin as directed by provider Daily. Remove & Discard patch within 12 hours or as directed by MD 30 patch 1    meloxicam (Mobic) 7.5 MG tablet Take 1 tablet by mouth Daily. 30 tablet 0     metoprolol succinate XL (TOPROL-XL) 50 MG 24 hr tablet Take 1 tablet by mouth Daily. 90 tablet 1    Multiple Vitamins-Minerals (MULTIVITAMIN & MINERAL PO) Take 1 tablet by mouth Daily.      Nurtec 75 MG dispersible tablet Place  under the tongue Daily As Needed.      vitamin B-12 (CYANOCOBALAMIN) 1000 MCG tablet Take 1 tablet by mouth Daily.      traMADol (ULTRAM) 50 MG tablet Take 1 tablet by mouth Every 6 (Six) Hours As Needed for Moderate Pain for up to 5 days. 20 tablet 0     No current facility-administered medications for this visit.     Current Outpatient Medications on File Prior to Visit   Medication Sig    aspirin (Tanner Aspirin) 325 MG tablet Take 1 tablet by mouth Daily.    atorvastatin (Lipitor) 40 MG tablet Take 1 tablet by mouth Every Night.    Budeson-Glycopyrrol-Formoterol (Breztri Aerosphere) 160-9-4.8 MCG/ACT aerosol inhaler Inhale 2 puffs 2 (Two) Times a Day As Needed.    cholecalciferol (VITAMIN D3) 25 MCG (1000 UT) tablet Take 2 tablets by mouth Daily. 2 pills daily    cyclobenzaprine (FLEXERIL) 10 MG tablet Take 1 tablet by mouth 3 (Three) Times a Day As Needed for Muscle Spasms.    estradiol (ESTRACE) 0.1 MG/GM vaginal cream Insert 1 g into the vagina Daily As Needed.    fluticasone (FLONASE) 50 MCG/ACT nasal spray Administer 2 sprays into the nostril(s) as directed by provider Daily As Needed for Rhinitis.    levothyroxine (Synthroid) 137 MCG tablet Take 1 tablet by mouth Daily.    lidocaine (LIDODERM) 5 % Place 1 patch on the skin as directed by provider Daily. Remove & Discard patch within 12 hours or as directed by MD    meloxicam (Mobic) 7.5 MG tablet Take 1 tablet by mouth Daily.    metoprolol succinate XL (TOPROL-XL) 50 MG 24 hr tablet Take 1 tablet by mouth Daily.    Multiple Vitamins-Minerals (MULTIVITAMIN & MINERAL PO) Take 1 tablet by mouth Daily.    Nurtec 75 MG dispersible tablet Place  under the tongue Daily As Needed.    vitamin B-12 (CYANOCOBALAMIN) 1000 MCG tablet Take 1  "tablet by mouth Daily.     No current facility-administered medications on file prior to visit.     She is allergic to bee venom and nickel..    Review of Systems   Constitutional: Negative.  Negative for activity change.   Eyes: Negative.    Respiratory: Negative.     Musculoskeletal:  Positive for back pain and gait problem.   All other systems reviewed and are negative.  Fell on November 5th. She complains of severe back pain. Also states she has bad hips and also has pain in both hips.    Objective     Vitals:    12/31/24 1124   BP: 118/70   Pulse: 81   Temp: 98.6 °F (37 °C)   SpO2: 93%   Weight: 119 kg (262 lb)   Height: 165 cm (64.96\")     Body mass index is 43.65 kg/m².    Physical Exam  Vitals and nursing note reviewed.   Constitutional:       General: She is in acute distress.      Appearance: She is obese.   Eyes:      Extraocular Movements: Extraocular movements intact.   Cardiovascular:      Rate and Rhythm: Normal rate.   Pulmonary:      Effort: Pulmonary effort is normal.   Musculoskeletal:         General: Tenderness present.      Cervical back: Normal range of motion.   Skin:     General: Skin is warm and dry.   Neurological:      General: No focal deficit present.      Mental Status: She is alert and oriented to person, place, and time.      Cranial Nerves: No cranial nerve deficit.      Sensory: No sensory deficit.      Motor: No weakness.      Coordination: Coordination normal.      Gait: Gait abnormal.     Neurological Exam  Mental Status  Alert. Oriented to person, place, and time.    Cranial Nerves  CN III, IV, VI: Extraocular movements intact bilaterally.    Gait   Abnormal gait.    Assessment & Plan   Independent Review of Radiographic Studies:      I personally reviewed the images from the following studies.    The MRI of the lumbar spine dated 12/24/2024 was reviewed with the patient and shows edema along the superior endplate of both L2 and L3 with L2>L3.  There is no retropulsion of " significance seen.  Greater than 25% collapse is identified.    Medical Decision Makin-year-old female with likely underlying osteoporosis after sustaining a vertebral compression fracture at the L2 and L3 level from a near ground-level fall.  She has had previous breast cancer although has been disease-free for approximately 5 years.  She is overweight and does have cerebrovascular risk factors including hypertension, hyperlipidemia and prediabetes.  She currently is in severe nonradiating pain with positive imaging on an MRI of 2 vertebral compression fractures.  She would likely benefit from the procedure given her severe pain level greater than 8 out of 10 at times.  The risks, alternatives and procedure for a two-level kyphoplasty were reviewed and explained with the patient expressing understanding and wishing to proceed under general anesthesia.  This will be arranged for soon as possible and she will be given some tramadol until the procedure.  Diagnoses and all orders for this visit:    1. Compression fracture of L2 vertebra with delayed healing, subsequent encounter (Primary)  -     IR Kyphoplasty Lumbar; Future  -     Basic Metabolic Panel; Future  -     CBC & Differential; Future  -     traMADol (ULTRAM) 50 MG tablet; Take 1 tablet by mouth Every 6 (Six) Hours As Needed for Moderate Pain for up to 5 days.  Dispense: 20 tablet; Refill: 0    2. Compression fracture of L3 vertebra with delayed healing, subsequent encounter  -     traMADol (ULTRAM) 50 MG tablet; Take 1 tablet by mouth Every 6 (Six) Hours As Needed for Moderate Pain for up to 5 days.  Dispense: 20 tablet; Refill: 0    3. Age related osteoporosis, unspecified pathological fracture presence  -     IR Kyphoplasty Lumbar; Future  -     Basic Metabolic Panel; Future  -     CBC & Differential; Future    4. Bilateral malignant neoplasm involving both nipple and areola in female, unspecified estrogen receptor status  -     IR Kyphoplasty  Lumbar; Future  -     Basic Metabolic Panel; Future  -     CBC & Differential; Future    5. Primary hypertension  -     Basic Metabolic Panel; Future  -     CBC & Differential; Future    6. Pure hypercholesterolemia  -     Basic Metabolic Panel; Future  -     CBC & Differential; Future    7. Obesity, morbid (more than 100 lbs over ideal weight or BMI > 40)  -     Basic Metabolic Panel; Future  -     CBC & Differential; Future      Return in about 1 week (around 1/7/2025) for Kyphoplasty at L2 and L3.  Tramadol for pain relief.    I spent 40 minutes caring for Shwetha on this date of service. This time includes time spent by me in the following activities: preparing for the visit, reviewing tests, performing a medically appropriate examination and/or evaluation, counseling and educating the patient/family/caregiver, documenting information in the medical record, independently interpreting results and communicating that information with the patient/family/caregiver, care coordination, ordering medications, ordering procedure(s), obtaining a separately obtained history, and reviewing a separately obtained history.

## 2024-12-23 NOTE — PROGRESS NOTES
Subjective   Patient ID: Shwetha Lane is a 79 y.o. female is here today for follow-up for F/U EXT: Compression fracture of L2 vertebra with routine healing CT LUMBAR 11/08/24 @ [facility], MRI LUMBAR 12/24/24 @ [facility]     History of Present Illness  79 y.o. year old female who presented to the emergency department for evaluation of hip pain on 11-9-2024 after a fall. The evaluation of right hip pain and back pain included a CT of the lumbar spine which suggested an L2 vertebral compression fracture.  She states that she was walking down stairs and she was holding onto the handrail however when she got to the last step she slipped and fell onto her right side.  Patient is still complaining of right lateral back pain and right hip pain that radiates to the groin.  She states she has been able to ambulate, but it causes worsening pain.  She feels best when sitting on her couch not moving.  She states she did not have any dizziness, weakness, or syncope leading to the fall.  An MRI was obtained on 12/24/2024 which shows acute edema in the superior endplate of both L2 and L3.  She had a normal DEXA scan approximately 2 years ago but has not had a repeat.  She is on estrogen replacement as well as Lipitor and antihypertensive medications plus aspirin for her hypertension, hyperlipidemia, PVCs and previous breast cancer.  She presents today in significant pain with hope of being a kyphoplasty candidate.    The following portions of the patient's history were reviewed and updated as appropriate: She  has a past medical history of Anxiety (Sometimes), Arthritis, Cancer (Breast, 3 years ago), Cataract (Had surgery in April), COPD (chronic obstructive pulmonary disease), COVID, Diverticulosis, GERD (gastroesophageal reflux disease), Heart murmur (3 years ago), Hyperlipidemia, Hypertension, Hypothyroidism, Low back pain, Migraines, Obesity, Pneumonia, PVC (premature ventricular contraction), Scoliosis, and Sleep apnea.  She  does not have any pertinent problems on file.  She  has a past surgical history that includes Tonsillectomy; Hysterectomy; Laparoscopic gastric banding; Colonoscopy; Replacement total knee (Left); Knee arthroscopy (Bilateral); Mouth surgery; Total knee arthroplasty (Right, 09/26/2023); Appendectomy (1955); Bariatric Surgery (Got the Band 2011); Breast surgery (2020); and Joint replacement (Left Knee 2017).  Her family history includes Asthma in her mother and sister; Depression in her mother; Diabetes in her mother; Heart disease in her father.  She  reports that she has quit smoking. Her smoking use included cigarettes. She has a 7.5 pack-year smoking history. She has never used smokeless tobacco. She reports that she does not drink alcohol and does not use drugs.  Current Outpatient Medications   Medication Sig Dispense Refill    aspirin (Tanner Aspirin) 325 MG tablet Take 1 tablet by mouth Daily. 21 tablet 1    atorvastatin (Lipitor) 40 MG tablet Take 1 tablet by mouth Every Night. 90 tablet 1    Budeson-Glycopyrrol-Formoterol (Breztri Aerosphere) 160-9-4.8 MCG/ACT aerosol inhaler Inhale 2 puffs 2 (Two) Times a Day As Needed.      cholecalciferol (VITAMIN D3) 25 MCG (1000 UT) tablet Take 2 tablets by mouth Daily. 2 pills daily 90 tablet 3    cyclobenzaprine (FLEXERIL) 10 MG tablet Take 1 tablet by mouth 3 (Three) Times a Day As Needed for Muscle Spasms. 90 tablet 3    estradiol (ESTRACE) 0.1 MG/GM vaginal cream Insert 1 g into the vagina Daily As Needed.      fluticasone (FLONASE) 50 MCG/ACT nasal spray Administer 2 sprays into the nostril(s) as directed by provider Daily As Needed for Rhinitis.      levothyroxine (Synthroid) 137 MCG tablet Take 1 tablet by mouth Daily. 90 tablet 3    lidocaine (LIDODERM) 5 % Place 1 patch on the skin as directed by provider Daily. Remove & Discard patch within 12 hours or as directed by MD 30 patch 1    meloxicam (Mobic) 7.5 MG tablet Take 1 tablet by mouth Daily. 30 tablet 0     metoprolol succinate XL (TOPROL-XL) 50 MG 24 hr tablet Take 1 tablet by mouth Daily. 90 tablet 1    Multiple Vitamins-Minerals (MULTIVITAMIN & MINERAL PO) Take 1 tablet by mouth Daily.      Nurtec 75 MG dispersible tablet Place  under the tongue Daily As Needed.      vitamin B-12 (CYANOCOBALAMIN) 1000 MCG tablet Take 1 tablet by mouth Daily.      traMADol (ULTRAM) 50 MG tablet Take 1 tablet by mouth Every 6 (Six) Hours As Needed for Moderate Pain for up to 5 days. 20 tablet 0     No current facility-administered medications for this visit.     Current Outpatient Medications on File Prior to Visit   Medication Sig    aspirin (Tanner Aspirin) 325 MG tablet Take 1 tablet by mouth Daily.    atorvastatin (Lipitor) 40 MG tablet Take 1 tablet by mouth Every Night.    Budeson-Glycopyrrol-Formoterol (Breztri Aerosphere) 160-9-4.8 MCG/ACT aerosol inhaler Inhale 2 puffs 2 (Two) Times a Day As Needed.    cholecalciferol (VITAMIN D3) 25 MCG (1000 UT) tablet Take 2 tablets by mouth Daily. 2 pills daily    cyclobenzaprine (FLEXERIL) 10 MG tablet Take 1 tablet by mouth 3 (Three) Times a Day As Needed for Muscle Spasms.    estradiol (ESTRACE) 0.1 MG/GM vaginal cream Insert 1 g into the vagina Daily As Needed.    fluticasone (FLONASE) 50 MCG/ACT nasal spray Administer 2 sprays into the nostril(s) as directed by provider Daily As Needed for Rhinitis.    levothyroxine (Synthroid) 137 MCG tablet Take 1 tablet by mouth Daily.    lidocaine (LIDODERM) 5 % Place 1 patch on the skin as directed by provider Daily. Remove & Discard patch within 12 hours or as directed by MD    meloxicam (Mobic) 7.5 MG tablet Take 1 tablet by mouth Daily.    metoprolol succinate XL (TOPROL-XL) 50 MG 24 hr tablet Take 1 tablet by mouth Daily.    Multiple Vitamins-Minerals (MULTIVITAMIN & MINERAL PO) Take 1 tablet by mouth Daily.    Nurtec 75 MG dispersible tablet Place  under the tongue Daily As Needed.    vitamin B-12 (CYANOCOBALAMIN) 1000 MCG tablet Take 1  "tablet by mouth Daily.     No current facility-administered medications on file prior to visit.     She is allergic to bee venom and nickel..    Review of Systems   Constitutional: Negative.  Negative for activity change.   Eyes: Negative.    Respiratory: Negative.     Musculoskeletal:  Positive for back pain and gait problem.   All other systems reviewed and are negative.  Fell on November 5th. She complains of severe back pain. Also states she has bad hips and also has pain in both hips.    Objective     Vitals:    12/31/24 1124   BP: 118/70   Pulse: 81   Temp: 98.6 °F (37 °C)   SpO2: 93%   Weight: 119 kg (262 lb)   Height: 165 cm (64.96\")     Body mass index is 43.65 kg/m².    Physical Exam  Vitals and nursing note reviewed.   Constitutional:       General: She is in acute distress.      Appearance: She is obese.   Eyes:      Extraocular Movements: Extraocular movements intact.   Cardiovascular:      Rate and Rhythm: Normal rate.   Pulmonary:      Effort: Pulmonary effort is normal.   Musculoskeletal:         General: Tenderness present.      Cervical back: Normal range of motion.   Skin:     General: Skin is warm and dry.   Neurological:      General: No focal deficit present.      Mental Status: She is alert and oriented to person, place, and time.      Cranial Nerves: No cranial nerve deficit.      Sensory: No sensory deficit.      Motor: No weakness.      Coordination: Coordination normal.      Gait: Gait abnormal.     Neurological Exam  Mental Status  Alert. Oriented to person, place, and time.    Cranial Nerves  CN III, IV, VI: Extraocular movements intact bilaterally.    Gait   Abnormal gait.    Assessment & Plan   Independent Review of Radiographic Studies:      I personally reviewed the images from the following studies.    The MRI of the lumbar spine dated 12/24/2024 was reviewed with the patient and shows edema along the superior endplate of both L2 and L3 with L2>L3.  There is no retropulsion of " significance seen.  Greater than 25% collapse is identified.    Medical Decision Makin-year-old female with likely underlying osteoporosis after sustaining a vertebral compression fracture at the L2 and L3 level from a near ground-level fall.  She has had previous breast cancer although has been disease-free for approximately 5 years.  She is overweight and does have cerebrovascular risk factors including hypertension, hyperlipidemia and prediabetes.  She currently is in severe nonradiating pain with positive imaging on an MRI of 2 vertebral compression fractures.  She would likely benefit from the procedure given her severe pain level greater than 8 out of 10 at times.  The risks, alternatives and procedure for a two-level kyphoplasty were reviewed and explained with the patient expressing understanding and wishing to proceed under general anesthesia.  This will be arranged for soon as possible and she will be given some tramadol until the procedure.  Diagnoses and all orders for this visit:    1. Compression fracture of L2 vertebra with delayed healing, subsequent encounter (Primary)  -     IR Kyphoplasty Lumbar; Future  -     Basic Metabolic Panel; Future  -     CBC & Differential; Future  -     traMADol (ULTRAM) 50 MG tablet; Take 1 tablet by mouth Every 6 (Six) Hours As Needed for Moderate Pain for up to 5 days.  Dispense: 20 tablet; Refill: 0    2. Compression fracture of L3 vertebra with delayed healing, subsequent encounter  -     traMADol (ULTRAM) 50 MG tablet; Take 1 tablet by mouth Every 6 (Six) Hours As Needed for Moderate Pain for up to 5 days.  Dispense: 20 tablet; Refill: 0    3. Age related osteoporosis, unspecified pathological fracture presence  -     IR Kyphoplasty Lumbar; Future  -     Basic Metabolic Panel; Future  -     CBC & Differential; Future    4. Bilateral malignant neoplasm involving both nipple and areola in female, unspecified estrogen receptor status  -     IR Kyphoplasty  Lumbar; Future  -     Basic Metabolic Panel; Future  -     CBC & Differential; Future    5. Primary hypertension  -     Basic Metabolic Panel; Future  -     CBC & Differential; Future    6. Pure hypercholesterolemia  -     Basic Metabolic Panel; Future  -     CBC & Differential; Future    7. Obesity, morbid (more than 100 lbs over ideal weight or BMI > 40)  -     Basic Metabolic Panel; Future  -     CBC & Differential; Future      Return in about 1 week (around 1/7/2025) for Kyphoplasty at L2 and L3.  Tramadol for pain relief.    I spent 40 minutes caring for Shwetha on this date of service. This time includes time spent by me in the following activities: preparing for the visit, reviewing tests, performing a medically appropriate examination and/or evaluation, counseling and educating the patient/family/caregiver, documenting information in the medical record, independently interpreting results and communicating that information with the patient/family/caregiver, care coordination, ordering medications, ordering procedure(s), obtaining a separately obtained history, and reviewing a separately obtained history.

## 2024-12-24 ENCOUNTER — HOSPITAL ENCOUNTER (OUTPATIENT)
Dept: MRI IMAGING | Facility: HOSPITAL | Age: 79
Discharge: HOME | End: 2024-12-24
Admitting: NURSE PRACTITIONER
Payer: MEDICARE

## 2024-12-24 DIAGNOSIS — S32.020D COMPRESSION FRACTURE OF L2 VERTEBRA WITH ROUTINE HEALING, SUBSEQUENT ENCOUNTER: ICD-10-CM

## 2024-12-24 PROCEDURE — 72148 MRI LUMBAR SPINE W/O DYE: CPT

## 2024-12-30 ENCOUNTER — OFFICE VISIT (OUTPATIENT)
Dept: FAMILY MEDICINE CLINIC | Facility: CLINIC | Age: 79
End: 2024-12-30
Payer: MEDICARE

## 2024-12-30 VITALS
DIASTOLIC BLOOD PRESSURE: 80 MMHG | SYSTOLIC BLOOD PRESSURE: 122 MMHG | BODY MASS INDEX: 43.35 KG/M2 | HEART RATE: 77 BPM | OXYGEN SATURATION: 97 % | HEIGHT: 65 IN | TEMPERATURE: 96.4 F | WEIGHT: 260.2 LBS

## 2024-12-30 DIAGNOSIS — Z98.84 STATUS FOLLOWING GASTRIC BANDING SURGERY FOR WEIGHT LOSS: ICD-10-CM

## 2024-12-30 DIAGNOSIS — I10 PRIMARY HYPERTENSION: ICD-10-CM

## 2024-12-30 DIAGNOSIS — S32.020D COMPRESSION FRACTURE OF L2 VERTEBRA WITH ROUTINE HEALING, SUBSEQUENT ENCOUNTER: ICD-10-CM

## 2024-12-30 DIAGNOSIS — E66.01 OBESITY, MORBID (MORE THAN 100 LBS OVER IDEAL WEIGHT OR BMI > 40): ICD-10-CM

## 2024-12-30 DIAGNOSIS — M25.542 JOINT PAIN IN FINGERS OF BOTH HANDS: Primary | ICD-10-CM

## 2024-12-30 DIAGNOSIS — M25.541 JOINT PAIN IN FINGERS OF BOTH HANDS: Primary | ICD-10-CM

## 2024-12-30 DIAGNOSIS — R73.03 PREDIABETES: ICD-10-CM

## 2024-12-30 PROCEDURE — 1126F AMNT PAIN NOTED NONE PRSNT: CPT | Performed by: NURSE PRACTITIONER

## 2024-12-30 PROCEDURE — 3079F DIAST BP 80-89 MM HG: CPT | Performed by: NURSE PRACTITIONER

## 2024-12-30 PROCEDURE — G2211 COMPLEX E/M VISIT ADD ON: HCPCS | Performed by: NURSE PRACTITIONER

## 2024-12-30 PROCEDURE — 3074F SYST BP LT 130 MM HG: CPT | Performed by: NURSE PRACTITIONER

## 2024-12-30 PROCEDURE — 99214 OFFICE O/P EST MOD 30 MIN: CPT | Performed by: NURSE PRACTITIONER

## 2024-12-30 RX ORDER — MELOXICAM 7.5 MG/1
7.5 TABLET ORAL DAILY
Qty: 30 TABLET | Refills: 0 | Status: SHIPPED | OUTPATIENT
Start: 2024-12-30

## 2024-12-30 NOTE — PROGRESS NOTES
"Chief Complaint  Hypertension (Pt is here for follow up on hypertension. Pt would like to discuss MRI) and weight management (Pt would like to discuss diet and possible Mounjaro)    Subjective        Shwetha Lane presents to [facility]  History of Present Illness  Patient presents office today for follow-up on hypertension.  Blood pressure today is controlled 122/80.  Today she is reporting joint pain in both hands. She has tried otc analgesics without improvement.   She is scheduled with neurosurgeon on 12/31/2024 with history of chronic lumbar pain, abnormal MRI lumbar spine   With obesity she would like further evaluation and management. She was seeb by bariatric surgeon in 2011 with gastric band surgery.   With prediabetes check updated labs today       Objective   Vital Signs:  /80 (BP Location: Left arm, Patient Position: Sitting, Cuff Size: Adult)   Pulse 77   Temp 96.4 °F (35.8 °C) (Temporal)   Ht 165.1 cm (65\")   Wt 118 kg (260 lb 3.2 oz)   SpO2 97%   BMI 43.30 kg/m²   Estimated body mass index is 43.3 kg/m² as calculated from the following:    Height as of this encounter: 165.1 cm (65\").    Weight as of this encounter: 118 kg (260 lb 3.2 oz).            Physical Exam  Constitutional:       Appearance: Normal appearance. She is obese.   HENT:      Head: Normocephalic.   Cardiovascular:      Rate and Rhythm: Normal rate and regular rhythm.   Pulmonary:      Effort: Pulmonary effort is normal. No respiratory distress.      Breath sounds: Normal breath sounds. No wheezing.   Abdominal:      General: Abdomen is flat.      Palpations: Abdomen is soft. There is no mass.      Tenderness: There is no abdominal tenderness.      Hernia: No hernia is present.   Musculoskeletal:         General: Tenderness present.      Cervical back: Normal. No spasms or tenderness. Normal range of motion.      Thoracic back: Normal. No spasms or tenderness. Normal range of motion.      " Lumbar back: Spasms and tenderness present. No edema or bony tenderness. Decreased range of motion. No scoliosis.   Neurological:      Mental Status: She is alert.        Result Review :  The following data was reviewed by: NELLY Rivera on 12/30/2024:  Common labs          11/18/2024    10:37 11/19/2024    10:07 12/30/2024    11:57   Common Labs   Glucose 108  114     BUN 14  14     Creatinine 0.80  0.85     Sodium 141  138     Potassium 4.6  4.0     Chloride 105  102     Calcium 9.4  9.1     Total Protein 6.9      Albumin 4.1  3.7     Total Bilirubin 0.6  0.7     Alkaline Phosphatase 103  102     AST (SGOT) 20  16     ALT (SGPT) 19  18     WBC 8.78  8.20     Hemoglobin 14.9  14.9     Hematocrit 45.7  43.0     Platelets 280  253     Total Cholesterol 141      Triglycerides 145      HDL Cholesterol 39      LDL Cholesterol  77      Hemoglobin A1C   5.7    Uric Acid   5.3      Data reviewed : Radiologic studies MRI lumbar spine            Assessment and Plan   Diagnoses and all orders for this visit:    1. Joint pain in fingers of both hands (Primary)  -     DAYNA  -     C-reactive Protein  -     Rheumatoid Factor  -     Uric Acid  -     Sedimentation Rate  -     meloxicam (Mobic) 7.5 MG tablet; Take 1 tablet by mouth Daily.  Dispense: 30 tablet; Refill: 0    2. Primary hypertension  Assessment & Plan:  Hypertension is stable and controlled  Continue current treatment regimen.  Blood pressure will be reassessed in 6 months.      3. Obesity, morbid (more than 100 lbs over ideal weight or BMI > 40)  -     Ambulatory Referral to Bariatric Surgery    4. Compression fracture of L2 vertebra with routine healing, subsequent encounter  Assessment & Plan:  Patient to continue follow up [facility] neurosurgeon, stable today       5. Prediabetes  -     Cancel: Hemoglobin A1c  -     Hemoglobin A1c    6. Status following gastric banding surgery for weight loss  Assessment & Plan:  2011             I spent 30 minutes caring for  Shwetha on this date of service. This time includes time spent by me in the following activities:preparing for the visit, reviewing tests, obtaining and/or reviewing a separately obtained history, performing a medically appropriate examination and/or evaluation , counseling and educating the patient/family/caregiver, ordering medications, tests, or procedures, documenting information in the medical record, independently interpreting results and communicating that information with the patient/family/caregiver, and care coordination  Follow Up   Return in about 4 weeks (around 1/27/2025) for Recheck.  Patient was given instructions and counseling regarding her condition or for health maintenance advice. Please see specific information pulled into the AVS if appropriate.

## 2024-12-31 ENCOUNTER — OFFICE VISIT (OUTPATIENT)
Dept: NEUROSURGERY | Facility: CLINIC | Age: 79
End: 2024-12-31
Payer: MEDICARE

## 2024-12-31 VITALS
OXYGEN SATURATION: 93 % | WEIGHT: 262 LBS | BODY MASS INDEX: 43.65 KG/M2 | HEART RATE: 81 BPM | SYSTOLIC BLOOD PRESSURE: 118 MMHG | HEIGHT: 65 IN | TEMPERATURE: 98.6 F | DIASTOLIC BLOOD PRESSURE: 70 MMHG

## 2024-12-31 DIAGNOSIS — C50.012 BILATERAL MALIGNANT NEOPLASM INVOLVING BOTH NIPPLE AND AREOLA IN FEMALE, UNSPECIFIED ESTROGEN RECEPTOR STATUS: ICD-10-CM

## 2024-12-31 DIAGNOSIS — S32.020G COMPRESSION FRACTURE OF L2 VERTEBRA WITH DELAYED HEALING, SUBSEQUENT ENCOUNTER: Primary | ICD-10-CM

## 2024-12-31 DIAGNOSIS — M81.0 AGE RELATED OSTEOPOROSIS, UNSPECIFIED PATHOLOGICAL FRACTURE PRESENCE: ICD-10-CM

## 2024-12-31 DIAGNOSIS — I10 PRIMARY HYPERTENSION: ICD-10-CM

## 2024-12-31 DIAGNOSIS — C50.011 BILATERAL MALIGNANT NEOPLASM INVOLVING BOTH NIPPLE AND AREOLA IN FEMALE, UNSPECIFIED ESTROGEN RECEPTOR STATUS: ICD-10-CM

## 2024-12-31 DIAGNOSIS — S32.030G COMPRESSION FRACTURE OF L3 VERTEBRA WITH DELAYED HEALING, SUBSEQUENT ENCOUNTER: ICD-10-CM

## 2024-12-31 DIAGNOSIS — E78.00 PURE HYPERCHOLESTEROLEMIA: ICD-10-CM

## 2024-12-31 PROBLEM — S32.000G COMPRESSION FRACTURE OF LUMBAR VERTEBRA WITH DELAYED HEALING: Status: ACTIVE | Noted: 2024-12-31

## 2024-12-31 LAB
ANA SER QL: NEGATIVE
CRP SERPL-MCNC: 2 MG/L (ref 0–10)
ERYTHROCYTE [SEDIMENTATION RATE] IN BLOOD BY WESTERGREN METHOD: 33 MM/HR (ref 0–40)
HBA1C MFR BLD: 5.7 % (ref 4.8–5.6)
RHEUMATOID FACT SERPL-ACNC: 108.7 IU/ML
URATE SERPL-MCNC: 5.3 MG/DL (ref 3.1–7.9)

## 2024-12-31 RX ORDER — TRAMADOL HYDROCHLORIDE 50 MG/1
50 TABLET ORAL EVERY 6 HOURS PRN
Qty: 20 TABLET | Refills: 0 | Status: SHIPPED | OUTPATIENT
Start: 2024-12-31 | End: 2025-01-05

## 2025-01-03 ENCOUNTER — LAB (OUTPATIENT)
Dept: LAB | Facility: HOSPITAL | Age: 80
End: 2025-01-03
Payer: MEDICARE

## 2025-01-03 DIAGNOSIS — S32.020G COMPRESSION FRACTURE OF L2 VERTEBRA WITH DELAYED HEALING, SUBSEQUENT ENCOUNTER: ICD-10-CM

## 2025-01-03 DIAGNOSIS — I10 PRIMARY HYPERTENSION: ICD-10-CM

## 2025-01-03 DIAGNOSIS — M81.0 AGE RELATED OSTEOPOROSIS, UNSPECIFIED PATHOLOGICAL FRACTURE PRESENCE: ICD-10-CM

## 2025-01-03 DIAGNOSIS — C50.011 BILATERAL MALIGNANT NEOPLASM INVOLVING BOTH NIPPLE AND AREOLA IN FEMALE, UNSPECIFIED ESTROGEN RECEPTOR STATUS: ICD-10-CM

## 2025-01-03 DIAGNOSIS — C50.012 BILATERAL MALIGNANT NEOPLASM INVOLVING BOTH NIPPLE AND AREOLA IN FEMALE, UNSPECIFIED ESTROGEN RECEPTOR STATUS: ICD-10-CM

## 2025-01-03 DIAGNOSIS — E78.00 PURE HYPERCHOLESTEROLEMIA: ICD-10-CM

## 2025-01-03 LAB
ANION GAP SERPL CALCULATED.3IONS-SCNC: 10.3 MMOL/L (ref 5–15)
BASOPHILS # BLD AUTO: 0.1 10*3/MM3 (ref 0–0.2)
BASOPHILS NFR BLD AUTO: 1.4 % (ref 0–1.5)
BUN SERPL-MCNC: 15 MG/DL (ref 8–23)
BUN/CREAT SERPL: 16.7 (ref 7–25)
CALCIUM SPEC-SCNC: 9.6 MG/DL (ref 8.6–10.5)
CHLORIDE SERPL-SCNC: 105 MMOL/L (ref 98–107)
CO2 SERPL-SCNC: 27.7 MMOL/L (ref 22–29)
CREAT SERPL-MCNC: 0.9 MG/DL (ref 0.57–1)
DEPRECATED RDW RBC AUTO: 41.6 FL (ref 37–54)
EGFRCR SERPLBLD CKD-EPI 2021: 65.2 ML/MIN/1.73
EOSINOPHIL # BLD AUTO: 0.17 10*3/MM3 (ref 0–0.4)
EOSINOPHIL NFR BLD AUTO: 2.3 % (ref 0.3–6.2)
ERYTHROCYTE [DISTWIDTH] IN BLOOD BY AUTOMATED COUNT: 12.3 % (ref 12.3–15.4)
GLUCOSE SERPL-MCNC: 95 MG/DL (ref 65–99)
HCT VFR BLD AUTO: 43.7 % (ref 34–46.6)
HGB BLD-MCNC: 14.9 G/DL (ref 12–15.9)
IMM GRANULOCYTES # BLD AUTO: 0.06 10*3/MM3 (ref 0–0.05)
IMM GRANULOCYTES NFR BLD AUTO: 0.8 % (ref 0–0.5)
LYMPHOCYTES # BLD AUTO: 2.12 10*3/MM3 (ref 0.7–3.1)
LYMPHOCYTES NFR BLD AUTO: 29.2 % (ref 19.6–45.3)
MCH RBC QN AUTO: 31.8 PG (ref 26.6–33)
MCHC RBC AUTO-ENTMCNC: 34.1 G/DL (ref 31.5–35.7)
MCV RBC AUTO: 93.2 FL (ref 79–97)
MONOCYTES # BLD AUTO: 0.77 10*3/MM3 (ref 0.1–0.9)
MONOCYTES NFR BLD AUTO: 10.6 % (ref 5–12)
NEUTROPHILS NFR BLD AUTO: 4.05 10*3/MM3 (ref 1.7–7)
NEUTROPHILS NFR BLD AUTO: 55.7 % (ref 42.7–76)
NRBC BLD AUTO-RTO: 0 /100 WBC (ref 0–0.2)
PLATELET # BLD AUTO: 282 10*3/MM3 (ref 140–450)
PMV BLD AUTO: 10 FL (ref 6–12)
POTASSIUM SERPL-SCNC: 4.2 MMOL/L (ref 3.5–5.2)
RBC # BLD AUTO: 4.69 10*6/MM3 (ref 3.77–5.28)
SODIUM SERPL-SCNC: 143 MMOL/L (ref 136–145)
WBC NRBC COR # BLD AUTO: 7.27 10*3/MM3 (ref 3.4–10.8)

## 2025-01-03 PROCEDURE — 36415 COLL VENOUS BLD VENIPUNCTURE: CPT

## 2025-01-03 PROCEDURE — 85025 COMPLETE CBC W/AUTO DIFF WBC: CPT

## 2025-01-03 PROCEDURE — 80048 BASIC METABOLIC PNL TOTAL CA: CPT

## 2025-01-07 ENCOUNTER — TELEPHONE (OUTPATIENT)
Dept: NEUROSURGERY | Facility: CLINIC | Age: 80
End: 2025-01-07
Payer: MEDICARE

## 2025-01-07 NOTE — TELEPHONE ENCOUNTER
Patient's surgery with Dr. Park scheduled on 1/8 has been moved to 1/10 due to a leak in the IR suite. Patient is aware.

## 2025-01-08 ENCOUNTER — HOSPITAL ENCOUNTER (OUTPATIENT)
Dept: INTERVENTIONAL RADIOLOGY/VASCULAR | Facility: HOSPITAL | Age: 80
Discharge: HOME | End: 2025-01-08
Payer: MEDICARE

## 2025-01-08 DIAGNOSIS — R76.8 RHEUMATOID FACTOR POSITIVE: Primary | ICD-10-CM

## 2025-01-08 NOTE — TELEPHONE ENCOUNTER
Patient had to be rescheduled again due to an issue in the IR with maintenance scheduling. She is now scheduled for January 13 for her kyphoplasty and she is aware.

## 2025-01-10 NOTE — PROGRESS NOTES
01/13/25 0001   Pre-Procedure Phone Call   Procedure Time Verified Yes   Arrival Time 0700   Procedure Location Verified Yes   Medical History Reviewed Yes   NPO Status Reinforced Yes   Ride and Caregiver Arranged Yes   Patient Knows to Bring Current Medications Yes   Bring Outside Films Requested No

## 2025-01-13 ENCOUNTER — ANESTHESIA (OUTPATIENT)
Dept: INTERVENTIONAL RADIOLOGY/VASCULAR | Facility: HOSPITAL | Age: 80
End: 2025-01-13
Payer: MEDICARE

## 2025-01-13 ENCOUNTER — HOSPITAL ENCOUNTER (OUTPATIENT)
Dept: INTERVENTIONAL RADIOLOGY/VASCULAR | Facility: HOSPITAL | Age: 80
Discharge: HOME | End: 2025-01-13
Payer: MEDICARE

## 2025-01-13 VITALS
RESPIRATION RATE: 16 BRPM | HEART RATE: 72 BPM | BODY MASS INDEX: 41.65 KG/M2 | WEIGHT: 250 LBS | OXYGEN SATURATION: 92 % | DIASTOLIC BLOOD PRESSURE: 79 MMHG | TEMPERATURE: 97.8 F | HEIGHT: 65 IN | SYSTOLIC BLOOD PRESSURE: 140 MMHG

## 2025-01-13 DIAGNOSIS — M81.0 AGE RELATED OSTEOPOROSIS, UNSPECIFIED PATHOLOGICAL FRACTURE PRESENCE: ICD-10-CM

## 2025-01-13 DIAGNOSIS — C50.012 BILATERAL MALIGNANT NEOPLASM INVOLVING BOTH NIPPLE AND AREOLA IN FEMALE, UNSPECIFIED ESTROGEN RECEPTOR STATUS: ICD-10-CM

## 2025-01-13 DIAGNOSIS — C50.011 BILATERAL MALIGNANT NEOPLASM INVOLVING BOTH NIPPLE AND AREOLA IN FEMALE, UNSPECIFIED ESTROGEN RECEPTOR STATUS: ICD-10-CM

## 2025-01-13 DIAGNOSIS — S32.020G COMPRESSION FRACTURE OF L2 VERTEBRA WITH DELAYED HEALING, SUBSEQUENT ENCOUNTER: ICD-10-CM

## 2025-01-13 PROBLEM — S32.030G COMPRESSION FRACTURE OF L3 VERTEBRA WITH DELAYED HEALING: Status: ACTIVE | Noted: 2025-01-13

## 2025-01-13 PROCEDURE — 22515 PERQ VERTEBRAL AUGMENTATION: CPT

## 2025-01-13 PROCEDURE — 63710000001 ACETAMINOPHEN 500 MG TABLET: Performed by: ANESTHESIOLOGY

## 2025-01-13 PROCEDURE — 25010000002 LIDOCAINE 2% SOLUTION: Performed by: NURSE ANESTHETIST, CERTIFIED REGISTERED

## 2025-01-13 PROCEDURE — 25010000002 SUGAMMADEX 200 MG/2ML SOLUTION: Performed by: NURSE ANESTHETIST, CERTIFIED REGISTERED

## 2025-01-13 PROCEDURE — C1713 ANCHOR/SCREW BN/BN,TIS/BN: HCPCS

## 2025-01-13 PROCEDURE — A9270 NON-COVERED ITEM OR SERVICE: HCPCS | Performed by: ANESTHESIOLOGY

## 2025-01-13 PROCEDURE — 25010000002 ONDANSETRON PER 1 MG: Performed by: NURSE ANESTHETIST, CERTIFIED REGISTERED

## 2025-01-13 PROCEDURE — 25010000002 PROPOFOL 10 MG/ML EMULSION: Performed by: NURSE ANESTHETIST, CERTIFIED REGISTERED

## 2025-01-13 PROCEDURE — 88307 TISSUE EXAM BY PATHOLOGIST: CPT | Performed by: RADIOLOGY

## 2025-01-13 PROCEDURE — 63710000001 HYDROCODONE-ACETAMINOPHEN 5-325 MG TABLET: Performed by: NURSE ANESTHETIST, CERTIFIED REGISTERED

## 2025-01-13 PROCEDURE — 88341 IMHCHEM/IMCYTCHM EA ADD ANTB: CPT | Performed by: RADIOLOGY

## 2025-01-13 PROCEDURE — 25010000002 CEFAZOLIN PER 500 MG: Performed by: RADIOLOGY

## 2025-01-13 PROCEDURE — 25010000002 DEXAMETHASONE SODIUM PHOSPHATE 20 MG/5ML SOLUTION: Performed by: NURSE ANESTHETIST, CERTIFIED REGISTERED

## 2025-01-13 PROCEDURE — 25010000002 FENTANYL CITRATE (PF) 50 MCG/ML SOLUTION: Performed by: NURSE ANESTHETIST, CERTIFIED REGISTERED

## 2025-01-13 PROCEDURE — 22514 PERQ VERTEBRAL AUGMENTATION: CPT

## 2025-01-13 PROCEDURE — A9270 NON-COVERED ITEM OR SERVICE: HCPCS | Performed by: NURSE ANESTHETIST, CERTIFIED REGISTERED

## 2025-01-13 PROCEDURE — 25810000003 LACTATED RINGERS PER 1000 ML: Performed by: ANESTHESIOLOGY

## 2025-01-13 PROCEDURE — 88342 IMHCHEM/IMCYTCHM 1ST ANTB: CPT | Performed by: RADIOLOGY

## 2025-01-13 PROCEDURE — 25010000002 GLYCOPYRROLATE 1 MG/5ML SOLUTION: Performed by: NURSE ANESTHETIST, CERTIFIED REGISTERED

## 2025-01-13 PROCEDURE — 25010000002 HYDROMORPHONE PER 4 MG: Performed by: NURSE ANESTHETIST, CERTIFIED REGISTERED

## 2025-01-13 RX ORDER — GLYCOPYRROLATE 0.2 MG/ML
INJECTION INTRAMUSCULAR; INTRAVENOUS AS NEEDED
Status: DISCONTINUED | OUTPATIENT
Start: 2025-01-13 | End: 2025-01-13 | Stop reason: SURG

## 2025-01-13 RX ORDER — SODIUM CHLORIDE 9 MG/ML
40 INJECTION, SOLUTION INTRAVENOUS AS NEEDED
Status: CANCELLED | OUTPATIENT
Start: 2025-01-13

## 2025-01-13 RX ORDER — ACETAMINOPHEN 325 MG/1
650 TABLET ORAL EVERY 4 HOURS PRN
Status: CANCELLED | OUTPATIENT
Start: 2025-01-13

## 2025-01-13 RX ORDER — OXYCODONE AND ACETAMINOPHEN 7.5; 325 MG/1; MG/1
1 TABLET ORAL EVERY 4 HOURS PRN
Status: DISCONTINUED | OUTPATIENT
Start: 2025-01-13 | End: 2025-01-14 | Stop reason: HOSPADM

## 2025-01-13 RX ORDER — FENTANYL CITRATE 50 UG/ML
INJECTION, SOLUTION INTRAMUSCULAR; INTRAVENOUS AS NEEDED
Status: DISCONTINUED | OUTPATIENT
Start: 2025-01-13 | End: 2025-01-13 | Stop reason: SURG

## 2025-01-13 RX ORDER — SODIUM CHLORIDE 0.9 % (FLUSH) 0.9 %
10 SYRINGE (ML) INJECTION EVERY 12 HOURS SCHEDULED
Status: CANCELLED | OUTPATIENT
Start: 2025-01-13

## 2025-01-13 RX ORDER — SODIUM CHLORIDE 0.9 % (FLUSH) 0.9 %
3-10 SYRINGE (ML) INJECTION AS NEEDED
Status: DISCONTINUED | OUTPATIENT
Start: 2025-01-13 | End: 2025-01-14 | Stop reason: HOSPADM

## 2025-01-13 RX ORDER — SODIUM CHLORIDE 0.9 % (FLUSH) 0.9 %
10 SYRINGE (ML) INJECTION AS NEEDED
Status: CANCELLED | OUTPATIENT
Start: 2025-01-13

## 2025-01-13 RX ORDER — ATROPINE SULFATE 0.4 MG/ML
0.4 INJECTION, SOLUTION INTRAMUSCULAR; INTRAVENOUS; SUBCUTANEOUS ONCE AS NEEDED
Status: DISCONTINUED | OUTPATIENT
Start: 2025-01-13 | End: 2025-01-14 | Stop reason: HOSPADM

## 2025-01-13 RX ORDER — PROMETHAZINE HYDROCHLORIDE 25 MG/1
25 TABLET ORAL ONCE AS NEEDED
Status: DISCONTINUED | OUTPATIENT
Start: 2025-01-13 | End: 2025-01-14 | Stop reason: HOSPADM

## 2025-01-13 RX ORDER — ACETAMINOPHEN 160 MG/5ML
650 SOLUTION ORAL EVERY 4 HOURS PRN
Status: CANCELLED | OUTPATIENT
Start: 2025-01-13

## 2025-01-13 RX ORDER — EPHEDRINE SULFATE 50 MG/ML
5 INJECTION, SOLUTION INTRAVENOUS ONCE AS NEEDED
Status: DISCONTINUED | OUTPATIENT
Start: 2025-01-13 | End: 2025-01-14 | Stop reason: HOSPADM

## 2025-01-13 RX ORDER — SODIUM CHLORIDE 0.9 % (FLUSH) 0.9 %
3 SYRINGE (ML) INJECTION EVERY 12 HOURS SCHEDULED
Status: DISCONTINUED | OUTPATIENT
Start: 2025-01-13 | End: 2025-01-14 | Stop reason: HOSPADM

## 2025-01-13 RX ORDER — SODIUM CHLORIDE, SODIUM LACTATE, POTASSIUM CHLORIDE, CALCIUM CHLORIDE 600; 310; 30; 20 MG/100ML; MG/100ML; MG/100ML; MG/100ML
9 INJECTION, SOLUTION INTRAVENOUS CONTINUOUS
Status: DISCONTINUED | OUTPATIENT
Start: 2025-01-13 | End: 2025-01-14 | Stop reason: HOSPADM

## 2025-01-13 RX ORDER — PROPOFOL 10 MG/ML
VIAL (ML) INTRAVENOUS AS NEEDED
Status: DISCONTINUED | OUTPATIENT
Start: 2025-01-13 | End: 2025-01-13 | Stop reason: SURG

## 2025-01-13 RX ORDER — ROCURONIUM BROMIDE 10 MG/ML
INJECTION, SOLUTION INTRAVENOUS AS NEEDED
Status: DISCONTINUED | OUTPATIENT
Start: 2025-01-13 | End: 2025-01-13 | Stop reason: SURG

## 2025-01-13 RX ORDER — DEXAMETHASONE SODIUM PHOSPHATE 4 MG/ML
INJECTION, SOLUTION INTRA-ARTICULAR; INTRALESIONAL; INTRAMUSCULAR; INTRAVENOUS; SOFT TISSUE AS NEEDED
Status: DISCONTINUED | OUTPATIENT
Start: 2025-01-13 | End: 2025-01-13 | Stop reason: SURG

## 2025-01-13 RX ORDER — ACETAMINOPHEN 500 MG
1000 TABLET ORAL ONCE
Status: COMPLETED | OUTPATIENT
Start: 2025-01-13 | End: 2025-01-13

## 2025-01-13 RX ORDER — LIDOCAINE HYDROCHLORIDE 20 MG/ML
INJECTION, SOLUTION INFILTRATION; PERINEURAL AS NEEDED
Status: DISCONTINUED | OUTPATIENT
Start: 2025-01-13 | End: 2025-01-13 | Stop reason: SURG

## 2025-01-13 RX ORDER — HYDROCODONE BITARTRATE AND ACETAMINOPHEN 5; 325 MG/1; MG/1
1 TABLET ORAL ONCE AS NEEDED
Status: COMPLETED | OUTPATIENT
Start: 2025-01-13 | End: 2025-01-13

## 2025-01-13 RX ORDER — SODIUM CHLORIDE 9 MG/ML
40 INJECTION, SOLUTION INTRAVENOUS AS NEEDED
Status: DISCONTINUED | OUTPATIENT
Start: 2025-01-13 | End: 2025-01-14 | Stop reason: HOSPADM

## 2025-01-13 RX ORDER — CELECOXIB 100 MG/1
100 CAPSULE ORAL ONCE
Status: DISCONTINUED | OUTPATIENT
Start: 2025-01-13 | End: 2025-01-14 | Stop reason: HOSPADM

## 2025-01-13 RX ORDER — ONDANSETRON 2 MG/ML
INJECTION INTRAMUSCULAR; INTRAVENOUS AS NEEDED
Status: DISCONTINUED | OUTPATIENT
Start: 2025-01-13 | End: 2025-01-13 | Stop reason: SURG

## 2025-01-13 RX ORDER — FENTANYL CITRATE 50 UG/ML
50 INJECTION, SOLUTION INTRAMUSCULAR; INTRAVENOUS
Status: DISCONTINUED | OUTPATIENT
Start: 2025-01-13 | End: 2025-01-14 | Stop reason: HOSPADM

## 2025-01-13 RX ORDER — ONDANSETRON 2 MG/ML
4 INJECTION INTRAMUSCULAR; INTRAVENOUS ONCE AS NEEDED
Status: COMPLETED | OUTPATIENT
Start: 2025-01-13 | End: 2025-01-13

## 2025-01-13 RX ORDER — DIPHENHYDRAMINE HYDROCHLORIDE 50 MG/ML
12.5 INJECTION INTRAMUSCULAR; INTRAVENOUS
Status: DISCONTINUED | OUTPATIENT
Start: 2025-01-13 | End: 2025-01-14 | Stop reason: HOSPADM

## 2025-01-13 RX ORDER — IPRATROPIUM BROMIDE AND ALBUTEROL SULFATE 2.5; .5 MG/3ML; MG/3ML
3 SOLUTION RESPIRATORY (INHALATION) ONCE AS NEEDED
Status: DISCONTINUED | OUTPATIENT
Start: 2025-01-13 | End: 2025-01-14 | Stop reason: HOSPADM

## 2025-01-13 RX ORDER — LABETALOL HYDROCHLORIDE 5 MG/ML
5 INJECTION, SOLUTION INTRAVENOUS
Status: DISCONTINUED | OUTPATIENT
Start: 2025-01-13 | End: 2025-01-14 | Stop reason: HOSPADM

## 2025-01-13 RX ORDER — FAMOTIDINE 10 MG/ML
20 INJECTION, SOLUTION INTRAVENOUS ONCE
Status: COMPLETED | OUTPATIENT
Start: 2025-01-13 | End: 2025-01-13

## 2025-01-13 RX ORDER — NALOXONE HCL 0.4 MG/ML
0.2 VIAL (ML) INJECTION AS NEEDED
Status: DISCONTINUED | OUTPATIENT
Start: 2025-01-13 | End: 2025-01-14 | Stop reason: HOSPADM

## 2025-01-13 RX ORDER — HYDROMORPHONE HYDROCHLORIDE 1 MG/ML
0.5 INJECTION, SOLUTION INTRAMUSCULAR; INTRAVENOUS; SUBCUTANEOUS
Status: DISCONTINUED | OUTPATIENT
Start: 2025-01-13 | End: 2025-01-14 | Stop reason: HOSPADM

## 2025-01-13 RX ORDER — PROMETHAZINE HYDROCHLORIDE 25 MG/1
25 SUPPOSITORY RECTAL ONCE AS NEEDED
Status: DISCONTINUED | OUTPATIENT
Start: 2025-01-13 | End: 2025-01-14 | Stop reason: HOSPADM

## 2025-01-13 RX ORDER — FLUMAZENIL 0.1 MG/ML
0.2 INJECTION INTRAVENOUS AS NEEDED
Status: DISCONTINUED | OUTPATIENT
Start: 2025-01-13 | End: 2025-01-14 | Stop reason: HOSPADM

## 2025-01-13 RX ORDER — HYDRALAZINE HYDROCHLORIDE 20 MG/ML
5 INJECTION INTRAMUSCULAR; INTRAVENOUS
Status: DISCONTINUED | OUTPATIENT
Start: 2025-01-13 | End: 2025-01-14 | Stop reason: HOSPADM

## 2025-01-13 RX ORDER — ACETAMINOPHEN 650 MG/1
650 SUPPOSITORY RECTAL EVERY 4 HOURS PRN
Status: CANCELLED | OUTPATIENT
Start: 2025-01-13

## 2025-01-13 RX ADMIN — LIDOCAINE HYDROCHLORIDE 80 MG: 20 INJECTION, SOLUTION INFILTRATION; PERINEURAL at 09:10

## 2025-01-13 RX ADMIN — HYDROMORPHONE HYDROCHLORIDE 0.5 MG: 0.5 INJECTION, SOLUTION INTRAMUSCULAR; INTRAVENOUS; SUBCUTANEOUS at 10:53

## 2025-01-13 RX ADMIN — PROPOFOL 50 MG: 10 INJECTION, EMULSION INTRAVENOUS at 09:11

## 2025-01-13 RX ADMIN — DEXAMETHASONE SODIUM PHOSPHATE 10 MG: 4 INJECTION, SOLUTION INTRAMUSCULAR; INTRAVENOUS at 09:17

## 2025-01-13 RX ADMIN — SODIUM CHLORIDE, POTASSIUM CHLORIDE, SODIUM LACTATE AND CALCIUM CHLORIDE 9 ML/HR: 600; 310; 30; 20 INJECTION, SOLUTION INTRAVENOUS at 08:57

## 2025-01-13 RX ADMIN — SUGAMMADEX 200 MG: 100 INJECTION, SOLUTION INTRAVENOUS at 10:16

## 2025-01-13 RX ADMIN — GLYCOPYRROLATE 0.2 MG: 0.2 INJECTION INTRAMUSCULAR; INTRAVENOUS at 09:08

## 2025-01-13 RX ADMIN — SODIUM CHLORIDE 2000 MG: 900 INJECTION INTRAVENOUS at 08:57

## 2025-01-13 RX ADMIN — PROPOFOL 200 MG: 10 INJECTION, EMULSION INTRAVENOUS at 09:10

## 2025-01-13 RX ADMIN — ONDANSETRON 4 MG: 2 INJECTION, SOLUTION INTRAMUSCULAR; INTRAVENOUS at 10:53

## 2025-01-13 RX ADMIN — HYDROCODONE BITARTRATE AND ACETAMINOPHEN 1 TABLET: 5; 325 TABLET ORAL at 10:54

## 2025-01-13 RX ADMIN — ACETAMINOPHEN 500 MG: 500 TABLET ORAL at 08:50

## 2025-01-13 RX ADMIN — ROCURONIUM BROMIDE 70 MG: 10 INJECTION, SOLUTION INTRAVENOUS at 09:10

## 2025-01-13 RX ADMIN — ONDANSETRON 4 MG: 2 INJECTION INTRAMUSCULAR; INTRAVENOUS at 10:12

## 2025-01-13 RX ADMIN — FAMOTIDINE 20 MG: 10 INJECTION INTRAVENOUS at 08:57

## 2025-01-13 RX ADMIN — FENTANYL CITRATE 50 MCG: 50 INJECTION, SOLUTION INTRAMUSCULAR; INTRAVENOUS at 09:08

## 2025-01-13 NOTE — POST-PROCEDURE NOTE
"POST PROCEDURE NOTE    Procedure: Lumbar Kyphoplasty and Biopsy    Pre-Procedure Diagnosis: L2 and L3 VCF    Post-procedure Diagnosis: Same    Findings: L2 and L3 VCFs with biopsy and subsequent balloon kyphoplasty with good \"cement\" deposition and no significant extravasation. Official report to follow.    Complications: None encountered    Blood loss: 5 cc    Specimen Removed: L2 and L3 vertebral body    Disposition:   Discharge home today    "

## 2025-01-13 NOTE — ANESTHESIA POSTPROCEDURE EVALUATION
Patient: Shwetha Lane    Procedure Summary       Date: 01/13/25 Room / Location: [facility] INTERVENTIONAL    Anesthesia Start: 0903 Anesthesia Stop: 1034    Procedure: IR KYPHOPLASTY LUMBAR Diagnosis:       Compression fracture of L2 vertebra with delayed healing, subsequent encounter      Age related osteoporosis, unspecified pathological fracture presence      Bilateral malignant neoplasm involving both nipple and areola in female, unspecified estrogen receptor status      (L2 and L3 VCF)    Scheduled Providers:  Provider: Toni Parham MD    Anesthesia Type: general ASA Status: 3            Anesthesia Type: general    Vitals  Vitals Value Taken Time   /60 01/13/25 1115   Temp 36.6 °C (97.8 °F) 01/13/25 1030   Pulse 74 01/13/25 1129   Resp 14 01/13/25 1115   SpO2 97 % 01/13/25 1129   Vitals shown include unfiled device data.        Post Anesthesia Care and Evaluation    Patient location during evaluation: bedside  Patient participation: complete - patient participated  Level of consciousness: awake  Pain management: adequate    Airway patency: patent  Anesthetic complications: No anesthetic complications  PONV Status: none  Cardiovascular status: acceptable  Respiratory status: acceptable  Hydration status: acceptable  Post Neuraxial Block status: Motor and sensory function returned to baseline

## 2025-01-13 NOTE — INTERVAL H&P NOTE
H&P reviewed. The patient was examined and there are no changes to the H&P.         Patient is calling to request a new dermatology referral for an appointment he has tomorrow, 7/28 at 9:30 am for yearly skin test. I advised that turn around time is 3-5 business days and patient is going to call to see if he can move appointment to a later date.      Castro Tadeo  [facility], [facility]  [address] [place] [address]   Phone: [phone]  Fax: [phone]

## 2025-01-13 NOTE — ANESTHESIA PREPROCEDURE EVALUATION
Anesthesia Evaluation     Patient summary reviewed   NPO Solid Status: > 8 hours  NPO Liquid Status: > 2 hours           Airway   Mallampati: II  TM distance: >3 FB  Neck ROM: full  No difficulty expected  Dental - normal exam     Pulmonary    (+) ,sleep apnea on CPAP  (-) pneumonia  Cardiovascular     ECG reviewed  Rhythm: regular    (+) hypertension, hyperlipidemia      Neuro/Psych  GI/Hepatic/Renal/Endo    (+) morbid obesity, GERD, thyroid problem hypothyroidism    Musculoskeletal     Abdominal   (+) obese   Substance History      OB/GYN          Other                    Anesthesia Plan    ASA 3     general     intravenous induction     Anesthetic plan, risks, benefits, and alternatives have been provided, discussed and informed consent has been obtained with: patient.    CODE STATUS:

## 2025-01-13 NOTE — ANESTHESIA PROCEDURE NOTES
Airway  Urgency: elective    Date/Time: 1/13/2025 9:13 AM  Airway not difficult    General Information and Staff    Patient location during procedure: OR  Anesthesiologist: Toni Parham MD  CRNA/CAA: Lacey Rodney CRNA    Indications and Patient Condition  Indications for airway management: airway protection    Preoxygenated: yes  MILS not maintained throughout  Mask difficulty assessment: 1 - vent by mask    Final Airway Details  Final airway type: endotracheal airway      Successful airway: ETT  Cuffed: yes   Successful intubation technique: direct laryngoscopy  Facilitating devices/methods: intubating stylet  Endotracheal tube insertion site: oral  Blade: José Miguel  Blade size: 3  ETT size (mm): 7.0  Cormack-Lehane Classification: grade I - full view of glottis  Placement verified by: chest auscultation   Cuff volume (mL): 8  Measured from: lips  Number of attempts at approach: 1  Assessment: lips, teeth, and gum same as pre-op and atraumatic intubation    Additional Comments  PreO2 100% face mask, IV induction, easy mask, DVL x1, cords noted, tube through, cuff up, EBBSH, +etCO2, = chest movement, tube secured in place, atraumatic, caps, teeth and lips intact as preop.

## 2025-01-14 ENCOUNTER — TELEPHONE (OUTPATIENT)
Dept: NEUROSURGERY | Facility: CLINIC | Age: 80
End: 2025-01-14
Payer: MEDICARE

## 2025-01-14 NOTE — TELEPHONE ENCOUNTER
I called and talked to Shwetha regarding her dentist appmt.  I let her know per Dr. Park that she would need to wait another week before going to the dentist and she did not need any anti-biotics.

## 2025-01-14 NOTE — TELEPHONE ENCOUNTER
Shwetha called and wanted to know if she needed any antibiotics before getting her teeth cleaned? She is wondering if it is ok to go to the dentist? Please advise and thank you

## 2025-01-15 LAB
CYTO UR: NORMAL
LAB AP CASE REPORT: NORMAL
LAB AP SPECIAL STAINS: NORMAL
PATH REPORT.FINAL DX SPEC: NORMAL
PATH REPORT.GROSS SPEC: NORMAL

## 2025-01-23 NOTE — PROGRESS NOTES
Subjective   Patient ID: Shwetha Lane is a 79 y.o. female is here today for follow-up for   PO, L2 and L3 kypho    History of Present Illness  79 y.o. year old female who presented to the emergency department for evaluation of hip pain on 11-9-2024 after a fall. The evaluation of right hip pain and back pain included a CT of the lumbar spine which suggested an L2 vertebral compression fracture.  She states that she was walking down stairs and she was holding onto the handrail, however when she got to the last step she slipped and fell onto her right side.  Patient was still complaining of right lateral back pain and right hip pain that radiated to the groin.  She states she had been able to ambulate, but it caused worsening pain.  She felt best when sitting on her couch not moving.  She stated she did not have any dizziness, weakness, or syncope leading to the fall.  An MRI was obtained on 12/24/2024 which showed acute edema in the superior endplate of both L2 and L3.  She had a normal DEXA scan approximately 2 years ago, but has not had a repeat.  She is on estrogen replacement as well as Lipitor and antihypertensive medications plus aspirin for her hypertension, hyperlipidemia, PVCs and previous breast cancer.  She now returns status post kyphoplasty on 1/13/2025 with pain relief achieved and no cancer or myeloma present in the biopsy.    The following portions of the patient's history were reviewed and updated as appropriate: She  has a past medical history of Anxiety (Sometimes), Arthritis, Cancer (Breast, 3 years ago), Cataract (Had surgery in April), COPD (chronic obstructive pulmonary disease), COVID, Diverticulosis, GERD (gastroesophageal reflux disease), Heart murmur (3 years ago), Hyperlipidemia, Hypertension, Hypothyroidism, Low back pain, Migraines, Obesity, Pneumonia, PVC (premature ventricular contraction), Scoliosis, and Sleep apnea.  She does not have any pertinent problems on file.  She  has a  past surgical history that includes Tonsillectomy; Hysterectomy; Laparoscopic gastric banding; Colonoscopy; Replacement total knee (Left); Knee arthroscopy (Bilateral); Mouth surgery; Total knee arthroplasty (Right, 09/26/2023); Appendectomy (1955); Bariatric Surgery (Got the Band 2011); Breast surgery (2020); and Joint replacement (Left Knee 2017).  Her family history includes Asthma in her mother and sister; Depression in her mother; Diabetes in her mother; Heart disease in her father.  She  reports that she has quit smoking. Her smoking use included cigarettes. She has a 7.5 pack-year smoking history. She has never used smokeless tobacco. She reports that she does not drink alcohol and does not use drugs.  Current Outpatient Medications   Medication Sig Dispense Refill    atorvastatin (Lipitor) 40 MG tablet Take 1 tablet by mouth Every Night. 90 tablet 1    Budeson-Glycopyrrol-Formoterol (Breztri Aerosphere) 160-9-4.8 MCG/ACT aerosol inhaler Inhale 2 puffs 2 (Two) Times a Day As Needed.      cholecalciferol (VITAMIN D3) 25 MCG (1000 UT) tablet Take 2 tablets by mouth Daily. 2 pills daily 90 tablet 3    cyclobenzaprine (FLEXERIL) 10 MG tablet Take 1 tablet by mouth 3 (Three) Times a Day As Needed for Muscle Spasms. 90 tablet 3    estradiol (ESTRACE) 0.1 MG/GM vaginal cream Insert 1 g into the vagina Daily As Needed.      fluticasone (FLONASE) 50 MCG/ACT nasal spray Administer 2 sprays into the nostril(s) as directed by provider Daily As Needed for Rhinitis.      levothyroxine (Synthroid) 137 MCG tablet Take 1 tablet by mouth Daily. 90 tablet 3    meloxicam (Mobic) 7.5 MG tablet Take 1 tablet by mouth Daily. 30 tablet 1    metoprolol succinate XL (TOPROL-XL) 50 MG 24 hr tablet Take 1 tablet by mouth Daily. 90 tablet 1    Multiple Vitamins-Minerals (MULTIVITAMIN & MINERAL PO) Take 1 tablet by mouth Daily.      Nurtec 75 MG dispersible tablet Place  under the tongue Daily As Needed.      vitamin B-12 (CYANOCOBALAMIN)  "1000 MCG tablet Take 1 tablet by mouth Daily.       No current facility-administered medications for this visit.     Current Outpatient Medications on File Prior to Visit   Medication Sig    atorvastatin (Lipitor) 40 MG tablet Take 1 tablet by mouth Every Night.    Budeson-Glycopyrrol-Formoterol (Breztri Aerosphere) 160-9-4.8 MCG/ACT aerosol inhaler Inhale 2 puffs 2 (Two) Times a Day As Needed.    cholecalciferol (VITAMIN D3) 25 MCG (1000 UT) tablet Take 2 tablets by mouth Daily. 2 pills daily    cyclobenzaprine (FLEXERIL) 10 MG tablet Take 1 tablet by mouth 3 (Three) Times a Day As Needed for Muscle Spasms.    estradiol (ESTRACE) 0.1 MG/GM vaginal cream Insert 1 g into the vagina Daily As Needed.    fluticasone (FLONASE) 50 MCG/ACT nasal spray Administer 2 sprays into the nostril(s) as directed by provider Daily As Needed for Rhinitis.    levothyroxine (Synthroid) 137 MCG tablet Take 1 tablet by mouth Daily.    meloxicam (Mobic) 7.5 MG tablet Take 1 tablet by mouth Daily.    metoprolol succinate XL (TOPROL-XL) 50 MG 24 hr tablet Take 1 tablet by mouth Daily.    Multiple Vitamins-Minerals (MULTIVITAMIN & MINERAL PO) Take 1 tablet by mouth Daily.    Nurtec 75 MG dispersible tablet Place  under the tongue Daily As Needed.    vitamin B-12 (CYANOCOBALAMIN) 1000 MCG tablet Take 1 tablet by mouth Daily.     No current facility-administered medications on file prior to visit.     She is allergic to bee venom and nickel..    Review of Systems   Musculoskeletal:  Positive for back pain.   All other systems reviewed and are negative.    Objective     Vitals:    01/30/25 1328   BP: 118/70   Pulse: 72   Temp: 98.2 °F (36.8 °C)   SpO2: 95%   Weight: 118 kg (261 lb)   Height: 165 cm (64.96\")     Body mass index is 43.48 kg/m².      Physical Exam  Vitals and nursing note reviewed.   Constitutional:       General: She is not in acute distress.     Appearance: She is obese.   Cardiovascular:      Rate and Rhythm: Normal rate. " "  Pulmonary:      Effort: Pulmonary effort is normal.   Musculoskeletal:         General: Normal range of motion.      Cervical back: Normal range of motion.   Skin:     General: Skin is warm and dry.   Neurological:      Mental Status: She is alert and oriented to person, place, and time.      Cranial Nerves: No cranial nerve deficit.      Sensory: No sensory deficit.      Motor: No weakness.      Coordination: Coordination abnormal.      Gait: Gait normal.     Neurological Exam  Mental Status  Alert. Oriented to person, place, and time.    Gait   Normal gait.      Assessment & Plan   Independent Review of Radiographic Studies:      I personally reviewed the images from the following studies.    The kyphoplasty procedure dated 2025 was reviewed with the patient and shows successful biopsy and subsequent balloon kyphoplasty at the L2 and L3 vertebral body levels.  The biopsies were submitted to pathology for further evaluation and no cancer or myeloma was detected.    Medical Decision Makin-year-old female with osteoporosis and breast cancer who developed vertebral compression fractures at L2 and L3.  Subsequent kyphoplasty performed after biopsy with good \"cement\" deposition and no significant extravasation.  No cancer detected or myeloma seen in the specimens.  Patient has achieved pain relief with only some soreness remaining that should improve over time.  She continues to control her cerebrovascular risk factors medically and is a former smoker but currently tobacco free.  Diagnoses and all orders for this visit:    1. Compression fracture of L2 vertebra status post kyphoplasty (Primary)    2. Compression fracture of L3 vertebra status post kyphoplasty    3. Bilateral malignant neoplasm involving both nipple and areola in female, unspecified estrogen receptor status    4. Age related osteoporosis    5. Primary hypertension    6. Pure hypercholesterolemia      Return if symptoms worsen or fail to " improve.     I spent 30 minutes caring for Shwetha on this date of service. This time includes time spent by me in the following activities: preparing for the visit, reviewing tests, performing a medically appropriate examination and/or evaluation, counseling and educating the patient/family/caregiver, documenting information in the medical record, independently interpreting results and communicating that information with the patient/family/caregiver, and obtaining a separately obtained history.

## 2025-01-27 ENCOUNTER — OFFICE VISIT (OUTPATIENT)
Dept: FAMILY MEDICINE CLINIC | Facility: CLINIC | Age: 80
End: 2025-01-27
Payer: MEDICARE

## 2025-01-27 VITALS
WEIGHT: 260.8 LBS | OXYGEN SATURATION: 95 % | SYSTOLIC BLOOD PRESSURE: 112 MMHG | DIASTOLIC BLOOD PRESSURE: 64 MMHG | HEART RATE: 76 BPM | HEIGHT: 65 IN | BODY MASS INDEX: 43.45 KG/M2 | TEMPERATURE: 98 F

## 2025-01-27 DIAGNOSIS — I10 PRIMARY HYPERTENSION: Primary | ICD-10-CM

## 2025-01-27 DIAGNOSIS — M54.17 LUMBOSACRAL RADICULOPATHY: ICD-10-CM

## 2025-01-27 DIAGNOSIS — M25.542 JOINT PAIN IN FINGERS OF BOTH HANDS: ICD-10-CM

## 2025-01-27 DIAGNOSIS — M25.541 JOINT PAIN IN FINGERS OF BOTH HANDS: ICD-10-CM

## 2025-01-27 PROCEDURE — 99214 OFFICE O/P EST MOD 30 MIN: CPT | Performed by: NURSE PRACTITIONER

## 2025-01-27 PROCEDURE — 3074F SYST BP LT 130 MM HG: CPT | Performed by: NURSE PRACTITIONER

## 2025-01-27 PROCEDURE — 3078F DIAST BP <80 MM HG: CPT | Performed by: NURSE PRACTITIONER

## 2025-01-27 PROCEDURE — 1126F AMNT PAIN NOTED NONE PRSNT: CPT | Performed by: NURSE PRACTITIONER

## 2025-01-27 RX ORDER — MELOXICAM 7.5 MG/1
7.5 TABLET ORAL DAILY
Qty: 30 TABLET | Refills: 1 | Status: SHIPPED | OUTPATIENT
Start: 2025-01-27

## 2025-01-30 ENCOUNTER — OFFICE VISIT (OUTPATIENT)
Dept: NEUROSURGERY | Facility: CLINIC | Age: 80
End: 2025-01-30
Payer: MEDICARE

## 2025-01-30 VITALS
HEART RATE: 72 BPM | HEIGHT: 65 IN | TEMPERATURE: 98.2 F | BODY MASS INDEX: 43.49 KG/M2 | OXYGEN SATURATION: 95 % | WEIGHT: 261 LBS | SYSTOLIC BLOOD PRESSURE: 118 MMHG | DIASTOLIC BLOOD PRESSURE: 70 MMHG

## 2025-01-30 DIAGNOSIS — M81.0 AGE RELATED OSTEOPOROSIS, UNSPECIFIED PATHOLOGICAL FRACTURE PRESENCE: ICD-10-CM

## 2025-01-30 DIAGNOSIS — S32.020G COMPRESSION FRACTURE OF L2 VERTEBRA WITH DELAYED HEALING, SUBSEQUENT ENCOUNTER: Primary | ICD-10-CM

## 2025-01-30 DIAGNOSIS — C50.012 BILATERAL MALIGNANT NEOPLASM INVOLVING BOTH NIPPLE AND AREOLA IN FEMALE, UNSPECIFIED ESTROGEN RECEPTOR STATUS: ICD-10-CM

## 2025-01-30 DIAGNOSIS — I10 PRIMARY HYPERTENSION: ICD-10-CM

## 2025-01-30 DIAGNOSIS — C50.011 BILATERAL MALIGNANT NEOPLASM INVOLVING BOTH NIPPLE AND AREOLA IN FEMALE, UNSPECIFIED ESTROGEN RECEPTOR STATUS: ICD-10-CM

## 2025-01-30 DIAGNOSIS — E78.00 PURE HYPERCHOLESTEROLEMIA: ICD-10-CM

## 2025-01-30 DIAGNOSIS — S32.030G COMPRESSION FRACTURE OF L3 VERTEBRA WITH DELAYED HEALING: ICD-10-CM

## 2025-02-24 ENCOUNTER — OFFICE VISIT (OUTPATIENT)
Dept: ORTHOPEDIC SURGERY | Facility: CLINIC | Age: 80
End: 2025-02-24
Payer: MEDICARE

## 2025-02-24 VITALS
DIASTOLIC BLOOD PRESSURE: 82 MMHG | SYSTOLIC BLOOD PRESSURE: 134 MMHG | HEART RATE: 83 BPM | OXYGEN SATURATION: 92 % | HEIGHT: 64 IN | WEIGHT: 261 LBS | BODY MASS INDEX: 44.56 KG/M2

## 2025-02-24 DIAGNOSIS — M70.62 TROCHANTERIC BURSITIS OF LEFT HIP: ICD-10-CM

## 2025-02-24 DIAGNOSIS — M25.551 RIGHT HIP PAIN: Primary | ICD-10-CM

## 2025-02-24 DIAGNOSIS — M70.61 TROCHANTERIC BURSITIS OF RIGHT HIP: ICD-10-CM

## 2025-02-24 DIAGNOSIS — M25.552 LEFT HIP PAIN: ICD-10-CM

## 2025-02-24 RX ORDER — TRIAMCINOLONE ACETONIDE 40 MG/ML
40 INJECTION, SUSPENSION INTRA-ARTICULAR; INTRAMUSCULAR
Status: COMPLETED | OUTPATIENT
Start: 2025-02-24 | End: 2025-02-24

## 2025-02-24 RX ORDER — LIDOCAINE HYDROCHLORIDE 10 MG/ML
5 INJECTION, SOLUTION INFILTRATION; PERINEURAL
Status: COMPLETED | OUTPATIENT
Start: 2025-02-24 | End: 2025-02-24

## 2025-02-24 RX ADMIN — TRIAMCINOLONE ACETONIDE 40 MG: 40 INJECTION, SUSPENSION INTRA-ARTICULAR; INTRAMUSCULAR at 14:37

## 2025-02-24 RX ADMIN — LIDOCAINE HYDROCHLORIDE 5 ML: 10 INJECTION, SOLUTION INFILTRATION; PERINEURAL at 14:37

## 2025-02-26 ENCOUNTER — HOSPITAL ENCOUNTER (OUTPATIENT)
Facility: HOSPITAL | Age: 80
Discharge: HOME | End: 2025-02-26
Payer: MEDICARE

## 2025-02-26 ENCOUNTER — OFFICE VISIT (OUTPATIENT)
Age: 80
End: 2025-02-26
Payer: MEDICARE

## 2025-02-26 VITALS
TEMPERATURE: 99 F | HEART RATE: 76 BPM | WEIGHT: 256.4 LBS | DIASTOLIC BLOOD PRESSURE: 70 MMHG | HEIGHT: 64 IN | BODY MASS INDEX: 43.77 KG/M2 | SYSTOLIC BLOOD PRESSURE: 130 MMHG | OXYGEN SATURATION: 97 %

## 2025-02-26 DIAGNOSIS — G89.29 CHRONIC BILATERAL LOW BACK PAIN WITHOUT SCIATICA: ICD-10-CM

## 2025-02-26 DIAGNOSIS — M19.90 INFLAMMATORY ARTHRITIS: ICD-10-CM

## 2025-02-26 DIAGNOSIS — R76.8 ELEVATED RHEUMATOID FACTOR: ICD-10-CM

## 2025-02-26 DIAGNOSIS — M19.242 OTHER SECONDARY OSTEOARTHRITIS OF BOTH HANDS: ICD-10-CM

## 2025-02-26 DIAGNOSIS — E66.01 MORBID OBESITY WITH BMI OF 40.0-44.9, ADULT: ICD-10-CM

## 2025-02-26 DIAGNOSIS — G56.03 BILATERAL CARPAL TUNNEL SYNDROME: ICD-10-CM

## 2025-02-26 DIAGNOSIS — M54.50 CHRONIC BILATERAL LOW BACK PAIN WITHOUT SCIATICA: ICD-10-CM

## 2025-02-26 DIAGNOSIS — M79.7 FIBROMYALGIA: ICD-10-CM

## 2025-02-26 DIAGNOSIS — M19.241 OTHER SECONDARY OSTEOARTHRITIS OF BOTH HANDS: ICD-10-CM

## 2025-02-26 DIAGNOSIS — M19.90 INFLAMMATORY ARTHRITIS: Primary | ICD-10-CM

## 2025-02-26 NOTE — PROGRESS NOTES
RHEUMATOLOGY NEW PATIENT VISIT  2025  Patient Name: Shwetha Lane : 1945 Medical Record: [id number]  PCP: Rosibel Scott APRN    Referring provider: Rosibel Scott    REASON FOR CONSULTATION    Hand pains and elevated rheumatoid factor    History of Present Illness  Shwetha Laen is a 79 y.o. female who presents for evaluation for hand pains and elevated rheumatoid factor.  She got labs done for her hand complaints - showing normal inflammatory markers but an elevated RF of 108.    She reports experiencing severe pain in her right thumb, index finger, and wrist, which has progressively worsened over time.   She describes the pain as burning, initially manifested in the thumb before spreading to the wrist and index finger.   It is predominantly localized on the dorsum of the hand. The intensity of the pain has escalated to the point where she is unable to perform simple tasks such as opening her car seatbelt, necessitating the use of an alternative finger. She also experiences cramping in the affected fingers. Despite having had these symptoms for some time, she notes a recent increase in severity. She is beginning to experience the same problem on her left hand.    Other review of Rheum system:  Review is negative for: Fever, Red eye (uveitis), Psoriasis, Recurrent/previous infections: gastroenteritis, STDs, tonsillitis, Dysphagia, IBD, Inflammatory back pain, Lower limb joints pain, Plantar fasciitis/Achilles tendinitis,     In addition to the upper extremity joint issues reports occasional foot and ankle pain, which she attributes to weather changes. She has no history of gout. She cannot exercise because she has back pain and had surgery recently. She has had 2 knees replaced. Her hips hurt, and she had an injection in her hip yesterday.    She has a history of kyphoplasty for L2 compression fracture. She had 2 vertebrae fractured. She has had injections in her neck and an  ablation.      Her primary provider gave her Meloxicam for pain relief but has not started taking it yet.    She thinks she might have fibromyalgia because she is hurting everywhere.      SOCIAL HISTORY  The patient quit smoking 16 or 20 years ago. She does not drink alcohol. 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     Current Outpatient Medications:     atorvastatin (Lipitor) 40 MG tablet, Take 1 tablet by mouth Every Night., Disp: 90 tablet, Rfl: 1    Budeson-Glycopyrrol-Formoterol (Breztri Aerosphere) 160-9-4.8 MCG/ACT aerosol inhaler, Inhale 2 puffs 2 (Two) Times a Day As Needed., Disp: , Rfl:     cholecalciferol (VITAMIN D3) 25 MCG (1000 UT) tablet, Take 2 tablets by mouth Daily. 2 pills daily, Disp: 90 tablet, Rfl: 3    cyclobenzaprine (FLEXERIL) 10 MG tablet, Take 1 tablet by mouth 3 (Three) Times a Day As Needed for Muscle Spasms., Disp: 90 tablet, Rfl: 3    estradiol (ESTRACE) 0.1 MG/GM vaginal cream, Insert 1 g into the vagina Daily As Needed., Disp: , Rfl:     levothyroxine (Synthroid) 137 MCG tablet, Take 1 tablet by mouth Daily., Disp: 90 tablet, Rfl: 3    metoprolol succinate XL (TOPROL-XL) 50 MG 24 hr tablet, Take 1 tablet by mouth Daily., Disp: 90 tablet, Rfl: 1    Multiple Vitamins-Minerals (MULTIVITAMIN & MINERAL PO), Take 1 tablet by mouth Daily., Disp: , Rfl:     Nurtec 75 MG dispersible tablet, Place  under the tongue Daily As Needed., Disp: , Rfl:     vitamin B-12 (CYANOCOBALAMIN) 1000 MCG tablet, Take 1 tablet by mouth Daily., Disp: , Rfl:     fluticasone (FLONASE) 50 MCG/ACT nasal spray, Administer 2 sprays into the nostril(s) as directed by provider Daily As Needed for Rhinitis. (Patient not taking: Reported on 2/26/2025),  "Disp: , Rfl:     meloxicam (Mobic) 7.5 MG tablet, Take 1 tablet by mouth Daily. (Patient not taking: Reported on 2/26/2025), Disp: 30 tablet, Rfl: 1     There are no discontinued medications.      Past Surgical History:   Procedure Laterality Date    APPENDECTOMY  1955    BARIATRIC SURGERY  Got the Band 2011    BREAST SURGERY  2020    COLONOSCOPY      HYSTERECTOMY      JOINT REPLACEMENT  Left Knee 2017    KNEE ARTHROSCOPY Bilateral     HAS HAD BOTH KNEES SCOPED WITH MENISCUS REPAIR    LAPAROSCOPIC GASTRIC BANDING      MOUTH SURGERY      TOOTH EXTRACTED ABOUT 6 WEEKS AGO CURRENTLY HEALED AND NO ISSUES    REPLACEMENT TOTAL KNEE Left     TONSILLECTOMY      TOTAL KNEE ARTHROPLASTY Right 09/26/2023    Procedure: TOTAL KNEE ARTHROPLASTY WITH ALOK NAVIGATION;  Surgeon: Dneys Mohr MD;  Location: [facility] MAIN OR;  Service: Robotics - Ortho;  Laterality: Right;       Allergies   Allergen Reactions    Bee Venom Swelling    Nickel Itching, Swelling and Rash     EARRINGS (NICKEL)      Physical Exam      Vitals:    02/26/25 1353   BP: 130/70   BP Location: Left arm   Patient Position: Sitting   Cuff Size: Adult   Pulse: 76   Temp: 99 °F (37.2 °C)   TempSrc: Oral   SpO2: 97%   Weight: 116 kg (256 lb 6.4 oz)   Height: 162.6 cm (64.02\")     Gen: Well-developed, well-nourished adult in no apparent distress. Pleasant and cooperative. Alert and oriented.   HEENT: EOMI, MMM, oropharynx clear without oral ulcers, no lacrimal gland enlargement, normal salivary pooling, normal temporal arteries without tenderness or beading.  Chest: Normal work of breathing. CTAB without wheezing/rhonchi/rales. ??  CV: RRR, normal S1/S2, no murmurs/rubs/gallops heard. ??  Extremities: Warm, 2+ distal pulses, no cyanosis, clubbing, or edema.  Neuro: Good comprehension/cognition. Muscle strength 5/5 in all extremities. Gait normal.??  ??Skin: No alopecia, nail change (including no nail pitting), rashes, bruising, petechiae, sclerodactyly, digital " pits, telangiectasias, tophi, appreciable calcinosis, or nodules.??    Comprehensive Musculoskeletal Examination:?  - There is restricted motion both laterally, flexion and extension of the cervical neck.  neck without limited ROM.?  - Lumbosacral spine and hips without limited ROM.?? Negative SUNDAY.  -There is a positive grind test in the right and left  CMC joint. MCP of  right index finger shows a thickened synovium with some mild pain, but no evidence of synovitis. The right middle finger and fourth finger. The IP and the PIPs of the right hand are not very remarkable as the MCPs.    The left wrist appears slightly swollen. PIPs and the DIPs of the rest of the fingers on the left hand are unremarkable. The IP of the left hand is tender.   - Shoulders, elbows, knees, ankles, feet/toes: No deformity, erythema, warmth, swelling, effusion, tenderness, or limited ROM.??       Assessment & Plan  Shwetha Lane is a 79 y.o. female who presents for evaluation for hand pains and elevated rheumatoid factor.  She got labs done for her hand complaints - showing normal inflammatory markers but an elevated RF of 108.    Patient has had ongoing history of chronic diffuse musculoskeletal pain, predominantly with and noninflammatory etiology.  Both axial and peripheral joints are involved with ongoing joint pains.  While the rheumatoid factor is elevated, she has normal inflammatory markers.     In addition to her chronic multiple joint pain, she has had episodic hand swelling with involvement of the right greater than left.  She also has a history of bilateral carpal tunnel syndrome for years.    Today's examination shows bilateral bony hypertrophy/prominence of her MCPs , IP's and DIPs with some tenderness on palpation without significant synovitis.    She has bilateral positive grind test with squaring of her CMC joints, denoting CMC osteoarthritis.  Examination also showed multiple tender points in the lower and upper  "extremities, anterior and posterior neck regions suspicious for fibromyalgia.    Differential diagnosis of patient MSK problem include:   1.  Chronic large joint and small joint osteoarthritis: The small joints of the hands demonstrate evidence of CMC involvement and the elicitation of discomfort with palpation of the hand joints without synovitis might indicate possible erosive disease as well.    2.  Rheumatoid arthritis is a possibility, but the distribution and presentation are somehow atypical for RA, despite the high rheumatoid factor    3.  Crystalline arthropathy is a consideration, especially in this age group and with the manner of episodic hand swellings that was described- \"pain in the right thumb, index finger, and wrist\"    Of note, both crystal arthropathy and erosive osteoarthritis can also cause rheumatoid factor to be elevated just like rheumatoid arthritis.  We do not have a CCP obtained with labs, which if positive will be more specific for rheumatoid arthritis and might exclude the others.    Plan:  - Patient is encouraged to start taking prescribed meloxicam for pain management. If there is no improvement or if symptoms worsen, addition of short course corticosteroids or conventional DMARDs such as methotrexate or hydroxychloroquine may be considered    - Topical NSAID is an option which can be beneficial especially for the hands.    -Some recommended non-Pharmacologic Interventions include regular hand exercises to improve function and reduce pain.  Will discuss referral to occupational therapy for tailored exercise programs, including range of motion and strengthening exercises during her next visit.    -Also discussed thermal Therapy: Application of heat, such as paraffin wax baths for relief and improving joint mobility     - Will obtain x-rays of the hand, wrist and foot will be obtained to assess for inflammatory changes    - Will obtain additional labs-including CCP, complements, CK, " repeat inflammatory markers, protein electrophoresis, urinalysis    - Regarding suspicion for fibromyalgia, she is advised to discuss potential medications like Lyrica with her primary care physician. Physical therapy and cognitive behavioral therapy are recommended to help manage symptoms.     - She is morbidly obese, which contributes to her existing musculoskeletal pain.  She has been counseled on dietary and lifestyle modification. A Mediterranean diet is also suggested to reduce inflammation.    - She can continue to wear hand braces at night for her carpal tunnel syndromes which she says has been helpful for the last 10 years    Follow-up  The patient will follow up in 3 weeks.     Diagnoses and all orders for this visit:    1. Inflammatory arthritis (Primary)  2. Elevated rheumatoid factor  3. Other secondary osteoarthritis of both hands  4. Chronic bilateral low back pain without sciatica  5. Bilateral carpal tunnel syndrome    -     XR Wrist 3+ View Bilateral  -     XR Hand 3+ View Bilateral; Future  -     XR Foot 3+ View Bilateral  -     Cyclic Citrul Peptide Antibody, IgG / IgA  -     DAREK Antibody Panel  -     C3 Complement  -     C4 Complement  -     Urinalysis With Microscopic If Indicated (No Culture) - Urine, Clean Catch  -     Protein / Creatinine Ratio, Urine - Urine, Clean Catch  -     Protein Electrophoresis, Total  -     CK  -     Sedimentation Rate  -     C-reactive Protein    6. Fibromyalgia  7. Morbid obesity with BMI of 40.0-44.9, adult  -     Cyclic Citrul Peptide Antibody, IgG / IgA  -     DAREK Antibody Panel  -     C3 Complement  -     C4 Complement  -     Urinalysis With Microscopic If Indicated (No Culture) - Urine, Clean Catch  -     Protein / Creatinine Ratio, Urine - Urine, Clean Catch  -     Protein Electrophoresis, Total  -     CK  -     Sedimentation Rate  -     C-reactive Protein    Patient or patient representative verbalized consent for the use of Ambient Listening during the  visit with  Dada Noriega MD for chart documentation.      I spent 60 minutes caring for Shwetha on this date of service. This time includes time spent by me in the following activities:preparing for the visit, reviewing tests, obtaining and/or reviewing a separately obtained history, performing a medically appropriate examination and/or evaluation , counseling and educating the patient/family/caregiver, ordering medications, tests, or procedures, referring and communicating with other health care professionals , documenting information in the medical record, and independently interpreting results and communicating that information with the patient/family/caregiver

## 2025-02-27 ENCOUNTER — HOSPITAL ENCOUNTER (OUTPATIENT)
Facility: HOSPITAL | Age: 80
Discharge: HOME | End: 2025-02-27
Payer: MEDICARE

## 2025-02-27 ENCOUNTER — LAB (OUTPATIENT)
Facility: HOSPITAL | Age: 80
End: 2025-02-27
Payer: MEDICARE

## 2025-02-27 LAB
BILIRUB UR QL STRIP: NEGATIVE
C3 SERPL-MCNC: 160 MG/DL (ref 82–167)
C4 SERPL-MCNC: 20 MG/DL (ref 14–44)
CK SERPL-CCNC: 75 U/L (ref 20–180)
CLARITY UR: CLEAR
COLOR UR: YELLOW
CREAT UR-MCNC: 73.5 MG/DL
CRP SERPL-MCNC: <0.3 MG/DL (ref 0–0.5)
ERYTHROCYTE [SEDIMENTATION RATE] IN BLOOD: 38 MM/HR (ref 0–30)
GLUCOSE UR STRIP-MCNC: NEGATIVE MG/DL
HGB UR QL STRIP.AUTO: NEGATIVE
KETONES UR QL STRIP: NEGATIVE
LEUKOCYTE ESTERASE UR QL STRIP.AUTO: NEGATIVE
NITRITE UR QL STRIP: NEGATIVE
PH UR STRIP.AUTO: 6.5 [PH] (ref 5–8)
PROT ?TM UR-MCNC: 4.9 MG/DL
PROT UR QL STRIP: NEGATIVE
PROT/CREAT UR: 66.7 MG/G CREA (ref 0–200)
SP GR UR STRIP: 1.02 (ref 1–1.03)
UROBILINOGEN UR QL STRIP: NORMAL

## 2025-02-27 PROCEDURE — 73130 X-RAY EXAM OF HAND: CPT

## 2025-02-27 PROCEDURE — 81003 URINALYSIS AUTO W/O SCOPE: CPT | Performed by: STUDENT IN AN ORGANIZED HEALTH CARE EDUCATION/TRAINING PROGRAM

## 2025-02-27 PROCEDURE — 85652 RBC SED RATE AUTOMATED: CPT | Performed by: STUDENT IN AN ORGANIZED HEALTH CARE EDUCATION/TRAINING PROGRAM

## 2025-02-27 PROCEDURE — 86235 NUCLEAR ANTIGEN ANTIBODY: CPT | Performed by: STUDENT IN AN ORGANIZED HEALTH CARE EDUCATION/TRAINING PROGRAM

## 2025-02-27 PROCEDURE — 73110 X-RAY EXAM OF WRIST: CPT

## 2025-02-27 PROCEDURE — 84155 ASSAY OF PROTEIN SERUM: CPT | Performed by: STUDENT IN AN ORGANIZED HEALTH CARE EDUCATION/TRAINING PROGRAM

## 2025-02-27 PROCEDURE — 86200 CCP ANTIBODY: CPT | Performed by: STUDENT IN AN ORGANIZED HEALTH CARE EDUCATION/TRAINING PROGRAM

## 2025-02-27 PROCEDURE — 86160 COMPLEMENT ANTIGEN: CPT | Performed by: STUDENT IN AN ORGANIZED HEALTH CARE EDUCATION/TRAINING PROGRAM

## 2025-02-27 PROCEDURE — 84165 PROTEIN E-PHORESIS SERUM: CPT | Performed by: STUDENT IN AN ORGANIZED HEALTH CARE EDUCATION/TRAINING PROGRAM

## 2025-02-27 PROCEDURE — 82550 ASSAY OF CK (CPK): CPT | Performed by: STUDENT IN AN ORGANIZED HEALTH CARE EDUCATION/TRAINING PROGRAM

## 2025-02-27 PROCEDURE — 73630 X-RAY EXAM OF FOOT: CPT

## 2025-02-27 PROCEDURE — 86140 C-REACTIVE PROTEIN: CPT | Performed by: STUDENT IN AN ORGANIZED HEALTH CARE EDUCATION/TRAINING PROGRAM

## 2025-02-27 PROCEDURE — 36415 COLL VENOUS BLD VENIPUNCTURE: CPT | Performed by: STUDENT IN AN ORGANIZED HEALTH CARE EDUCATION/TRAINING PROGRAM

## 2025-02-27 PROCEDURE — 82570 ASSAY OF URINE CREATININE: CPT | Performed by: STUDENT IN AN ORGANIZED HEALTH CARE EDUCATION/TRAINING PROGRAM

## 2025-02-27 PROCEDURE — 84156 ASSAY OF PROTEIN URINE: CPT | Performed by: STUDENT IN AN ORGANIZED HEALTH CARE EDUCATION/TRAINING PROGRAM

## 2025-02-28 LAB
ALBUMIN SERPL ELPH-MCNC: 3.6 G/DL (ref 2.9–4.4)
ALBUMIN/GLOB SERPL: 0.9 {RATIO} (ref 0.7–1.7)
ALPHA1 GLOB SERPL ELPH-MCNC: 0.2 G/DL (ref 0–0.4)
ALPHA2 GLOB SERPL ELPH-MCNC: 1 G/DL (ref 0.4–1)
B-GLOBULIN SERPL ELPH-MCNC: 1.1 G/DL (ref 0.7–1.3)
CCP IGA+IGG SERPL IA-ACNC: 4 UNITS (ref 0–19)
ENA RNP AB SER-ACNC: <0.2 AI (ref 0–0.9)
ENA SCL70 AB SER-ACNC: <0.2 AI (ref 0–0.9)
ENA SM AB SER-ACNC: <0.2 AI (ref 0–0.9)
ENA SS-A AB SER-ACNC: <0.2 AI (ref 0–0.9)
ENA SS-B AB SER-ACNC: <0.2 AI (ref 0–0.9)
GAMMA GLOB SERPL ELPH-MCNC: 1.4 G/DL (ref 0.4–1.8)
GLOBULIN SER CALC-MCNC: 3.8 G/DL (ref 2.2–3.9)
LABORATORY COMMENT REPORT: NORMAL
M PROTEIN SERPL ELPH-MCNC: NORMAL G/DL
PROT SERPL-MCNC: 7.4 G/DL (ref 6–8.5)

## 2025-03-03 PROBLEM — M79.7 FIBROMYALGIA: Status: ACTIVE | Noted: 2025-03-03

## 2025-03-03 PROBLEM — R76.8 ELEVATED RHEUMATOID FACTOR: Status: ACTIVE | Noted: 2025-03-03

## 2025-03-03 PROBLEM — E66.01 MORBID OBESITY WITH BMI OF 40.0-44.9, ADULT: Status: ACTIVE | Noted: 2025-03-03

## 2025-03-03 PROBLEM — M19.049 DEGENERATIVE JOINT DISEASE OF HAND: Status: ACTIVE | Noted: 2025-03-03

## 2025-03-03 PROBLEM — G56.03 BILATERAL CARPAL TUNNEL SYNDROME: Status: ACTIVE | Noted: 2025-03-03

## 2025-03-03 PROBLEM — M19.90 INFLAMMATORY ARTHRITIS: Status: ACTIVE | Noted: 2025-03-03

## 2025-03-04 NOTE — PATIENT INSTRUCTIONS
Thank you for the new patient visit with rheumatology  You were evaluated for multiple joint pains and elevated rheumatoid factor  We discussed that while you are rheumatoid factor is elevated, you have multiple contributors to your pain including possible crystal related disease, hand osteoarthritis, and contribution from your carpal tunnel syndrome.  Because you did have tender points during exam there was also consideration for a possible fibromyalgia.  Will obtain labs and imaging today and we will see you again in 3 weeks for follow-up  Please continue your pain management and other conservative measures as discussed.

## 2025-03-05 ENCOUNTER — TELEPHONE (OUTPATIENT)
Age: 80
End: 2025-03-05
Payer: MEDICARE

## 2025-03-05 NOTE — PROGRESS NOTES
Please notify patient of their result    Dear Ms. Lane,  Thank you for having your labs and x-rays done.  Here is a summary of your results:    Apart from the slightly elevated ESR-38 (which is actually normal when adjusted for your age), the rest of your test is  normal.  Your normal test include-autoimmune screening labs-DAREK panel, CCP, complement C3 and C4; orders-CK, CRP, protein creatinine ratio, protein electrophoresis, urinalysis.  I reviewed your x-rays:  Hand films shows evidence of erosive osteoarthritis  Foot pain shows mainly degenerative changes consistent with osteoarthritis.  X-rays does not does not quite support an inflammatory arthritis usually seen with rheumatoid arthritis.  No additional change to your management at this time.  Will evaluate you in your follow-up visit in 3 weeks.

## 2025-03-12 ENCOUNTER — OFFICE VISIT (OUTPATIENT)
Dept: ORTHOPEDIC SURGERY | Facility: CLINIC | Age: 80
End: 2025-03-12
Payer: MEDICARE

## 2025-03-12 VITALS
SYSTOLIC BLOOD PRESSURE: 137 MMHG | HEIGHT: 64 IN | DIASTOLIC BLOOD PRESSURE: 67 MMHG | BODY MASS INDEX: 42.92 KG/M2 | OXYGEN SATURATION: 94 % | HEART RATE: 64 BPM | WEIGHT: 251.4 LBS

## 2025-03-12 DIAGNOSIS — M70.62 TROCHANTERIC BURSITIS OF LEFT HIP: Primary | ICD-10-CM

## 2025-03-12 RX ORDER — TRIAMCINOLONE ACETONIDE 40 MG/ML
40 INJECTION, SUSPENSION INTRA-ARTICULAR; INTRAMUSCULAR
Status: COMPLETED | OUTPATIENT
Start: 2025-03-12 | End: 2025-03-12

## 2025-03-12 RX ORDER — LIDOCAINE HYDROCHLORIDE 10 MG/ML
5 INJECTION, SOLUTION INFILTRATION; PERINEURAL
Status: COMPLETED | OUTPATIENT
Start: 2025-03-12 | End: 2025-03-12

## 2025-03-12 RX ORDER — ESCITALOPRAM OXALATE 5 MG/1
5 TABLET ORAL DAILY
COMMUNITY
Start: 2025-03-03

## 2025-03-12 RX ADMIN — TRIAMCINOLONE ACETONIDE 40 MG: 40 INJECTION, SUSPENSION INTRA-ARTICULAR; INTRAMUSCULAR at 13:39

## 2025-03-12 RX ADMIN — LIDOCAINE HYDROCHLORIDE 5 ML: 10 INJECTION, SOLUTION INFILTRATION; PERINEURAL at 13:39

## 2025-03-12 NOTE — PROGRESS NOTES
"Chief Complaint  Follow-up of the Left Hip       Subjective      Shwetha Lane presents to [facility] for a follow up for her left hip. She has been treating her left hip bursitis conservatively. She was last seen in the office two weeks ago where she had a right hip bursitis injection. She states this injection gave her great relief and is requesting an injection to her left hip. She denies any new injury or falls since her last visit.     Allergies   Allergen Reactions    Bee Venom Swelling    Nickel Itching, Swelling and Rash     EARRINGS (NICKEL)         Social History     Socioeconomic History    Marital status:    Tobacco Use    Smoking status: Former     Current packs/day: 0.50     Average packs/day: 0.5 packs/day for 15.0 years (7.5 ttl pk-yrs)     Types: Cigarettes    Smokeless tobacco: Never    Tobacco comments:     QUIT ABOUT 16 YEARS AGO   Vaping Use    Vaping status: Never Used   Substance and Sexual Activity    Alcohol use: Never    Drug use: Never    Sexual activity: Not Currently     Partners: Male        I reviewed the patient's chief complaint, history of present illness, review of systems, past medical history, surgical history, family history, social history, medications, and allergy list.     Review of Systems     Constitutional: Denies fevers, chills, weight loss  Cardiovascular: Denies chest pain, shortness of breath  Skin: Denies rashes, acute skin changes  Neurologic: Denies headache, loss of consciousness  MSK: left hip pain       Vital Signs:   /67   Pulse 64   Ht 162.6 cm (64.02\")   Wt 114 kg (251 lb 6.4 oz)   SpO2 94%   BMI 43.13 kg/m²          Physical Exam  General: Alert. No acute distress    Ortho Exam        Left lower extremity: Mildly full ROM of the hip.   No skin discoloration, atrophy or swelling. Positive EHL, FHL, GS, and TA. Sensation intact to all 5 nerves of the foot. Pain with straight leg raise. Leg lengths appear " equal. Ambulates with Antalgic gait  Knee Extensor Mechanism  intact  Tender over the greater trochanter of bilateral hips.     Large Joint Arthrocentesis: L greater trochanteric bursa  Date/Time: 3/12/2025 1:39 PM  Consent given by: patient  Site marked: site marked  Timeout: Immediately prior to procedure a time out was called to verify the correct patient, procedure, equipment, support staff and site/side marked as required   Supporting Documentation  Indications: pain   Procedure Details  Location: hip - L greater trochanteric bursa  Preparation: Patient was prepped and draped in the usual sterile fashion  Needle gauge: 21 G.  Medications administered: 5 mL lidocaine 1 %; 40 mg triamcinolone acetonide 40 MG/ML  Patient tolerance: patient tolerated the procedure well with no immediate complications      This injection documentation was Scribed for Denys Mohr MD by NILE Wu.  03/12/25   13:40 EDT      Imaging Results (Most Recent)       None             Result Review :     XR Wrist 3+ View Bilateral  XR Wrist 3+ View Bilateral, XR Foot 3+ View Bilateral  XR Wrist 3+ View Bilateral, XR Foot 3+ View Bilateral, XR Hand 3+ View Bilateral  Result Date: 2/28/2025  Narrative: BOTH WRISTS, HANDS, FEET: 3 VIEWS OF EACH  HISTORY: Inflammatory arthritis. Elevated rheumatoid factor.  COMPARISON: None.  FINDINGS: RIGHT WRIST AND HAND: Radiocarpal arthritis with degenerative subchondral cyst within the distal radius measuring 11 x 12 mm. Scapholunate joint space widening suggesting scapholunate ligamentous dysfunction or tear. Chondrocalcinosis overlies the triangular fibrocartilage. Advanced arthritis triscaphe joint and 1st CMC joints.  2nd through 5th MCP joints appear preserved. There is nonuniform joint space narrowing at the interphalangeal joints, greatest involving the 2nd DIP joint consistent with osteoarthritis and potential erosive osteoarthritis. No evidence for fracture or acute abnormality.  LEFT  WRIST AND HAND: Tiny subcortical cyst or erosion within the tip of the ulnar styloid. Chondrocalcinosis overlies the triangular fibrocartilage. Advanced arthritis triscaphe joint and 1st CMC joint. Joint space narrowing with marginal spurring and subchondral sclerosis.  Nonuniform joint space narrowing at the interphalangeal joints, greatest involving the 2nd DIP and 3rd PIP joints. MCP joint spaces appear preserved.  RIGHT FOOT: Advanced arthritis at the 2nd and 3rd TMT joints and to a lesser degree at the 1st and 4th TMT joints. Hallux valgus deformity with mild arthritic changes 1st MTP joint. Bipartite medial hallux sesamoid. No evidence for fracture or acute abnormality. Degenerative plantar calcaneal spur measures 11 mm. Pes planus deformity.  LEFT FOOT: Hallux valgus deformity with mild arthritic changes 1st MTP joint. Advanced arthritis at the 2nd and 3rd TMT joints and to a lesser degree at the 1st and 4th TMT joints with joint space narrowing. Small accessory navicular. No evidence for fracture or acute abnormality. Degenerative plantar calcaneal spur is present.      Impression: 1. Osteoarthritis involving the interphalangeal joints of both hands and potential erosive osteoarthritis. 2. Right radiocarpal arthritis with 12 mm degenerative subchondral cyst within the distal radius. 3. Tiny subcortical cyst or erosion within the left ulnar styloid. 4. Advanced osteoarthritis bilateral triscaphe joints and 1st CMC joints. 5. Chronic arthritis bilateral 2nd and 3rd TMT joints. Hallux valgus deformity with mild arthritic changes of both 1st MTP joints. 6. Right pes planus deformity. 7. Degenerative plantar calcaneal spurs.   This report was finalized on 2/28/2025 3:55 PM by Weston Person M.D on Workstation: [id number]               Assessment and Plan     Diagnoses and all orders for this visit:    1. Trochanteric bursitis of left hip (Primary)        The patient presents here today for a follow up for  her left hip.     Discussed the risks and benefits of a left hip bursitis steroid injection today in the office. Patient expressed understanding and wishes to proceed. Patient tolerted the injection well and without any complications.     Home exercises given today and will continue over the counter medications for pain control.     Call or return if worsening symptoms.    Follow Up     PRN     Patient was given instructions and counseling regarding her condition or for health maintenance advice. Please see specific information pulled into the AVS if appropriate.     Scribed for Denys Mohr MD by Aisha Kathleen.  03/12/25   13:17 EDT    I have personally performed the services described in this document as scribed by the above individual and it is both accurate and complete. Denys Mohr MD 03/12/25

## 2025-03-19 ENCOUNTER — OFFICE VISIT (OUTPATIENT)
Age: 80
End: 2025-03-19
Payer: MEDICARE

## 2025-03-19 VITALS
OXYGEN SATURATION: 97 % | HEART RATE: 76 BPM | WEIGHT: 251 LBS | SYSTOLIC BLOOD PRESSURE: 120 MMHG | TEMPERATURE: 99.7 F | BODY MASS INDEX: 42.85 KG/M2 | DIASTOLIC BLOOD PRESSURE: 68 MMHG | HEIGHT: 64 IN

## 2025-03-19 DIAGNOSIS — M79.7 FIBROMYALGIA: ICD-10-CM

## 2025-03-19 DIAGNOSIS — M19.242 OTHER SECONDARY OSTEOARTHRITIS OF BOTH HANDS: ICD-10-CM

## 2025-03-19 DIAGNOSIS — M15.4 EROSIVE OSTEOARTHRITIS OF HANDS, BILATERAL: ICD-10-CM

## 2025-03-19 DIAGNOSIS — M19.241 OTHER SECONDARY OSTEOARTHRITIS OF BOTH HANDS: ICD-10-CM

## 2025-03-19 DIAGNOSIS — R76.8 ELEVATED RHEUMATOID FACTOR: Primary | ICD-10-CM

## 2025-03-19 NOTE — PROGRESS NOTES
RHEUMATOLOGY FOLLOW UP VISIT  3/19/2025  Patient Name: Shwetha Lane : 1945 Medical Record: [id number]    Patient Active Problem List   Diagnosis    Hypothyroidism    Primary osteoarthritis involving multiple joints    Hyperlipemia    Migraine    Vertigo    PVC (premature ventricular contraction)    PAC (premature atrial contraction)    Malignant neoplasm of female breast    COVID-19 virus detected    Ventricular tachycardia    Cervical spondylolysis    Low back pain    Sacroiliac joint pain    Lumbosacral radiculopathy    Primary hypertension    Compression fracture of lumbar vertebra with delayed healing    Age related osteoporosis    Elevated rheumatoid factor    Fibromyalgia    Morbid obesity with BMI of 40.0-44.9, adult    Bilateral carpal tunnel syndrome     History of Present Illness  Shwetha Lane returns for follow up for hand OA.      In the interval:  She had labs and imaging done. Labs mostly wnl and Imaging of hand suggests erosive OA.     She has been using a brace and applying a topical gel, the name of which she can not recall but notes it has an unpleasant odor. She reports that her symptoms are exacerbated during cold weather and experiences cramping in her left index finger.     She has not been taking meloxicam due to a general aversion to medication but is open to using it as needed for pain management.      She has made dietary modifications, including reducing carbohydrate intake and increasing fruit consumption, in an effort to manage inflammation. She has also reduced her bread consumption.    She has a history of severe knee issues, which have improved following knee replacement surgery, allowing her to walk without difficulty. However, she is currently experiencing hip problems and is scheduled to start physical therapy next week.    ROS is unremarkable for new events.  She continues to experience muscle tenderness associated with fibromyalgia.       Results  Laboratory  Studies  2/26/25  elevated ESR-38 (which is actually normal when adjusted for your age)    normal test include-autoimmune screening labs-DAREK panel, CCP, complement C3 and C4; orders-CK, CRP, protein creatinine ratio, protein electrophoresis, urinalysis.   x-rays:  Hand films shows evidence of erosive osteoarthritis  Foot pain shows mainly degenerative changes consistent with osteoarthritis.  X-rays does not does not quite support an inflammatory arthritis usually seen with rheumatoid arthritis.        Outpatient Medications Prior to Visit   Medication Sig Dispense Refill    atorvastatin (Lipitor) 40 MG tablet Take 1 tablet by mouth Every Night. 90 tablet 1    Budeson-Glycopyrrol-Formoterol (Breztri Aerosphere) 160-9-4.8 MCG/ACT aerosol inhaler Inhale 2 puffs 2 (Two) Times a Day As Needed.      cholecalciferol (VITAMIN D3) 25 MCG (1000 UT) tablet Take 2 tablets by mouth Daily. 2 pills daily 90 tablet 3    cyclobenzaprine (FLEXERIL) 10 MG tablet Take 1 tablet by mouth 3 (Three) Times a Day As Needed for Muscle Spasms. 90 tablet 3    escitalopram (LEXAPRO) 5 MG tablet Take 1 tablet by mouth Daily.      estradiol (ESTRACE) 0.1 MG/GM vaginal cream Insert 1 g into the vagina Daily As Needed.      levothyroxine (Synthroid) 137 MCG tablet Take 1 tablet by mouth Daily. 90 tablet 3    metoprolol succinate XL (TOPROL-XL) 50 MG 24 hr tablet Take 1 tablet by mouth Daily. 90 tablet 1    Multiple Vitamins-Minerals (MULTIVITAMIN & MINERAL PO) Take 1 tablet by mouth Daily.      Nurtec 75 MG dispersible tablet Place  under the tongue Daily As Needed.      vitamin B-12 (CYANOCOBALAMIN) 1000 MCG tablet Take 1 tablet by mouth Daily.      fluticasone (FLONASE) 50 MCG/ACT nasal spray Administer 2 sprays into the nostril(s) as directed by provider Daily As Needed for Rhinitis. (Patient not taking: Reported on 2/26/2025)      meloxicam (Mobic) 7.5 MG tablet Take 1 tablet by mouth Daily. (Patient not taking: Reported on 2/26/2025) 30 tablet  1     No facility-administered medications prior to visit.     Physical Exam  Gen: Well-developed, well-nourished adult in no apparent distress. Pleasant and cooperative. Alert and oriented.   HEENT: EOMI, MMM, oropharynx clear without oral ulcers, no lacrimal gland enlargement, normal salivary pooling, normal temporal arteries without tenderness or beading.  Chest: Normal work of breathing. CTAB without wheezing/rhonchi/rales. ??  CV: RRR, normal S1/S2, no murmurs/rubs/gallops heard. ??  Extremities: Warm, 2+ distal pulses, no cyanosis, clubbing, or edema.  Neuro: Good comprehension/cognition. Muscle strength 5/5 in all extremities. Gait normal.??  ??Skin: No alopecia, nail change (including no nail pitting), rashes, bruising, petechiae, sclerodactyly, digital pits, telangiectasias, tophi, appreciable calcinosis, or nodules.??    Comprehensive Musculoskeletal Examination:?  - There is restricted motion both laterally, flexion and extension of the cervical neck.  neck without limited ROM.?  - Lumbosacral spine and hips without limited ROM.?? Negative SUNDAY.  -There is a positive grind test in the right and left  CMC joint. MCP of  right index finger shows a thickened synovium with some mild pain, but no evidence of synovitis. The right middle finger and fourth finger. The IP and the PIPs of the right hand are not very remarkable as the MCPs.    The left wrist appears slightly swollen. PIPs and the DIPs of the rest of the fingers on the left hand are unremarkable. The IP of the left hand is tender.   - Shoulders, elbows, knees, ankles, feet/toes: No deformity, erythema, warmth, swelling, effusion, tenderness, or limited ROM.??    Assessment & Plan  Shwetha Lane is a 79 y.o. female who follows up for hand OA and an elevated rheumatoid factor. Recent imaging indicates possible Erosive OA.     No interval event reported today. Exam is stable without evidence of synovitis.    Plan:  - She is advised to take  meloxicam with food as needed for pain relief. If the pain persists for several days, she should take it continuously for 3 to 5 days before discontinuing.     - A prescription for Voltaren gel has been ordered for topical use on her hand , especially if she is hesitant to take the oral NSAIDs     - Continue current management for her Fibromyalgia    - She can continue to wear hand braces at night for her carpal tunnel syndrome which she says has been helpful for the last 10 years     Follow-up  The patient will follow up as needed.     Diagnoses and all orders for this visit:    1. Elevated rheumatoid factor (Primary)    2. Erosive osteoarthritis of hands, bilateral    3. Other secondary osteoarthritis of both hands    4. Fibromyalgia    Other orders  -     Diclofenac Sodium (VOLTAREN) 1 % gel gel; Apply 4 g topically to the appropriate area as directed 4 (Four) Times a Day.  Dispense: 100 g; Refill: 1       Return for Follow up as needed.      I spent 30 minutes caring for Shwetha on this date of service. This time includes time spent by me in the following activities:preparing for the visit, reviewing tests, obtaining and/or reviewing a separately obtained history, performing a medically appropriate examination and/or evaluation , counseling and educating the patient/family/caregiver, ordering medications, tests, or procedures, and documenting information in the medical record

## 2025-03-23 PROBLEM — M15.4 EROSIVE OSTEOARTHRITIS OF HANDS, BILATERAL: Status: ACTIVE | Noted: 2025-03-23

## 2025-03-23 NOTE — PATIENT INSTRUCTIONS
Seen for follow up.  Your arthritis is stable  Please use topical Voltaren  if you are hesitant to take oral meloxicam  Continue to use your hand brace at night   If your hand pain persists or stiffness interferes with regular use of your hands, referral to occupational therapy will be beneficial.  Follow up with Rheumatology as needed.

## 2025-04-09 ENCOUNTER — TREATMENT (OUTPATIENT)
Dept: PHYSICAL THERAPY | Facility: CLINIC | Age: 80
End: 2025-04-09
Payer: MEDICARE

## 2025-04-09 DIAGNOSIS — M70.62 TROCHANTERIC BURSITIS OF LEFT HIP: ICD-10-CM

## 2025-04-09 DIAGNOSIS — M25.552 LEFT HIP PAIN: Primary | ICD-10-CM

## 2025-04-09 PROCEDURE — 97110 THERAPEUTIC EXERCISES: CPT | Performed by: PHYSICAL THERAPIST

## 2025-04-09 PROCEDURE — 97530 THERAPEUTIC ACTIVITIES: CPT | Performed by: PHYSICAL THERAPIST

## 2025-04-09 PROCEDURE — 97162 PT EVAL MOD COMPLEX 30 MIN: CPT | Performed by: PHYSICAL THERAPIST

## 2025-04-09 NOTE — PROGRESS NOTES
Physical Therapy Initial Evaluation and Plan of Care  [facility]  [address], [address], [place] [id number]    Patient: Shwetha Lane   : 1945  Diagnosis/ICD-10 Code:  Left hip pain [M25.552]  Referring practitioner: Denys Mohr MD  Date: 2025    Subjective Evaluation    History of Present Illness  Onset date: about 2 years ago.  Mechanism of injury: Patient reports (B) hip pain for about 2 years. The pain is episodic.  She has pain with laying on her sides/hips, transferring sit to stand, standing, walking, and negotiating steps.     2025: (L) trochanteric bursa injection. Injection (R) trochanteric bursa injection 3 weeks prior. This is the second set of injections in her hips in the past 1-1.5 years. She reports minimal relief from injections.     PMH notable for recent lumbar kyphoplasty in 2025 due to 2-level lumbar compression fractures, COPD, breast CA, HTN, hypothyroidism, migraines, PVCs, and cataracts.      Patient Occupation: N/A Quality of life: good    Pain  Pain scale: 1-2/10.  At worst pain ratin  Location: (R) > (L) lateral hips  Quality: dull ache  Relieving factors: change in position, ice and medications (sitting; meloxicam not helpful; Aleve helpful)  Progression: improved (slightly improved since injections)    Diagnostic Tests  X-ray: normal (mild (B) hip arthritis)    Patient Goals  Patient goals for therapy: decreased pain  Patient goal: learn exercises I can keep up with on my own           Subjective Questionnaire: LEFS: 23/    Objective          Active Range of Motion   Left Hip   Flexion: 99 degrees   Abduction: 31 degrees     Right Hip   Flexion: 95 degrees   Abduction: 31 degrees     Passive Range of Motion   Left Hip   External rotation (90/90): WFL  Internal rotation (90/90): WFL    Right Hip   External rotation (90/90): WFL  Internal rotation (90/90): WFL    Additional Passive Range of Motion Details  PROM (B)  hip IR/ER WNL, painfree    Strength/Myotome Testing     Left Hip   Planes of Motion   Flexion: 3+  Abduction: 3+  Adduction: 4-    Right Hip   Planes of Motion   Flexion: 3+  Abduction: 3+  Adduction: 3+    Left Knee   Flexion: 4-  Extension: 4+    Right Knee   Flexion: 4-  Extension: 4+          Assessment & Plan       Assessment  Impairments: abnormal muscle firing, abnormal or restricted ROM, activity intolerance, impaired physical strength, lacks appropriate home exercise program and pain with function   Functional limitations: sleeping, walking, uncomfortable because of pain, moving in bed, standing, stooping and unable to perform repetitive tasks   Assessment details: Patient is a 79 y.o female who reports an approximately 2 year history of (B) hip pain (R) > (L).  She recently received a second set of bursa injections with minimal symptom improvement.  Of note, patient underwent a lumbar kyphoplasty in January 2025 due to compression fractures sustained during a fall down steps in November 2024.  She reports (B) hip symptoms 1-5/10 located laterally and posteriorly to greater trochanters, worst with sidelying position, transferring sit to stand, standing, walking, and negotiating steps.  She exhibits global weakness of trunk and (B) LEs and mild limitation of (B) hip ROM.  Her LEFS score is 23/80 indicating a significant perceived level of functional limitation.  She will benefit from skilled PT services to address these deficits and assist patient in painfree return to all ADLs and functional mobility for improved QOL.  Prognosis: good    Goals  Plan Goals: STGs: to be met in 4 weeks  1. Patient will be independent with initial HEP  2. Patient will report improved (B) hip symptoms 0-3/10 for improved activity and positional tolerance  3. Patient will report ability to lay on either side to sleep for up to 2 hours with minimal symptoms  4. Patient will demonstrate improved (B) hip flexion ROM >/= 110 degrees  for improved hip mobility  5. Patient will consistently transfer sit to stand without hip symptom provocation    LTGs: to be met in 8 weeks  1. Patient will be independent with progressed HEP  2. Patient will report improved (B) hip symptoms 0-2/10 for improved activity tolerance  3. Patient will report ability to sleep in sidelying positions without awakening due to symptoms  4. Patient will demonstrate ability to perform 1 flight of steps reciprocally without symptom onset  5. Patient will have improved LEFS score >/= 35/80 for subjective evidence of functional improvement    Plan  Therapy options: will be seen for skilled therapy services  Planned modality interventions: cryotherapy and thermotherapy (hydrocollator packs)  Planned therapy interventions: abdominal trunk stabilization, ADL retraining, balance/weight-bearing training, flexibility, functional ROM exercises, home exercise program, joint mobilization, manual therapy, neuromuscular re-education, soft tissue mobilization, strengthening, stretching and therapeutic activities  Frequency: 2x week  Duration in weeks: 12  Treatment plan discussed with: patient        TIMED:  Manual Therapy:         mins  30597;  Therapeutic Exercise:    20     mins  64001;     Neuromuscular Los:        mins  72131;    Therapeutic Activity:     11     mins  32177;     Gait Training:           mins  60921;     Ultrasound:          mins  01307;    Work Conditioning             mins 91674    UNTIMED:  Electrical Stimulation:         mins  77851 (MC );  Dry Needling          mins 94816    Timed Treatment:   31   mins   Total Treatment:     52   mins    PT SIGNATURE: Kristin Azevedo PT, DPT, OCS  Electronically signed by: Kristin Azevedo PT, 04/09/25, 11:13 AM EDT  [place] License #[id number]     DATE TREATMENT INITIATED: 4/9/2025    Medicare Initial Certification  Certification Period: 4/9/2025 thru 7/7/2025  I certify that the therapy services are furnished while this patient is under my  care.  The services outlined above are required by this patient, and will be reviewed every 90 days.     PHYSICIAN: Denys Mohr MD  [id number]                                          DATE:     Please sign and return via fax to [phone]. Thank you, [facility].

## 2025-04-16 ENCOUNTER — TREATMENT (OUTPATIENT)
Dept: PHYSICAL THERAPY | Facility: CLINIC | Age: 80
End: 2025-04-16
Payer: MEDICARE

## 2025-04-16 DIAGNOSIS — M70.62 TROCHANTERIC BURSITIS OF LEFT HIP: ICD-10-CM

## 2025-04-16 DIAGNOSIS — M25.552 LEFT HIP PAIN: Primary | ICD-10-CM

## 2025-04-16 PROCEDURE — 97530 THERAPEUTIC ACTIVITIES: CPT | Performed by: PHYSICAL THERAPIST

## 2025-04-16 PROCEDURE — 97110 THERAPEUTIC EXERCISES: CPT | Performed by: PHYSICAL THERAPIST

## 2025-04-16 NOTE — PROGRESS NOTES
Physical Therapy Daily Treatment Note               [address]                                                                                                                                             [place] [address]    Patient: Shwetha Lane   : 1945  Referring practitioner: Denys Mohr MD  Date of Initial Visit: Type: THERAPY  Noted: 2025  Today's Date: 2025  Patient seen for 2 sessions       Visit Diagnoses:    ICD-10-CM ICD-9-CM   1. Left hip pain  M25.552 719.45   2. Trochanteric bursitis of left hip  M70.62 726.5       Subjective     Objective   See Exercise, Manual, and Modality Logs for complete treatment.   Added alternating hip IR AROM , hip add-iso, and hip abd-iso    Assessment & Plan       Assessment  Assessment details: Pt completed treatment with no c/o increased pain in (L) hip. She responded well to the addition of alternating hip IR AROM, stating that she felt a good stretch in her low back. She had no issues with the addition of hip add/abd isometrics. Pt's HEP was updated and given to her.  Plan to progress per POC.          Timed:         Manual Therapy:    0     mins  13156;     Therapeutic Exercise:    16     mins  96829;     Neuromuscular Los:    0    mins  53936;    Therapeutic Activity:     8     mins  26802;     Gait Trainin     mins  31871;     Ultrasound:     0     mins  29520;    Work Dill/Cond      0    mins   98991    Untimed:  E Stim                            0    mins   02628( [id number])    Timed Treatment:   24   mins   Total Treatment:     24   mins    Ganga Cadena PTA  [place] License: [id number]

## 2025-04-18 ENCOUNTER — TREATMENT (OUTPATIENT)
Dept: PHYSICAL THERAPY | Facility: CLINIC | Age: 80
End: 2025-04-18
Payer: MEDICARE

## 2025-04-18 DIAGNOSIS — M25.552 LEFT HIP PAIN: Primary | ICD-10-CM

## 2025-04-18 DIAGNOSIS — M70.62 TROCHANTERIC BURSITIS OF LEFT HIP: ICD-10-CM

## 2025-04-18 PROCEDURE — 97110 THERAPEUTIC EXERCISES: CPT | Performed by: PHYSICAL THERAPIST

## 2025-04-18 NOTE — PROGRESS NOTES
Physical Therapy Daily Treatment Note       [facility] Physical Therapy           [address], [address], [place] [id number]    Patient: Shwetha Lane   : 1945  Referring practitioner: Denys Mohr MD  Date of Initial Visit: Type: THERAPY  Noted: 2025  Today's Date: 2025  Patient seen for 3 sessions         Visit Diagnoses:     ICD-10-CM ICD-9-CM   1. Left hip pain  M25.552 719.45   2. Trochanteric bursitis of left hip  M70.62 726.5         Subjective Evaluation    History of Present Illness    Subjective comment: I'm doing terrible today.  My back from where I had that fall is really hurting.  Sometimes it bothers me, sometimes it's very painful.  My hip has been feeling better when I do the exercises here or at home; it's not gone yet but it's better.       Objective   See Exercise, Manual, and Modality Logs for complete treatment.   *Initiated seated glut set and quad sets    Assessment & Plan       Assessment  Assessment details: Patient reports good response to all lumbopelvic and hip mobility activities and stretches.  In fact, she reports moderate improvement in lumbar symptoms following PT session this date.  She performs hip stretches through slightly increased ROM arcs, indicating decreasing muscle tension and improving joint mobility.  She is motivated and compliant with performance of HEP daily.          Progress per Plan of Care         TIMED:  Manual Therapy:         mins  72190;  Therapeutic Exercise:    26     mins  81736;     Neuromuscular Los:        mins  67117;    Therapeutic Activity:          mins  23681;     Gait Training:           mins  61125;     Ultrasound:          mins  47533;    Work Conditioning             mins 60274    UNTIMED:  Electrical Stimulation:         mins  57751 ( );  Dry Needling          mins 12485    Timed Treatment:   26   mins   Total Treatment:     26   mins    Kristin Azevedo, PT, DPT, OCS  Physical Therapist  [place]  License #[id number]  Electronically signed by: Kristin Azevedo PT, 04/18/25, 12:59 PM EDT

## 2025-04-23 ENCOUNTER — TREATMENT (OUTPATIENT)
Dept: PHYSICAL THERAPY | Facility: CLINIC | Age: 80
End: 2025-04-23
Payer: MEDICARE

## 2025-04-23 DIAGNOSIS — M25.552 LEFT HIP PAIN: Primary | ICD-10-CM

## 2025-04-23 DIAGNOSIS — M70.62 TROCHANTERIC BURSITIS OF LEFT HIP: ICD-10-CM

## 2025-04-23 PROCEDURE — 97110 THERAPEUTIC EXERCISES: CPT | Performed by: PHYSICAL THERAPIST

## 2025-04-24 ENCOUNTER — PATIENT ROUNDING (BHMG ONLY) (OUTPATIENT)
Age: 80
End: 2025-04-24
Payer: MEDICARE

## 2025-04-24 NOTE — PROGRESS NOTES
April 24, 2025    Hello, may I speak with Shwetha Lane?    My name is Angelia Beck     I am  with [id number]  [facility]  [address]  [place] [address] [phone].    Before we get started may I verify your date of birth? 1945    I am calling to officially welcome you to our practice and ask about your recent visit. Is this a good time to talk? yes    Tell me about your visit with us. What things went well?  It was perfect. The doctor took his time, I was very satisfied.       We're always looking for ways to make our patients' experiences even better. Do you have recommendations on ways we may improve?  no    Overall were you satisfied with your first visit to our practice? yes       I appreciate you taking the time to speak with me today. Is there anything else I can do for you? no      Thank you, and have a great day.

## 2025-04-25 ENCOUNTER — TREATMENT (OUTPATIENT)
Dept: PHYSICAL THERAPY | Facility: CLINIC | Age: 80
End: 2025-04-25
Payer: MEDICARE

## 2025-04-25 DIAGNOSIS — M25.552 LEFT HIP PAIN: Primary | ICD-10-CM

## 2025-04-25 DIAGNOSIS — M70.62 TROCHANTERIC BURSITIS OF LEFT HIP: ICD-10-CM

## 2025-04-25 PROCEDURE — 97110 THERAPEUTIC EXERCISES: CPT | Performed by: PHYSICAL THERAPIST

## 2025-04-25 NOTE — PROGRESS NOTES
Physical Therapy Daily Treatment Note               [address]                                                                                                                                             [place] [address]    Patient: Shwetha Lane   : 1945  Referring practitioner: Denys Mohr MD  Date of Initial Visit: Type: THERAPY  Noted: 2025  Today's Date: 2025  Patient seen for 5 sessions       Visit Diagnoses:    ICD-10-CM ICD-9-CM   1. Left hip pain  M25.552 719.45   2. Trochanteric bursitis of left hip  M70.62 726.5       Subjective Evaluation    History of Present Illness    Subjective comment: Pt reports that her (L) knee ahd hip are hurting this morning.       Objective   See Exercise, Manual, and Modality Logs for complete treatment.       Assessment & Plan       Assessment  Assessment details: Pt completed treatment with no c/o increased pain in (L) hip and knee. Pt needed minimal vc for exercise performance.  Progressions were deferred due to c/o increased pain prior to treatment.  Pt noted that her stretches felt good today.  Plan to progress per POC.          Timed:         Manual Therapy:    0     mins  75080;     Therapeutic Exercise:    25     mins  08909;     Neuromuscular Los:    0    mins  28137;    Therapeutic Activity:     0     mins  12107;     Gait Trainin     mins  70158;     Ultrasound:     0     mins  70850;    Work Dill/Cond      0    mins   85996    Untimed:  E Stim                            0    mins   21462( [id number])    Timed Treatment:   25   mins   Total Treatment:     25   mins    Ganga Cadena PTA  [place] License: [id number]

## 2025-04-28 ENCOUNTER — OFFICE VISIT (OUTPATIENT)
Dept: FAMILY MEDICINE CLINIC | Facility: CLINIC | Age: 80
End: 2025-04-28
Payer: MEDICARE

## 2025-04-28 VITALS
HEIGHT: 64 IN | BODY MASS INDEX: 43.98 KG/M2 | DIASTOLIC BLOOD PRESSURE: 72 MMHG | OXYGEN SATURATION: 98 % | HEART RATE: 69 BPM | SYSTOLIC BLOOD PRESSURE: 124 MMHG | TEMPERATURE: 98 F | WEIGHT: 257.6 LBS

## 2025-04-28 DIAGNOSIS — E78.00 PURE HYPERCHOLESTEROLEMIA: Primary | ICD-10-CM

## 2025-04-28 DIAGNOSIS — E03.9 HYPOTHYROIDISM, UNSPECIFIED TYPE: ICD-10-CM

## 2025-04-28 DIAGNOSIS — R73.03 PREDIABETES: ICD-10-CM

## 2025-04-28 DIAGNOSIS — J30.2 SEASONAL ALLERGIES: ICD-10-CM

## 2025-04-28 DIAGNOSIS — I10 PRIMARY HYPERTENSION: ICD-10-CM

## 2025-04-28 RX ORDER — ATORVASTATIN CALCIUM 40 MG/1
40 TABLET, FILM COATED ORAL NIGHTLY
Qty: 90 TABLET | Refills: 1 | Status: SHIPPED | OUTPATIENT
Start: 2025-04-28

## 2025-04-28 RX ORDER — METOPROLOL SUCCINATE 50 MG/1
50 TABLET, EXTENDED RELEASE ORAL DAILY
Qty: 90 TABLET | Refills: 1 | Status: SHIPPED | OUTPATIENT
Start: 2025-04-28

## 2025-04-28 RX ORDER — LIDOCAINE 50 MG/G
1 PATCH TOPICAL EVERY 24 HOURS
COMMUNITY
Start: 2025-03-25

## 2025-04-28 NOTE — PROGRESS NOTES
"Chief Complaint  Hypertension (Follow up/) and Weight Gain (Hair loss, discuss thyroid/)    Subjective        hSwetha Lane presents to [facility]  History of Present Illness  Patient presents to the office today for follow-up on hypertension.  Blood pressure is 124/72.  With hypertension continue metoprolol as directed.  With hair loss she is currently taking levothyroxine.  She has not increased episodes of fatigue due for repeat thyroid check.  With weight gain she has an evaluation with bariatric.  With prediabetes A1c has been controlled continue working on healthy diet and exercise doing all labs today.  With hyperlipidemia continue Lipitor 40 mg.      Objective   Vital Signs:  /72 (BP Location: Left arm, Patient Position: Sitting, Cuff Size: Large Adult)   Pulse 69   Temp 98 °F (36.7 °C)   Ht 162.6 cm (64.02\")   Wt 117 kg (257 lb 9.6 oz)   SpO2 98%   BMI 44.19 kg/m²   Estimated body mass index is 44.19 kg/m² as calculated from the following:    Height as of this encounter: 162.6 cm (64.02\").    Weight as of this encounter: 117 kg (257 lb 9.6 oz).            Physical Exam  Constitutional:       General: She is not in acute distress.     Appearance: Normal appearance.   HENT:      Head: Normocephalic.   Eyes:      Pupils: Pupils are equal, round, and reactive to light.   Cardiovascular:      Rate and Rhythm: Normal rate.      Pulses: Normal pulses.      Heart sounds: Normal heart sounds.   Pulmonary:      Effort: Pulmonary effort is normal.      Breath sounds: Normal breath sounds.   Abdominal:      General: Bowel sounds are normal.   Musculoskeletal:         General: Normal range of motion.      Cervical back: Normal range of motion and neck supple.   Skin:     General: Skin is warm.   Neurological:      General: No focal deficit present.      Mental Status: She is alert and oriented to person, place, and time.   Psychiatric:         Mood and Affect: Mood normal. "         Behavior: Behavior normal.         Thought Content: Thought content normal.         Judgment: Judgment normal.        Result Review :  The following data was reviewed by: NELLY Rivera on 04/28/2025:  Common labs          12/30/2024    11:57 1/3/2025    14:42 2/27/2025    13:48   Common Labs   Glucose  95     BUN  15     Creatinine  0.90     Sodium  143     Potassium  4.2     Chloride  105     Calcium  9.6     Albumin   3.6    WBC  7.27     Hemoglobin  14.9     Hematocrit  43.7     Platelets  282     Hemoglobin A1C 5.7      Uric Acid 5.3                  Assessment and Plan   Diagnoses and all orders for this visit:    1. Pure hypercholesterolemia (Primary)  -     atorvastatin (Lipitor) 40 MG tablet; Take 1 tablet by mouth Every Night.  Dispense: 90 tablet; Refill: 1  -     Lipid Panel    2. Primary hypertension  -     metoprolol succinate XL (TOPROL-XL) 50 MG 24 hr tablet; Take 1 tablet by mouth Daily.  Dispense: 90 tablet; Refill: 1  -     Comprehensive Metabolic Panel    3. Hypothyroidism, unspecified type  -     TSH    4. Prediabetes  -     Hemoglobin A1c    5. Seasonal allergies  -     CBC & Differential           I spent 20 minutes caring for Shwetha on this date of service. This time includes time spent by me in the following activities:preparing for the visit, reviewing tests, obtaining and/or reviewing a separately obtained history, performing a medically appropriate examination and/or evaluation , counseling and educating the patient/family/caregiver, ordering medications, tests, or procedures, documenting information in the medical record, independently interpreting results and communicating that information with the patient/family/caregiver, and care coordination  Follow Up   Return in about 3 months (around 7/30/2025) for Recheck.  Patient was given instructions and counseling regarding her condition or for health maintenance advice. Please see specific information pulled into the  AVS if appropriate.

## 2025-04-28 NOTE — PROGRESS NOTES
"Chief Complaint  Follow-up of the Right Hip and Follow-up of the Left Hip     Subjective      Shwetha Lane presents to [facility] for follow up of bilateral hips.  She has more pain with lying in bed.  She has some pain in the groin on the right side.  She has pain on the lateral aspect of bilateral hips.  She has pain with prolonged sitting and ambulation.  She has had no recent injury or fall.     Allergies   Allergen Reactions    Bee Venom Swelling    Nickel Itching, Swelling and Rash     EARRINGS (NICKEL)         Social History     Socioeconomic History    Marital status:    Tobacco Use    Smoking status: Former     Current packs/day: 0.50     Average packs/day: 0.5 packs/day for 15.0 years (7.5 ttl pk-yrs)     Types: Cigarettes    Smokeless tobacco: Never    Tobacco comments:     QUIT ABOUT 16 YEARS AGO   Vaping Use    Vaping status: Never Used   Substance and Sexual Activity    Alcohol use: Never    Drug use: Never    Sexual activity: Not Currently     Partners: Male        I reviewed the patient's chief complaint, history of present illness, review of systems, past medical history, surgical history, family history, social history, medications, and allergy list.     Review of Systems     Constitutional: Denies fevers, chills, weight loss  Cardiovascular: Denies chest pain, shortness of breath  Skin: Denies rashes, acute skin changes  Neurologic: Denies headache, loss of consciousness        Vital Signs:   /82   Pulse 83   Ht 162.6 cm (64\")   Wt 118 kg (261 lb)   SpO2 92%   BMI 44.80 kg/m²          Physical Exam  General: Alert. No acute distress    Ortho Exam        BILATERAL HIPS Mildly full ROM of the hip.   No skin discoloration, atrophy or swelling. Positive EHL, FHL, GS, and TA. Sensation intact to all 5 nerves of the foot. Pain with straight leg raise. Leg lengths appear equal. Ambulates with Antalgic gait  Knee Extensor Mechanism  intact  Tender over " Handled in another telephone encounter. Closing note   the greater trochanter of bilateral hips.       Large Joint Arthrocentesis: R greater trochanteric bursa  Date/Time: 2/24/2025 2:37 PM  Consent given by: patient  Site marked: site marked  Timeout: Immediately prior to procedure a time out was called to verify the correct patient, procedure, equipment, support staff and site/side marked as required   Supporting Documentation  Indications: pain   Procedure Details  Location: hip - R greater trochanteric bursa  Needle gauge: 21 G.  Medications administered: 5 mL lidocaine 1 %; 40 mg triamcinolone acetonide 40 MG/ML  Patient tolerance: patient tolerated the procedure well with no immediate complications        This injection documentation was Scribed for Denys Mohr MD by Caitlin Garcia.  02/24/25   14:38 EST      Imaging Results (Most Recent)       None             Result Review :             Assessment and Plan     Diagnoses and all orders for this visit:    1. Right hip pain (Primary)    2. Left hip pain    3. Trochanteric bursitis of right hip  -     Large Joint Arthrocentesis: R greater trochanteric bursa    4. Trochanteric bursitis of left hip        Discussed the treatment plan with the patient.     Discussed the risks and benefits of conservative measures. The patient expressed understanding and wished to proceed with a right hip bursa steroid injection.      Discussed with the patient that due to the steroid injection given today in the office they may see an increase in blood sugar for a few days. Advised patient to monitor sugar after receiving the injection.     Discussed possibility of a reaction from the injection.  Discussed the possibility that the injection may not completely improve or remove the pain.  Discussed the risk of infection.      Call or return if worsening symptoms.    Follow Up     1 - 2 weeks for injection of the left hip bursa.       Patient was given instructions and counseling regarding her condition or for health maintenance advice.  Please see specific information pulled into the AVS if appropriate.     Scribed for Denys Mohr MD by Chelsie Rendon MA.  02/24/25   14:27 EST    I have personally performed the services described in this document as scribed by the above individual and it is both accurate and complete. Denys Mohr MD 02/24/25

## 2025-04-29 LAB
ALBUMIN SERPL-MCNC: 4.1 G/DL (ref 3.5–5.2)
ALBUMIN/GLOB SERPL: 1.7 G/DL
ALP SERPL-CCNC: 97 U/L (ref 39–117)
ALT SERPL-CCNC: 19 U/L (ref 1–33)
AST SERPL-CCNC: 16 U/L (ref 1–32)
BASOPHILS # BLD AUTO: 0.11 10*3/MM3 (ref 0–0.2)
BASOPHILS NFR BLD AUTO: 1.3 % (ref 0–1.5)
BILIRUB SERPL-MCNC: 0.6 MG/DL (ref 0–1.2)
BUN SERPL-MCNC: 14 MG/DL (ref 8–23)
BUN/CREAT SERPL: 17.7 (ref 7–25)
CALCIUM SERPL-MCNC: 9.3 MG/DL (ref 8.6–10.5)
CHLORIDE SERPL-SCNC: 105 MMOL/L (ref 98–107)
CHOLEST SERPL-MCNC: 150 MG/DL (ref 0–200)
CO2 SERPL-SCNC: 27.7 MMOL/L (ref 22–29)
CREAT SERPL-MCNC: 0.79 MG/DL (ref 0.57–1)
EGFRCR SERPLBLD CKD-EPI 2021: 76.2 ML/MIN/1.73
EOSINOPHIL # BLD AUTO: 0.18 10*3/MM3 (ref 0–0.4)
EOSINOPHIL NFR BLD AUTO: 2.1 % (ref 0.3–6.2)
ERYTHROCYTE [DISTWIDTH] IN BLOOD BY AUTOMATED COUNT: 13.3 % (ref 12.3–15.4)
GLOBULIN SER CALC-MCNC: 2.4 GM/DL
GLUCOSE SERPL-MCNC: 103 MG/DL (ref 65–99)
HBA1C MFR BLD: 5.8 % (ref 4.8–5.6)
HCT VFR BLD AUTO: 44.3 % (ref 34–46.6)
HDLC SERPL-MCNC: 39 MG/DL (ref 40–60)
HGB BLD-MCNC: 14.4 G/DL (ref 12–15.9)
IMM GRANULOCYTES # BLD AUTO: 0.12 10*3/MM3 (ref 0–0.05)
IMM GRANULOCYTES NFR BLD AUTO: 1.4 % (ref 0–0.5)
LDLC SERPL CALC-MCNC: 77 MG/DL (ref 0–100)
LYMPHOCYTES # BLD AUTO: 2.34 10*3/MM3 (ref 0.7–3.1)
LYMPHOCYTES NFR BLD AUTO: 27.6 % (ref 19.6–45.3)
MCH RBC QN AUTO: 31.4 PG (ref 26.6–33)
MCHC RBC AUTO-ENTMCNC: 32.5 G/DL (ref 31.5–35.7)
MCV RBC AUTO: 96.5 FL (ref 79–97)
MONOCYTES # BLD AUTO: 0.85 10*3/MM3 (ref 0.1–0.9)
MONOCYTES NFR BLD AUTO: 10 % (ref 5–12)
NEUTROPHILS # BLD AUTO: 4.87 10*3/MM3 (ref 1.7–7)
NEUTROPHILS NFR BLD AUTO: 57.6 % (ref 42.7–76)
NRBC BLD AUTO-RTO: 0 /100 WBC (ref 0–0.2)
PLATELET # BLD AUTO: 249 10*3/MM3 (ref 140–450)
POTASSIUM SERPL-SCNC: 4.6 MMOL/L (ref 3.5–5.2)
PROT SERPL-MCNC: 6.5 G/DL (ref 6–8.5)
RBC # BLD AUTO: 4.59 10*6/MM3 (ref 3.77–5.28)
SODIUM SERPL-SCNC: 144 MMOL/L (ref 136–145)
TRIGL SERPL-MCNC: 205 MG/DL (ref 0–150)
TSH SERPL DL<=0.005 MIU/L-ACNC: 1.92 UIU/ML (ref 0.27–4.2)
VLDLC SERPL CALC-MCNC: 34 MG/DL (ref 5–40)
WBC # BLD AUTO: 8.47 10*3/MM3 (ref 3.4–10.8)

## 2025-04-30 ENCOUNTER — TREATMENT (OUTPATIENT)
Dept: PHYSICAL THERAPY | Facility: CLINIC | Age: 80
End: 2025-04-30
Payer: MEDICARE

## 2025-04-30 DIAGNOSIS — M70.62 TROCHANTERIC BURSITIS OF LEFT HIP: ICD-10-CM

## 2025-04-30 DIAGNOSIS — M25.552 LEFT HIP PAIN: Primary | ICD-10-CM

## 2025-04-30 PROCEDURE — 97112 NEUROMUSCULAR REEDUCATION: CPT | Performed by: PHYSICAL THERAPIST

## 2025-04-30 PROCEDURE — 97110 THERAPEUTIC EXERCISES: CPT | Performed by: PHYSICAL THERAPIST

## 2025-04-30 NOTE — PROGRESS NOTES
Physical Therapy Daily Treatment Note       [facility] Physical Therapy           [address], [address], [place] [id number]    Patient: Shwetha Lane   : 1945  Referring practitioner: Denys Mohr MD  Date of Initial Visit: Type: THERAPY  Noted: 2025  Today's Date: 2025  Patient seen for 6 sessions         Visit Diagnoses:     ICD-10-CM ICD-9-CM   1. Left hip pain  M25.552 719.45   2. Trochanteric bursitis of left hip  M70.62 726.5         Subjective Evaluation    History of Present Illness    Subjective comment: Today my left hip is not bothering me; it is my right.  It moves sides back and forth and I don't know why.  I know I have osteoarthritis all over.  I may never be completely out of pain.       Objective   See Exercise, Manual, and Modality Logs for complete treatment.   *Initiated seated abdom brace + marches and abdom brace with sit to stands    Assessment & Plan       Assessment  Assessment details: Patient reports (R) > (L) hip symptoms this date.  She is able to perform all current HEP components but notes stretching progresses to pain after a couple of repetitions of SKTC and piriformis stretches this date (B).  She is able to progress core strength/lumbar stabilization activities in sitting but benefits from verbal cueing to improve eccentric control of sit to stand activity.           Progress strengthening /stabilization /functional activity         TIMED:  Manual Therapy:         mins  52360;  Therapeutic Exercise:    20     mins  88357;     Neuromuscular Los:    8    mins  24533;    Therapeutic Activity:          mins  81797;     Gait Training:           mins  27637;     Ultrasound:          mins  81014;    Work Conditioning             mins 54914    UNTIMED:  Electrical Stimulation:         mins  16372 ( );  Dry Needling          mins 33344    Timed Treatment:   28   mins   Total Treatment:     28   mins    Kristin Azevedo, PT, DPT, OCS  Physical  Therapist  [place] License #[id number]  Electronically signed by: Kristin Azevedo PT, 04/30/25, 12:57 PM EDT

## 2025-05-05 ENCOUNTER — OFFICE VISIT (OUTPATIENT)
Dept: BARIATRICS/WEIGHT MGMT | Facility: CLINIC | Age: 80
End: 2025-05-05
Payer: MEDICARE

## 2025-05-05 VITALS
HEART RATE: 68 BPM | DIASTOLIC BLOOD PRESSURE: 77 MMHG | WEIGHT: 261 LBS | BODY MASS INDEX: 46.25 KG/M2 | TEMPERATURE: 97.2 F | SYSTOLIC BLOOD PRESSURE: 139 MMHG | HEIGHT: 63 IN

## 2025-05-05 DIAGNOSIS — R73.03 PREDIABETES: Primary | ICD-10-CM

## 2025-05-05 DIAGNOSIS — E66.01 OBESITY, CLASS III, BMI 40-49.9 (MORBID OBESITY): ICD-10-CM

## 2025-05-05 DIAGNOSIS — Z98.84 STATUS FOLLOWING GASTRIC BANDING SURGERY FOR WEIGHT LOSS: ICD-10-CM

## 2025-05-05 DIAGNOSIS — E78.00 PURE HYPERCHOLESTEROLEMIA: ICD-10-CM

## 2025-05-05 PROBLEM — E66.813 OBESITY, CLASS III, BMI 40-49.9 (MORBID OBESITY): Status: ACTIVE | Noted: 2025-03-03

## 2025-05-05 PROCEDURE — 1160F RVW MEDS BY RX/DR IN RCRD: CPT | Performed by: SURGERY

## 2025-05-05 PROCEDURE — 1159F MED LIST DOCD IN RCRD: CPT | Performed by: SURGERY

## 2025-05-05 PROCEDURE — 99204 OFFICE O/P NEW MOD 45 MIN: CPT | Performed by: SURGERY

## 2025-05-05 PROCEDURE — 3078F DIAST BP <80 MM HG: CPT | Performed by: SURGERY

## 2025-05-05 PROCEDURE — 3075F SYST BP GE 130 - 139MM HG: CPT | Performed by: SURGERY

## 2025-05-05 NOTE — PROGRESS NOTES
"Chief Complaint  Follow-up (WAQAR band/Discuss RX)    Subjective        Shwetha Lane presents to [facility]  History of Present Illness  Patient presents for consultation--patient is interested in medical weight loss--she is status post lap band placement by Dr. Tesfaye Valladares at [facility] in 2011--patient had a preoperative weight of around 275 pounds, she got up to low 160 pounds--fortunately patient had a lot of weight recidivism and now weighs 261 pounds--patient has had all the fluid her band removed due to severe dysphagia and reflux--she states now that her symptoms are fairly mild with all the fluid out of her band.  Patient's boyfriend is currently on Mounjaro and is lost 60 pounds and the patient would like to try this--we talked about the pharmacology and mechanism of action of this medication--patient understands that her insurance would likely not pay and she will have to pay out-of-pocket, patient is agreeable to this--patient has not had history of thyroid cancer and/or pancreatitis.  Objective   Vital Signs:  /77 (BP Location: Right arm, Patient Position: Sitting, Cuff Size: Large Adult)   Pulse 68   Temp 97.2 °F (36.2 °C) (Infrared)   Ht 159 cm (62.6\")   Wt 118 kg (261 lb)   BMI 46.83 kg/m²   Estimated body mass index is 46.83 kg/m² as calculated from the following:    Height as of this encounter: 159 cm (62.6\").    Weight as of this encounter: 118 kg (261 lb).               Physical Exam   Alert, pleasant  No respiratory distress  Abdomen soft  Result Review :                   Assessment and Plan   Diagnoses and all orders for this visit:    1. Prediabetes (Primary)    2. Status following gastric banding surgery for weight loss    3. Pure hypercholesterolemia    4. Obesity, Class III, BMI 40-49.9 (morbid obesity)    I am going to prescribe the patient's Zepbound 2.5 mg q. Weekly--I told the patient that we would see her back in 1 month to see " how she tolerates the first dose of the medication--after this time would have her see our dietitian to further assess her diet--I told the patient that as we see her back and increase her dosages I will give her weight loss goals       I spent 45 minutes caring for Shwetha on this date of service. This time includes time spent by me in the following activities:preparing for the visit, reviewing tests, obtaining and/or reviewing a separately obtained history, performing a medically appropriate examination and/or evaluation , counseling and educating the patient/family/caregiver, documenting information in the medical record, and independently interpreting results and communicating that information with the patient/family/caregiver  Follow Up   No follow-ups on file.  Patient was given instructions and counseling regarding her condition or for health maintenance advice. Please see specific information pulled into the AVS if appropriate.

## 2025-05-07 ENCOUNTER — TREATMENT (OUTPATIENT)
Dept: PHYSICAL THERAPY | Facility: CLINIC | Age: 80
End: 2025-05-07
Payer: MEDICARE

## 2025-05-07 DIAGNOSIS — M70.62 TROCHANTERIC BURSITIS OF LEFT HIP: ICD-10-CM

## 2025-05-07 DIAGNOSIS — M25.552 LEFT HIP PAIN: Primary | ICD-10-CM

## 2025-05-07 PROCEDURE — 97110 THERAPEUTIC EXERCISES: CPT | Performed by: PHYSICAL THERAPIST

## 2025-05-07 NOTE — PROGRESS NOTES
Physical Therapy Daily Treatment Note       [facility] Physical Therapy           [address], [address], [place] [id number]    Patient: Shwetha Lane   : 1945  Referring practitioner: Denys Mohr MD  Date of Initial Visit: Type: THERAPY  Noted: 2025  Today's Date: 2025  Patient seen for 7 sessions         Visit Diagnoses:     ICD-10-CM ICD-9-CM   1. Left hip pain  M25.552 719.45   2. Trochanteric bursitis of left hip  M70.62 726.5         Subjective Evaluation    History of Present Illness    Subjective comment: My back and left hip are hurting me today.  I have better days but I never have good days.  I know these issues are [degenerative].  I don't know if the therapy is helping much.  I am starting tirzepatide tomorrow to try to lose weight; I am really excited about that because I think it will help me feel better.       Objective   See Exercise, Manual, and Modality Logs for complete treatment.       Assessment & Plan       Assessment  Assessment details: Patient has remained motivated and compliant with PT interventions, however she has unfortunately not seen the anticipated improvement in (L) hip symptoms.  Her symptoms do fluctuate based on the day both in intensity and location, but there is no discernible overall change in pattern or intensity of symptoms.  However, she has been encouraged to remain compliant with HEP in anticipation of building further strength of hips and core.  At this time she will be discharged to independence with HEP.  She is agreeable to this plan and all questions have been answered to her satisfaction.          Other - D/C skilled PT services in favor of independence with HEP at this time.         TIMED:  Manual Therapy:         mins  16968;  Therapeutic Exercise:    29     mins  12414;     Neuromuscular Los:        mins  55784;    Therapeutic Activity:          mins  45852;     Gait Training:           mins  21214;     Ultrasound:           mins  64061;    Work Conditioning             mins 55664    UNTIMED:  Electrical Stimulation:         mins  61763 ( );  Dry Needling          mins 46083    Timed Treatment:   29   mins   Total Treatment:     33   mins    Kristin Azevedo PT, DPT, \[facility] [place] [facility]\""  Physical Therapist  [place] License #[id number]  Electronically signed by: Kristin Azevedo PT, 05/07/25, 12:55 PM EDT

## 2025-05-22 RX ORDER — TIRZEPATIDE 2.5 MG/.5ML
INJECTION, SOLUTION SUBCUTANEOUS
Qty: 2 ML | Refills: 0 | Status: SHIPPED | OUTPATIENT
Start: 2025-05-22

## 2025-06-11 ENCOUNTER — OFFICE VISIT (OUTPATIENT)
Dept: BARIATRICS/WEIGHT MGMT | Facility: CLINIC | Age: 80
End: 2025-06-11
Payer: MEDICARE

## 2025-06-11 VITALS
BODY MASS INDEX: 45 KG/M2 | SYSTOLIC BLOOD PRESSURE: 127 MMHG | TEMPERATURE: 97.4 F | OXYGEN SATURATION: 96 % | HEART RATE: 71 BPM | WEIGHT: 254 LBS | DIASTOLIC BLOOD PRESSURE: 76 MMHG | HEIGHT: 63 IN | RESPIRATION RATE: 14 BRPM

## 2025-06-11 DIAGNOSIS — E66.01 OBESITY, CLASS III, BMI 40-49.9 (MORBID OBESITY): Primary | ICD-10-CM

## 2025-06-11 NOTE — PROGRESS NOTES
"Chief Complaint  Follow-up and Weight Loss (Fuy band zepbound discuss getting another dose adjustment )    Subjective        Shwetha Lane presents to [facility]  History of Present Illness  Patient presents for medical weight loss follow-up after previous Lap-Band--patient started on Zepbound 2.5 mg last month, she lost 7 pounds.  Patient is not having any issues with abdominal pain, dysphagia or reflux.    Reviewed patient's diet and exercise.  She is currently participating in a Zepbound online group that offers answers to questions and also does nutritional education.  Patient states that she is waiting for her pool to open so she can do water aerobics, patient states that she cannot walk on pavement because it hurts her hips too much.  Objective   Vital Signs:  /76   Pulse 71   Temp 97.4 °F (36.3 °C) (Temporal)   Resp 14   Ht 159 cm (62.6\")   Wt 115 kg (254 lb)   SpO2 96%   BMI 45.57 kg/m²   Estimated body mass index is 45.57 kg/m² as calculated from the following:    Height as of this encounter: 159 cm (62.6\").    Weight as of this encounter: 115 kg (254 lb).               Physical Exam   Alert, pleasant  No respiratory distress  Abdomen soft  Result Review :                   Assessment and Plan   Diagnoses and all orders for this visit:    1. Obesity, Class III, BMI 40-49.9 (morbid obesity) (Primary)    Other orders  -     Tirzepatide-Weight Management (ZEPBOUND) 5 MG/0.5ML solution; Inject 0.5 mL under the skin into the appropriate area as directed 1 (One) Time Per Week.  Dispense: 2 mL; Refill: 0    Will increase her dosage to 5 mg q. Weekly.  Follow-up in 2 months.       I spent 20 minutes caring for Shwetha on this date of service. This time includes time spent by me in the following activities:preparing for the visit, reviewing tests, obtaining and/or reviewing a separately obtained history, performing a medically appropriate examination and/or " evaluation , counseling and educating the patient/family/caregiver, documenting information in the medical record, and independently interpreting results and communicating that information with the patient/family/caregiver  Follow Up   No follow-ups on file.  Patient was given instructions and counseling regarding her condition or for health maintenance advice. Please see specific information pulled into the AVS if appropriate.

## 2025-06-13 RX ORDER — TIRZEPATIDE 2.5 MG/.5ML
INJECTION, SOLUTION SUBCUTANEOUS
Qty: 2 ML | Refills: 0 | Status: CANCELLED | OUTPATIENT
Start: 2025-06-13

## 2025-06-13 RX ORDER — SEMAGLUTIDE 1 MG/.5ML
1 INJECTION, SOLUTION SUBCUTANEOUS WEEKLY
Qty: 2 ML | Refills: 1 | Status: SHIPPED | OUTPATIENT
Start: 2025-06-13

## 2025-06-30 RX ORDER — TIRZEPATIDE 5 MG/.5ML
INJECTION, SOLUTION SUBCUTANEOUS
Qty: 2 ML | Refills: 0 | Status: SHIPPED | OUTPATIENT
Start: 2025-06-30

## 2025-07-15 ENCOUNTER — OFFICE VISIT (OUTPATIENT)
Dept: FAMILY MEDICINE CLINIC | Facility: CLINIC | Age: 80
End: 2025-07-15
Payer: MEDICARE

## 2025-07-15 VITALS
BODY MASS INDEX: 44.47 KG/M2 | SYSTOLIC BLOOD PRESSURE: 144 MMHG | TEMPERATURE: 98 F | OXYGEN SATURATION: 95 % | WEIGHT: 251 LBS | DIASTOLIC BLOOD PRESSURE: 64 MMHG | HEART RATE: 78 BPM | HEIGHT: 63 IN

## 2025-07-15 DIAGNOSIS — J01.00 ACUTE NON-RECURRENT MAXILLARY SINUSITIS: Primary | ICD-10-CM

## 2025-07-15 DIAGNOSIS — B37.31 YEAST VAGINITIS: ICD-10-CM

## 2025-07-15 PROCEDURE — 1160F RVW MEDS BY RX/DR IN RCRD: CPT | Performed by: STUDENT IN AN ORGANIZED HEALTH CARE EDUCATION/TRAINING PROGRAM

## 2025-07-15 PROCEDURE — 99213 OFFICE O/P EST LOW 20 MIN: CPT | Performed by: STUDENT IN AN ORGANIZED HEALTH CARE EDUCATION/TRAINING PROGRAM

## 2025-07-15 PROCEDURE — 3077F SYST BP >= 140 MM HG: CPT | Performed by: STUDENT IN AN ORGANIZED HEALTH CARE EDUCATION/TRAINING PROGRAM

## 2025-07-15 PROCEDURE — 3078F DIAST BP <80 MM HG: CPT | Performed by: STUDENT IN AN ORGANIZED HEALTH CARE EDUCATION/TRAINING PROGRAM

## 2025-07-15 PROCEDURE — 1126F AMNT PAIN NOTED NONE PRSNT: CPT | Performed by: STUDENT IN AN ORGANIZED HEALTH CARE EDUCATION/TRAINING PROGRAM

## 2025-07-15 PROCEDURE — 1159F MED LIST DOCD IN RCRD: CPT | Performed by: STUDENT IN AN ORGANIZED HEALTH CARE EDUCATION/TRAINING PROGRAM

## 2025-07-15 RX ORDER — FLUTICASONE PROPIONATE 50 MCG
2 SPRAY, SUSPENSION (ML) NASAL DAILY
Qty: 16 G | Refills: 11 | Status: SHIPPED | OUTPATIENT
Start: 2025-07-15

## 2025-07-15 RX ORDER — CETIRIZINE HYDROCHLORIDE 10 MG/1
5 TABLET ORAL NIGHTLY
Qty: 30 TABLET | Refills: 5 | Status: SHIPPED | OUTPATIENT
Start: 2025-07-15

## 2025-07-15 RX ORDER — FLUCONAZOLE 150 MG/1
150 TABLET ORAL WEEKLY
Qty: 2 TABLET | Refills: 1 | Status: SHIPPED | OUTPATIENT
Start: 2025-07-15

## 2025-07-15 RX ORDER — AMOXICILLIN 500 MG/1
500 TABLET, FILM COATED ORAL 3 TIMES DAILY
Qty: 30 TABLET | Refills: 0 | Status: SHIPPED | OUTPATIENT
Start: 2025-07-15 | End: 2025-07-25

## 2025-07-15 NOTE — PROGRESS NOTES
"Chief Complaint  Facial Pain (Having pain on right side /Start- 3-4 days ago /Draining out and in the throat /)    Mesha Lane presents to [facility]  History of Present Illness  78yo WF here for evaluation of right sinus pain.      Pt c/o right maxillary sinus pain, post nasal drip, itchy throat, HA X 4 days.    Pt c/o yeast vaginitis on ABX.  Facial Pain  Chronicity:  New  Onset:  In the past 7 days  Progression since onset:  Worse  Symptoms: nasal congestion and headaches    Symptoms: no anorexia, no joint pain, no change in stool, no chest pain, no chills, no cough, no diaphoresis, no fever, no joint swelling, no myalgias, no nausea, no neck pain, no numbness, no sore throat, no swollen glands, no dysuria, no vertigo, no visual change, no vomiting and no weakness    Treatments tried:  NSAIDs  Improvement on treatment:  Mild      Objective   Vital Signs:  /64 (BP Location: Right arm, Patient Position: Sitting, Cuff Size: Adult)   Pulse 78   Temp 98 °F (36.7 °C)   Ht 159 cm (62.6\")   Wt 114 kg (251 lb)   SpO2 95%   BMI 45.04 kg/m²   Estimated body mass index is 45.04 kg/m² as calculated from the following:    Height as of this encounter: 159 cm (62.6\").    Weight as of this encounter: 114 kg (251 lb).               Physical Exam  Constitutional:       Appearance: Normal appearance.   HENT:      Head: Normocephalic and atraumatic.      Nose:      Right Sinus: Maxillary sinus tenderness present.   Eyes:      Conjunctiva/sclera: Conjunctivae normal.   Cardiovascular:      Rate and Rhythm: Normal rate and regular rhythm.      Heart sounds: Normal heart sounds.   Pulmonary:      Effort: Pulmonary effort is normal.      Breath sounds: Normal breath sounds.   Abdominal:      General: Bowel sounds are normal.      Palpations: Abdomen is soft.      Comments: Non-tender   Skin:     General: Skin is warm.   Neurological:      General: No focal deficit " present.      Mental Status: She is alert and oriented to person, place, and time.   Psychiatric:         Mood and Affect: Mood normal.         Behavior: Behavior normal.        Result Review :    The following data was reviewed by: NELLY Elliott on 07/15/2025:  CMP          1/3/2025    14:42 2/27/2025    13:48 4/28/2025    13:26   CMP   Glucose 95   103    BUN 15   14    Creatinine 0.90   0.79    EGFR 65.2   76.2    Sodium 143   144    Potassium 4.2   4.6    Chloride 105   105    Calcium 9.6   9.3    Total Protein  7.4  6.5    Albumin  3.6  4.1    Globulin  3.8  2.4    Total Bilirubin   0.6    Alkaline Phosphatase   97    AST (SGOT)   16    ALT (SGPT)   19    Albumin/Globulin Ratio  0.9  1.7    BUN/Creatinine Ratio 16.7   17.7    Anion Gap 10.3        CBC          11/19/2024    10:07 1/3/2025    14:42 4/28/2025    13:26   CBC   WBC 8.20  7.27  8.47    RBC 4.68  4.69  4.59    Hemoglobin 14.9  14.9  14.4    Hematocrit 43.0  43.7  44.3    MCV 91.9  93.2  96.5    MCH 31.8  31.8  31.4    MCHC 34.7  34.1  32.5    RDW 12.8  12.3  13.3    Platelets 253  282  249      Lipid Panel          11/18/2024    10:37 4/28/2025    13:26   Lipid Panel   Total Cholesterol 141  150    Triglycerides 145  205    HDL Cholesterol 39  39    VLDL Cholesterol 25  34    LDL Cholesterol  77  77      TSH          11/18/2024    10:37 4/28/2025    13:26   TSH   TSH 3.390  1.920      A1C Last 3 Results          12/30/2024    11:57 4/28/2025    13:26   HGBA1C Last 3 Results   Hemoglobin A1C 5.7  5.80                       Assessment and Plan     Diagnoses and all orders for this visit:    1. Acute non-recurrent maxillary sinusitis (Primary)  -     amoxicillin (AMOXIL) 500 MG tablet; Take 1 tablet by mouth 3 (Three) Times a Day for 10 days.  Dispense: 30 tablet; Refill: 0  -     fluticasone (FLONASE) 50 MCG/ACT nasal spray; Administer 2 sprays into the nostril(s) as directed by provider Daily.  Dispense: 16 g; Refill: 11  -      cetirizine (zyrTEC) 10 MG tablet; Take 0.5 tablets by mouth Every Night.  Dispense: 30 tablet; Refill: 5    2. Yeast vaginitis  -     fluconazole (DIFLUCAN) 150 MG tablet; Take 1 tablet by mouth 1 (One) Time Per Week.  Dispense: 2 tablet; Refill: 1      Instructed patient to Return to Clinic as needed for persistent or new symptoms and/or go to ER for worsening symptoms, patient voiced understanding.          Follow Up     Return in about 3 months (around 10/15/2025) for Next scheduled follow up.  Patient was given instructions and counseling regarding her condition or for health maintenance advice. Please see specific information pulled into the AVS if appropriate.

## 2025-07-28 ENCOUNTER — OFFICE VISIT (OUTPATIENT)
Dept: FAMILY MEDICINE CLINIC | Facility: CLINIC | Age: 80
End: 2025-07-28
Payer: MEDICARE

## 2025-07-28 VITALS
HEART RATE: 85 BPM | DIASTOLIC BLOOD PRESSURE: 74 MMHG | HEIGHT: 63 IN | BODY MASS INDEX: 44.01 KG/M2 | SYSTOLIC BLOOD PRESSURE: 122 MMHG | OXYGEN SATURATION: 95 % | TEMPERATURE: 97.7 F | WEIGHT: 248.4 LBS

## 2025-07-28 DIAGNOSIS — I10 PRIMARY HYPERTENSION: ICD-10-CM

## 2025-07-28 DIAGNOSIS — L30.4 INTERTRIGO: ICD-10-CM

## 2025-07-28 DIAGNOSIS — E78.00 PURE HYPERCHOLESTEROLEMIA: Primary | ICD-10-CM

## 2025-07-28 DIAGNOSIS — E03.9 HYPOTHYROIDISM, UNSPECIFIED TYPE: ICD-10-CM

## 2025-07-28 DIAGNOSIS — D18.03 HEMANGIOMA OF INTRA-ABDOMINAL STRUCTURE: ICD-10-CM

## 2025-07-28 DIAGNOSIS — M15.0 PRIMARY OSTEOARTHRITIS INVOLVING MULTIPLE JOINTS: ICD-10-CM

## 2025-07-28 DIAGNOSIS — R73.03 PREDIABETES: ICD-10-CM

## 2025-07-28 PROCEDURE — 99214 OFFICE O/P EST MOD 30 MIN: CPT | Performed by: NURSE PRACTITIONER

## 2025-07-28 PROCEDURE — 3078F DIAST BP <80 MM HG: CPT | Performed by: NURSE PRACTITIONER

## 2025-07-28 PROCEDURE — G2211 COMPLEX E/M VISIT ADD ON: HCPCS | Performed by: NURSE PRACTITIONER

## 2025-07-28 PROCEDURE — 1126F AMNT PAIN NOTED NONE PRSNT: CPT | Performed by: NURSE PRACTITIONER

## 2025-07-28 PROCEDURE — 3074F SYST BP LT 130 MM HG: CPT | Performed by: NURSE PRACTITIONER

## 2025-07-28 RX ORDER — NYSTATIN 100000 [USP'U]/G
POWDER TOPICAL 3 TIMES DAILY
Qty: 15 G | Refills: 0 | Status: SHIPPED | OUTPATIENT
Start: 2025-07-28 | End: 2025-08-07

## 2025-07-28 NOTE — PROGRESS NOTES
"Chief Complaint  Pure hypercholesterolemia (Follow up/Fasting/) and Skin Problem (Yeast infection at groin, topical only. Requesting powder. )    Subjective        Shwetha Lane presents to [facility]  History of Present Illness     The patient presents for weight loss, skin irritation, fatty liver, arthritis, and health maintenance.    She reports experiencing weight loss, attributing it to her current diet and medication regimen. Her appetite varies, with some days being more hungry than others. She maintains a diet rich in fruits. Currently, she is on Zepbound 5 mg, increased from 2.5 mg, which she started in 2025.    She has been dealing with skin irritation, which she believes is improving due to her weight loss. She has been using nystatin powder for relief but notes that it has .    CT of abdomen shows right hepatic hypoattenuating mass suggestive of hemangioma    She occasionally takes meloxicam for pain management but prefers to avoid medication when possible. . She uses patches for her back pain.    She is currently taking cholesterol medication and thyroid medication daily. She has an upcoming appointment with Dr. Rust on 2025, whom she sees every 2 months.    Diet: She maintains a diet rich in fruits.       Objective   Vital Signs:  /74 (BP Location: Left arm, Patient Position: Sitting, Cuff Size: Large Adult)   Pulse 85   Temp 97.7 °F (36.5 °C)   Ht 159 cm (62.6\")   Wt 113 kg (248 lb 6.4 oz)   SpO2 95%   BMI 44.57 kg/m²   Estimated body mass index is 44.57 kg/m² as calculated from the following:    Height as of this encounter: 159 cm (62.6\").    Weight as of this encounter: 113 kg (248 lb 6.4 oz).            Physical Exam  Constitutional:       General: She is not in acute distress.     Appearance: Normal appearance.   HENT:      Head: Normocephalic.   Eyes:      Pupils: Pupils are equal, round, and reactive to light.   Cardiovascular: "      Rate and Rhythm: Normal rate.      Pulses: Normal pulses.      Heart sounds: Normal heart sounds.   Pulmonary:      Effort: Pulmonary effort is normal.      Breath sounds: Normal breath sounds.   Abdominal:      General: Bowel sounds are normal.      Palpations: Abdomen is soft.   Musculoskeletal:         General: Normal range of motion.      Cervical back: Normal range of motion and neck supple.   Skin:     General: Skin is warm.      Findings: Erythema and rash present.   Neurological:      General: No focal deficit present.      Mental Status: She is alert and oriented to person, place, and time.   Psychiatric:         Mood and Affect: Mood normal.         Behavior: Behavior normal.         Thought Content: Thought content normal.         Judgment: Judgment normal.        Result Review :  The following data was reviewed by: NELLY Rivera on 07/28/2025:  Common labs          2/27/2025    13:48 4/28/2025    13:26 7/28/2025    00:00   Common Labs   Glucose  103  96    BUN  14  11.0    Creatinine  0.79  0.91    Sodium  144  138    Potassium  4.6  4.7    Chloride  105  103    Calcium  9.3  9.6    Albumin 3.6  4.1  4.2    Total Bilirubin  0.6  0.7    Alkaline Phosphatase  97  106    AST (SGOT)  16  16    ALT (SGPT)  19  20    WBC  8.47  7.90    Hemoglobin  14.4  15.9    Hematocrit  44.3  48.0    Platelets  249  268    Total Cholesterol  150  137    Triglycerides  205  190    HDL Cholesterol  39  36    LDL Cholesterol   77  69    Hemoglobin A1C  5.80  5.20                Assessment and Plan   Diagnoses and all orders for this visit:    1. Pure hypercholesterolemia (Primary)  -     Lipid Panel    2. Intertrigo  -     nystatin (MYCOSTATIN) 869907 UNIT/GM powder; Apply  topically to the appropriate area as directed 3 (Three) Times a Day for 10 days.  Dispense: 15 g; Refill: 0  -     CBC & Differential    3. Primary hypertension  -     Comprehensive Metabolic Panel    4. Hypothyroidism, unspecified type  -      TSH    5. Prediabetes  -     Hemoglobin A1c    6. Hemangioma of intra-abdominal structure  -     Ambulatory Referral to Gastroenterology    7. Primary osteoarthritis involving multiple joints      Side effects of all new and old medications reviewed with the patient -willing to accept all risks involved.  Advised to rto if no improvement or worsening of symptoms.  Patient instructed to  clinical summary at .        1. Weight loss.  - Her weight has decreased from 261 pounds in May 2025 to 248 pounds currently.  - This weight loss is beneficial for overall health, including joint health.  - She started Zepbound 5 mg for weight loss on 04/15/2025 and has been using it for three months.  - Advised to continue her current regimen and follow up with bariatrics.    2. Skin irritation.  - Reports skin irritation in the folds, which has improved with weight loss.  - Previous nystatin powder prescription ; a new prescription has been provided.  - Nystatin powder can be applied up to three times daily as needed.  - The irritation is improving as she continues to lose weight.    3. Fatty liver.  - CT scan of the abdomen and pelvis revealed a hemangioma on the right side of the liver.  - Hemangiomas are typically benign and asymptomatic, with an excellent prognosis.  - Discussed the nature of hemangiomas and their vascular malformations.  - Referral to gastroenterology has been made for further evaluation.    4. Arthritis.  - She has arthritis in her back and uses meloxicam as needed for pain management.  - She also uses patches for additional relief.    5. Health maintenance.  - Cholesterol levels are well-managed, with an LDL level of 77.  - Blood pressure readings are within normal range.    - Currently taking cholesterol medication and thyroid medication daily.  - A repeat CBC will be scheduled for next week.  - Thyroid levels will be checked today.          I spent 15 minutes caring for Shwetha  on this date of service. This time includes time spent by me in the following activities:preparing for the visit, reviewing tests, obtaining and/or reviewing a separately obtained history, performing a medically appropriate examination and/or evaluation , counseling and educating the patient/family/caregiver, ordering medications, tests, or procedures, documenting information in the medical record, independently interpreting results and communicating that information with the patient/family/caregiver, and care coordination  Follow Up   No follow-ups on file.  Patient was given instructions and counseling regarding her condition or for health maintenance advice. Please see specific information pulled into the AVS if appropriate.     Patient or patient representative verbalized consent for the use of Ambient Listening during the visit with  NELLY Rivera for chart documentation. 7/30/2025  13:51 EDT

## 2025-07-29 PROBLEM — D18.03 HEMANGIOMA OF INTRA-ABDOMINAL STRUCTURE: Status: ACTIVE | Noted: 2025-07-29

## 2025-07-29 LAB
ALBUMIN SERPL-MCNC: 4.2 G/DL (ref 3.5–5.2)
ALBUMIN/GLOB SERPL: 1.6 G/DL
ALP SERPL-CCNC: 106 U/L (ref 39–117)
ALT SERPL-CCNC: 20 U/L (ref 1–33)
AST SERPL-CCNC: 16 U/L (ref 1–32)
BASOPHILS # BLD AUTO: 0.09 10*3/MM3 (ref 0–0.2)
BASOPHILS NFR BLD AUTO: 1.1 % (ref 0–1.5)
BILIRUB SERPL-MCNC: 0.7 MG/DL (ref 0–1.2)
BUN SERPL-MCNC: 11 MG/DL (ref 8–23)
BUN/CREAT SERPL: 12.1 (ref 7–25)
CALCIUM SERPL-MCNC: 9.6 MG/DL (ref 8.6–10.5)
CHLORIDE SERPL-SCNC: 103 MMOL/L (ref 98–107)
CHOLEST SERPL-MCNC: 137 MG/DL (ref 0–200)
CO2 SERPL-SCNC: 25.1 MMOL/L (ref 22–29)
CREAT SERPL-MCNC: 0.91 MG/DL (ref 0.57–1)
EGFRCR SERPLBLD CKD-EPI 2021: 63.9 ML/MIN/1.73
EOSINOPHIL # BLD AUTO: 0.19 10*3/MM3 (ref 0–0.4)
EOSINOPHIL NFR BLD AUTO: 2.4 % (ref 0.3–6.2)
ERYTHROCYTE [DISTWIDTH] IN BLOOD BY AUTOMATED COUNT: 12 % (ref 12.3–15.4)
GLOBULIN SER CALC-MCNC: 2.7 GM/DL
GLUCOSE SERPL-MCNC: 96 MG/DL (ref 65–99)
HBA1C MFR BLD: 5.2 % (ref 4.8–5.6)
HCT VFR BLD AUTO: 48 % (ref 34–46.6)
HDLC SERPL-MCNC: 36 MG/DL (ref 40–60)
HGB BLD-MCNC: 15.9 G/DL (ref 12–15.9)
IMM GRANULOCYTES # BLD AUTO: 0.04 10*3/MM3 (ref 0–0.05)
IMM GRANULOCYTES NFR BLD AUTO: 0.5 % (ref 0–0.5)
LDLC SERPL CALC-MCNC: 69 MG/DL (ref 0–100)
LYMPHOCYTES # BLD AUTO: 2.41 10*3/MM3 (ref 0.7–3.1)
LYMPHOCYTES NFR BLD AUTO: 30.5 % (ref 19.6–45.3)
MCH RBC QN AUTO: 31.5 PG (ref 26.6–33)
MCHC RBC AUTO-ENTMCNC: 33.1 G/DL (ref 31.5–35.7)
MCV RBC AUTO: 95.2 FL (ref 79–97)
MONOCYTES # BLD AUTO: 0.75 10*3/MM3 (ref 0.1–0.9)
MONOCYTES NFR BLD AUTO: 9.5 % (ref 5–12)
NEUTROPHILS # BLD AUTO: 4.42 10*3/MM3 (ref 1.7–7)
NEUTROPHILS NFR BLD AUTO: 56 % (ref 42.7–76)
NRBC BLD AUTO-RTO: 0 /100 WBC (ref 0–0.2)
PLATELET # BLD AUTO: 268 10*3/MM3 (ref 140–450)
POTASSIUM SERPL-SCNC: 4.7 MMOL/L (ref 3.5–5.2)
PROT SERPL-MCNC: 6.9 G/DL (ref 6–8.5)
RBC # BLD AUTO: 5.04 10*6/MM3 (ref 3.77–5.28)
SODIUM SERPL-SCNC: 138 MMOL/L (ref 136–145)
TRIGL SERPL-MCNC: 190 MG/DL (ref 0–150)
TSH SERPL DL<=0.005 MIU/L-ACNC: 1.47 UIU/ML (ref 0.27–4.2)
VLDLC SERPL CALC-MCNC: 32 MG/DL (ref 5–40)
WBC # BLD AUTO: 7.9 10*3/MM3 (ref 3.4–10.8)

## 2025-07-29 RX ORDER — TIRZEPATIDE 5 MG/.5ML
INJECTION, SOLUTION SUBCUTANEOUS
Qty: 2 ML | Refills: 0 | Status: SHIPPED | OUTPATIENT
Start: 2025-07-29

## 2025-08-13 ENCOUNTER — OFFICE VISIT (OUTPATIENT)
Dept: GASTROENTEROLOGY | Facility: CLINIC | Age: 80
End: 2025-08-13
Payer: MEDICARE

## 2025-08-13 VITALS
DIASTOLIC BLOOD PRESSURE: 75 MMHG | HEIGHT: 65 IN | BODY MASS INDEX: 40.72 KG/M2 | HEART RATE: 72 BPM | TEMPERATURE: 98 F | SYSTOLIC BLOOD PRESSURE: 114 MMHG | WEIGHT: 244.4 LBS

## 2025-08-13 DIAGNOSIS — K76.0 METABOLIC DYSFUNCTION-ASSOCIATED STEATOTIC LIVER DISEASE (MASLD): Primary | ICD-10-CM

## 2025-08-13 DIAGNOSIS — D18.03 HEMANGIOMA OF LIVER: ICD-10-CM

## 2025-08-13 PROCEDURE — 1160F RVW MEDS BY RX/DR IN RCRD: CPT | Performed by: PHYSICIAN ASSISTANT

## 2025-08-13 PROCEDURE — 3078F DIAST BP <80 MM HG: CPT | Performed by: PHYSICIAN ASSISTANT

## 2025-08-13 PROCEDURE — 1159F MED LIST DOCD IN RCRD: CPT | Performed by: PHYSICIAN ASSISTANT

## 2025-08-13 PROCEDURE — 3074F SYST BP LT 130 MM HG: CPT | Performed by: PHYSICIAN ASSISTANT

## 2025-08-13 PROCEDURE — 99204 OFFICE O/P NEW MOD 45 MIN: CPT | Performed by: PHYSICIAN ASSISTANT

## 2025-08-14 ENCOUNTER — OFFICE VISIT (OUTPATIENT)
Dept: BARIATRICS/WEIGHT MGMT | Facility: CLINIC | Age: 80
End: 2025-08-14
Payer: MEDICARE

## 2025-08-14 VITALS
WEIGHT: 244 LBS | TEMPERATURE: 97.8 F | BODY MASS INDEX: 43.23 KG/M2 | HEIGHT: 63 IN | DIASTOLIC BLOOD PRESSURE: 60 MMHG | HEART RATE: 72 BPM | SYSTOLIC BLOOD PRESSURE: 124 MMHG

## 2025-08-14 DIAGNOSIS — E66.813 OBESITY, CLASS III, BMI 40-49.9 (MORBID OBESITY): Primary | ICD-10-CM

## 2025-08-14 PROCEDURE — 99213 OFFICE O/P EST LOW 20 MIN: CPT | Performed by: SURGERY

## 2025-08-14 RX ORDER — TIRZEPATIDE 7.5 MG/.5ML
7.5 INJECTION, SOLUTION SUBCUTANEOUS WEEKLY
Qty: 0.5 ML | Refills: 2 | Status: SHIPPED | OUTPATIENT
Start: 2025-08-14 | End: 2025-08-15

## 2025-08-15 RX ORDER — TIRZEPATIDE 7.5 MG/.5ML
7.5 INJECTION, SOLUTION SUBCUTANEOUS WEEKLY
Qty: 0.5 ML | Refills: 2 | Status: SHIPPED | OUTPATIENT
Start: 2025-08-15

## 2025-08-19 RX ORDER — TIRZEPATIDE 7.5 MG/.5ML
7.5 INJECTION, SOLUTION SUBCUTANEOUS WEEKLY
Qty: 2 ML | Refills: 3 | Status: SHIPPED | OUTPATIENT
Start: 2025-08-19

## 2025-08-20 ENCOUNTER — OFFICE VISIT (OUTPATIENT)
Dept: BARIATRICS/WEIGHT MGMT | Facility: CLINIC | Age: 80
End: 2025-08-20
Payer: MEDICARE

## 2025-08-20 VITALS
HEIGHT: 63 IN | SYSTOLIC BLOOD PRESSURE: 146 MMHG | BODY MASS INDEX: 42.7 KG/M2 | TEMPERATURE: 97.6 F | WEIGHT: 241 LBS | HEART RATE: 94 BPM | DIASTOLIC BLOOD PRESSURE: 69 MMHG

## 2025-08-20 DIAGNOSIS — R19.7 DIARRHEA, UNSPECIFIED TYPE: Primary | ICD-10-CM

## 2025-08-20 DIAGNOSIS — E66.813 OBESITY, CLASS III, BMI 40-49.9 (MORBID OBESITY): ICD-10-CM

## 2025-08-20 PROCEDURE — 99214 OFFICE O/P EST MOD 30 MIN: CPT | Performed by: SURGERY

## (undated) DEVICE — SCRW HEX PERSONA FML 2.5X25MM PK/2
Type: IMPLANTABLE DEVICE | Site: KNEE | Status: NON-FUNCTIONAL
Removed: 2023-09-26

## (undated) DEVICE — MAT FLR ABS W/BLU/LINER 56X72IN WHT

## (undated) DEVICE — GLV SURG SENSICARE SLT PF LF 7 STRL

## (undated) DEVICE — DRP SURG U/DRP INVISISHIELD PA 48X52IN

## (undated) DEVICE — NDL HYPO ECLPS SFTY 18G 1 1/2IN

## (undated) DEVICE — DISPOSABLE TOURNIQUET CUFF SINGLE BLADDER, SINGLE PORT AND QUICK CONNECT CONNECTOR: Brand: COLOR CUFF

## (undated) DEVICE — ENCORE® LATEX ORTHO SIZE 8, STERILE LATEX POWDER-FREE SURGICAL GLOVE: Brand: ENCORE

## (undated) DEVICE — PENCL E/S SMOKEEVAC W/TELESCP CANN

## (undated) DEVICE — INTENDED FOR TISSUE SEPARATION, AND OTHER PROCEDURES THAT REQUIRE A SHARP SURGICAL BLADE TO PUNCTURE OR CUT.: Brand: BARD-PARKER ® CARBON RIB-BACK BLADES

## (undated) DEVICE — ZIPPERED TOGA, PEEL-AWAY 2X LARGE: Brand: FLYTE, SURGICOOL

## (undated) DEVICE — ANCHORING PIN 20MM/4MM

## (undated) DEVICE — CVR LEG BOOTLEG F/R NOSKID 33IN

## (undated) DEVICE — UNDYED BRAIDED (POLYGLACTIN 910), SYNTHETIC ABSORBABLE SUTURE: Brand: COATED VICRYL

## (undated) DEVICE — NO-SCRATCH ™ SMALL WHITNEY CURETTE ™ IS A SINGLE-USE, PLASTIC CURETTE FOR QUICKLY APPLYING, MANIPULATING AND REMOVING BONE CEMENT DURING HIP AND KNEE REPLACEMENT SURGERY. THE PLASTIC IS SOFTER THAN STEEL INSTRUMENTS, REDUCING THE RISK OF DAMAGING THE PROSTHESIS WITH METAL INSTRUMENTS.  THE CURETTE’S 6MM TIP REMOVES EXCESS CEMENT FROM REPLACEMENT HIPS AND KNEES. EASY-TO-MANEUVER, THE SMALL BLUE CURETTE LETS YOU REMOVE CEMENT FROM ALL EDGES OF THE PROSTHESIS.NO-SCRATCH WHITNEY SMALL CURETTE FEATURES:SAFER THAN STEEL- MADE OF PLASTIC - STURDY YET SOFTER THAN SURGICAL STEEL.HANDIER- EACH TOOL HAS A MOLDED-IN THUMB INDENTATION INSTANTLY ORIENTING THE TOOL.- EASIER TO MANEUVER IN HARD TO SEE PLACES.- COLOR-CODED FOR EASY IDENTIFICATION.FASTER- COMES INDIVIDUALLY PACKAGED IN STERILE, PEEL OPEN POUCH, READY TO GO.- APPLIES, MANIPULATES, OR REMOVES CEMENT WITH FINGERTIP PRECISION.ECONOMICAL- THE COST OF A SINGLE REVISION DWARFS THE COST OF A SINGLE-USE CURETTE. - DISPOSABLE – THERE’S NO NEED TO WASTE TIME REMOVING HARDENED CEMENT OR RE-STERILIZING TOOLS.- LESS EXPENSIVE TO BUY AND INVENTORY - ORDER ONLY THE TOOL YOU USE.- PACKAGED 25 INDIVIDUALLY WRAPPED TOOLS TO A CARTON FOR CONVENIENT SHELF STORAGE.: Brand: WHITNEY NO-SCRATCH CURETTE (SMALL)

## (undated) DEVICE — STRYKER PERFORMANCE SERIES SAGITTAL BLADE: Brand: STRYKER PERFORMANCE SERIES

## (undated) DEVICE — TOWEL,OR,DSP,ST,BLUE,STD,4/PK,20PK/CS: Brand: MEDLINE

## (undated) DEVICE — INSTRUMENT BATTERY

## (undated) DEVICE — BASIC SINGLE BASIN-LF: Brand: MEDLINE INDUSTRIES, INC.

## (undated) DEVICE — ELECTRD BLD EDGE COAT 3IN

## (undated) DEVICE — Device: Brand: PULSAVAC®

## (undated) DEVICE — STERILE POLYISOPRENE POWDER-FREE SURGICAL GLOVES: Brand: PROTEXIS

## (undated) DEVICE — SUT ETHIB 1 X538H ETX538H

## (undated) DEVICE — PROXIMATE RH ROTATING HEAD SKIN STAPLERS (35 WIDE) CONTAINS 35 STAINLESS STEEL STAPLES: Brand: PROXIMATE

## (undated) DEVICE — GLV SURG ULTRAFREE MAX LTX PF 8

## (undated) DEVICE — SUT VIC 0 CT1 36IN J946H

## (undated) DEVICE — SYR LUERLOK 30CC

## (undated) DEVICE — TOTAL KNEE-LF: Brand: MEDLINE INDUSTRIES, INC.

## (undated) DEVICE — APPL CHLORAPREP HI/LITE 26ML ORNG

## (undated) DEVICE — GAUZE,SPONGE,4"X4",16PLY,STRL,LF,10/TRAY: Brand: MEDLINE

## (undated) DEVICE — PULLOVER TOGA, 2X LARGE: Brand: FLYTE, SURGICOOL

## (undated) DEVICE — GLV SURG SENSICARE PI PF LF 7 GRN STRL

## (undated) DEVICE — DRSNG PAD ABD 8X10IN STRL

## (undated) DEVICE — 3 BONE CEMENT MIXER: Brand: MIXEVAC

## (undated) DEVICE — SOL IRR NACL 0.9PCT 3000ML

## (undated) DEVICE — SLV SCD KN/LEN ADJ EXPRSS BLENDED MD 1P/U